# Patient Record
Sex: MALE | Race: WHITE | NOT HISPANIC OR LATINO | Employment: OTHER | URBAN - METROPOLITAN AREA
[De-identification: names, ages, dates, MRNs, and addresses within clinical notes are randomized per-mention and may not be internally consistent; named-entity substitution may affect disease eponyms.]

---

## 2018-07-28 ENCOUNTER — HOSPITAL ENCOUNTER (OUTPATIENT)
Facility: HOSPITAL | Age: 68
Discharge: HOME | End: 2018-07-30
Attending: EMERGENCY MEDICINE | Admitting: HOSPITALIST
Payer: COMMERCIAL

## 2018-07-28 DIAGNOSIS — E11.8 TYPE 2 DIABETES MELLITUS WITH COMPLICATION, WITH LONG-TERM CURRENT USE OF INSULIN: ICD-10-CM

## 2018-07-28 DIAGNOSIS — I25.10 CORONARY ARTERY DISEASE: ICD-10-CM

## 2018-07-28 DIAGNOSIS — R07.9 CHEST PAIN: Primary | ICD-10-CM

## 2018-07-28 DIAGNOSIS — Z79.4 TYPE 2 DIABETES MELLITUS WITH COMPLICATION, WITH LONG-TERM CURRENT USE OF INSULIN: ICD-10-CM

## 2018-07-28 LAB
ALBUMIN SERPL BCP-MCNC: 3.5 G/DL
ALP SERPL-CCNC: 111 U/L
ALT SERPL W/O P-5'-P-CCNC: 7 U/L
ANION GAP SERPL CALC-SCNC: 9 MMOL/L
AST SERPL-CCNC: 15 U/L
BASOPHILS # BLD AUTO: 0.07 K/UL
BASOPHILS NFR BLD: 0.8 %
BILIRUB SERPL-MCNC: 0.7 MG/DL
BNP SERPL-MCNC: 13 PG/ML
BUN SERPL-MCNC: 15 MG/DL
CALCIUM SERPL-MCNC: 8.9 MG/DL
CHLORIDE SERPL-SCNC: 104 MMOL/L
CO2 SERPL-SCNC: 24 MMOL/L
CREAT SERPL-MCNC: 1.1 MG/DL
DIFFERENTIAL METHOD: ABNORMAL
EOSINOPHIL # BLD AUTO: 0.1 K/UL
EOSINOPHIL NFR BLD: 1.4 %
ERYTHROCYTE [DISTWIDTH] IN BLOOD BY AUTOMATED COUNT: 12.9 %
EST. GFR  (AFRICAN AMERICAN): >60 ML/MIN/1.73 M^2
EST. GFR  (NON AFRICAN AMERICAN): >60 ML/MIN/1.73 M^2
GLUCOSE SERPL-MCNC: 350 MG/DL
HCT VFR BLD AUTO: 40.8 %
HGB BLD-MCNC: 13.5 G/DL
IMM GRANULOCYTES # BLD AUTO: 0.05 K/UL
IMM GRANULOCYTES NFR BLD AUTO: 0.6 %
LYMPHOCYTES # BLD AUTO: 2.6 K/UL
LYMPHOCYTES NFR BLD: 29.5 %
MAGNESIUM SERPL-MCNC: 1.6 MG/DL
MCH RBC QN AUTO: 29.3 PG
MCHC RBC AUTO-ENTMCNC: 33.1 G/DL
MCV RBC AUTO: 89 FL
MONOCYTES # BLD AUTO: 0.6 K/UL
MONOCYTES NFR BLD: 6.3 %
NEUTROPHILS # BLD AUTO: 5.4 K/UL
NEUTROPHILS NFR BLD: 61.4 %
NRBC BLD-RTO: 0 /100 WBC
PHOSPHATE SERPL-MCNC: 3.4 MG/DL
PLATELET # BLD AUTO: 203 K/UL
PMV BLD AUTO: 10.9 FL
POTASSIUM SERPL-SCNC: 4.2 MMOL/L
PROT SERPL-MCNC: 6.6 G/DL
RBC # BLD AUTO: 4.6 M/UL
SODIUM SERPL-SCNC: 137 MMOL/L
TROPONIN I SERPL DL<=0.01 NG/ML-MCNC: <0.006 NG/ML
WBC # BLD AUTO: 8.85 K/UL

## 2018-07-28 PROCEDURE — 99285 EMERGENCY DEPT VISIT HI MDM: CPT | Mod: 25

## 2018-07-28 PROCEDURE — 83880 ASSAY OF NATRIURETIC PEPTIDE: CPT

## 2018-07-28 PROCEDURE — 80053 COMPREHEN METABOLIC PANEL: CPT

## 2018-07-28 PROCEDURE — G0378 HOSPITAL OBSERVATION PER HR: HCPCS

## 2018-07-28 PROCEDURE — 83735 ASSAY OF MAGNESIUM: CPT

## 2018-07-28 PROCEDURE — 85025 COMPLETE CBC W/AUTO DIFF WBC: CPT

## 2018-07-28 PROCEDURE — 93005 ELECTROCARDIOGRAM TRACING: CPT

## 2018-07-28 PROCEDURE — 84100 ASSAY OF PHOSPHORUS: CPT

## 2018-07-28 PROCEDURE — 99285 EMERGENCY DEPT VISIT HI MDM: CPT | Mod: ,,, | Performed by: NURSE PRACTITIONER

## 2018-07-28 PROCEDURE — 84484 ASSAY OF TROPONIN QUANT: CPT

## 2018-07-28 PROCEDURE — 25000003 PHARM REV CODE 250: Performed by: NURSE PRACTITIONER

## 2018-07-28 PROCEDURE — 93010 ELECTROCARDIOGRAM REPORT: CPT | Mod: ,,, | Performed by: INTERNAL MEDICINE

## 2018-07-28 PROCEDURE — 99220 PR INITIAL OBSERVATION CARE,LEVL III: CPT | Mod: ,,, | Performed by: NURSE PRACTITIONER

## 2018-07-28 PROCEDURE — 63600175 PHARM REV CODE 636 W HCPCS: Performed by: NURSE PRACTITIONER

## 2018-07-28 RX ORDER — PANTOPRAZOLE SODIUM 40 MG/1
40 TABLET, DELAYED RELEASE ORAL DAILY
Status: DISCONTINUED | OUTPATIENT
Start: 2018-07-29 | End: 2018-07-30 | Stop reason: HOSPADM

## 2018-07-28 RX ORDER — ASPIRIN 325 MG
325 TABLET ORAL
Status: COMPLETED | OUTPATIENT
Start: 2018-07-28 | End: 2018-07-28

## 2018-07-28 RX ORDER — SODIUM CHLORIDE 0.9 % (FLUSH) 0.9 %
5 SYRINGE (ML) INJECTION
Status: DISCONTINUED | OUTPATIENT
Start: 2018-07-29 | End: 2018-07-30 | Stop reason: HOSPADM

## 2018-07-28 RX ORDER — AMOXICILLIN 250 MG
1 CAPSULE ORAL 2 TIMES DAILY PRN
Status: DISCONTINUED | OUTPATIENT
Start: 2018-07-29 | End: 2018-07-30 | Stop reason: HOSPADM

## 2018-07-28 RX ORDER — IPRATROPIUM BROMIDE AND ALBUTEROL SULFATE 2.5; .5 MG/3ML; MG/3ML
3 SOLUTION RESPIRATORY (INHALATION) EVERY 4 HOURS PRN
Status: DISCONTINUED | OUTPATIENT
Start: 2018-07-29 | End: 2018-07-30 | Stop reason: HOSPADM

## 2018-07-28 RX ORDER — NITROGLYCERIN 0.4 MG/1
0.4 TABLET SUBLINGUAL EVERY 5 MIN PRN
Status: DISCONTINUED | OUTPATIENT
Start: 2018-07-28 | End: 2018-07-29

## 2018-07-28 RX ORDER — KETOROLAC TROMETHAMINE 30 MG/ML
15 INJECTION, SOLUTION INTRAMUSCULAR; INTRAVENOUS EVERY 6 HOURS PRN
Status: DISCONTINUED | OUTPATIENT
Start: 2018-07-29 | End: 2018-07-30 | Stop reason: HOSPADM

## 2018-07-28 RX ORDER — ACETAMINOPHEN 325 MG/1
650 TABLET ORAL EVERY 6 HOURS PRN
Status: DISCONTINUED | OUTPATIENT
Start: 2018-07-29 | End: 2018-07-30 | Stop reason: HOSPADM

## 2018-07-28 RX ORDER — RAMELTEON 8 MG/1
8 TABLET ORAL NIGHTLY PRN
Status: DISCONTINUED | OUTPATIENT
Start: 2018-07-29 | End: 2018-07-30 | Stop reason: HOSPADM

## 2018-07-28 RX ORDER — ONDANSETRON 8 MG/1
8 TABLET, ORALLY DISINTEGRATING ORAL EVERY 8 HOURS PRN
Status: DISCONTINUED | OUTPATIENT
Start: 2018-07-29 | End: 2018-07-30 | Stop reason: HOSPADM

## 2018-07-28 RX ORDER — ATORVASTATIN CALCIUM 20 MG/1
80 TABLET, FILM COATED ORAL NIGHTLY
Status: DISCONTINUED | OUTPATIENT
Start: 2018-07-29 | End: 2018-07-30 | Stop reason: HOSPADM

## 2018-07-28 RX ORDER — AMLODIPINE AND BENAZEPRIL HYDROCHLORIDE 5; 20 MG/1; MG/1
1 CAPSULE ORAL DAILY
Status: DISCONTINUED | OUTPATIENT
Start: 2018-07-29 | End: 2018-07-30 | Stop reason: HOSPADM

## 2018-07-28 RX ORDER — ASPIRIN 81 MG/1
81 TABLET ORAL DAILY
Status: DISCONTINUED | OUTPATIENT
Start: 2018-07-29 | End: 2018-07-30 | Stop reason: HOSPADM

## 2018-07-28 RX ADMIN — NITROGLYCERIN 0.4 MG: 0.4 TABLET SUBLINGUAL at 10:07

## 2018-07-28 RX ADMIN — ASPIRIN 325 MG ORAL TABLET 325 MG: 325 PILL ORAL at 09:07

## 2018-07-29 LAB
ALBUMIN SERPL BCP-MCNC: 3.2 G/DL
ALP SERPL-CCNC: 92 U/L
ALT SERPL W/O P-5'-P-CCNC: 8 U/L
ANION GAP SERPL CALC-SCNC: 8 MMOL/L
AST SERPL-CCNC: 11 U/L
BASOPHILS # BLD AUTO: 0.04 K/UL
BASOPHILS NFR BLD: 0.6 %
BILIRUB SERPL-MCNC: 0.5 MG/DL
BUN SERPL-MCNC: 17 MG/DL
CALCIUM SERPL-MCNC: 8.6 MG/DL
CHLORIDE SERPL-SCNC: 105 MMOL/L
CO2 SERPL-SCNC: 23 MMOL/L
CREAT SERPL-MCNC: 0.9 MG/DL
DIFFERENTIAL METHOD: ABNORMAL
EOSINOPHIL # BLD AUTO: 0.1 K/UL
EOSINOPHIL NFR BLD: 2 %
ERYTHROCYTE [DISTWIDTH] IN BLOOD BY AUTOMATED COUNT: 12.9 %
EST. GFR  (AFRICAN AMERICAN): >60 ML/MIN/1.73 M^2
EST. GFR  (NON AFRICAN AMERICAN): >60 ML/MIN/1.73 M^2
ESTIMATED AVG GLUCOSE: 214 MG/DL
GLUCOSE SERPL-MCNC: 223 MG/DL
HBA1C MFR BLD HPLC: 9.1 %
HCT VFR BLD AUTO: 38 %
HGB BLD-MCNC: 12.9 G/DL
IMM GRANULOCYTES # BLD AUTO: 0.05 K/UL
IMM GRANULOCYTES NFR BLD AUTO: 0.7 %
LYMPHOCYTES # BLD AUTO: 2.2 K/UL
LYMPHOCYTES NFR BLD: 30.5 %
MAGNESIUM SERPL-MCNC: 1.9 MG/DL
MCH RBC QN AUTO: 29.8 PG
MCHC RBC AUTO-ENTMCNC: 33.9 G/DL
MCV RBC AUTO: 88 FL
MONOCYTES # BLD AUTO: 0.5 K/UL
MONOCYTES NFR BLD: 7.4 %
NEUTROPHILS # BLD AUTO: 4.2 K/UL
NEUTROPHILS NFR BLD: 58.8 %
NRBC BLD-RTO: 0 /100 WBC
PHOSPHATE SERPL-MCNC: 3.7 MG/DL
PLATELET # BLD AUTO: 180 K/UL
PMV BLD AUTO: 11 FL
POCT GLUCOSE: 212 MG/DL (ref 70–110)
POCT GLUCOSE: 242 MG/DL (ref 70–110)
POCT GLUCOSE: 253 MG/DL (ref 70–110)
POCT GLUCOSE: 258 MG/DL (ref 70–110)
POTASSIUM SERPL-SCNC: 4.1 MMOL/L
PROT SERPL-MCNC: 6 G/DL
RBC # BLD AUTO: 4.33 M/UL
SODIUM SERPL-SCNC: 136 MMOL/L
TROPONIN I SERPL DL<=0.01 NG/ML-MCNC: 0.01 NG/ML
TROPONIN I SERPL DL<=0.01 NG/ML-MCNC: 0.02 NG/ML
TROPONIN I SERPL DL<=0.01 NG/ML-MCNC: <0.006 NG/ML
TROPONIN I SERPL DL<=0.01 NG/ML-MCNC: <0.006 NG/ML
TSH SERPL DL<=0.005 MIU/L-ACNC: 2.4 UIU/ML
WBC # BLD AUTO: 7.05 K/UL

## 2018-07-29 PROCEDURE — 36415 COLL VENOUS BLD VENIPUNCTURE: CPT

## 2018-07-29 PROCEDURE — 80053 COMPREHEN METABOLIC PANEL: CPT

## 2018-07-29 PROCEDURE — 25000003 PHARM REV CODE 250: Performed by: NURSE PRACTITIONER

## 2018-07-29 PROCEDURE — 83036 HEMOGLOBIN GLYCOSYLATED A1C: CPT

## 2018-07-29 PROCEDURE — 63600175 PHARM REV CODE 636 W HCPCS: Performed by: PHYSICIAN ASSISTANT

## 2018-07-29 PROCEDURE — 99226 PR SUBSEQUENT OBSERVATION CARE,LEVEL III: CPT | Mod: ,,, | Performed by: PHYSICIAN ASSISTANT

## 2018-07-29 PROCEDURE — 85025 COMPLETE CBC W/AUTO DIFF WBC: CPT

## 2018-07-29 PROCEDURE — 63600175 PHARM REV CODE 636 W HCPCS: Performed by: NURSE PRACTITIONER

## 2018-07-29 PROCEDURE — 84484 ASSAY OF TROPONIN QUANT: CPT

## 2018-07-29 PROCEDURE — G0378 HOSPITAL OBSERVATION PER HR: HCPCS

## 2018-07-29 PROCEDURE — 84443 ASSAY THYROID STIM HORMONE: CPT

## 2018-07-29 PROCEDURE — 93010 ELECTROCARDIOGRAM REPORT: CPT | Mod: ,,, | Performed by: INTERNAL MEDICINE

## 2018-07-29 PROCEDURE — 84100 ASSAY OF PHOSPHORUS: CPT

## 2018-07-29 PROCEDURE — 93005 ELECTROCARDIOGRAM TRACING: CPT

## 2018-07-29 PROCEDURE — 83735 ASSAY OF MAGNESIUM: CPT

## 2018-07-29 RX ORDER — IBUPROFEN 200 MG
24 TABLET ORAL
Status: DISCONTINUED | OUTPATIENT
Start: 2018-07-29 | End: 2018-07-30 | Stop reason: HOSPADM

## 2018-07-29 RX ORDER — INSULIN ASPART 100 [IU]/ML
1-10 INJECTION, SOLUTION INTRAVENOUS; SUBCUTANEOUS
Status: DISCONTINUED | OUTPATIENT
Start: 2018-07-29 | End: 2018-07-30 | Stop reason: HOSPADM

## 2018-07-29 RX ORDER — IBUPROFEN 200 MG
16 TABLET ORAL
Status: DISCONTINUED | OUTPATIENT
Start: 2018-07-29 | End: 2018-07-30 | Stop reason: HOSPADM

## 2018-07-29 RX ORDER — GLUCAGON 1 MG
1 KIT INJECTION
Status: DISCONTINUED | OUTPATIENT
Start: 2018-07-29 | End: 2018-07-30 | Stop reason: HOSPADM

## 2018-07-29 RX ADMIN — ASPIRIN 81 MG: 81 TABLET, COATED ORAL at 09:07

## 2018-07-29 RX ADMIN — KETOROLAC TROMETHAMINE 15 MG: 30 INJECTION, SOLUTION INTRAMUSCULAR at 12:07

## 2018-07-29 RX ADMIN — ATORVASTATIN CALCIUM 80 MG: 20 TABLET, FILM COATED ORAL at 11:07

## 2018-07-29 RX ADMIN — INSULIN ASPART 6 UNITS: 100 INJECTION, SOLUTION INTRAVENOUS; SUBCUTANEOUS at 01:07

## 2018-07-29 RX ADMIN — AMLODIPINE AND BENAZEPRIL HYDROCHLORIDE 1 CAPSULE: 5; 20 CAPSULE ORAL at 10:07

## 2018-07-29 RX ADMIN — INSULIN DETEMIR 40 UNITS: 100 INJECTION, SOLUTION SUBCUTANEOUS at 11:07

## 2018-07-29 RX ADMIN — INSULIN ASPART 4 UNITS: 100 INJECTION, SOLUTION INTRAVENOUS; SUBCUTANEOUS at 05:07

## 2018-07-29 RX ADMIN — INSULIN ASPART 3 UNITS: 100 INJECTION, SOLUTION INTRAVENOUS; SUBCUTANEOUS at 11:07

## 2018-07-29 RX ADMIN — PANTOPRAZOLE SODIUM 40 MG: 40 TABLET, DELAYED RELEASE ORAL at 09:07

## 2018-07-29 RX ADMIN — KETOROLAC TROMETHAMINE 15 MG: 30 INJECTION, SOLUTION INTRAMUSCULAR at 01:07

## 2018-07-29 NOTE — ASSESSMENT & PLAN NOTE
- hx of CAD s/p stent placement x 2  - initial troponin <0.006 - trend q 6 x 3  - maintain on telemetry   - continue ASA, statin  - trend electrolytes and replete as indicated

## 2018-07-29 NOTE — ED NOTES
Pt says that his heart pain started around 0900 this morning. Pt says that he took a nap to try and help, but it didn't help so he woke up and took two, 325 mg aspirins followed with 2 SL nitro. Pt says that he still doesn't have relief. Pt describes the chest pain as sharp. Pt denies aggravating or alleviating factors to chest pain.

## 2018-07-29 NOTE — H&P
[facility] Hospital Medicine  History & Physical    Patient Name: Paul Deleon  MRN: [id number]  Admission Date: 7/28/2018  Attending Physician: Geovanna Zafar MD   Primary Care Provider: Bree Larose MD    [facility] Medicine Team: [facility] E Avi Espitia NP     Patient information was obtained from patient and ER records.     Subjective:     Principal Problem:Chest pain    Chief Complaint:   Chief Complaint   Patient presents with    Chest Pain     Patient reports left sided chest pain since this AM, states that he took 2 Nitros at home and 2 325mg ASA at home with no relief. Patient reports a mild shortness of breath. Patient reports history of 2 stents.         HPI: 66 y/o gentleman, who presents to the ED with c/o sharp left sided chest pain.  He has a PMH of CAD s/p stent placement, DM, HTN, HLD, and GERD.  He reports that discomfort started around 07:00 am on 07/28/2018 and has been constant since that time.  He endorses brief mild nausea at onset of pain.  He states that discomfort is exacerbated with movement and deep inspiration.  He reports taking 325mg ASA and 2 NTG prior to arrival with no improvement in symptoms.  While in the ED he received additional dosing of NTG.  He reports that discomfort is unchanged since arrival.  Initial workup reveals no leukocytosis, afebrile, CXR with no acute findings, troponin <0.006, EKG without obvious ischemic changes.      Past Medical History:   Diagnosis Date    Coronary artery disease     Diabetes mellitus type II     GERD (gastroesophageal reflux disease)     Heart attack     Hyperlipidemia     Hypertension     MI (mitral incompetence)     MI (myocardial infarction)     Ulcer        Past Surgical History:   Procedure Laterality Date    ANKLE SURGERY      BACK SURGERY      CORONARY ANGIOPLASTY WITH STENT PLACEMENT      ELBOW SURGERY      lft    THROAT SURGERY         Review of patient's allergies indicates:  No Known  "Allergies    No current facility-administered medications on file prior to encounter.      Current Outpatient Prescriptions on File Prior to Encounter   Medication Sig    amlodipine-benazepril (LOTREL) 5-40 mg per capsule Take 1 capsule by mouth once daily.    aspirin (ECOTRIN) 81 MG EC tablet Take 1 tablet (81 mg total) by mouth once daily.    atorvastatin (LIPITOR) 80 MG tablet Take 1 tablet (80 mg total) by mouth every evening.    blood sugar diagnostic (FREESTYLE LITE STRIPS) Strp Inject 1 strip into the skin 3 (three) times daily.    cyclobenzaprine (FLEXERIL) 10 MG tablet     FREESTYLE LANCETS lancets USE THREE TIMES DAILY    hydrocodone-acetaminophen 7.5-300 mg Tab     insulin detemir (LEVEMIR FLEXPEN) 100 unit/mL (3 mL) InPn pen 40 units at bedtime    insulin lispro (HUMALOG KWIKPEN) 100 unit/mL InPn pen Inject 3 Units into the skin 4 (four) times daily before meals and nightly.    insulin needles, disposable, (BD INSULIN PEN NEEDLE UF SHORT) 31 X 5/16 " Ndle Inject 4 application into the skin 4 (four) times daily as needed.    isosorbide mononitrate (IMDUR) 30 MG 24 hr tablet Take 1 tablet (30 mg total) by mouth once daily.    metformin (GLUCOPHAGE) 1000 MG tablet Take 1 tablet (1,000 mg total) by mouth 2 (two) times daily with meals.    nitroGLYCERIN (NITROSTAT) 0.4 MG SL tablet Place 0.4 mg under the tongue every 5 (five) minutes as needed for Chest pain.    pantoprazole (PROTONIX) 40 MG tablet Take 1 tablet (40 mg total) by mouth once daily.     Family History     Problem Relation (Age of Onset)    Heart disease Mother, Father        Social History Main Topics    Smoking status: Never Smoker    Smokeless tobacco: Not on file    Alcohol use No    Drug use: No    Sexual activity: Yes     Partners: Female     Birth control/ protection: Other-see comments     Review of Systems   Constitutional: Negative for chills and fever.   HENT: Negative for congestion and trouble swallowing.    Eyes: " Negative for discharge and visual disturbance.   Respiratory: Negative for cough, chest tightness and shortness of breath.    Cardiovascular: Positive for chest pain. Negative for palpitations and leg swelling.        C/O left sided sharp CP exacerbated by deep inspiration and position changes    Gastrointestinal: Positive for nausea. Negative for abdominal distention, abdominal pain, diarrhea and vomiting.   Genitourinary: Negative for frequency and hematuria.   Musculoskeletal: Negative for back pain and joint swelling.   Skin: Negative for color change and rash.   Neurological: Negative for seizures and syncope.     Objective:     Vital Signs (Most Recent):  Temp: 97.7 °F (36.5 °C) (07/28/18 2053)  Pulse: 72 (07/28/18 2312)  Resp: 17 (07/28/18 2312)  BP: (!) 101/57 (07/28/18 2312)  SpO2: 98 % (07/28/18 2312) Vital Signs (24h Range):  Temp:  [97.7 °F (36.5 °C)] 97.7 °F (36.5 °C)  Pulse:  [71-81] 72  Resp:  [16-21] 17  SpO2:  [96 %-99 %] 98 %  BP: (101-145)/(57-90) 101/57     Weight: 94.3 kg (208 lb)  Body mass index is 33.57 kg/m².    Physical Exam   Constitutional: He is oriented to person, place, and time. He appears well-developed and well-nourished. No distress.   HENT:   Head: Normocephalic and atraumatic.   Eyes: EOM are normal. Pupils are equal, round, and reactive to light.   Neck: Normal range of motion. Neck supple.   Cardiovascular: Normal rate, regular rhythm, normal heart sounds and intact distal pulses.    No murmur heard.  Pulmonary/Chest: Effort normal and breath sounds normal. No respiratory distress. He has no wheezes. He has no rales.   Abdominal: Soft. Bowel sounds are normal. He exhibits no distension. There is no tenderness.   Musculoskeletal: Normal range of motion. He exhibits no edema.   Neurological: He is alert and oriented to person, place, and time.   Skin: Skin is warm and dry. He is not diaphoretic.   Psychiatric: He has a normal mood and affect. His behavior is normal.   Nursing  note and vitals reviewed.        CRANIAL NERVES     CN III, IV, VI   Pupils are equal, round, and reactive to light.  Extraocular motions are normal.        Significant Labs:   CBC:   Recent Labs  Lab 07/28/18 2116   WBC 8.85   HGB 13.5*   HCT 40.8        CMP:   Recent Labs  Lab 07/28/18 2116      K 4.2      CO2 24   *   BUN 15   CREATININE 1.1   CALCIUM 8.9   PROT 6.6   ALBUMIN 3.5   BILITOT 0.7   ALKPHOS 111   AST 15   ALT 7*   ANIONGAP 9   EGFRNONAA >60.0     Cardiac Markers:   Recent Labs  Lab 07/28/18 2116   BNP 13     Troponin:   Recent Labs  Lab 07/28/18 2116   TROPONINI <0.006       Significant Imaging: I have reviewed all pertinent imaging results/findings within the past 24 hours.    Assessment/Plan:     * Chest pain    - presents to ED with c/o sharp L sided chest pain exacerbated by movement and deep inspiration   - EKG without obvious ischemic changes - initial troponin <0.006 - CXR with no acute findings  - cardiology consulted while in ED  - appreciate rec's  - NPO after MN   - trend troponin Q 6 x 3  - hold NTG for now   - continue ASA and statin  - add ketorolac PRN for moderate discomfort ? Musculoskeletal discomfort          Coronary artery disease    - hx of CAD s/p stent placement x 2  - initial troponin <0.006 - trend q 6 x 3  - maintain on telemetry   - continue ASA, statin  - trend electrolytes and replete as indicated         Type 2 diabetes mellitus, with long-term current use of insulin    - glucose 350 on arrival (poorly controlled)  - resume home long acting insulin dosing  - hold metformin for now  - ISS AC/HS  - HgA1c pending         Hypertension associated with diabetes    - continue home antihypertensive regimen         Hyperlipidemia    - resume atorvastatin           VTE Risk Mitigation         Ordered     IP VTE HIGH RISK PATIENT  Once      07/28/18 2350     Place BELTRAN hose  Until discontinued      07/28/18 2350     Place sequential compression device   Until discontinued      07/28/18 4691             Avi Espitia NP  Department of Hospital Medicine   [facility]

## 2018-07-29 NOTE — SUBJECTIVE & OBJECTIVE
"Past Medical History:   Diagnosis Date    Coronary artery disease     Diabetes mellitus type II     GERD (gastroesophageal reflux disease)     Heart attack     Hyperlipidemia     Hypertension     MI (mitral incompetence)     MI (myocardial infarction)     Ulcer        Past Surgical History:   Procedure Laterality Date    ANKLE SURGERY      BACK SURGERY      CORONARY ANGIOPLASTY WITH STENT PLACEMENT      ELBOW SURGERY      lft    THROAT SURGERY         Review of patient's allergies indicates:  No Known Allergies    No current facility-administered medications on file prior to encounter.      Current Outpatient Prescriptions on File Prior to Encounter   Medication Sig    amlodipine-benazepril (LOTREL) 5-40 mg per capsule Take 1 capsule by mouth once daily.    aspirin (ECOTRIN) 81 MG EC tablet Take 1 tablet (81 mg total) by mouth once daily.    atorvastatin (LIPITOR) 80 MG tablet Take 1 tablet (80 mg total) by mouth every evening.    blood sugar diagnostic (FREESTYLE LITE STRIPS) Strp Inject 1 strip into the skin 3 (three) times daily.    cyclobenzaprine (FLEXERIL) 10 MG tablet     FREESTYLE LANCETS lancets USE THREE TIMES DAILY    hydrocodone-acetaminophen 7.5-300 mg Tab     insulin detemir (LEVEMIR FLEXPEN) 100 unit/mL (3 mL) InPn pen 40 units at bedtime    insulin lispro (HUMALOG KWIKPEN) 100 unit/mL InPn pen Inject 3 Units into the skin 4 (four) times daily before meals and nightly.    insulin needles, disposable, (BD INSULIN PEN NEEDLE UF SHORT) 31 X 5/16 " Ndle Inject 4 application into the skin 4 (four) times daily as needed.    isosorbide mononitrate (IMDUR) 30 MG 24 hr tablet Take 1 tablet (30 mg total) by mouth once daily.    metformin (GLUCOPHAGE) 1000 MG tablet Take 1 tablet (1,000 mg total) by mouth 2 (two) times daily with meals.    nitroGLYCERIN (NITROSTAT) 0.4 MG SL tablet Place 0.4 mg under the tongue every 5 (five) minutes as needed for Chest pain.    pantoprazole " (PROTONIX) 40 MG tablet Take 1 tablet (40 mg total) by mouth once daily.     Family History     Problem Relation (Age of Onset)    Heart disease Mother, Father        Social History Main Topics    Smoking status: Never Smoker    Smokeless tobacco: Not on file    Alcohol use No    Drug use: No    Sexual activity: Yes     Partners: Female     Birth control/ protection: Other-see comments     Review of Systems   Constitutional: Negative for chills and fever.   HENT: Negative for congestion and trouble swallowing.    Eyes: Negative for discharge and visual disturbance.   Respiratory: Negative for cough, chest tightness and shortness of breath.    Cardiovascular: Positive for chest pain. Negative for palpitations and leg swelling.        C/O left sided sharp CP exacerbated by deep inspiration and position changes    Gastrointestinal: Positive for nausea. Negative for abdominal distention, abdominal pain, diarrhea and vomiting.   Genitourinary: Negative for frequency and hematuria.   Musculoskeletal: Negative for back pain and joint swelling.   Skin: Negative for color change and rash.   Neurological: Negative for seizures and syncope.     Objective:     Vital Signs (Most Recent):  Temp: 97.7 °F (36.5 °C) (07/28/18 2053)  Pulse: 72 (07/28/18 2312)  Resp: 17 (07/28/18 2312)  BP: (!) 101/57 (07/28/18 2312)  SpO2: 98 % (07/28/18 2312) Vital Signs (24h Range):  Temp:  [97.7 °F (36.5 °C)] 97.7 °F (36.5 °C)  Pulse:  [71-81] 72  Resp:  [16-21] 17  SpO2:  [96 %-99 %] 98 %  BP: (101-145)/(57-90) 101/57     Weight: 94.3 kg (208 lb)  Body mass index is 33.57 kg/m².    Physical Exam   Constitutional: He is oriented to person, place, and time. He appears well-developed and well-nourished. No distress.   HENT:   Head: Normocephalic and atraumatic.   Eyes: EOM are normal. Pupils are equal, round, and reactive to light.   Neck: Normal range of motion. Neck supple.   Cardiovascular: Normal rate, regular rhythm, normal heart sounds  and intact distal pulses.    No murmur heard.  Pulmonary/Chest: Effort normal and breath sounds normal. No respiratory distress. He has no wheezes. He has no rales.   Abdominal: Soft. Bowel sounds are normal. He exhibits no distension. There is no tenderness.   Musculoskeletal: Normal range of motion. He exhibits no edema.   Neurological: He is alert and oriented to person, place, and time.   Skin: Skin is warm and dry. He is not diaphoretic.   Psychiatric: He has a normal mood and affect. His behavior is normal.   Nursing note and vitals reviewed.        CRANIAL NERVES     CN III, IV, VI   Pupils are equal, round, and reactive to light.  Extraocular motions are normal.        Significant Labs:   CBC:   Recent Labs  Lab 07/28/18 2116   WBC 8.85   HGB 13.5*   HCT 40.8        CMP:   Recent Labs  Lab 07/28/18 2116      K 4.2      CO2 24   *   BUN 15   CREATININE 1.1   CALCIUM 8.9   PROT 6.6   ALBUMIN 3.5   BILITOT 0.7   ALKPHOS 111   AST 15   ALT 7*   ANIONGAP 9   EGFRNONAA >60.0     Cardiac Markers:   Recent Labs  Lab 07/28/18 2116   BNP 13     Troponin:   Recent Labs  Lab 07/28/18 2116   TROPONINI <0.006       Significant Imaging: I have reviewed all pertinent imaging results/findings within the past 24 hours.

## 2018-07-29 NOTE — HPI
68 y/o gentleman, who presents to the ED with c/o sharp left sided chest pain.  He has a PMH of CAD s/p stent placement, DM, HTN, HLD, and GERD.  He reports that discomfort started around 07:00 am on 07/28/2018 and has been constant since that time.  He endorses brief mild nausea at onset of pain.  He states that discomfort is exacerbated with movement and deep inspiration.  He reports taking 325mg ASA and 2 NTG prior to arrival with no improvement in symptoms.  While in the ED he received additional dosing of NTG.  He reports that discomfort is unchanged since arrival.  Initial workup reveals no leukocytosis, afebrile, CXR with no acute findings, troponin <0.006, EKG without obvious ischemic changes.

## 2018-07-29 NOTE — ASSESSMENT & PLAN NOTE
- presents to ED with c/o sharp L sided chest pain exacerbated by movement and deep inspiration   - EKG without obvious ischemic changes - initial troponin <0.006 - CXR with no acute findings  - cardiology consulted while in ED  - appreciate rec's  - NPO after MN   - trend troponin Q 6 x 3  - hold NTG for now   - continue ASA and statin  - add ketorolac PRN for moderate discomfort ? Musculoskeletal discomfort

## 2018-07-29 NOTE — ASSESSMENT & PLAN NOTE
63 y/o male with Hx of HTN, HLD, Obesity, CAD (STEMI s/p OM3 stent in 05/13, angiogram in 9/2013 with nonobstructive disease, reported PCI at OSH in 2014-no records), DM2, preserved EF presenting with nonanginal, constant CP for last ~12 hrs. No change with now SL NTG x4. Cardiac enzymes negative, no significant EKG changes; no CXR findings. BP equivalent in both arms and pulses intact. Wells 0, no signs/sx of PE. Does note some worsening with inspiration, worse with leaning forward. MSK most likely, pleurisy or pericarditis also possible. Bedside echo unremarkable. Unlikely CAD by hx, negative enzymes and EKG, though with prior obstructive CAD and RFs, risk intermediate by Heart, BIBIANA.     -Admit to Medicine  -NPO at midnight, would obtain DSE in AM if available on Sun  -Serial Troponin, EKG  -Continue current medications including ASA, statin  -Would not give further NTG

## 2018-07-29 NOTE — ED PROVIDER NOTES
Encounter Date: 7/28/2018       History     Chief Complaint   Patient presents with    Chest Pain     Patient reports left sided chest pain since this AM, states that he took 2 Nitros at home and 2 325mg ASA at home with no relief. Patient reports a mild shortness of breath. Patient reports history of 2 stents.      Patient is a 67-year-old male with medical history of diabetes mellitus, GERD, CAD, hyperlipidemia, hypertension, mi presenting to the ED for left-sided chest pain. Patient describes pain as stabbing and constant.  Patient denies any radiation.  Patient states he took 2 nitro as well as to 325 aspirin at home which did not resolve symptoms. Patient states he had cardiac catheterization x2 5 years ago.  Patient does not follow up with cardiology at this time.  Patient denies any shortness of breath, fever, chills, nausea, vomiting or diarrhea.          Review of patient's allergies indicates:  No Known Allergies  Past Medical History:   Diagnosis Date    Coronary artery disease     Diabetes mellitus type II     GERD (gastroesophageal reflux disease)     Heart attack     Hyperlipidemia     Hypertension     MI (mitral incompetence)     MI (myocardial infarction)     Ulcer      Past Surgical History:   Procedure Laterality Date    ANKLE SURGERY      BACK SURGERY      CORONARY ANGIOPLASTY WITH STENT PLACEMENT      ELBOW SURGERY      lft    THROAT SURGERY       Family History   Problem Relation Age of Onset    Heart disease Mother         cad    Heart disease Father         pacemaker    Melanoma Neg Hx     Psoriasis Neg Hx     Lupus Neg Hx     Eczema Neg Hx     Acne Neg Hx      Social History   Substance Use Topics    Smoking status: Never Smoker    Smokeless tobacco: Not on file    Alcohol use No     Review of Systems   Constitutional: Negative for chills, diaphoresis and fever.   HENT: Negative for sore throat.    Respiratory: Negative for chest tightness, shortness of breath and  wheezing.    Cardiovascular: Positive for chest pain. Negative for palpitations.   Gastrointestinal: Negative for abdominal pain, constipation, diarrhea, nausea and vomiting.   Genitourinary: Negative for dysuria.   Musculoskeletal: Negative for back pain.   Skin: Negative for color change, pallor and rash.   Neurological: Negative for dizziness, syncope, weakness, numbness and headaches.   Hematological: Does not bruise/bleed easily.       Physical Exam     Initial Vitals [07/28/18 2053]   BP Pulse Resp Temp SpO2   135/80 81 18 97.7 °F (36.5 °C) 96 %      MAP       --         Physical Exam    Nursing note and vitals reviewed.  Constitutional: He appears well-developed and well-nourished. He is not diaphoretic. He is cooperative. He does not have a sickly appearance. He does not appear ill. No distress.   HENT:   Head: Normocephalic and atraumatic.   Eyes: Pupils are equal, round, and reactive to light.   Neck: Trachea normal, normal range of motion, full passive range of motion without pain and phonation normal. Neck supple. No JVD present.   Cardiovascular: Normal rate, regular rhythm and normal heart sounds.   Pulses:       Radial pulses are 2+ on the right side, and 2+ on the left side.        Dorsalis pedis pulses are 2+ on the right side, and 2+ on the left side.   Pulmonary/Chest: Effort normal and breath sounds normal. Chest wall is not dull to percussion. He exhibits tenderness ( mild). He exhibits no mass, no bony tenderness, no laceration, no crepitus, no edema, no deformity, no swelling and no retraction.       Abdominal: Soft. Bowel sounds are normal.   Musculoskeletal: Normal range of motion.   Neurological: He is alert and oriented to person, place, and time. He has normal strength. No cranial nerve deficit or sensory deficit. GCS eye subscore is 4. GCS verbal subscore is 5. GCS motor subscore is 6.   Skin: Skin is warm, dry and intact. Capillary refill takes less than 2 seconds. No abrasion, no  "laceration and no rash noted. No cyanosis. Nails show no clubbing.   Psychiatric: He has a normal mood and affect. His speech is normal and behavior is normal. Judgment and thought content normal. Cognition and memory are normal.         ED Course   Procedures  Labs Reviewed   CBC W/ AUTO DIFFERENTIAL - Abnormal; Notable for the following:        Result Value    Hemoglobin 13.5 (*)     Immature Granulocytes 0.6 (*)     Immature Grans (Abs) 0.05 (*)     All other components within normal limits   COMPREHENSIVE METABOLIC PANEL - Abnormal; Notable for the following:     Glucose 350 (*)     ALT 7 (*)     All other components within normal limits   TROPONIN I   B-TYPE NATRIURETIC PEPTIDE   PHOSPHORUS   MAGNESIUM   TROPONIN I          Imaging Results          X-Ray Chest AP Portable (Final result)  Result time 07/28/18 21:25:23    Final result by Jonathan Hinojosa MD (07/28/18 21:25:23)                 Impression:      No acute cardiopulmonary finding.      Electronically signed by: Jonathan Hinojosa MD  Date:    07/28/2018  Time:    21:25             Narrative:    EXAMINATION:  XR CHEST AP PORTABLE    CLINICAL HISTORY:  Provided history is "Chest Pain;  ".    TECHNIQUE:  One view of the chest.    COMPARISON:  08/27/2013.    FINDINGS:  Evaluation is slightly limited by lordotic patient positioning and cardiac wires overlying the chest.  Cardiac silhouette is magnified by technique but not felt to be enlarged.  No focal consolidation. No sizable pleural effusion. No pneumothorax. No detrimental change in lung aeration.                                    Additional MDM:   PERC Rule:   Age is greater than or equal to 50 = 1.0  Heart Rate is greater than or equal to 100 = 0.0  SaO2 on room air < 95% = 0.0  Unilateral leg swelling = 0.0  Hemoptysis = 0.0  Recent surgery or trauma = 0.0  Prior PE or DVT =  0.0  Hormone use = 0.00  PERC Score = 1    APC / Resident Notes:   Emergent evaluation of a 66 yo male patient presenting to " the ER with chief complaint of left sided chest pain that started approximatly 0700 this morning.  Pt states he took 650mg ASA as well as 2 sublingual nitros with no relief.  Patient states pain is stabbing and constant and does not radiate.  On exam patient A&O x3. Abdomen soft and nontender.  Mild chest wall pain to palpation. EKG shows junctional rhythm.  I will get labs, imaging, medicate and reassess.  Differential diagnoses include but are not limited to ischemic chest pain (STEMI, NonSTEMI, or unstable angina), pulmonary embolism, aortic dissection, pericarditis, chest wall pain (rib strain, muscle strain, shingles), pneumonia, esophageal rupture, referred pain from the abdomen (biliary colic, cholecystitis, gastritis, gas pains, pancreatitis), anxiety or other causes of chest pain (GERD, esophageal spasm).  I discussed the care of this patient with my Supervising Physician.      Patient's CBC unremarkable. CMP shows elevated glucose at 3:50 a.m. troponin negative, BNP 13.  Phosphorus within normal limits at 3.4 with a magnesium of 1.6.  Chest x-ray negative.  Will consult Cardiology and reassess.  Chads score 4.  Cardiology recommending observation admission with stress echo in the am.  Pt and family updated on results.  All questions and concerns addressed.                     Clinical Impression:   The encounter diagnosis was Chest pain.      Disposition:   Disposition: Placed in Observation  Condition: Stable                        Mendy Duncan NP  07/28/18 0152

## 2018-07-29 NOTE — HPI
61 y/o male with Hx of HTN, HLD, Obesity, CAD (STEMI s/p OM3 stent in 05/13, angiogram in 9/2013 with nonobstructive disease, reported PCI at OSH in 2014-no records), DM2, preserved EF presenting with chest pain. His pain started while resting at home around 10 AM, no clear precipitant. Left sided, substernal without radiation or associated symptoms, constant since 10 AM. He took 2 SL NTG and 2x 325 ASA without relief at home. He thinks its more different than similar to his prior CP before his 2 PCIs; more severe and sharp, without radiation to his LUE as before. Feels slightly worse with sitting forward, slightly worse with deep breaths. No SOB, diaphoresis, N/V, heartburn/GERD, ISIDORO, PND, orhthopnea.    He was last seen by cardiology in clinic in 2014, doing well at that time, asymptomatic from a cardiovascular perspective. Last echo 2014 with EF 60-65%, no WMA, normal LV and RV function, normal diastology. LHC in 5/2013 with occluded OM3 s/p successful PCI. Repeat in 9/2013 with nonobstructive CAD; noted stenoses 10% pLAD, 40% ramus, 20% OM2, mRCA 10%, dRCA 10%; widely patent stent OM3; otherwise luminal irregularities.               Tn here negative, BNP 23. CXR without acute findings. /90 on presentation. EKG limited by baseline artifact, NSR, no obvious STD or TW changes. Bedside echo with intact EF, no WMA, trace pericardial effusion.    He took 2 further SL NTG here without relief.     Currently on ASA 81, atorvastatin 80, amlodipine-benazepril.

## 2018-07-29 NOTE — PLAN OF CARE
Problem: Patient Care Overview  Goal: Plan of Care Review  Outcome: Ongoing (interventions implemented as appropriate)  Pt will remain free of falls: provide non-skid socks to pt, place call light and personal belongings within reach of pt.  Pt will remain free of pain:  Monitor pt's pain level during rounding.  Will continue to monitor pt's blood sugar levels

## 2018-07-29 NOTE — CONSULTS
[facility] [address]  Cardiology  Consult Note    Patient Name: Paul Deleon  MRN: [id number]  Admission Date: 7/28/2018  Hospital Length of Stay: 0 days  Code Status: Full Code   Attending Provider: Geovanna Zafar MD   Consulting Provider: Antony Galvan MD  Primary Care Physician: Bree Larose MD  Principal Problem:Chest pain    Patient information was obtained from past medical records and ER records.     Inpatient consult to Cardiology  Consult performed by: ANTONY GALVAN  Consult ordered by: JOIE REILLY        Subjective:     Chief Complaint:  Chest pain    HPI:   63 y/o male with Hx of HTN, HLD, Obesity, CAD (STEMI s/p OM3 stent in 05/13, angiogram in 9/2013 with nonobstructive disease, reported PCI at OSH in 2014-no records), DM2, preserved EF presenting with chest pain. His pain started while resting at home around 10 AM, no clear precipitant. Left sided, substernal without radiation or associated symptoms, constant since 10 AM. He took 2 SL NTG and 2x 325 ASA without relief at home. He thinks its more different than similar to his prior CP before his 2 PCIs; more severe and sharp, without radiation to his LUE as before. Feels slightly worse with sitting forward, slightly worse with deep breaths. No SOB, diaphoresis, N/V, heartburn/GERD, ISIDORO, PND, orhthopnea.    He was last seen by cardiology in clinic in 2014, doing well at that time, asymptomatic from a cardiovascular perspective. Last echo 2014 with EF 60-65%, no WMA, normal LV and RV function, normal diastology. LHC in 5/2013 with occluded OM3 s/p successful PCI. Repeat in 9/2013 with nonobstructive CAD; noted stenoses 10% pLAD, 40% ramus, 20% OM2, mRCA 10%, dRCA 10%; widely patent stent OM3; otherwise luminal irregularities.               Tn here negative, BNP 23. CXR without acute findings. /90 on presentation. EKG limited by baseline artifact, NSR, no obvious STD or TW changes. Bedside echo with intact EF, no WMA,  "trace pericardial effusion.    He took 2 further SL NTG here without relief.     Currently on ASA 81, atorvastatin 80, amlodipine-benazepril.     Past Medical History:   Diagnosis Date    Coronary artery disease     Diabetes mellitus type II     GERD (gastroesophageal reflux disease)     Heart attack     Hyperlipidemia     Hypertension     MI (mitral incompetence)     MI (myocardial infarction)     Ulcer        Past Surgical History:   Procedure Laterality Date    ANKLE SURGERY      BACK SURGERY      CORONARY ANGIOPLASTY WITH STENT PLACEMENT      ELBOW SURGERY      lft    THROAT SURGERY         Review of patient's allergies indicates:  No Known Allergies    No current facility-administered medications on file prior to encounter.      Current Outpatient Prescriptions on File Prior to Encounter   Medication Sig    amlodipine-benazepril (LOTREL) 5-40 mg per capsule Take 1 capsule by mouth once daily.    aspirin (ECOTRIN) 81 MG EC tablet Take 1 tablet (81 mg total) by mouth once daily.    atorvastatin (LIPITOR) 80 MG tablet Take 1 tablet (80 mg total) by mouth every evening.    blood sugar diagnostic (FREESTYLE LITE STRIPS) Strp Inject 1 strip into the skin 3 (three) times daily.    cyclobenzaprine (FLEXERIL) 10 MG tablet     FREESTYLE LANCETS lancets USE THREE TIMES DAILY    hydrocodone-acetaminophen 7.5-300 mg Tab     insulin detemir (LEVEMIR FLEXPEN) 100 unit/mL (3 mL) InPn pen 40 units at bedtime    insulin lispro (HUMALOG KWIKPEN) 100 unit/mL InPn pen Inject 3 Units into the skin 4 (four) times daily before meals and nightly.    insulin needles, disposable, (BD INSULIN PEN NEEDLE UF SHORT) 31 X 5/16 " Ndle Inject 4 application into the skin 4 (four) times daily as needed.    isosorbide mononitrate (IMDUR) 30 MG 24 hr tablet Take 1 tablet (30 mg total) by mouth once daily.    metformin (GLUCOPHAGE) 1000 MG tablet Take 1 tablet (1,000 mg total) by mouth 2 (two) times daily with meals.    " nitroGLYCERIN (NITROSTAT) 0.4 MG SL tablet Place 0.4 mg under the tongue every 5 (five) minutes as needed for Chest pain.    pantoprazole (PROTONIX) 40 MG tablet Take 1 tablet (40 mg total) by mouth once daily.     Family History     Problem Relation (Age of Onset)    Heart disease Mother, Father        Social History Main Topics    Smoking status: Never Smoker    Smokeless tobacco: Not on file    Alcohol use No    Drug use: No    Sexual activity: Yes     Partners: Female     Birth control/ protection: Other-see comments     Review of Systems   All other systems reviewed and are negative.    Objective:     Vital Signs (Most Recent):  Temp: 97.7 °F (36.5 °C) (07/28/18 2053)  Pulse: 74 (07/28/18 2214)  Resp: (!) 21 (07/28/18 2214)  BP: 117/66 (07/28/18 2202)  SpO2: 97 % (07/28/18 2214) Vital Signs (24h Range):  Temp:  [97.7 °F (36.5 °C)] 97.7 °F (36.5 °C)  Pulse:  [72-81] 74  Resp:  [16-21] 21  SpO2:  [96 %-99 %] 97 %  BP: (117-145)/(66-90) 117/66     Weight: 94.3 kg (208 lb)  Body mass index is 33.57 kg/m².    SpO2: 97 %  O2 Device (Oxygen Therapy): room air    No intake or output data in the 24 hours ending 07/28/18 2234    Lines/Drains/Airways     Peripheral Intravenous Line                 Peripheral IV - Single Lumen 08/27/13 Right Antecubital 1796 days         Peripheral IV - Single Lumen 07/28/18 2123 Left Antecubital less than 1 day                Physical Exam   Constitutional: He is oriented to person, place, and time. He appears well-nourished.   Obese male, NAD   HENT:   Head: Atraumatic.   Mouth/Throat: No oropharyngeal exudate.   Eyes: EOM are normal. No scleral icterus.   Neck: Neck supple. No JVD present.   Cardiovascular: Normal rate, regular rhythm and normal heart sounds.  Exam reveals no gallop and no friction rub.    No murmur heard.  Pulmonary/Chest: Effort normal and breath sounds normal. No respiratory distress. He has no wheezes. He has no rales. He exhibits no tenderness.   Abdominal:  Soft. Bowel sounds are normal. He exhibits no distension. There is no tenderness.   Musculoskeletal: He exhibits no edema.   Neurological: He is alert and oriented to person, place, and time. No cranial nerve deficit.   Skin: Skin is warm and dry. No erythema.   Psychiatric: He has a normal mood and affect.       Significant Labs:   CMP   Recent Labs  Lab 07/28/18 2116      K 4.2      CO2 24   *   BUN 15   CREATININE 1.1   CALCIUM 8.9   PROT 6.6   ALBUMIN 3.5   BILITOT 0.7   ALKPHOS 111   AST 15   ALT 7*   ANIONGAP 9   ESTGFRAFRICA >60.0   EGFRNONAA >60.0   , CBC   Recent Labs  Lab 07/28/18 2116   WBC 8.85   HGB 13.5*   HCT 40.8      , Lipid Panel No results for input(s): CHOL, HDL, LDLCALC, TRIG, CHOLHDL in the last 48 hours. and Troponin   Recent Labs  Lab 07/28/18 2116   TROPONINI <0.006       Significant Imaging: Echocardiogram:   2D echo with color flow doppler:   Results for orders placed or performed during the hospital encounter of 05/18/13   2D echo with color flow doppler   Result Value Ref Range    EF 65     Diastolic Dysfunction No     and EKG: NSR, normal axis, baseline artifact, no obvious STT abnormalities  Tele: no events, normal sinus rhythm    Assessment and Plan:     * Chest pain    61 y/o male with Hx of HTN, HLD, Obesity, CAD (STEMI s/p OM3 stent in 05/13, angiogram in 9/2013 with nonobstructive disease, reported PCI at OSH in 2014-no records), DM2, preserved EF presenting with nonanginal, constant CP for last ~12 hrs. No change with now SL NTG x4. Cardiac enzymes negative, no significant EKG changes; no CXR findings. BP equivalent in both arms and pulses intact. Wells 0, no signs/sx of PE. Does note some worsening with inspiration, worse with leaning forward. MSK most likely, pleurisy or pericarditis also possible. Bedside echo unremarkable. Unlikely CAD by hx, negative enzymes and EKG, though with prior obstructive CAD and RFs, risk intermediate by Heart, [place].      -Admit to Medicine  -NPO at midnight, would obtain DSE in AM if available on Sun  -Serial Troponin, EKG  -Continue current medications including ASA, statin  -Would not give further NTG          Thank you for your consult. I will sign off. Please contact us if you have any additional questions.    Antony Erazo MD  Cardiology   [facility]

## 2018-07-29 NOTE — ASSESSMENT & PLAN NOTE
- glucose 350 on arrival (poorly controlled)  - resume home long acting insulin dosing  - hold metformin for now  - ISS AC/HS  - HgA1c pending

## 2018-07-29 NOTE — SUBJECTIVE & OBJECTIVE
"Past Medical History:   Diagnosis Date    Coronary artery disease     Diabetes mellitus type II     GERD (gastroesophageal reflux disease)     Heart attack     Hyperlipidemia     Hypertension     MI (mitral incompetence)     MI (myocardial infarction)     Ulcer        Past Surgical History:   Procedure Laterality Date    ANKLE SURGERY      BACK SURGERY      CORONARY ANGIOPLASTY WITH STENT PLACEMENT      ELBOW SURGERY      lft    THROAT SURGERY         Review of patient's allergies indicates:  No Known Allergies    No current facility-administered medications on file prior to encounter.      Current Outpatient Prescriptions on File Prior to Encounter   Medication Sig    amlodipine-benazepril (LOTREL) 5-40 mg per capsule Take 1 capsule by mouth once daily.    aspirin (ECOTRIN) 81 MG EC tablet Take 1 tablet (81 mg total) by mouth once daily.    atorvastatin (LIPITOR) 80 MG tablet Take 1 tablet (80 mg total) by mouth every evening.    blood sugar diagnostic (FREESTYLE LITE STRIPS) Strp Inject 1 strip into the skin 3 (three) times daily.    cyclobenzaprine (FLEXERIL) 10 MG tablet     FREESTYLE LANCETS lancets USE THREE TIMES DAILY    hydrocodone-acetaminophen 7.5-300 mg Tab     insulin detemir (LEVEMIR FLEXPEN) 100 unit/mL (3 mL) InPn pen 40 units at bedtime    insulin lispro (HUMALOG KWIKPEN) 100 unit/mL InPn pen Inject 3 Units into the skin 4 (four) times daily before meals and nightly.    insulin needles, disposable, (BD INSULIN PEN NEEDLE UF SHORT) 31 X 5/16 " Ndle Inject 4 application into the skin 4 (four) times daily as needed.    isosorbide mononitrate (IMDUR) 30 MG 24 hr tablet Take 1 tablet (30 mg total) by mouth once daily.    metformin (GLUCOPHAGE) 1000 MG tablet Take 1 tablet (1,000 mg total) by mouth 2 (two) times daily with meals.    nitroGLYCERIN (NITROSTAT) 0.4 MG SL tablet Place 0.4 mg under the tongue every 5 (five) minutes as needed for Chest pain.    pantoprazole " (PROTONIX) 40 MG tablet Take 1 tablet (40 mg total) by mouth once daily.     Family History     Problem Relation (Age of Onset)    Heart disease Mother, Father        Social History Main Topics    Smoking status: Never Smoker    Smokeless tobacco: Not on file    Alcohol use No    Drug use: No    Sexual activity: Yes     Partners: Female     Birth control/ protection: Other-see comments     Review of Systems   All other systems reviewed and are negative.    Objective:     Vital Signs (Most Recent):  Temp: 97.7 °F (36.5 °C) (07/28/18 2053)  Pulse: 74 (07/28/18 2214)  Resp: (!) 21 (07/28/18 2214)  BP: 117/66 (07/28/18 2202)  SpO2: 97 % (07/28/18 2214) Vital Signs (24h Range):  Temp:  [97.7 °F (36.5 °C)] 97.7 °F (36.5 °C)  Pulse:  [72-81] 74  Resp:  [16-21] 21  SpO2:  [96 %-99 %] 97 %  BP: (117-145)/(66-90) 117/66     Weight: 94.3 kg (208 lb)  Body mass index is 33.57 kg/m².    SpO2: 97 %  O2 Device (Oxygen Therapy): room air    No intake or output data in the 24 hours ending 07/28/18 2234    Lines/Drains/Airways     Peripheral Intravenous Line                 Peripheral IV - Single Lumen 08/27/13 Right Antecubital 1796 days         Peripheral IV - Single Lumen 07/28/18 2123 Left Antecubital less than 1 day                Physical Exam   Constitutional: He is oriented to person, place, and time. He appears well-nourished.   Obese male, NAD   HENT:   Head: Atraumatic.   Mouth/Throat: No oropharyngeal exudate.   Eyes: EOM are normal. No scleral icterus.   Neck: Neck supple. No JVD present.   Cardiovascular: Normal rate, regular rhythm and normal heart sounds.  Exam reveals no gallop and no friction rub.    No murmur heard.  Pulmonary/Chest: Effort normal and breath sounds normal. No respiratory distress. He has no wheezes. He has no rales. He exhibits no tenderness.   Abdominal: Soft. Bowel sounds are normal. He exhibits no distension. There is no tenderness.   Musculoskeletal: He exhibits no edema.   Neurological:  He is alert and oriented to person, place, and time. No cranial nerve deficit.   Skin: Skin is warm and dry. No erythema.   Psychiatric: He has a normal mood and affect.       Significant Labs:   CMP   Recent Labs  Lab 07/28/18 2116      K 4.2      CO2 24   *   BUN 15   CREATININE 1.1   CALCIUM 8.9   PROT 6.6   ALBUMIN 3.5   BILITOT 0.7   ALKPHOS 111   AST 15   ALT 7*   ANIONGAP 9   ESTGFRAFRICA >60.0   EGFRNONAA >60.0   , CBC   Recent Labs  Lab 07/28/18 2116   WBC 8.85   HGB 13.5*   HCT 40.8      , Lipid Panel No results for input(s): CHOL, HDL, LDLCALC, TRIG, CHOLHDL in the last 48 hours. and Troponin   Recent Labs  Lab 07/28/18 2116   TROPONINI <0.006       Significant Imaging: Echocardiogram:   2D echo with color flow doppler:   Results for orders placed or performed during the hospital encounter of 05/18/13   2D echo with color flow doppler   Result Value Ref Range    EF 65     Diastolic Dysfunction No     and EKG: NSR, normal axis, baseline artifact, no obvious STT abnormalities  Tele: no events, normal sinus rhythm

## 2018-07-30 ENCOUNTER — DOCUMENTATION ONLY (OUTPATIENT)
Dept: CARDIOLOGY | Facility: CLINIC | Age: 68
End: 2018-07-30

## 2018-07-30 VITALS
WEIGHT: 214.5 LBS | DIASTOLIC BLOOD PRESSURE: 81 MMHG | RESPIRATION RATE: 18 BRPM | TEMPERATURE: 96 F | SYSTOLIC BLOOD PRESSURE: 131 MMHG | HEIGHT: 66 IN | HEART RATE: 81 BPM | BODY MASS INDEX: 34.47 KG/M2 | OXYGEN SATURATION: 96 %

## 2018-07-30 LAB
ALBUMIN SERPL BCP-MCNC: 3.7 G/DL
ALP SERPL-CCNC: 91 U/L
ALT SERPL W/O P-5'-P-CCNC: 11 U/L
ANION GAP SERPL CALC-SCNC: 9 MMOL/L
AST SERPL-CCNC: 16 U/L
BASOPHILS # BLD AUTO: 0.05 K/UL
BASOPHILS NFR BLD: 0.7 %
BILIRUB SERPL-MCNC: 0.9 MG/DL
BUN SERPL-MCNC: 14 MG/DL
CALCIUM SERPL-MCNC: 9.5 MG/DL
CHLORIDE SERPL-SCNC: 105 MMOL/L
CO2 SERPL-SCNC: 26 MMOL/L
CREAT SERPL-MCNC: 0.8 MG/DL
DIASTOLIC DYSFUNCTION: NO
DIFFERENTIAL METHOD: ABNORMAL
EOSINOPHIL # BLD AUTO: 0.1 K/UL
EOSINOPHIL NFR BLD: 1.7 %
ERYTHROCYTE [DISTWIDTH] IN BLOOD BY AUTOMATED COUNT: 13.1 %
EST. GFR  (AFRICAN AMERICAN): >60 ML/MIN/1.73 M^2
EST. GFR  (NON AFRICAN AMERICAN): >60 ML/MIN/1.73 M^2
GLUCOSE SERPL-MCNC: 182 MG/DL
HCT VFR BLD AUTO: 43.5 %
HGB BLD-MCNC: 14.5 G/DL
IMM GRANULOCYTES # BLD AUTO: 0.05 K/UL
IMM GRANULOCYTES NFR BLD AUTO: 0.7 %
LYMPHOCYTES # BLD AUTO: 2.1 K/UL
LYMPHOCYTES NFR BLD: 28 %
MAGNESIUM SERPL-MCNC: 2.1 MG/DL
MCH RBC QN AUTO: 29.5 PG
MCHC RBC AUTO-ENTMCNC: 33.3 G/DL
MCV RBC AUTO: 88 FL
MONOCYTES # BLD AUTO: 0.5 K/UL
MONOCYTES NFR BLD: 6.7 %
NEUTROPHILS # BLD AUTO: 4.7 K/UL
NEUTROPHILS NFR BLD: 62.2 %
NRBC BLD-RTO: 0 /100 WBC
PHOSPHATE SERPL-MCNC: 3.6 MG/DL
PLATELET # BLD AUTO: 211 K/UL
PMV BLD AUTO: 10.9 FL
POCT GLUCOSE: 134 MG/DL (ref 70–110)
POCT GLUCOSE: 188 MG/DL (ref 70–110)
POTASSIUM SERPL-SCNC: 4.3 MMOL/L
PROT SERPL-MCNC: 6.9 G/DL
RBC # BLD AUTO: 4.92 M/UL
SODIUM SERPL-SCNC: 140 MMOL/L
WBC # BLD AUTO: 7.6 K/UL

## 2018-07-30 PROCEDURE — 93017 CV STRESS TEST TRACING ONLY: CPT

## 2018-07-30 PROCEDURE — 80053 COMPREHEN METABOLIC PANEL: CPT

## 2018-07-30 PROCEDURE — 36415 COLL VENOUS BLD VENIPUNCTURE: CPT

## 2018-07-30 PROCEDURE — 93018 CV STRESS TEST I&R ONLY: CPT | Mod: ,,, | Performed by: INTERNAL MEDICINE

## 2018-07-30 PROCEDURE — G0378 HOSPITAL OBSERVATION PER HR: HCPCS

## 2018-07-30 PROCEDURE — 93016 CV STRESS TEST SUPVJ ONLY: CPT | Mod: ,,, | Performed by: INTERNAL MEDICINE

## 2018-07-30 PROCEDURE — 84100 ASSAY OF PHOSPHORUS: CPT

## 2018-07-30 PROCEDURE — 63600175 PHARM REV CODE 636 W HCPCS: Performed by: PHYSICIAN ASSISTANT

## 2018-07-30 PROCEDURE — 83735 ASSAY OF MAGNESIUM: CPT

## 2018-07-30 PROCEDURE — 85025 COMPLETE CBC W/AUTO DIFF WBC: CPT

## 2018-07-30 PROCEDURE — 25000003 PHARM REV CODE 250: Performed by: NURSE PRACTITIONER

## 2018-07-30 PROCEDURE — 82962 GLUCOSE BLOOD TEST: CPT | Mod: 91

## 2018-07-30 RX ORDER — NITROGLYCERIN 0.4 MG/1
0.4 TABLET SUBLINGUAL EVERY 5 MIN PRN
Status: DISPENSED | OUTPATIENT
Start: 2018-07-30 | End: 2018-07-30

## 2018-07-30 RX ORDER — NITROGLYCERIN 0.4 MG/1
0.4 TABLET SUBLINGUAL EVERY 5 MIN PRN
Status: DISCONTINUED | OUTPATIENT
Start: 2018-07-30 | End: 2018-07-30

## 2018-07-30 RX ADMIN — ASPIRIN 81 MG: 81 TABLET, COATED ORAL at 08:07

## 2018-07-30 RX ADMIN — INSULIN ASPART 2 UNITS: 100 INJECTION, SOLUTION INTRAVENOUS; SUBCUTANEOUS at 08:07

## 2018-07-30 RX ADMIN — NITROGLYCERIN 0.4 MG: 0.4 TABLET SUBLINGUAL at 01:07

## 2018-07-30 RX ADMIN — AMLODIPINE AND BENAZEPRIL HYDROCHLORIDE 1 CAPSULE: 5; 20 CAPSULE ORAL at 08:07

## 2018-07-30 RX ADMIN — PANTOPRAZOLE SODIUM 40 MG: 40 TABLET, DELAYED RELEASE ORAL at 08:07

## 2018-07-30 NOTE — PLAN OF CARE
Problem: Patient Care Overview  Goal: Plan of Care Review  Outcome: Ongoing (interventions implemented as appropriate)  Pt's VSS, NAD noted. Pt is sinus rhythm on cardiac monitor. Pt denies pain at this time. Pt aware of NPO status and impending nuclear medicine test. Bed locked in lowest position, side rails upx2, call bell within reach, nonskid socks on pt. Pt aware may be discharged today after test.

## 2018-07-30 NOTE — PROGRESS NOTES
Pt returned to OBS 9 via stretcher with transport tech. VSS, NAD noted. Pt c/o of left sided nonradiating, nonreproducible chest pain that was a 10/10 during nuclear test and has decreased to a 3/10. PA paged.

## 2018-07-30 NOTE — ASSESSMENT & PLAN NOTE
- hx of CAD s/p stent placement x 2  - troponin negative x 3  - maintain on telemetry   - continue ASA, statin

## 2018-07-30 NOTE — PLAN OF CARE
07/30/18 1633   Final Note   Assessment Type Final Discharge Note     Patient discharged home with no needs on 7/30/18.

## 2018-07-30 NOTE — PROGRESS NOTES
Patient verified by 2 identifiers and allergies reviewed.  22g IV in place to Rt Hand.  Lexiscan testing explained to patient, patient verbalized understanding, consent obtained & testing completed.  Pt C/O 5/10 non-radiating CP, HA & nausea after Regadenoson administration.  CP resolved 7 changed to stomach discomfort during the recovery period.  IV flushed post testing.  Post study discharge instructions reviewed with patient, patient verbalized understanding.  Patient taken to Nuclear Med by BonzerDarg for completion of pictures.

## 2018-07-30 NOTE — PLAN OF CARE
Patient off the unit having a stress test done in nuclear med when CM rounded. Will continue to follow.

## 2018-07-30 NOTE — PROGRESS NOTES
Gibson Coffey PA aware that pt returned to OBS and has 3/10 nonradiating left sided chest pain. PA gave verbal order for one time order of SL .04 nitroglycerin x3 tabs for chest pain as needed.

## 2018-07-30 NOTE — PROGRESS NOTES
[facility] Hospital Medicine  Progress Note    Patient Name: Paul Deleon  MRN: [id number]  Patient Class: OP- Observation   Admission Date: 7/28/2018  Length of Stay: 0 days  Attending Physician: Geovanna Zafar MD  Primary Care Provider: Bree Larose MD    [facility] Medicine Team: [facility] MED E Gibson Coffey PA-C    Subjective:     Principal Problem:Chest pain    HPI:  66 y/o gentleman, who presents to the ED with c/o sharp left sided chest pain.  He has a PMH of CAD s/p stent placement, DM, HTN, HLD, and GERD.  He reports that discomfort started around 07:00 am on 07/28/2018 and has been constant since that time.  He endorses brief mild nausea at onset of pain.  He states that discomfort is exacerbated with movement and deep inspiration.  He reports taking 325mg ASA and 2 NTG prior to arrival with no improvement in symptoms.  While in the ED he received additional dosing of NTG.  He reports that discomfort is unchanged since arrival.  Initial workup reveals no leukocytosis, afebrile, CXR with no acute findings, troponin <0.006, EKG without obvious ischemic changes.      Hospital Course:  Patient admitted to observation for chest pain. NM Spec stress ordered for 7/30.    Interval History: No events overnight. NPO at MN for stress.    Review of Systems   Constitutional: Negative for chills and fever.   HENT: Negative for congestion and trouble swallowing.    Eyes: Negative for discharge and visual disturbance.   Respiratory: Negative for cough, chest tightness and shortness of breath.    Cardiovascular: Positive for chest pain. Negative for palpitations and leg swelling.        C/O left sided sharp CP exacerbated by deep inspiration and position changes    Gastrointestinal: Positive for nausea. Negative for abdominal distention, abdominal pain, diarrhea and vomiting.   Genitourinary: Negative for frequency and hematuria.   Musculoskeletal: Negative for back pain and joint  swelling.   Skin: Negative for color change and rash.   Neurological: Negative for seizures and syncope.     Objective:     Vital Signs (Most Recent):  Temp: 96.3 °F (35.7 °C) (07/29/18 1609)  Pulse: 75 (07/29/18 1900)  Resp: 16 (07/29/18 1609)  BP: 137/78 (07/29/18 1609)  SpO2: (!) 94 % (07/29/18 1609) Vital Signs (24h Range):  Temp:  [96.2 °F (35.7 °C)-97.7 °F (36.5 °C)] 96.3 °F (35.7 °C)  Pulse:  [64-83] 75  Resp:  [14-21] 16  SpO2:  [94 %-99 %] 94 %  BP: (101-145)/(57-90) 137/78     Weight: 97 kg (213 lb 13.5 oz)  Body mass index is 34.52 kg/m².    Intake/Output Summary (Last 24 hours) at 07/29/18 1952  Last data filed at 07/29/18 1500   Gross per 24 hour   Intake              240 ml   Output              450 ml   Net             -210 ml      Physical Exam   Constitutional: He is oriented to person, place, and time. He appears well-developed and well-nourished.   Cardiovascular: Normal rate, regular rhythm, normal heart sounds and intact distal pulses.    No murmur heard.  Pulmonary/Chest: Effort normal and breath sounds normal. He has no wheezes.   Abdominal: Soft. Bowel sounds are normal. There is no tenderness.   Musculoskeletal: Normal range of motion. He exhibits no edema.   Neurological: He is alert and oriented to person, place, and time.   Skin: Skin is warm and dry.   Psychiatric: He has a normal mood and affect. His behavior is normal.   Nursing note and vitals reviewed.      Significant Labs:   BMP:   Recent Labs  Lab 07/29/18  0548   *      K 4.1      CO2 23   BUN 17   CREATININE 0.9   CALCIUM 8.6*   MG 1.9     CBC:   Recent Labs  Lab 07/28/18  2116 07/29/18  0548   WBC 8.85 7.05   HGB 13.5* 12.9*   HCT 40.8 38.0*    180     Troponin:   Recent Labs  Lab 07/29/18  0549 07/29/18  1301 07/29/18  1838   TROPONINI <0.006 0.021 0.006       Significant Imaging: I have reviewed all pertinent imaging results/findings within the past 24 hours.    Assessment/Plan:      * Chest pain     - presents to ED with c/o sharp L sided chest pain exacerbated by movement and deep inspiration   - EKG without obvious ischemic changes - initial troponin <0.006 - CXR with no acute findings  - cardiology consulted while in ED  - NM spec stress ordered, patient cannot tolerate DSE  - NPO after MN   - hold NTG for now   - continue ASA and statin  - add ketorolac PRN for moderate discomfort ? Musculoskeletal discomfort          Type 2 diabetes mellitus, with long-term current use of insulin    - glucose 350 on arrival (poorly controlled)  - resume home long acting insulin dosing  - hold metformin for now  - ISS AC/HS        Coronary artery disease    - hx of CAD s/p stent placement x 2  - troponin negative x 3  - maintain on telemetry   - continue ASA, statin        Hypertension associated with diabetes    - continue home antihypertensive regimen         Hyperlipidemia    - see above          VTE Risk Mitigation         Ordered     IP VTE HIGH RISK PATIENT  Once      07/28/18 2350     Place BELTRAN hose  Until discontinued      07/28/18 2350     Place sequential compression device  Until discontinued      07/28/18 2350              Gibson Coffey PA-C  Department of Hospital Medicine   [facility]

## 2018-07-30 NOTE — ASSESSMENT & PLAN NOTE
- presents to ED with c/o sharp L sided chest pain exacerbated by movement and deep inspiration   - EKG without obvious ischemic changes - initial troponin <0.006 - CXR with no acute findings  - cardiology consulted while in ED  - NM spec stress ordered, patient cannot tolerate DSE  - NPO after MN   - hold NTG for now   - continue ASA and statin  - add ketorolac PRN for moderate discomfort ? Musculoskeletal discomfort

## 2018-07-30 NOTE — PROGRESS NOTES
Gibson Coffey PA aware that pt's BS is 188 and per insulin sliding scale order to give 2U of Novolog. PA stated it was okay to give 2 units of Novolog even though pt is NPO for test.

## 2018-07-30 NOTE — PROGRESS NOTES
Pt discharged and ambulated off unit. VSS, NAD noted. Pt received discharge instructions and verbalized understanding. Pt denies pain at this time. IV removed with catheter intact.

## 2018-07-30 NOTE — ASSESSMENT & PLAN NOTE
- glucose 350 on arrival (poorly controlled)  - resume home long acting insulin dosing  - hold metformin for now  - ISS AC/HS

## 2018-07-30 NOTE — PROGRESS NOTES
Pt's chest pain decreased from 3/10 to a 2/10 after one dose of nitroglycerin. Pt's BP dropped down to 109/78. Gibson DUMONT aware and stated not to give another dose of Nitro. PRN Ketorolac will be requested from pharmacy.

## 2018-07-30 NOTE — SUBJECTIVE & OBJECTIVE
Interval History: No events overnight. NPO at MN for stress.    Review of Systems   Constitutional: Negative for chills and fever.   HENT: Negative for congestion and trouble swallowing.    Eyes: Negative for discharge and visual disturbance.   Respiratory: Negative for cough, chest tightness and shortness of breath.    Cardiovascular: Positive for chest pain. Negative for palpitations and leg swelling.        C/O left sided sharp CP exacerbated by deep inspiration and position changes    Gastrointestinal: Positive for nausea. Negative for abdominal distention, abdominal pain, diarrhea and vomiting.   Genitourinary: Negative for frequency and hematuria.   Musculoskeletal: Negative for back pain and joint swelling.   Skin: Negative for color change and rash.   Neurological: Negative for seizures and syncope.     Objective:     Vital Signs (Most Recent):  Temp: 96.3 °F (35.7 °C) (07/29/18 1609)  Pulse: 75 (07/29/18 1900)  Resp: 16 (07/29/18 1609)  BP: 137/78 (07/29/18 1609)  SpO2: (!) 94 % (07/29/18 1609) Vital Signs (24h Range):  Temp:  [96.2 °F (35.7 °C)-97.7 °F (36.5 °C)] 96.3 °F (35.7 °C)  Pulse:  [64-83] 75  Resp:  [14-21] 16  SpO2:  [94 %-99 %] 94 %  BP: (101-145)/(57-90) 137/78     Weight: 97 kg (213 lb 13.5 oz)  Body mass index is 34.52 kg/m².    Intake/Output Summary (Last 24 hours) at 07/29/18 1952  Last data filed at 07/29/18 1500   Gross per 24 hour   Intake              240 ml   Output              450 ml   Net             -210 ml      Physical Exam   Constitutional: He is oriented to person, place, and time. He appears well-developed and well-nourished.   Cardiovascular: Normal rate, regular rhythm, normal heart sounds and intact distal pulses.    No murmur heard.  Pulmonary/Chest: Effort normal and breath sounds normal. He has no wheezes.   Abdominal: Soft. Bowel sounds are normal. There is no tenderness.   Musculoskeletal: Normal range of motion. He exhibits no edema.   Neurological: He is alert and  oriented to person, place, and time.   Skin: Skin is warm and dry.   Psychiatric: He has a normal mood and affect. His behavior is normal.   Nursing note and vitals reviewed.      Significant Labs:   BMP:   Recent Labs  Lab 07/29/18  0548   *      K 4.1      CO2 23   BUN 17   CREATININE 0.9   CALCIUM 8.6*   MG 1.9     CBC:   Recent Labs  Lab 07/28/18  2116 07/29/18  0548   WBC 8.85 7.05   HGB 13.5* 12.9*   HCT 40.8 38.0*    180     Troponin:   Recent Labs  Lab 07/29/18  0549 07/29/18  1301 07/29/18  1838   TROPONINI <0.006 0.021 0.006       Significant Imaging: I have reviewed all pertinent imaging results/findings within the past 24 hours.

## 2018-07-30 NOTE — PLAN OF CARE
Problem: Patient Care Overview  Goal: Plan of Care Review  Outcome: Ongoing (interventions implemented as appropriate)  Pt with no falls or injuries this shift. BS in 200s. Pt started on SSI. Pt assymptomatic. Pt reports chest pain 2/10.  Cardiac monitoring NSR.

## 2018-07-30 NOTE — PROGRESS NOTES
Pt transferred to cardiac test via stretcher with transport tech. VSS, NAD noted. Pt ambulated to stretcher independently with even gait.

## 2018-07-30 NOTE — HOSPITAL COURSE
Patient admitted to observation for chest pain. NM Spec stress completed and negative for ischemia. Troponins negative. Patient stable for discharge with Cardiology follow up.

## 2018-08-01 NOTE — ASSESSMENT & PLAN NOTE
- presents to ED with c/o sharp L sided chest pain exacerbated by movement and deep inspiration   - EKG without ischemic changes  - NM spec stress ordered, patient cannot tolerate DSE  - negative for ischemia  - continue ASA and statin  - add ketorolac PRN for moderate discomfort ? Musculoskeletal discomfort    - amb ref to Cardiology

## 2018-08-01 NOTE — DISCHARGE SUMMARY
[facility] Hospital Medicine  Discharge Summary      Patient Name: Paul Deleon  MRN: [id number]  Admission Date: 7/28/2018  Hospital Length of Stay: 0 days  Discharge Date and Time: 7/30/2018  2:46 PM  Attending Physician: No att. providers found   Discharging Provider: Gibson Coffey PA-C  Primary Care Provider: Bree Larose MD  [facility] Medicine Team: [facility]OS[facility] MED E Gibson Coffey PA-C    HPI:   66 y/o gentleman, who presents to the ED with c/o sharp left sided chest pain.  He has a PMH of CAD s/p stent placement, DM, HTN, HLD, and GERD.  He reports that discomfort started around 07:00 am on 07/28/2018 and has been constant since that time.  He endorses brief mild nausea at onset of pain.  He states that discomfort is exacerbated with movement and deep inspiration.  He reports taking 325mg ASA and 2 NTG prior to arrival with no improvement in symptoms.  While in the ED he received additional dosing of NTG.  He reports that discomfort is unchanged since arrival.  Initial workup reveals no leukocytosis, afebrile, CXR with no acute findings, troponin <0.006, EKG without obvious ischemic changes.      * No surgery found *      Hospital Course:   Patient admitted to observation for chest pain. NM Spec stress completed and negative for ischemia. Troponins negative. Patient stable for discharge with Cardiology follow up.     Consults:   Consults         Status Ordering Provider     Inpatient consult to Cardiology  Once     Provider:  (Not yet assigned)    Completed NAHOMI GALO          * Chest pain    - presents to ED with c/o sharp L sided chest pain exacerbated by movement and deep inspiration   - EKG without ischemic changes  - NM spec stress ordered, patient cannot tolerate DSE  - negative for ischemia  - continue ASA and statin  - add ketorolac PRN for moderate discomfort ? Musculoskeletal discomfort    - amb ref to Cardiology        Type 2 diabetes mellitus, with  long-term current use of insulin    - glucose 350 on arrival (poorly controlled)  - resume home long acting insulin dosing  - hold metformin for now  - ISS AC/HS        Coronary artery disease    - hx of CAD s/p stent placement x 2  - troponin negative x 3  - maintain on telemetry   - continue ASA, statin        Hypertension associated with diabetes    - continue home antihypertensive regimen         Hyperlipidemia    - see above          Final Active Diagnoses:    Diagnosis Date Noted POA    PRINCIPAL PROBLEM:  Chest pain [R07.9] 07/28/2018 Yes    Type 2 diabetes mellitus, with long-term current use of insulin [E11.9, Z79.4] 10/17/2013 Not Applicable    Coronary artery disease [I25.10] 09/24/2013 Yes    Hypertension associated with diabetes [E11.59, I10] 05/07/2013 Yes    Hyperlipidemia [E78.5]  Yes      Problems Resolved During this Admission:    Diagnosis Date Noted Date Resolved POA       Discharged Condition: good    Disposition: Home or Self Care    Follow Up:  Follow-up Information     Antony Erazo MD On 8/10/2018.    Specialty:  Cardiovascular Disease  Why:  at 10:00 AM  Contact information:  [address] [facility] [address]  [phone]             Bree Larose MD On 8/16/2018.    Specialty:  Internal Medicine  Why:  at 10:00 AM  Contact information:  [address]  [place] [address]  [phone]                 Patient Instructions:     Ambulatory referral to Cardiology   Referral Priority: Routine Referral Type: Consultation   Referral Reason: Specialty Services Required    Requested Specialty: Cardiology    Number of Visits Requested: 1      Diet diabetic     Diet Cardiac     Notify your health care provider if you experience any of the following:  persistent nausea and vomiting or diarrhea     Notify your health care provider if you experience any of the following:  severe uncontrolled pain     Notify your health care provider if you experience any of the following:   difficulty breathing or increased cough     Notify your health care provider if you experience any of the following:  persistent dizziness, light-headedness, or visual disturbances     Notify your health care provider if you experience any of the following:  increased confusion or weakness     Activity as tolerated         Significant Diagnostic Studies:     Pending Diagnostic Studies:     None         Medications:  Reconciled Home Medications:      Medication List      CONTINUE taking these medications    amlodipine-benazepril 5-40 mg per capsule  Commonly known as:  LOTREL  Take 1 capsule by mouth once daily.     aspirin 81 MG EC tablet  Commonly known as:  ECOTRIN  Take 1 tablet (81 mg total) by mouth once daily.     atorvastatin 80 MG tablet  Commonly known as:  LIPITOR  Take 1 tablet (80 mg total) by mouth every evening.     blood sugar diagnostic Strp  Commonly known as:  FREESTYLE LITE STRIPS  Inject 1 strip into the skin 3 (three) times daily.     cyclobenzaprine 10 MG tablet  Commonly known as:  FLEXERIL     FREESTYLE LANCETS 28 gauge Misc  Generic drug:  lancets  USE THREE TIMES DAILY     HYDROcodone-acetaminophen 7.5-300 mg Tab     insulin detemir U-100 100 unit/mL (3 mL) Inpn pen  Commonly known as:  LEVEMIR FLEXPEN  40 units at bedtime     insulin lispro 100 unit/mL Inpn pen  Commonly known as:  HUMALOG KWIKPEN  Inject 3 Units into the skin 4 (four) times daily before meals and nightly.     isosorbide mononitrate 30 MG 24 hr tablet  Commonly known as:  IMDUR  Take 1 tablet (30 mg total) by mouth once daily.     metFORMIN 1000 MG tablet  Commonly known as:  GLUCOPHAGE  Take 1 tablet (1,000 mg total) by mouth 2 (two) times daily with meals.     nitroGLYCERIN 0.4 MG SL tablet  Commonly known as:  NITROSTAT  Place 0.4 mg under the tongue every 5 (five) minutes as needed for Chest pain.     pantoprazole 40 MG tablet  Commonly known as:  PROTONIX  Take 1 tablet (40 mg total) by mouth once daily.     pen  "needle, diabetic 31 gauge x 5/16" Ndle  Commonly known as:  BD ULTRA-FINE SHORT PEN NEEDLE  Inject 4 application into the skin 4 (four) times daily as needed.            Indwelling Lines/Drains at time of discharge:   Lines/Drains/Airways          No matching active lines, drains, or airways          Time spent on the discharge of patient: 36 minutes  Patient was seen and examined on the date of discharge and determined to be suitable for discharge.         Gibson Coffey PA-C  Department of Hospital Medicine  [facility]  "

## 2018-08-07 ENCOUNTER — TELEPHONE (OUTPATIENT)
Dept: CARDIOLOGY | Facility: CLINIC | Age: 68
End: 2018-08-07

## 2018-08-07 DIAGNOSIS — R00.2 PALPITATION: Primary | ICD-10-CM

## 2018-08-10 ENCOUNTER — OFFICE VISIT (OUTPATIENT)
Dept: CARDIOLOGY | Facility: CLINIC | Age: 68
End: 2018-08-10
Payer: COMMERCIAL

## 2018-08-10 ENCOUNTER — HOSPITAL ENCOUNTER (OUTPATIENT)
Dept: CARDIOLOGY | Facility: CLINIC | Age: 68
Discharge: HOME | End: 2018-08-10
Payer: COMMERCIAL

## 2018-08-10 VITALS
DIASTOLIC BLOOD PRESSURE: 71 MMHG | HEIGHT: 66 IN | SYSTOLIC BLOOD PRESSURE: 113 MMHG | WEIGHT: 207.25 LBS | HEART RATE: 75 BPM | BODY MASS INDEX: 33.31 KG/M2

## 2018-08-10 DIAGNOSIS — I15.2 HYPERTENSION ASSOCIATED WITH DIABETES: ICD-10-CM

## 2018-08-10 DIAGNOSIS — E78.5 HYPERLIPIDEMIA, UNSPECIFIED HYPERLIPIDEMIA TYPE: ICD-10-CM

## 2018-08-10 DIAGNOSIS — R00.2 PALPITATION: ICD-10-CM

## 2018-08-10 DIAGNOSIS — I25.10 CORONARY ARTERY DISEASE INVOLVING NATIVE HEART, ANGINA PRESENCE UNSPECIFIED, UNSPECIFIED VESSEL OR LESION TYPE: ICD-10-CM

## 2018-08-10 DIAGNOSIS — R07.9 CHEST PAIN, UNSPECIFIED TYPE: Primary | ICD-10-CM

## 2018-08-10 DIAGNOSIS — E11.59 HYPERTENSION ASSOCIATED WITH DIABETES: ICD-10-CM

## 2018-08-10 PROCEDURE — 99999 PR PBB SHADOW E&M-EST. PATIENT-LVL IV: CPT | Mod: PBBFAC,,, | Performed by: STUDENT IN AN ORGANIZED HEALTH CARE EDUCATION/TRAINING PROGRAM

## 2018-08-10 PROCEDURE — 99203 OFFICE O/P NEW LOW 30 MIN: CPT | Mod: S$GLB,,, | Performed by: STUDENT IN AN ORGANIZED HEALTH CARE EDUCATION/TRAINING PROGRAM

## 2018-08-10 PROCEDURE — 3078F DIAST BP <80 MM HG: CPT | Mod: CPTII,S$GLB,, | Performed by: STUDENT IN AN ORGANIZED HEALTH CARE EDUCATION/TRAINING PROGRAM

## 2018-08-10 PROCEDURE — 3074F SYST BP LT 130 MM HG: CPT | Mod: CPTII,S$GLB,, | Performed by: STUDENT IN AN ORGANIZED HEALTH CARE EDUCATION/TRAINING PROGRAM

## 2018-08-10 PROCEDURE — 93000 ELECTROCARDIOGRAM COMPLETE: CPT | Mod: S$GLB,,, | Performed by: INTERNAL MEDICINE

## 2018-08-10 PROCEDURE — 3046F HEMOGLOBIN A1C LEVEL >9.0%: CPT | Mod: CPTII,S$GLB,, | Performed by: STUDENT IN AN ORGANIZED HEALTH CARE EDUCATION/TRAINING PROGRAM

## 2018-08-10 RX ORDER — INSULIN DEGLUDEC 200 U/ML
68 INJECTION, SOLUTION SUBCUTANEOUS NIGHTLY
COMMUNITY
Start: 2018-07-24 | End: 2019-11-13 | Stop reason: SDUPTHER

## 2018-08-10 NOTE — PROGRESS NOTES
Cardiology Clinic Note  Reason for Visit: chest pain    HPI:   66 y/o male with hx of HTN, HLD, Obesity, CAD (STEMI s/p OM3 stent in 05/13, angiogram in 9/2013 with nonobstructive disease, reported PCI at OSH in 2014-no records), DM2, preserved EF presenting for follow up of chest pain after recent brief hospitalization. He initially presented to the ED with 10 hrs constant, left sided substernal chest pain described as throbbing. Initially stated similar to his prior MI but now feels it is different. Prior pain more severe, radiating down left arm. He took multiple SL NTG without relief at home and in ED. No positional component, pleuritic component. Not associated with SOB/VAZ, diaphoresis, N/V. Serial enzymes negative, EKG with NSR, nonspecific STT changes not dynamic. He underwent a lexiscan SPECT which was negative for ischemia or prior injury. BP was well controlled, equal bilaterally; no significant findings on CXR.    He follows up today and continues to have the same chest discomfort. Again states its different than prior to his MI. Nonexertional, constant, feels like a sharp throbbing. Nonpositional, nonpleuritic, not reproducible with movements. Did get some relief inpatient with toradol.     Denies palpitations, SOB/VAZ, PND, orthopnea, ISIDORO, presyncope/syncope, LH, dizziness, diaphoresis, N/V.     He was last seen by cardiology in clinic in 2014, doing well at that time, asymptomatic from a cardiovascular perspective. Last echo 2014 with EF 60-65%, no WMA, normal LV and RV function, normal diastology. LHC in 5/2013 with occluded OM3 s/p successful PCI. Repeat in 9/2013 with nonobstructive CAD; noted stenoses 10% pLAD, 40% ramus, 20% OM2, mRCA 10%, dRCA 10%; widely patent stent OM3; otherwise luminal irregularities.    ROS:    Constitution: Negative for fever or chills. Negative for weight loss or gain.   HENT: Negative for sore throat or headaches. Negative for rhinorrhea.  Eyes: Negative for blurred or  "double vision.   Cardiovascular: See above  Pulmonary: Negative for SOB. Negative for cough.   Gastrointestinal: Negative for abdominal pain. Negative for nausea/ vomiting. Negative for diarrhea.   : Negative for dysuria.   Neurological: Negative for focal weakness or sensory changes.  PMH:     Past Medical History:   Diagnosis Date    Coronary artery disease     Diabetes mellitus type II     GERD (gastroesophageal reflux disease)     Heart attack     Hyperlipidemia     Hypertension     MI (mitral incompetence)     MI (myocardial infarction)     Ulcer      Past Surgical History:   Procedure Laterality Date    ANKLE SURGERY      BACK SURGERY      CORONARY ANGIOPLASTY WITH STENT PLACEMENT      ELBOW SURGERY      lft    THROAT SURGERY       Allergies:   Review of patient's allergies indicates:  No Known Allergies  Medications:     Current Outpatient Prescriptions on File Prior to Visit   Medication Sig Dispense Refill    amlodipine-benazepril (LOTREL) 5-40 mg per capsule Take 1 capsule by mouth once daily. 90 capsule 0    aspirin (ECOTRIN) 81 MG EC tablet Take 1 tablet (81 mg total) by mouth once daily. 30 tablet 3    atorvastatin (LIPITOR) 80 MG tablet Take 1 tablet (80 mg total) by mouth every evening. 90 tablet 3    blood sugar diagnostic (FREESTYLE LITE STRIPS) Strp Inject 1 strip into the skin 3 (three) times daily. 100 each 11    FREESTYLE LANCETS lancets USE THREE TIMES DAILY 100 each 11    insulin needles, disposable, (BD INSULIN PEN NEEDLE UF SHORT) 31 X 5/16 " Ndle Inject 4 application into the skin 4 (four) times daily as needed. 150 each 11    metformin (GLUCOPHAGE) 1000 MG tablet Take 1 tablet (1,000 mg total) by mouth 2 (two) times daily with meals. 60 tablet 11    nitroGLYCERIN (NITROSTAT) 0.4 MG SL tablet Place 0.4 mg under the tongue every 5 (five) minutes as needed for Chest pain.      pantoprazole (PROTONIX) 40 MG tablet Take 1 tablet (40 mg total) by mouth once daily. 30 " "tablet 11    [DISCONTINUED] cyclobenzaprine (FLEXERIL) 10 MG tablet       [DISCONTINUED] hydrocodone-acetaminophen 7.5-300 mg Tab       [DISCONTINUED] insulin detemir (LEVEMIR FLEXPEN) 100 unit/mL (3 mL) InPn pen 40 units at bedtime 15 mL 11    [DISCONTINUED] insulin lispro (HUMALOG KWIKPEN) 100 unit/mL InPn pen Inject 3 Units into the skin 4 (four) times daily before meals and nightly.      [DISCONTINUED] isosorbide mononitrate (IMDUR) 30 MG 24 hr tablet Take 1 tablet (30 mg total) by mouth once daily. 90 tablet 3     No current facility-administered medications on file prior to visit.      Social History:     Social History   Substance Use Topics    Smoking status: Never Smoker    Smokeless tobacco: Not on file    Alcohol use No     Family History:     Family History   Problem Relation Age of Onset    Heart disease Mother         cad    Heart disease Father         pacemaker    Melanoma Neg Hx     Psoriasis Neg Hx     Lupus Neg Hx     Eczema Neg Hx     Acne Neg Hx      Physical Exam:   /71 (BP Location: Left arm, Patient Position: Sitting, BP Method: Large (Automatic))   Pulse 75   Ht 5' 6" (1.676 m)   Wt 94 kg (207 lb 3.7 oz)   BMI 33.45 kg/m²      Constitutional: No acute distress, conversant  HEENT: Sclera anicteric, Pupils equal, round and reactive to light, extraocular motions intact, Oropharynx clear  Neck: No JVD, no carotid bruits  Cardiovascular: regular rate and rhythm, no murmur, rubs or gallops, normal S1/S2, chest wall nontender  Pulmonary: Clear to auscultation bilaterally  Abdominal: Abdomen soft, nontender, nondistended, positive bowel sounds  Extremities: No lower extremity edema,   Pulses: 2+ BL Radial, 2+ BL carotid, 2+ BL DP, 2+ BL PT, normal Allens Test BL  Skin: No ecchymosis, erythema, or ulcers  Psych: Alert and oriented x 3, appropriate affect  Neuro: CNII-XII intact, no focal deficits    Labs:     Lab Results   Component Value Date     07/30/2018    K 4.3 " 07/30/2018     07/30/2018    CO2 26 07/30/2018    BUN 14 07/30/2018    CREATININE 0.8 07/30/2018    ANIONGAP 9 07/30/2018     Lab Results   Component Value Date    AST 16 07/30/2018    ALT 11 07/30/2018    ALKPHOS 91 07/30/2018    BILITOT 0.9 07/30/2018    ALBUMIN 3.7 07/30/2018     Lab Results   Component Value Date    CALCIUM 9.5 07/30/2018    MG 2.1 07/30/2018    PHOS 3.6 07/30/2018     Lab Results   Component Value Date    BNP 13 07/28/2018    BNP <10.0 08/27/2013    BNP <10.0 06/22/2012    Lab Results   Component Value Date    WBC 7.60 07/30/2018    HGB 14.5 07/30/2018    HCT 43.5 07/30/2018     07/30/2018    GRAN 4.7 07/30/2018    GRAN 62.2 07/30/2018     Lab Results   Component Value Date    INR 1.0 08/27/2013     Lab Results   Component Value Date    CHOL 185 12/31/2013    HDL 26 (L) 12/31/2013    LDLCALC 126.4 12/31/2013    TRIG 163 (H) 12/31/2013     Lab Results   Component Value Date    HGBA1C 9.1 (H) 07/29/2018     Lab Results   Component Value Date    TSH 2.400 07/29/2018          Imaging:     EF   Date Value Ref Range Status   05/19/2013 65       Assessment:   68 y/o male with hx of HTN, HLD, Obesity, CAD (STEMI s/p OM3 stent in 05/13, angiogram in 9/2013 with nonobstructive disease, reported PCI at OSH in 2014-no records), DM2, preserved EF presenting for follow up of chest pain after recent brief hospitalization. Noncardiac chest pain. No evidence of ACS or angina on previous testing including recent SPECT. No CHF symptoms, no e/o pericarditis on EKG or by hx. Suspect musculoskeletal, josie given relief with toradol. Will advise trial of NSAIDs. BP well controlled, on appropriate meds, enrolled in ADAPTABLE study.    1. Chest pain, unspecified type     2. Coronary artery disease involving native heart, angina presence unspecified, unspecified vessel or lesion type     3. Hyperlipidemia, unspecified hyperlipidemia type     4. Hypertension associated with diabetes       Plan:   -Trial  naproxen for 7-10 days, do not take within 6 hrs of ASA  -Continue ASA (enrolled in ADAPTABLE today)  -Continue statin  -Continue amlodipine-benazepril  -Follow up in one year or sooner as needed    Signed:    Antony Erazo MD  Cardiology Fellow PGY4  Beeper:  [phone]  8/10/2018 11:59 AM    Follow-up:   Future Appointments  Date Time Provider Department Center   8/16/2018 1:00 PM Bree Larose MD [facility] [place]

## 2018-09-28 DIAGNOSIS — E78.5 HYPERLIPIDEMIA, UNSPECIFIED HYPERLIPIDEMIA TYPE: Primary | ICD-10-CM

## 2018-10-01 ENCOUNTER — OFFICE VISIT (OUTPATIENT)
Dept: INTERNAL MEDICINE | Facility: CLINIC | Age: 68
End: 2018-10-01
Payer: COMMERCIAL

## 2018-10-01 ENCOUNTER — IMMUNIZATION (OUTPATIENT)
Dept: INTERNAL MEDICINE | Facility: CLINIC | Age: 68
End: 2018-10-01
Payer: COMMERCIAL

## 2018-10-01 VITALS
HEIGHT: 66 IN | BODY MASS INDEX: 32.35 KG/M2 | OXYGEN SATURATION: 93 % | WEIGHT: 201.31 LBS | HEART RATE: 94 BPM | SYSTOLIC BLOOD PRESSURE: 104 MMHG | DIASTOLIC BLOOD PRESSURE: 68 MMHG

## 2018-10-01 DIAGNOSIS — Z11.59 NEED FOR HEPATITIS C SCREENING TEST: ICD-10-CM

## 2018-10-01 DIAGNOSIS — E11.8 TYPE 2 DIABETES MELLITUS WITH COMPLICATION, WITH LONG-TERM CURRENT USE OF INSULIN: ICD-10-CM

## 2018-10-01 DIAGNOSIS — R07.89 CHEST PAIN, NON-CARDIAC: ICD-10-CM

## 2018-10-01 DIAGNOSIS — I15.2 HYPERTENSION ASSOCIATED WITH DIABETES: ICD-10-CM

## 2018-10-01 DIAGNOSIS — E11.59 HYPERTENSION ASSOCIATED WITH DIABETES: ICD-10-CM

## 2018-10-01 DIAGNOSIS — R07.9 CHEST PAIN SYNDROME: ICD-10-CM

## 2018-10-01 DIAGNOSIS — E78.5 HYPERLIPIDEMIA, UNSPECIFIED HYPERLIPIDEMIA TYPE: ICD-10-CM

## 2018-10-01 DIAGNOSIS — Z79.4 TYPE 2 DIABETES MELLITUS WITH COMPLICATION, WITH LONG-TERM CURRENT USE OF INSULIN: ICD-10-CM

## 2018-10-01 PROCEDURE — 90662 IIV NO PRSV INCREASED AG IM: CPT | Mod: PBBFAC

## 2018-10-01 PROCEDURE — 99215 OFFICE O/P EST HI 40 MIN: CPT | Mod: PBBFAC,25 | Performed by: INTERNAL MEDICINE

## 2018-10-01 PROCEDURE — 99203 OFFICE O/P NEW LOW 30 MIN: CPT | Mod: S$GLB,,, | Performed by: INTERNAL MEDICINE

## 2018-10-01 PROCEDURE — 99999 PR PBB SHADOW E&M-EST. PATIENT-LVL V: CPT | Mod: PBBFAC,,, | Performed by: INTERNAL MEDICINE

## 2018-10-01 NOTE — PROGRESS NOTES
Subjective:       Patient ID: Paul Deleon is a 67 y.o. male.    Chief Complaint: Follow-up (hosp. f/u)    HPI    Last seen 4 years ago.     Retired and lives in [place], [place].    Hospitalized in July with CP Known CAD and stents placed.  Saw cardiology in August, with similar CP, thought MS.  He was given naproxen and pain has resolved.     he would like to transfer health care back to me.   He complains that Tresiba.  Continues to take metformin.  Sugars running 130-140 fasting, as low as 110.  Last a1c 9.1.      Htn.  BP low today.  No hypotensive symptoms.    Active riding mower.  Chores around the house.    protonix for gerd.    Review of Systems   Constitutional: Negative for activity change and unexpected weight change.   Respiratory: Negative for chest tightness and shortness of breath.    Cardiovascular: Negative for chest pain and leg swelling.   Gastrointestinal: Negative for abdominal pain.   Neurological: Negative for headaches.   Psychiatric/Behavioral: Negative for dysphoric mood.       Objective:      Physical Exam   Constitutional: He is oriented to person, place, and time. He appears well-developed and well-nourished.   Eyes: No scleral icterus.   Neck: No JVD present. No thyromegaly present.   Cardiovascular: Normal rate, regular rhythm and normal heart sounds.   Pulmonary/Chest: Effort normal and breath sounds normal. No respiratory distress. He has no wheezes. He has no rales.   Abdominal: Soft. He exhibits no mass. There is no tenderness.   Musculoskeletal: He exhibits no edema.   Neurological: He is alert and oriented to person, place, and time.   Psychiatric: He has a normal mood and affect. His behavior is normal.       Assessment:       1. Uncontrolled diabetes mellitus with peripheral artery disease    2. Chest pain syndrome    3. Hyperlipidemia, unspecified hyperlipidemia type    4. Hypertension associated with diabetes    5. Type 2 diabetes mellitus with complication, with  long-term current use of insulin    6. Need for hepatitis C screening test    7. BMI 32.0-32.9,adult    8. Chest pain, non-cardiac        Plan:       Paul was seen today for follow-up.    Diagnoses and all orders for this visit:    Uncontrolled diabetes mellitus with peripheral artery disease  -     Ambulatory consult to Endocrinology  -     Hemoglobin A1c; Future    Chest pain syndrome    Hyperlipidemia, unspecified hyperlipidemia type  -     Lipid panel; Future    Hypertension associated with diabetes    Type 2 diabetes mellitus with complication, with long-term current use of insulin  -     Ambulatory consult to Optometry    Need for hepatitis C screening test  -     Hepatitis C antibody; Future    BMI 32.0-32.9,adult    Chest pain, non-cardiac       Will endocrine advice.  DM is uncontrolled and he would like cheaper alternative to Tresiba.

## 2018-10-12 ENCOUNTER — TELEPHONE (OUTPATIENT)
Dept: CARDIOLOGY | Facility: CLINIC | Age: 68
End: 2018-10-12

## 2018-10-12 ENCOUNTER — TELEPHONE (OUTPATIENT)
Dept: RESEARCH | Facility: HOSPITAL | Age: 68
End: 2018-10-12

## 2018-10-12 RX ORDER — ASPIRIN 325 MG
325 TABLET ORAL DAILY
COMMUNITY
End: 2023-01-10

## 2018-11-27 ENCOUNTER — OFFICE VISIT (OUTPATIENT)
Dept: OPTOMETRY | Facility: CLINIC | Age: 68
End: 2018-11-27
Payer: MEDICARE

## 2018-11-27 ENCOUNTER — OFFICE VISIT (OUTPATIENT)
Dept: ENDOCRINOLOGY | Facility: CLINIC | Age: 68
End: 2018-11-27
Payer: MEDICARE

## 2018-11-27 ENCOUNTER — CLINICAL SUPPORT (OUTPATIENT)
Dept: OPHTHALMOLOGY | Facility: CLINIC | Age: 68
End: 2018-11-27
Payer: MEDICARE

## 2018-11-27 ENCOUNTER — TELEPHONE (OUTPATIENT)
Dept: OPTOMETRY | Facility: CLINIC | Age: 68
End: 2018-11-27

## 2018-11-27 VITALS
DIASTOLIC BLOOD PRESSURE: 78 MMHG | HEIGHT: 66 IN | BODY MASS INDEX: 32.78 KG/M2 | WEIGHT: 203.94 LBS | HEART RATE: 86 BPM | SYSTOLIC BLOOD PRESSURE: 106 MMHG

## 2018-11-27 DIAGNOSIS — H25.13 NUCLEAR SCLEROTIC CATARACT OF BOTH EYES: ICD-10-CM

## 2018-11-27 DIAGNOSIS — E11.8 TYPE 2 DIABETES MELLITUS WITH COMPLICATION, WITH LONG-TERM CURRENT USE OF INSULIN: Primary | ICD-10-CM

## 2018-11-27 DIAGNOSIS — H52.203 MYOPIA WITH ASTIGMATISM AND PRESBYOPIA, BILATERAL: ICD-10-CM

## 2018-11-27 DIAGNOSIS — H52.13 MYOPIA WITH ASTIGMATISM AND PRESBYOPIA, BILATERAL: ICD-10-CM

## 2018-11-27 DIAGNOSIS — I15.2 HYPERTENSION ASSOCIATED WITH DIABETES: ICD-10-CM

## 2018-11-27 DIAGNOSIS — H52.4 MYOPIA WITH ASTIGMATISM AND PRESBYOPIA, BILATERAL: ICD-10-CM

## 2018-11-27 DIAGNOSIS — Z79.84 LONG TERM CURRENT USE OF ORAL HYPOGLYCEMIC DRUG: ICD-10-CM

## 2018-11-27 DIAGNOSIS — E11.9 TYPE 2 DIABETES MELLITUS WITHOUT RETINOPATHY: Primary | ICD-10-CM

## 2018-11-27 DIAGNOSIS — E78.5 HYPERLIPIDEMIA, UNSPECIFIED HYPERLIPIDEMIA TYPE: ICD-10-CM

## 2018-11-27 DIAGNOSIS — Z79.4 TYPE 2 DIABETES MELLITUS WITH COMPLICATION, WITH LONG-TERM CURRENT USE OF INSULIN: Primary | ICD-10-CM

## 2018-11-27 DIAGNOSIS — H40.023 OAG (OPEN ANGLE GLAUCOMA) SUSPECT, HIGH RISK, BILATERAL: ICD-10-CM

## 2018-11-27 DIAGNOSIS — E11.59 HYPERTENSION ASSOCIATED WITH DIABETES: ICD-10-CM

## 2018-11-27 LAB — GLUCOSE SERPL-MCNC: 141 MG/DL (ref 70–110)

## 2018-11-27 PROCEDURE — 1101F PT FALLS ASSESS-DOCD LE1/YR: CPT | Mod: CPTII,S$GLB,, | Performed by: INTERNAL MEDICINE

## 2018-11-27 PROCEDURE — 92083 EXTENDED VISUAL FIELD XM: CPT | Mod: S$GLB,,, | Performed by: OPTOMETRIST

## 2018-11-27 PROCEDURE — 99999 PR PBB SHADOW E&M-EST. PATIENT-LVL III: CPT | Mod: PBBFAC,,, | Performed by: OPTOMETRIST

## 2018-11-27 PROCEDURE — 99203 OFFICE O/P NEW LOW 30 MIN: CPT | Mod: S$GLB,,, | Performed by: INTERNAL MEDICINE

## 2018-11-27 PROCEDURE — 3046F HEMOGLOBIN A1C LEVEL >9.0%: CPT | Mod: CPTII,S$GLB,, | Performed by: INTERNAL MEDICINE

## 2018-11-27 PROCEDURE — 3074F SYST BP LT 130 MM HG: CPT | Mod: CPTII,S$GLB,, | Performed by: INTERNAL MEDICINE

## 2018-11-27 PROCEDURE — 92004 COMPRE OPH EXAM NEW PT 1/>: CPT | Mod: S$GLB,,, | Performed by: OPTOMETRIST

## 2018-11-27 PROCEDURE — 92015 DETERMINE REFRACTIVE STATE: CPT | Mod: S$GLB,,, | Performed by: OPTOMETRIST

## 2018-11-27 PROCEDURE — 3078F DIAST BP <80 MM HG: CPT | Mod: CPTII,S$GLB,, | Performed by: INTERNAL MEDICINE

## 2018-11-27 PROCEDURE — 82962 GLUCOSE BLOOD TEST: CPT | Mod: S$GLB,,, | Performed by: INTERNAL MEDICINE

## 2018-11-27 PROCEDURE — 92133 CPTRZD OPH DX IMG PST SGM ON: CPT | Mod: S$GLB,,, | Performed by: OPTOMETRIST

## 2018-11-27 PROCEDURE — 99999 PR PBB SHADOW E&M-EST. PATIENT-LVL III: CPT | Mod: PBBFAC,,, | Performed by: INTERNAL MEDICINE

## 2018-11-27 RX ORDER — VITAMIN E 268 MG
CAPSULE ORAL
COMMUNITY
Start: 2018-08-24

## 2018-11-27 NOTE — TELEPHONE ENCOUNTER
Spoke with pt directly regarding results of HVF/OCT performed earlier today. No glaucoma OU. Monitor in 1 year.    Emily Ivory, OD   11/27/2018

## 2018-11-27 NOTE — LETTER
November 27, 2018      Bree Larose MD  [address]  [place] [address]           [facility] - Endocrinology  [address]  [facility] [address]  Phone: [phone]          Patient: Paul Deleon   MR Number: [id number]   YOB: 1950   Date of Visit: 11/27/2018       Dear Dr. Bree Larose:    Thank you for referring Paul Deleon to me for evaluation. Attached you will find relevant portions of my assessment and plan of care.    If you have questions, please do not hesitate to call me. I look forward to following Paul Deleon along with you.    Sincerely,    Rocio Gillespie MD    Enclosure  CC:  No Recipients    If you would like to receive this communication electronically, please contact [email or web address] or [phone] to request more information on [employer] access.    For providers and/or their staff who would like to refer a patient to [facility], please contact us through our one-stop-shop provider referral line, [facility], at [phone].    If you feel you have received this communication in error or would no longer like to receive these types of communications, please e-mail [email or web address]

## 2018-11-27 NOTE — LETTER
November 27, 2018      Bree Larose MD  [address]  [facility] [address]           [facility]  [address]  [facility] [address]  Phone: [phone]          Patient: Paul Deleon   MR Number: [id number]   YOB: 1950   Date of Visit: 11/27/2018       Dear Dr. Bree Larose:    Thank you for referring Paul Deleon to me for evaluation. Attached you will find relevant portions of my assessment and plan of care.    If you have questions, please do not hesitate to call me. I look forward to following Paul Deloen along with you.    Sincerely,    Emily Ivory, OD    Enclosure  CC:  No Recipients    If you would like to receive this communication electronically, please contact [email or web address] or [phone] to request more information on [facility] [employer] access.    For providers and/or their staff who would like to refer a patient to [facility], please contact us through our one-stop-shop provider referral line, [facility], at [phone].    If you feel you have received this communication in error or would no longer like to receive these types of communications, please e-mail [email or web address]

## 2018-11-27 NOTE — PATIENT INSTRUCTIONS
Look into cost of trulicity as an add on to the insulin (this is not generic)     If your insulin is too expensive ... [facility] has a generic 70/30   Dose would be 40 units before breakfast and before dinner

## 2018-11-27 NOTE — PATIENT INSTRUCTIONS
CATARACT    Symptoms and Signs:  A cataract starts out small, and at first has little effect on your vision. You may notice that your vision is blurred a little, like looking through a cloudy piece of glass or viewing an impressionist painting. A cataract may make light from the sun or a lamp seem too bright or glaring. Or you may notice when you drive at night that the oncoming headlights cause more glare than before. Colors may not appear as bright as they once did.  The type of cataract you have will affect exactly which symptoms you experience and how soon they will occur. When a nuclear cataract first develops it can bring about a temporary improvement in your near vision, called second sight. Unfortunately, the improved vision is short-lived and will disappear as the cataract worsens. Meanwhile, a sub-capsular cataract may not produce any symptoms until it's well-developed.    Causes:  No one knows for sure why the eye's lens changes as we age, forming cataracts. Researchers are gradually identifying factors that may cause cataracts - and information that may help to prevent them.  Many studies suggest that exposure to ultraviolet light is associated with cataracts, so eye care practitioners recommend wearing sunglasses and a wide-brimmed hat to lessen your exposure.  Other studies suggest people with diabetes are at risk for developing a cataract.   Some eye care practitioners believe that a diet high in antioxidants, such as beta-carotene (vitamin A), selenium and vitamins C and E, may forestall cataracts.  The most important of these is probably vitamin C; it might be helpful to supplement the diet with an extra Vitamin C tablet.  Meanwhile, eating a lot of salt may increase your risk.  Other risk factors include cigarette smoke, air pollution and heavy alcohol consumption.  We simply recommend that you be careful to use sunglasses and to take Vitamin C.    Treatment:  When symptoms begin to appear, we can  improve your vision for a while using new glasses, strong bifocals, magnification, appropriate lighting or other visual aids.  This is true in your case; your cataract does not impact your vision very much at this time. If you experience any of the symptoms we described you can return at any time. Otherwise it is fine to see you in 1 year.    ==============================================    GLAUCOMA    Glaucoma is a disease caused by increased intraocular pressure (IOP) resulting either from a malformation or malfunction of the eyes drainage structures.  Left untreated, an elevated IOP causes irreversible damage the optic nerve and retinal fibers resulting in a progressive, permanent loss of vision.  However, early detection and treatment can slow, or even halt the progression of the disease.     Causes of glaucoma:    The eye constantly produces aqueous, the clear fluid that fills the anterior chamber (the space between the cornea and iris).  The aqueous filters out of the anterior chamber through a complex drainage system.  The delicate balance between the production and drainage of aqueous determines the eyes intraocular pressure (IOP). Most peoples IOPs fall between 8 and 21.  However, some eyes can tolerate higher pressures than others. Thats why it may be normal for one person to have a higher pressure than another.     Common types of glaucoma    Open Angle    Open angle (also called chronic open angle or primary open angle) is the most common type of glaucoma.  With this type, even though the anterior structures of the eye appear normal, aqueous fluid builds within the anterior chamber, causing the IOP to become elevated.  Left untreated, this may result in permanent damage of the optic nerve and retina.  Eye drops are generally prescribed to lower the eye pressure.  In some cases, surgery is performed if the IOP cannot be adequately controlled with medical therapy.      Acute Angle Closure    Only  about 10% of the population with glaucoma has this type.  Acute angle closure occurs because of an abnormality of the structures in the front of the eye.  In most of these cases, the space between the iris and cornea is more narrow than normal, leaving a smaller channel for the aqueous to pass through.  If the flow of aqueous becomes completely blocked, the IOP rises sharply, causing a sudden angle closure attack.    While patients with open angle glaucoma don't typically have symptoms, those with angle closure glaucoma may experience severe eye pain accompanied by nausea, blurred vision, rainbows around lights, and a red eye. This problem is an emergency and should be treated immediately. If left untreated, severe and permanent loss of vision will occur in a matter of days.    Secondary Glaucoma    This type occurs as a result of another disease or problem within the eye such as: inflammation, trauma, previous surgery, diabetes, tumor, and certain medications.  For this type, both the glaucoma and the underlying problem must be treated.    Congenital    This is a rare type of glaucoma that is generally seen in infants. In most cases, surgery is required.    Signs and Symptoms    Glaucoma is an insidious disease because it rarely causes symptoms.        Detection and Diagnosis    Because glaucoma does not cause symptoms in most cases, those who are 40 or older should have an annual examination including a measurement of the intraocular pressure.  Those who are glaucoma suspects may need additional testing.      The glaucoma evaluation has several components. In addition to measuring the intraocular pressure, the doctor will also evaluate the health of the optic nerve (ophthalmoscopy), test the peripheral vision (visual field test), and examine the structures in the front of the eye with a special lens (gonioscopy) before making a diagnosis.       The doctor evaluates the optic nerve and grades its health by noting  the cup to disc ratio.  This is simply a comparison of the cup (the depressed area in the center of the nerve) to the entire diameter of the optic nerve.  As glaucoma progresses, the area of cupping, or depression, increases.  Therefore, a patient with a higher ratio has more damage.     The progression of glaucoma is monitored with a visual field test.  This test maps the peripheral vision, allowing the doctor to determine the extent of vision loss from glaucoma and a measure of the effectiveness of the treatment.  The visual field test is periodically repeated to verify that the intraocular pressure is being adequately controlled.    The structures in the front of the eye are normally difficult to see without the help of a special gonioscopy lens.  This special mirrored contact lens allows the doctor to examine the anterior chamber and the eyes drainage system.        Treatment     Most patients with glaucoma require only medication to control the eye pressure.  Sometimes, several medications that complement each other are necessary to reduce the pressure adequately.      Surgery is indicated when medical treatment fails to lower the pressure satisfactorily.  There are several types of procedures, some involve laser and can be done in the office, others must be performed in the operating room.  The objective of any glaucoma operation is to allow fluid to drain from the eye more efficiently.

## 2018-11-27 NOTE — Clinical Note
Dear Dr. Larose,Thank you for referring Mr. Deleon for a diabetic eye examination; there is no retinopathy and I will continue to monitor yearly. Please let me know if you have questions.Sincerely,Emily Ivory OD

## 2018-11-27 NOTE — Clinical Note
He wasn't interested in making changesAlso he wanted me to let you know he needs a refill on his PPI

## 2018-11-27 NOTE — PROGRESS NOTES
Subjective:      Patient ID: Paul Deleon is a 68 y.o. male.    Chief Complaint:  Diabetes      History of Present Illness  With regards to the diabetes:   Known complications:  Cad     Current regimen:  Metformin 1 gm bid  Basal insulin : tresiba 80      Missed doses?  none         # times a day testing 1-2   Log reviewed:   none     Pre breakfast 100      Hypoglycemic event? None      Diabetes Management Status    Statin: taking  ACE/ARB: taking    Screening or Prevention Patient's value Goal Complete/Controlled?   HgA1C Testing and Control   Lab Results   Component Value Date    HGBA1C 9.1 (H) 07/29/2018      Annually/Less than 8% No   Lipid profile Most Recent Lipid Panel Health Maintenance Topic Completion: Not Found Annually Yes   LDL control Lab Results   Component Value Date    LDLCALC 96.6 11/27/2018    LDLCALC 96.6 11/27/2018    Annually/Less than 100 mg/dl  Yes   Nephropathy screening Lab Results   Component Value Date    LABMICR 42.0 09/08/2014     Lab Results   Component Value Date    PROTEINUA Trace (A) 09/08/2014    Annually No   Blood pressure BP Readings from Last 1 Encounters:   11/27/18 106/78    Less than 140/90 Yes   Dilated retinal exam : 09/24/2014 Annually No   Foot exam   Most Recent Foot Exam Date: Not Found Annually No        Denies numbness or tingling of feet    Typical meals: trying to keep to a healthy diet    Education - last visit: w/i year per him   Exercise - tries to stay active     No Polyuria polydipsia nocturia or vision changes          Review of Systems   Constitutional: Negative for unexpected weight change.   Eyes: Negative for visual disturbance.   Respiratory: Negative for shortness of breath.    Cardiovascular: Negative for chest pain.   Gastrointestinal: Negative for abdominal pain.   Musculoskeletal: Negative for myalgias.   Skin: Negative for wound.   Neurological: Negative for headaches.   Hematological: Does not bruise/bleed easily.   Psychiatric/Behavioral:  Negative for sleep disturbance.       Objective:   Physical Exam   Constitutional: He appears well-developed.   HENT:   Right Ear: External ear normal.   Left Ear: External ear normal.   Nose: Nose normal.   Hearing normal    Neck: No tracheal deviation present. No thyromegaly present.   Cardiovascular: Normal rate.   No murmur heard.  Pulmonary/Chest: Effort normal and breath sounds normal.   Abdominal: Soft. He exhibits no mass.   No hernia noted   Musculoskeletal: He exhibits no edema.   Neurological: He is alert. No cranial nerve deficit or sensory deficit. Coordination and gait normal.        Skin: No rash noted.   No nodules   Psychiatric: He has a normal mood and affect. Judgment normal.   Vitals reviewed.  injection sites are ok. No lipo hypertropthy or atrophy      Body mass index is 32.91 kg/m².    Lab Review:   Lab Results   Component Value Date    HGBA1C 9.1 (H) 07/29/2018     Lab Results   Component Value Date    CHOL 153 11/27/2018    CHOL 153 11/27/2018    HDL 29 (L) 11/27/2018    HDL 29 (L) 11/27/2018    LDLCALC 96.6 11/27/2018    LDLCALC 96.6 11/27/2018    TRIG 137 11/27/2018    TRIG 137 11/27/2018    CHOLHDL 19.0 (L) 11/27/2018    CHOLHDL 19.0 (L) 11/27/2018     Lab Results   Component Value Date     07/30/2018    K 4.3 07/30/2018     07/30/2018    CO2 26 07/30/2018     (H) 07/30/2018    BUN 14 07/30/2018    CREATININE 0.8 07/30/2018    CALCIUM 9.5 07/30/2018    PROT 6.9 07/30/2018    ALBUMIN 3.7 07/30/2018    BILITOT 0.9 07/30/2018    ALKPHOS 91 07/30/2018    AST 16 07/30/2018    ALT 11 07/30/2018    ANIONGAP 9 07/30/2018    ESTGFRAFRICA >60.0 07/30/2018    EGFRNONAA >60.0 07/30/2018    TSH 2.400 07/29/2018       Assessment and Plan     Type 2 diabetes mellitus, with long-term current use of insulin  He declined changing to generic insulin   He declined adding incretin      rtc prn         Hypertension associated with diabetes  Controlled  Was not able to give urine for MAC      Hyperlipidemia  On a statin

## 2018-11-27 NOTE — PROGRESS NOTES
HPI     Mr. Paul Deleon was referred by Bree Larose MD for Diabetic   Eye Exam.    Blurred vision all ranges with and without correction. He requests   refraction today.    (-)drops  (+)flashes saw a flash of light once.  (-)floaters  (-)diplopia    Diabetic: Yes  Pt says blood glucose has been unstable.   Hemoglobin A1C       Date                     Value               Ref Range             Status           07/29/2018               9.1 (H)             4.0 - 5.6 %         Final  12/31/2013               8.6 (H)             4.5 - 6.2 %         Final  10/14/2013               8.4 (H)             4.5 - 6.2 %         Final    OCULAR HISTORY  Last Eye Exam: 2016 outside [facility] (Dr. Bass)  Last eye examination at [facility]: 09/24/14 with Dr. Enamorado  (-)eye surgery  Pt reports possible corneal abrasion/erosion OS a few years ago  Cataracts OU  Glaucoma suspect OU   * HVF in 2014 showed early superior arcuate OD and temple defect OS   * Pt has never had OCT    FAMILY HISTORY  (-)Glaucoma         Last edited by Emily Ivory, OD on 11/27/2018  9:25 AM. (History)            Assessment /Plan     For exam results, see Encounter Report.    Type 2 diabetes mellitus without retinopathy  Long term current use of oral hypoglycemic drug   No retinopathy noted OU. Monitor with yearly DFE.     Nuclear sclerotic cataract of both eyes   Cause of mildly reduced best-corrected visual acuity (20/30 OD and OS), but asymptomatic. Surgery not yet indicated. Monitor.    OAG (open angle glaucoma) suspect, high risk, bilateral   As previously noted based on cupping OU and HVF defects OU in 2014. No family history and normal IOP (Tmax OD 14mmHg, OS 15mmHg).    Explained nature of glaucoma and potential to progress to permanent loss of peripheral vision if untreated. Recommended HVF 24-2ss, RNFL OCT, and Posterior Pole OCT; pt agrees, scheduled for later today. Will call pt with results -- HVF stable/improved OD and normal OS; OCT  borderline OD and normal OS. Drops not needed at this time. Monitor in 1 year with DFE and RNFL OCT to monitor for progression.    -     Zeng Visual Field - OU - Extended - Both Eyes  -     Posterior Segment OCT Optic Nerve- Both eyes    Interpretation of RNFL OCT (11/27/2018):   OD: Borderline globally.   OS: WNL.  Interpretation of Posterior Pole OCT (11/27/2018):   OD: Inferior hemisphere thinner than superior. Normal foveal contour and macular morphology.   OS: Inferior hemisphere slightly thinner than superior. Normal foveal contour and macular morphology.  Interpretation of HVF 24-2ss (11/27/2018):   OD: Good reliability (fixation losses 2/13, false positives 4%, false negatives 4%). Small shallow defect at superior edge of field (possible early superior arcuate, repeatable but improved). GHT WNL.   OS: Fair reliability (fixation losses 6/13, false positives 2%, false negatives 1%). Clear field. GHT WNL.      Myopia with astigmatism and presbyopia, bilateral   Pt understands that refractive error changes with blood glucose fluctuations and opts to proceed with refraction today.    New glasses prescription released, adaptation expected.    Eyeglass Final Rx     Eyeglass Final Rx       Sphere Cylinder Axis Add    Right -2.25 +1.25 175 +2.50    Left -1.75 +0.50 060 +2.50    Expiration Date:  11/28/2019                   RTC 1 year

## 2019-05-19 ENCOUNTER — PATIENT MESSAGE (OUTPATIENT)
Dept: INTERNAL MEDICINE | Facility: CLINIC | Age: 69
End: 2019-05-19

## 2019-06-07 DIAGNOSIS — E11.9 TYPE 2 DIABETES MELLITUS WITHOUT COMPLICATION: ICD-10-CM

## 2019-09-03 ENCOUNTER — TELEPHONE (OUTPATIENT)
Dept: CARDIOLOGY | Facility: CLINIC | Age: 69
End: 2019-09-03

## 2019-09-03 NOTE — TELEPHONE ENCOUNTER
----- Message from Dorina Luna sent at 9/3/2019  9:56 AM CDT -----  Contact: Pt  Pt is requesting a callback from office at [phone] need to r/s his visit he has scheduled on Wednesday to a Monday or Tuesday after the 10th

## 2019-09-10 ENCOUNTER — OFFICE VISIT (OUTPATIENT)
Dept: INTERNAL MEDICINE | Facility: CLINIC | Age: 69
End: 2019-09-10
Payer: MEDICARE

## 2019-09-10 ENCOUNTER — LAB VISIT (OUTPATIENT)
Dept: LAB | Facility: HOSPITAL | Age: 69
End: 2019-09-10
Attending: INTERNAL MEDICINE
Payer: MEDICARE

## 2019-09-10 ENCOUNTER — IMMUNIZATION (OUTPATIENT)
Dept: INTERNAL MEDICINE | Facility: CLINIC | Age: 69
End: 2019-09-10
Payer: MEDICARE

## 2019-09-10 VITALS
OXYGEN SATURATION: 99 % | SYSTOLIC BLOOD PRESSURE: 104 MMHG | WEIGHT: 200.63 LBS | DIASTOLIC BLOOD PRESSURE: 64 MMHG | HEIGHT: 66 IN | HEART RATE: 83 BPM | BODY MASS INDEX: 32.24 KG/M2

## 2019-09-10 DIAGNOSIS — Z12.5 PROSTATE CANCER SCREENING: ICD-10-CM

## 2019-09-10 DIAGNOSIS — Z12.11 COLON CANCER SCREENING: ICD-10-CM

## 2019-09-10 DIAGNOSIS — I15.2 HYPERTENSION ASSOCIATED WITH DIABETES: ICD-10-CM

## 2019-09-10 DIAGNOSIS — E78.5 HYPERLIPIDEMIA, UNSPECIFIED HYPERLIPIDEMIA TYPE: ICD-10-CM

## 2019-09-10 DIAGNOSIS — E11.59 HYPERTENSION ASSOCIATED WITH DIABETES: ICD-10-CM

## 2019-09-10 DIAGNOSIS — I25.10 CORONARY ARTERY DISEASE INVOLVING NATIVE HEART, ANGINA PRESENCE UNSPECIFIED, UNSPECIFIED VESSEL OR LESION TYPE: ICD-10-CM

## 2019-09-10 DIAGNOSIS — H40.009 GLAUCOMA SUSPECT, UNSPECIFIED LATERALITY: ICD-10-CM

## 2019-09-10 LAB
ALBUMIN SERPL BCP-MCNC: 4.2 G/DL (ref 3.5–5.2)
ALP SERPL-CCNC: 84 U/L (ref 55–135)
ALT SERPL W/O P-5'-P-CCNC: 10 U/L (ref 10–44)
ANION GAP SERPL CALC-SCNC: 9 MMOL/L (ref 8–16)
AST SERPL-CCNC: 20 U/L (ref 10–40)
BASOPHILS # BLD AUTO: 0.05 K/UL (ref 0–0.2)
BASOPHILS NFR BLD: 0.5 % (ref 0–1.9)
BILIRUB SERPL-MCNC: 0.9 MG/DL (ref 0.1–1)
BUN SERPL-MCNC: 13 MG/DL (ref 8–23)
CALCIUM SERPL-MCNC: 9.6 MG/DL (ref 8.7–10.5)
CHLORIDE SERPL-SCNC: 101 MMOL/L (ref 95–110)
CO2 SERPL-SCNC: 28 MMOL/L (ref 23–29)
COMPLEXED PSA SERPL-MCNC: 1.7 NG/ML (ref 0–4)
CREAT SERPL-MCNC: 0.9 MG/DL (ref 0.5–1.4)
DIFFERENTIAL METHOD: NORMAL
EOSINOPHIL # BLD AUTO: 0.1 K/UL (ref 0–0.5)
EOSINOPHIL NFR BLD: 1.2 % (ref 0–8)
ERYTHROCYTE [DISTWIDTH] IN BLOOD BY AUTOMATED COUNT: 13.3 % (ref 11.5–14.5)
EST. GFR  (AFRICAN AMERICAN): >60 ML/MIN/1.73 M^2
EST. GFR  (NON AFRICAN AMERICAN): >60 ML/MIN/1.73 M^2
ESTIMATED AVG GLUCOSE: 186 MG/DL (ref 68–131)
GLUCOSE SERPL-MCNC: 167 MG/DL (ref 70–110)
HBA1C MFR BLD HPLC: 8.1 % (ref 4–5.6)
HCT VFR BLD AUTO: 44.1 % (ref 40–54)
HGB BLD-MCNC: 14.8 G/DL (ref 14–18)
LYMPHOCYTES # BLD AUTO: 2.1 K/UL (ref 1–4.8)
LYMPHOCYTES NFR BLD: 23.2 % (ref 18–48)
MCH RBC QN AUTO: 28.8 PG (ref 27–31)
MCHC RBC AUTO-ENTMCNC: 33.6 G/DL (ref 32–36)
MCV RBC AUTO: 86 FL (ref 82–98)
MONOCYTES # BLD AUTO: 0.7 K/UL (ref 0.3–1)
MONOCYTES NFR BLD: 7.9 % (ref 4–15)
NEUTROPHILS # BLD AUTO: 6.2 K/UL (ref 1.8–7.7)
NEUTROPHILS NFR BLD: 67.2 % (ref 38–73)
PLATELET # BLD AUTO: 242 K/UL (ref 150–350)
PMV BLD AUTO: 10.7 FL (ref 9.2–12.9)
POTASSIUM SERPL-SCNC: 4.5 MMOL/L (ref 3.5–5.1)
PROT SERPL-MCNC: 7.6 G/DL (ref 6–8.4)
RBC # BLD AUTO: 5.14 M/UL (ref 4.6–6.2)
SODIUM SERPL-SCNC: 138 MMOL/L (ref 136–145)
WBC # BLD AUTO: 9.23 K/UL (ref 3.9–12.7)

## 2019-09-10 PROCEDURE — 90662 FLU VACCINE - HIGH DOSE (65+) PRESERVATIVE FREE IM: ICD-10-PCS | Mod: S$GLB,,, | Performed by: INTERNAL MEDICINE

## 2019-09-10 PROCEDURE — 99999 PR PBB SHADOW E&M-EST. PATIENT-LVL III: ICD-10-PCS | Mod: PBBFAC,,, | Performed by: INTERNAL MEDICINE

## 2019-09-10 PROCEDURE — 1101F PT FALLS ASSESS-DOCD LE1/YR: CPT | Mod: CPTII,S$GLB,, | Performed by: INTERNAL MEDICINE

## 2019-09-10 PROCEDURE — 85025 COMPLETE CBC W/AUTO DIFF WBC: CPT

## 2019-09-10 PROCEDURE — 90662 IIV NO PRSV INCREASED AG IM: CPT | Mod: S$GLB,,, | Performed by: INTERNAL MEDICINE

## 2019-09-10 PROCEDURE — 3078F PR MOST RECENT DIASTOLIC BLOOD PRESSURE < 80 MM HG: ICD-10-PCS | Mod: CPTII,S$GLB,, | Performed by: INTERNAL MEDICINE

## 2019-09-10 PROCEDURE — 1101F PR PT FALLS ASSESS DOC 0-1 FALLS W/OUT INJ PAST YR: ICD-10-PCS | Mod: CPTII,S$GLB,, | Performed by: INTERNAL MEDICINE

## 2019-09-10 PROCEDURE — 99215 PR OFFICE/OUTPT VISIT, EST, LEVL V, 40-54 MIN: ICD-10-PCS | Mod: 25,S$GLB,, | Performed by: INTERNAL MEDICINE

## 2019-09-10 PROCEDURE — G0008 ADMIN INFLUENZA VIRUS VAC: HCPCS | Mod: S$GLB,,, | Performed by: INTERNAL MEDICINE

## 2019-09-10 PROCEDURE — 3078F DIAST BP <80 MM HG: CPT | Mod: CPTII,S$GLB,, | Performed by: INTERNAL MEDICINE

## 2019-09-10 PROCEDURE — 83036 HEMOGLOBIN GLYCOSYLATED A1C: CPT

## 2019-09-10 PROCEDURE — 99215 OFFICE O/P EST HI 40 MIN: CPT | Mod: 25,S$GLB,, | Performed by: INTERNAL MEDICINE

## 2019-09-10 PROCEDURE — 3045F PR MOST RECENT HEMOGLOBIN A1C LEVEL 7.0-9.0%: ICD-10-PCS | Mod: CPTII,S$GLB,, | Performed by: INTERNAL MEDICINE

## 2019-09-10 PROCEDURE — 80053 COMPREHEN METABOLIC PANEL: CPT

## 2019-09-10 PROCEDURE — G0008 FLU VACCINE - HIGH DOSE (65+) PRESERVATIVE FREE IM: ICD-10-PCS | Mod: S$GLB,,, | Performed by: INTERNAL MEDICINE

## 2019-09-10 PROCEDURE — 84153 ASSAY OF PSA TOTAL: CPT

## 2019-09-10 PROCEDURE — 3074F SYST BP LT 130 MM HG: CPT | Mod: CPTII,S$GLB,, | Performed by: INTERNAL MEDICINE

## 2019-09-10 PROCEDURE — 36415 COLL VENOUS BLD VENIPUNCTURE: CPT

## 2019-09-10 PROCEDURE — 3045F PR MOST RECENT HEMOGLOBIN A1C LEVEL 7.0-9.0%: CPT | Mod: CPTII,S$GLB,, | Performed by: INTERNAL MEDICINE

## 2019-09-10 PROCEDURE — 3074F PR MOST RECENT SYSTOLIC BLOOD PRESSURE < 130 MM HG: ICD-10-PCS | Mod: CPTII,S$GLB,, | Performed by: INTERNAL MEDICINE

## 2019-09-10 PROCEDURE — 99999 PR PBB SHADOW E&M-EST. PATIENT-LVL III: CPT | Mod: PBBFAC,,, | Performed by: INTERNAL MEDICINE

## 2019-09-10 RX ORDER — ATORVASTATIN CALCIUM 80 MG/1
80 TABLET, FILM COATED ORAL NIGHTLY
Qty: 90 TABLET | Refills: 3 | Status: SHIPPED | OUTPATIENT
Start: 2019-09-10 | End: 2020-10-08

## 2019-09-10 RX ORDER — NITROGLYCERIN 0.4 MG/1
0.4 TABLET SUBLINGUAL EVERY 5 MIN PRN
Qty: 30 TABLET | Refills: 11 | Status: SHIPPED | OUTPATIENT
Start: 2019-09-10 | End: 2021-07-13

## 2019-09-10 RX ORDER — METFORMIN HYDROCHLORIDE 1000 MG/1
1000 TABLET ORAL 2 TIMES DAILY WITH MEALS
Qty: 180 TABLET | Refills: 3 | Status: SHIPPED | OUTPATIENT
Start: 2019-09-10 | End: 2021-06-24

## 2019-09-10 RX ORDER — AMLODIPINE AND BENAZEPRIL HYDROCHLORIDE 5; 40 MG/1; MG/1
1 CAPSULE ORAL DAILY
Qty: 90 CAPSULE | Refills: 3 | Status: SHIPPED | OUTPATIENT
Start: 2019-09-10 | End: 2020-10-08

## 2019-09-10 NOTE — PROGRESS NOTES
Subjective:       Patient ID: Paul Deleon is a 68 y.o. male.    Chief Complaint: Diabetes          And other concerns  HPI   Last visit was in October.  Lives in [place], [place].    Last  Medical visit was many m onths ago.     Saw NP for uti most recently.  No dysuria currently.     He has poorly controlled DM complicated by htn, pvd.     He is not monitoring sugars.  He c/o expense of Tresiba.  We discussed changing to 70/30, as Dr Gillespie suggested last November.     He missed last cardiology appt as he had to attend a .   He is out of lipitor.  He c/o fatigue with exertion, but no chest pain.  He doesn't believe he has had angina.    He had [facility] Screening.  All ok except hs CRP of 6.7.  Normal < 5.1.  carotids fine.  Cr 0.8.        Review of Systems   Constitutional: Negative for activity change and unexpected weight change.   Respiratory: Negative for chest tightness and shortness of breath.    Cardiovascular: Negative for chest pain and leg swelling.   Gastrointestinal: Negative for abdominal pain.   Neurological: Negative for headaches.   Psychiatric/Behavioral: Negative for dysphoric mood.       Objective:      Physical Exam   Constitutional: He appears well-developed and well-nourished. No distress.        Neck: Neck supple. No JVD present.   Cardiovascular: Regular rhythm.   Pulmonary/Chest: Effort normal. No respiratory distress.   Abdominal: He exhibits no mass. There is no tenderness.   Musculoskeletal: He exhibits no edema.   Psychiatric: He has a normal mood and affect.     Protective Sensation (w/ 10 gram monofilament):  Right: Intact  Left: Intact    Visual Inspection:  Normal -  Bilateral    Pedal Pulses:   Right: Diminshed  Left: Diminshed    Posterior tibialis:   Right:Diminshed  Left: Diminshed    Assessment:       1. Uncontrolled diabetes mellitus with peripheral artery disease    2. Hyperlipidemia, unspecified hyperlipidemia type    3. Hypertension associated with diabetes     4. Prostate cancer screening    5. Glaucoma suspect, unspecified laterality    6. Coronary artery disease involving native heart, angina presence unspecified, unspecified vessel or lesion type    7. Colon cancer screening        Plan:       Paul was seen today for diabetes.    Diagnoses and all orders for this visit:    Uncontrolled diabetes mellitus with peripheral artery disease  -     Hemoglobin A1c; Future  -     Comprehensive metabolic panel; Future  -     CBC auto differential; Future  -     Ambulatory consult to Optometry    Hyperlipidemia, unspecified hyperlipidemia type    Hypertension associated with diabetes    Prostate cancer screening  -     PSA, Screening; Future    Glaucoma suspect, unspecified laterality  -     Ambulatory consult to Optometry    Coronary artery disease involving native heart, angina presence unspecified, unspecified vessel or lesion type  -     Ambulatory consult to Cardiology    Colon cancer screening  -     Fecal Immunochemical Test (iFOBT); Future    Other orders  -     atorvastatin (LIPITOR) 80 MG tablet; Take 1 tablet (80 mg total) by mouth every evening.  -     amlodipine-benazepril (LOTREL) 5-40 mg per capsule; Take 1 capsule by mouth once daily.  -     metFORMIN (GLUCOPHAGE) 1000 MG tablet; Take 1 tablet (1,000 mg total) by mouth 2 (two) times daily with meals.  -     nitroGLYCERIN (NITROSTAT) 0.4 MG SL tablet; Place 1 tablet (0.4 mg total) under the tongue every 5 (five) minutes as needed for Chest pain.       He declines colonoscopy     Will decide f/u after above.     Flu vax

## 2019-09-11 ENCOUNTER — TELEPHONE (OUTPATIENT)
Dept: INTERNAL MEDICINE | Facility: CLINIC | Age: 69
End: 2019-09-11

## 2019-09-11 NOTE — TELEPHONE ENCOUNTER
Left voice message for patient to schedule appointment from referral to Cardiology.  Manuel BIRCH  [phone]

## 2019-09-15 RX ORDER — LANCETS 28 GAUGE
EACH MISCELLANEOUS
Qty: 200 EACH | Refills: 11 | Status: SHIPPED | OUTPATIENT
Start: 2019-09-15

## 2019-09-16 ENCOUNTER — TELEPHONE (OUTPATIENT)
Dept: INTERNAL MEDICINE | Facility: CLINIC | Age: 69
End: 2019-09-16

## 2019-09-16 NOTE — TELEPHONE ENCOUNTER
----- Message from Bree Larose MD sent at 9/15/2019  9:18 PM CDT -----  pls call - labs are fine except for uncontrolled DM    Pls make appt with Namita Mederos - asap    Monitor sugars - I reordered strips and lancets.

## 2019-09-16 NOTE — TELEPHONE ENCOUNTER
Tried calling pt, home num,seamus is not in use, mobile number rang and then gave busy signal. Will try again

## 2019-10-15 ENCOUNTER — OFFICE VISIT (OUTPATIENT)
Dept: INTERNAL MEDICINE | Facility: CLINIC | Age: 69
End: 2019-10-15
Payer: MEDICARE

## 2019-10-15 VITALS
BODY MASS INDEX: 32.14 KG/M2 | DIASTOLIC BLOOD PRESSURE: 72 MMHG | SYSTOLIC BLOOD PRESSURE: 108 MMHG | WEIGHT: 200 LBS | HEIGHT: 66 IN

## 2019-10-15 DIAGNOSIS — E11.65 TYPE 2 DIABETES MELLITUS WITH HYPERGLYCEMIA, WITH LONG-TERM CURRENT USE OF INSULIN: Primary | ICD-10-CM

## 2019-10-15 DIAGNOSIS — I25.10 CORONARY ARTERY DISEASE INVOLVING NATIVE HEART, ANGINA PRESENCE UNSPECIFIED, UNSPECIFIED VESSEL OR LESION TYPE: ICD-10-CM

## 2019-10-15 DIAGNOSIS — Z79.4 TYPE 2 DIABETES MELLITUS WITH HYPERGLYCEMIA, WITH LONG-TERM CURRENT USE OF INSULIN: Primary | ICD-10-CM

## 2019-10-15 DIAGNOSIS — I15.2 HYPERTENSION ASSOCIATED WITH DIABETES: ICD-10-CM

## 2019-10-15 DIAGNOSIS — E78.5 HYPERLIPIDEMIA, UNSPECIFIED HYPERLIPIDEMIA TYPE: ICD-10-CM

## 2019-10-15 DIAGNOSIS — E66.9 OBESITY, UNSPECIFIED CLASSIFICATION, UNSPECIFIED OBESITY TYPE, UNSPECIFIED WHETHER SERIOUS COMORBIDITY PRESENT: ICD-10-CM

## 2019-10-15 DIAGNOSIS — E11.59 HYPERTENSION ASSOCIATED WITH DIABETES: ICD-10-CM

## 2019-10-15 PROCEDURE — 99214 OFFICE O/P EST MOD 30 MIN: CPT | Mod: S$GLB,,, | Performed by: NURSE PRACTITIONER

## 2019-10-15 PROCEDURE — 99214 PR OFFICE/OUTPT VISIT, EST, LEVL IV, 30-39 MIN: ICD-10-PCS | Mod: S$GLB,,, | Performed by: NURSE PRACTITIONER

## 2019-10-15 PROCEDURE — 99999 PR PBB SHADOW E&M-EST. PATIENT-LVL III: ICD-10-PCS | Mod: PBBFAC,,, | Performed by: NURSE PRACTITIONER

## 2019-10-15 PROCEDURE — 1101F PT FALLS ASSESS-DOCD LE1/YR: CPT | Mod: CPTII,S$GLB,, | Performed by: NURSE PRACTITIONER

## 2019-10-15 PROCEDURE — 3078F PR MOST RECENT DIASTOLIC BLOOD PRESSURE < 80 MM HG: ICD-10-PCS | Mod: CPTII,S$GLB,, | Performed by: NURSE PRACTITIONER

## 2019-10-15 PROCEDURE — 99999 PR PBB SHADOW E&M-EST. PATIENT-LVL III: CPT | Mod: PBBFAC,,, | Performed by: NURSE PRACTITIONER

## 2019-10-15 PROCEDURE — 3074F SYST BP LT 130 MM HG: CPT | Mod: CPTII,S$GLB,, | Performed by: NURSE PRACTITIONER

## 2019-10-15 PROCEDURE — 3074F PR MOST RECENT SYSTOLIC BLOOD PRESSURE < 130 MM HG: ICD-10-PCS | Mod: CPTII,S$GLB,, | Performed by: NURSE PRACTITIONER

## 2019-10-15 PROCEDURE — 1101F PR PT FALLS ASSESS DOC 0-1 FALLS W/OUT INJ PAST YR: ICD-10-PCS | Mod: CPTII,S$GLB,, | Performed by: NURSE PRACTITIONER

## 2019-10-15 PROCEDURE — 3078F DIAST BP <80 MM HG: CPT | Mod: CPTII,S$GLB,, | Performed by: NURSE PRACTITIONER

## 2019-10-15 RX ORDER — PANTOPRAZOLE SODIUM 40 MG/1
TABLET, DELAYED RELEASE ORAL
COMMUNITY
Start: 2019-10-03 | End: 2020-06-18 | Stop reason: SDUPTHER

## 2019-10-15 NOTE — PROGRESS NOTES
CHIEF COMPLAINT: Type  2 Diabetes     HPI: Mr. Paul Deleon is a 69 y.o. male who was diagnosed with T2DM in 2006.  Pt has had issues with financial burden of insulin-tresiba -$150 per month.  Pt is being seen by me for the first time.  Lab Results   Component Value Date    HGBA1C 8.1 (H) 09/10/2019     a1c has been elevated over the past year.     PREVIOUS DIABETES MEDICATIONS TRIED  tresiba   Metformin     CURRENT DIABETIC MEDS: metformin 1000 mg bid, tresiba 80 units at night     Pt is monitoring blood glucose readings 3 times a day.  Needs ~100 strips per month related to fluctuations with blood glucose reading, a1c trends, and activity level.  FBG 98, 120s-140s  Pre lunch  Mid 100s   Evening varies  Lowest: 98  Highest: 300      Diabetes Management Status    Statin: Taking  ACE/ARB: Taking    Screening or Prevention Patient's value Goal Complete/Controlled?   HgA1C Testing and Control   Lab Results   Component Value Date    HGBA1C 8.1 (H) 09/10/2019      Annually/Less than 8% No   Lipid profile : 11/27/2018 Annually Yes   LDL control Lab Results   Component Value Date    LDLCALC 96.6 11/27/2018    LDLCALC 96.6 11/27/2018    Annually/Less than 100 mg/dl  Yes   Nephropathy screening Lab Results   Component Value Date    LABMICR 42.0 09/08/2014     Lab Results   Component Value Date    PROTEINUA Trace (A) 09/08/2014    Annually No   Blood pressure BP Readings from Last 1 Encounters:   10/15/19 108/72    Less than 140/90 Yes   Dilated retinal exam : 11/27/2018 Annually Yes   Foot exam   : 09/10/2019 Annually Yes     REVIEW OF SYSTEMS  General: no weakness, fatigue, + weight changes 3# loss since 10/2018.   Eyes: no double or blurred vision, eye pain, or redness  Cardiovascular: no chest pain, palpitations, edema, or murmurs.   Respiratory: no cough or dyspnea.   GI: no heartburn, nausea, or changes in bowel patterns; good appetite.   Skin: no rashes, dryness, itching, or reactions at insulin injection  "sites.  Neuro: no numbness, tingling, tremors, or vertigo. +dizziness at night (7p-8p-tresiba admin)  Endocrine: no polyuria, polydipsia, polyphagia, heat or cold intolerance.     Vital Signs  /72   Ht 5' 6" (1.676 m)   Wt 90.7 kg (200 lb)   BMI 32.28 kg/m²     Hemoglobin A1C   Date Value Ref Range Status   09/10/2019 8.1 (H) 4.0 - 5.6 % Final     Comment:     ADA Screening Guidelines:  5.7-6.4%  Consistent with prediabetes  >or=6.5%  Consistent with diabetes  High levels of fetal hemoglobin interfere with the HbA1C  assay. Heterozygous hemoglobin variants (HbS, HgC, etc)do  not significantly interfere with this assay.   However, presence of multiple variants may affect accuracy.     11/27/2018 7.8 (H) 4.0 - 5.6 % Final     Comment:     ADA Screening Guidelines:  5.7-6.4%  Consistent with prediabetes  >or=6.5%  Consistent with diabetes  High levels of fetal hemoglobin interfere with the HbA1C  assay. Heterozygous hemoglobin variants (HbS, HgC, etc)do  not significantly interfere with this assay.   However, presence of multiple variants may affect accuracy.     07/29/2018 9.1 (H) 4.0 - 5.6 % Final     Comment:     ADA Screening Guidelines:  5.7-6.4%  Consistent with prediabetes  >or=6.5%  Consistent with diabetes  High levels of fetal hemoglobin interfere with the HbA1C  assay. Heterozygous hemoglobin variants (HbS, HgC, etc)do  not significantly interfere with this assay.   However, presence of multiple variants may affect accuracy.          Chemistry        Component Value Date/Time     09/10/2019 0856    K 4.5 09/10/2019 0856     09/10/2019 0856    CO2 28 09/10/2019 0856    BUN 13 09/10/2019 0856    CREATININE 0.9 09/10/2019 0856     (H) 09/10/2019 0856        Component Value Date/Time    CALCIUM 9.6 09/10/2019 0856    ALKPHOS 84 09/10/2019 0856    AST 20 09/10/2019 0856    ALT 10 09/10/2019 0856    BILITOT 0.9 09/10/2019 0856    ESTGFRAFRICA >60 09/10/2019 0856    EGFRNONAA >60 " 09/10/2019 0856           Lab Results   Component Value Date    TSH 2.400 07/29/2018      Lab Results   Component Value Date    CHOL 153 11/27/2018    CHOL 153 11/27/2018    CHOL 185 12/31/2013     Lab Results   Component Value Date    HDL 29 (L) 11/27/2018    HDL 29 (L) 11/27/2018    HDL 26 (L) 12/31/2013     Lab Results   Component Value Date    LDLCALC 96.6 11/27/2018    LDLCALC 96.6 11/27/2018    LDLCALC 126.4 12/31/2013     Lab Results   Component Value Date    TRIG 137 11/27/2018    TRIG 137 11/27/2018    TRIG 163 (H) 12/31/2013     Lab Results   Component Value Date    CHOLHDL 19.0 (L) 11/27/2018    CHOLHDL 19.0 (L) 11/27/2018    CHOLHDL 14.1 (L) 12/31/2013         PHYSICAL EXAMINATION  Constitutional: Appears well, no distress  Neck: Supple, trachea midline.   Respiratory: No wheezes, even and unlabored.  Cardiovascular: RRR; no edema.   Lymph: deferred  Skin: warm and dry; no injection site reactions, no acanthosis nigracans observed.  Neuro:patient alert and cooperative, normal affect; steady gait.    Diabetes Foot Exam:   Deferred     Assessment/Plan    1. Type 2 diabetes mellitus with hyperglycemia, with long-term current use of insulin     2. Hypertension associated with diabetes     3. Hyperlipidemia, unspecified hyperlipidemia type     4. Obesity, unspecified classification, unspecified obesity type, unspecified whether serious comorbidity present       1. F/u in 3 mos  DE in 4-6 weeks-here  Pt assistance messaged  Needs assistance for tresiba  Discussed vgo 30 and 3 clicks with meals  1 click for snack  1 click extra for sugars >180  Discussed trulicity 0.75 mg weekly-pt is worried about price burdens  No h/o pancreatitis or medullary thyroid ca    DE will be helpful to make decision on best options  vgo $75-80 per month, eliminates pen needles, total medical for freestyle orion -option discussed as well  Brochures given  Regular insulin (relion)-[facility] 3 vials ($75, or $25 per week)    As of  now,  a1c goal less than 7.5%  Cut back tresiba by 15% to 68 units at night  Continue metformin 1000 mg bid  Add jardiance 10 mg daily -[facility] -pt assistance if too expensive  Await pt assistance  Will like to add glp1a trulicity via ino -will hold off    A1c, tsh, lipid, urine mac   [facility] -insulin     2. Controlled, continue med(s)  3.   Lab Results   Component Value Date    LDLCALC 96.6 11/27/2018    LDLCALC 96.6 11/27/2018     At goal  Lipid next time   4. Body mass index is 32.28 kg/m². may increase insulin resistance  Walks 20 mins per morning  Does yard work   Part of silver sneakers     FOLLOW UP  Follow up in about 3 months (around 1/15/2020).

## 2019-10-15 NOTE — PATIENT INSTRUCTIONS
Snacks can be an important part of a balanced, healthy meal plan. They allow you to eat more frequently, feeling full and satisfied throughout the day. Also, they allow you to spread carbohydrates evenly, which may stabilize blood sugars.  Plus, snacks are enjoyable!     The amount of carbohydrate needed at snacks varies. Generally, about 15-30 grams of carbohydrate per snack is recommended.  Below you will find some tasty treats.       0-5 gm carb   Crystal Light   Vitamin Water Zero   Herbal tea, unsweetened   2 tsp peanut butter on celery   1./2 cup sugar-free jell-o   1 sugar-free popsicle   ¼ cup blueberries   8oz Blue Annika unsweetened almond milk   5 baby carrots & celery sticks, cucumbers, bell peppers dipped in ¼ cup salsa, 2Tbsp light ranch dressing or 2Tbsp plain Greek yogurt   10 Goldfish crackers   ½ oz low-fat cheese or string cheese   1 closed handful of nuts, unsalted   1 Tbsp of sunflower seeds, unsalted   1 cup Smart Pop popcorn   1 whole grain brown rice cake        15 gm carb   1 small piece of fruit or ½ banana or 1/2 cup lite canned fruit   3 aishwarya cracker squares   3 cups Smart Pop popcorn, top spray butter, Lu lite salt or cinnamon and Truvia   5 Vanilla Wafers   ½ cup low fat, no added sugar ice cream or frozen yogurt (Blue bell, Blue Bunny, Weight Watchers, Skinny Cow)   ½ turkey, ham, or chicken sandwich   ½ c fruit with ½ c Cottage cheese   4-6 unsalted wheat crackers with 1 oz low fat cheese or 1 tbsp peanut butter    30-45 goldfish crackers (depending on flavor)    7-8 Lutheran mini brown rice cakes (caramel, apple cinnamon, chocolate)    12 Lutheran mini brown rice cakes (cheddar, bbq, ranch)    1/3 cup hummus dip with raw veg   1/2 whole wheat fly, 1Tbsp hummus   Mini Pizza (1/2 whole wheat English muffin, low-fat  cheese, tomato sauce)   100 calorie snack pack (Oreo, Chips Ahoy, Ritz Mix, Baked Cheetos)   4-6 oz. light or Greek Style yogurt  (Chocarmela, Yoplaiesther, [place], [facility])   ½ cup sugar-free pudding     6 in. wheat tortilla or fly oven toasted chips (topped with spray butter flavoring, cinnamon, Truvia OR spray butter, garlic powder, chili powder)    18 BBQ Popchips (available at [employer], [employer] Foods, Fresh Market)                   Diabetes Support Group Meetings         Date: Topic:   February 14 Eat Fit MADDIE/Health Promotion   March 14 Taking Care of Your Smile   April 11 Spring into Healthy Eating/Cooking Demo   May 9 Ease Your Mind with Diabetes   Herlinda 13 Summer Treats/Cooking Demo   July 11 Eat Fit MADDIE/Super Market Sweep   August 8 Taking Care of Your Eyes and Feet   Sept 12 Technology/ADA updates   October 10 Recipes & Treats/Cooking Demo   November 14 Heart Health/Pump it up!   December 12 Year-End Close Out        Meetings are held in the [facility] (A) of the [facility] located at [address] [place] [address]. Please call [phone] for additional information.    Free service, offered every 2nd Thursday of every month! Family members and/or friends are welcome as well!  Support group is for patients with type 1 or type 2 diabetes.    From 3:30p to 4:30p        Empagliflozin oral tablets  What is this medicine?  EMPAGLIGLOZIN (EM pa gli FLOE zin) helps to treat type 2 diabetes. It helps to control blood sugar. Treatment is combined with diet and exercise.  How should I use this medicine?  Take this medicine by mouth with a glass of water. Follow the directions on the prescription label. Take it in the morning, with or without food. Take your dose at the same time each day. Do not take more often than directed. Do not stop taking except on your doctor's advice.  Talk to your pediatrician regarding the use of this medicine in children. Special care may be needed.  What side effects may I notice from receiving this medicine?  Side effects that you should report to your doctor or  health care professional as soon as possible:  · allergic reactions like skin rash, itching or hives, swelling of the face, lips, or tongue  · breathing problems  · dizziness  · fast or irregular heartbeat  · feeling faint or lightheaded, falls  · muscle weakness  · nausea, vomiting, unusual stomach upset or pain  · signs and symptoms of low blood sugar such as feeling anxious, confusion, dizziness, increased hunger, unusually weak or tired, sweating, shakiness, cold, irritable, headache, blurred vision, fast heartbeat, loss of consciousness  · signs and symptoms of a urinary tract infection, such as fever, chills, a burning feeling when urinating, blood in the urine, back pain  · trouble passing urine or change in the amount of urine, including an urgent need to urinate more often, in larger amounts, or at night  · penile discharge, itching, or pain in men  · unusual tiredness  · vaginal discharge, itching, or odor in women  Side effects that usually do not require medical attention (Report these to your doctor or health care professional if they continue or are bothersome.):  · joint pain  · mild increase in urination  · thirsty  What may interact with this medicine?  Do not take this medicine with any of the following medications:  · gatifloxacin  This medicine may also interact with the following medications:  · alcohol  · certain medicines for blood pressure, heart disease  · diuretics  What if I miss a dose?  If you miss a dose, take it as soon as you can. If it is almost time for your next dose, take only that dose. Do not take double or extra doses.  Where should I keep my medicine?  Keep out of the reach of children.  Store at room temperature between 20 and 25 degrees C (68 and 77 degrees F). Throw away any unused medicine after the expiration date.  What should I tell my health care provider before I take this medicine?  They need to know if you have any of these conditions:  · dehydration  · diabetic  ketoacidosis  · diet low in salt  · eating less due to illness, surgery, dieting, or any other reason  · having surgery  · high cholesterol  · high levels of potassium in the blood  · history of pancreatitis or pancreas problems  · history of yeast infection of the penis or vagina  · if you often drink alcohol  · infections in the bladder, kidneys, or urinary tract  · kidney disease  · liver disease  · low blood pressure  · on hemodialysis  · problems urinating  · type 1 diabetes  · uncircumcised male  · an unusual or allergic reaction to empagliflozin, other medicines, foods, dyes, or preservatives  · pregnant or trying to get pregnant  · breast-feeding  What should I watch for while using this medicine?  Visit your doctor or health care professional for regular checks on your progress.  This medicine can cause a serious condition in which there is too much acid in the blood. If you develop nausea, vomiting, stomach pain, unusual tiredness, or breathing problems, stop taking this medicine and call your doctor right away. If possible, use a ketone dipstick to check for ketones in your urine.  A test called the HbA1C (A1C) will be monitored. This is a simple blood test. It measures your blood sugar control over the last 2 to 3 months. You will receive this test every 3 to 6 months.  Learn how to check your blood sugar. Learn the symptoms of low and high blood sugar and how to manage them.  Always carry a quick-source of sugar with you in case you have symptoms of low blood sugar. Examples include hard sugar candy or glucose tablets. Make sure others know that you can choke if you eat or drink when you develop serious symptoms of low blood sugar, such as seizures or unconsciousness. They must get medical help at once.  Tell your doctor or health care professional if you have high blood sugar. You might need to change the dose of your medicine. If you are sick or exercising more than usual, you might need to change the  dose of your medicine.  Do not skip meals. Ask your doctor or health care professional if you should avoid alcohol. Many nonprescription cough and cold products contain sugar or alcohol. These can affect blood sugar.  Wear a medical ID bracelet or chain, and carry a card that describes your disease and details of your medicine and dosage times.  NOTE:This sheet is a summary. It may not cover all possible information. If you have questions about this medicine, talk to your doctor, pharmacist, or health care provider. Copyright© 2017 [employer]        Hypoglycemia (Low Blood Sugar)     Fast-acting sugar includes a cup of nonfat milk.     Too little sugar (glucose) in your blood is called hypoglycemia or low blood sugar. Low blood sugar usually means anything lower than 70 mg/dL. Talk with your healthcare provider about your target range and what level is too low for you. Diabetes itself doesnt cause low blood sugar. But some of the treatments for diabetes, such as pills or insulin, may raise your risk for it. Low blood sugar may cause you to pass out or have a seizure. So always treat low blood sugar right away, but don't overeat.  Special note: Always carry a source of fast-acting sugar and a snack in case of hypoglycemia.   What you may notice  If you have low blood sugar, you may have one or more of these symptoms:  · Shakiness or dizziness  · Cold, clammy skin or sweating  · Feelings of hunger  · Headache  · Nervousness  · A hard, fast heartbeat  · Weakness  · Confusion or irritability  · Blurred vision  · Having nightmares or waking up confused or sweating  · Numbness or tingling in the lips or tongue  What you should do  Here are tips to follow if you have hypoglycemia:   · First check your blood sugar. If it is too low (out of your target range), eat or drink 15 to 20 grams of fast-acting sugar. This may be 3 to 4 glucose tablets, 4 ounces (half a cup) of fruit juice or regular (nondiet) soda, 8 ounces (1  cup) of fat-free milk, or 1 tablespoon of honey. Dont take more than this, or your blood sugar may go too high.  · Wait 15 minutes. Then recheck your blood sugar if you can.  · If your blood sugar is still too low, repeat the steps above and check your blood sugar again. If your blood sugar still has not returned to your target range, contact your healthcare provider or seek emergency care.  · Once your blood sugar returns to target range, eat a snack or meal.  Preventing low blood sugar  Things you can do include the following:   · If your condition needs a strict treatment plan, eat your meals and snacks at the same times each day. Dont skip meals!  · If your treatment plan lets you change when you eat and what you eat, learn how to change the time and dose of your rapid-acting insulin to match this.   · Ask your healthcare provider if it is safe for you to drink alcohol. Never drink on an empty stomach.  · Take your medicine at the prescribed times.  · Always carry a source of fast-acting sugar and a snack when youre away from home.  Other things to do  Additional tips include the following:  · Carry a medical ID card, a compact USB drive, or wear a medical alert bracelet or necklace. It should say that you have diabetes. It should also say what to do if you pass out or have a seizure.  · Make sure your family, friends, and coworkers know the signs of low blood sugar. Tell them what to do if your blood sugar falls very low and you cant treat yourself.  · Keep a glucagon emergency kit handy. Be sure your family, friends, and coworkers know how and when to use it. Check it regularly and replace the glucagon before it expires.  · Talk with your health care team about other things you can do to prevent low blood sugar.     If you have unexplained hypoglycemia or hypoglycemia several times, call your healthcare provider.   Date Last Reviewed: 5/1/2016  © 6087-3261 [employer]. [address] [facility],  [place] [address]. All rights reserved. This information is not intended as a substitute for professional medical care. Always follow your healthcare professional's instructions.

## 2019-10-17 ENCOUNTER — TELEPHONE (OUTPATIENT)
Dept: PHARMACY | Facility: AMBULARY SURGERY CENTER | Age: 69
End: 2019-10-17

## 2019-10-30 ENCOUNTER — PATIENT OUTREACH (OUTPATIENT)
Dept: ADMINISTRATIVE | Facility: OTHER | Age: 69
End: 2019-10-30

## 2019-11-01 ENCOUNTER — OFFICE VISIT (OUTPATIENT)
Dept: OPTOMETRY | Facility: CLINIC | Age: 69
End: 2019-11-01
Payer: COMMERCIAL

## 2019-11-01 DIAGNOSIS — H26.9 CORTICAL CATARACT OF BOTH EYES: ICD-10-CM

## 2019-11-01 DIAGNOSIS — E11.9 DIABETIC EYE EXAM: Primary | ICD-10-CM

## 2019-11-01 DIAGNOSIS — Z01.00 DIABETIC EYE EXAM: Primary | ICD-10-CM

## 2019-11-01 DIAGNOSIS — H25.13 NUCLEAR SCLEROTIC CATARACT OF BOTH EYES: ICD-10-CM

## 2019-11-01 DIAGNOSIS — H52.4 PRESBYOPIA OF BOTH EYES: ICD-10-CM

## 2019-11-01 DIAGNOSIS — H52.11 MYOPIA OF RIGHT EYE WITH ASTIGMATISM: ICD-10-CM

## 2019-11-01 DIAGNOSIS — H52.12 MYOPIA OF LEFT EYE: ICD-10-CM

## 2019-11-01 DIAGNOSIS — E11.9 TYPE 2 DIABETES MELLITUS WITHOUT RETINOPATHY: ICD-10-CM

## 2019-11-01 DIAGNOSIS — H52.201 MYOPIA OF RIGHT EYE WITH ASTIGMATISM: ICD-10-CM

## 2019-11-01 DIAGNOSIS — H40.013 OPEN ANGLE WITH BORDERLINE FINDINGS, LOW RISK, BILATERAL: ICD-10-CM

## 2019-11-01 PROCEDURE — 99999 PR PBB SHADOW E&M-EST. PATIENT-LVL III: ICD-10-PCS | Mod: PBBFAC,,, | Performed by: OPTOMETRIST

## 2019-11-01 PROCEDURE — 92015 DETERMINE REFRACTIVE STATE: CPT | Mod: S$GLB,,, | Performed by: OPTOMETRIST

## 2019-11-01 PROCEDURE — 92014 COMPRE OPH EXAM EST PT 1/>: CPT | Mod: S$GLB,,, | Performed by: OPTOMETRIST

## 2019-11-01 PROCEDURE — 92014 PR EYE EXAM, EST PATIENT,COMPREHESV: ICD-10-PCS | Mod: S$GLB,,, | Performed by: OPTOMETRIST

## 2019-11-01 PROCEDURE — 92015 PR REFRACTION: ICD-10-PCS | Mod: S$GLB,,, | Performed by: OPTOMETRIST

## 2019-11-01 PROCEDURE — 92250 COLOR FUNDUS PHOTOGRAPHY - OU - BOTH EYES: ICD-10-PCS | Mod: S$GLB,,, | Performed by: OPTOMETRIST

## 2019-11-01 PROCEDURE — 92250 FUNDUS PHOTOGRAPHY W/I&R: CPT | Mod: S$GLB,,, | Performed by: OPTOMETRIST

## 2019-11-01 PROCEDURE — 99999 PR PBB SHADOW E&M-EST. PATIENT-LVL III: CPT | Mod: PBBFAC,,, | Performed by: OPTOMETRIST

## 2019-11-01 NOTE — PATIENT INSTRUCTIONS
Bilateral nuclear sclerosis of lens, and bilateral cortical cataract.  No need for cataract surgery in either eye.  Monitor.    Glaucoma suspect based on the finding of larger than average optic nerve head cupping in each eye, with normal intraocular pressure in each eye.  Previous HVF test and OCT RNFL thickness analysis done (per Dr. Ivory).  Results not accessible today in OIS, but likely normal, as no treatment for IOP control/reduction ever initiated.  Order stereo disc photos to be taken today.  Otherwise, continue to monitor.    Myopia with astigmatism in the right eye.  Myopia in the left eye.  Presbyopia consistent with age.   New spectacle Rx issued for use as desired.     Recheck in one year.

## 2019-11-01 NOTE — LETTER
November 3, 2019      Bree Larose MD  [address]  [facility] [address]           [facility]  [address]  [facility] [address]  Phone: [phone]  Fax: [phone]          Patient: Paul Deleon   MR Number: [id number]   YOB: 1950   Date of Visit: 11/1/2019       Dear Dr. Bree Larose:    Thank you for referring Paul Deleon to me for evaluation. Attached you will find relevant portions of my assessment and plan of care.    If you have questions, please do not hesitate to call me. I look forward to following Paul Deleon along with you.    Sincerely,    Perez Allan, OD    Enclosure  CC:  No Recipients    If you would like to receive this communication electronically, please contact [email or web address] or [phone] to request more information on [employer] Link access.    For providers and/or their staff who would like to refer a patient to [facility], please contact us through our one-stop-shop provider referral line, [facility], at [phone].    If you feel you have received this communication in error or would no longer like to receive these types of communications, please e-mail [email or web address]

## 2019-11-01 NOTE — PROGRESS NOTES
HPI     Diabetic Eye Exam      Additional comments: Diabetic eye examination.  IDDM - and takes Metformin.  General eye examination and refraction.  Pt states overall changes with glasses.               Comments     Patient's age: 69 y.o. WM   Occupation: Retired   Approximate date of last eye examination:  11/27/2018  Name of last eye doctor seen: Dr. Ivory  City/State: [place]  Wears glasses? Yes      If yes, wears  Full-time or part-time?  Full time   Present glasses are: Bifocal, SV Distance, SV Reading?  Progressive lens  Approximate age of present glasses:  1 yr    Got new glasses following last exam, or subsequently?:  Yes    Any problem with VA with glasses?  Pt co overall changes with glasses    Wears CLs?:  No  Headaches?  Yes, occasional   Eye pain/discomfort?  Possible Dryness                                                                                      Flashes?  No   Floaters?  No   Diplopia/Double vision? No  Patient's Ocular History:         Any eye surgeries? No          Any eye injury?  No          Any treatment for eye disease?  Scratched cornea OS  Family history of eye disease?  No   Significant patient medical history:         1. Diabetes?  Yes, Pt did not check BS this morning.        If yes, IDDM or NIDDM?  IDDM   2. HBP?  Yes, controlled with meds               3. Other (describe):  No    ! OTC eyedrops currently using:  No    ! Prescription eye meds currently using:  No    ! Any history of allergy/adverse reaction to any eye meds used   previously?  No     ! Any history of allergy/adverse reaction to eyedrops used during prior   eye exam(s)? No     ! Any history of allergy/adverse reaction to Novacaine or similar meds?   No    ! Any history of allergy/adverse reaction to Epinephrine or similar meds?   No     ! Patient okay with use of anesthetic eyedrops to check eye pressure?    Yes         ! Patient okay with use of eyedrops to dilate pupils today?  Yes    !   "Allergies/Medications/Medical History/Family History reviewed today?    Yes       PD =   62/58  Desired reading distance =  15.5 "                                                                     Last edited by Perez Allan, OD on 11/1/2019  8:44 AM. (History)            Assessment /Plan     For exam results, see Encounter Report.    1. Diabetic eye exam     2. Type 2 diabetes mellitus without retinopathy     3. Nuclear sclerotic cataract of both eyes     4. Cortical cataract of both eyes     5. Open angle with borderline findings, low risk, bilateral  Color Fundus Photography - [facility] - Both Eyes   6. Myopia of right eye with astigmatism     7. Myopia of left eye     8. Presbyopia of both eyes                    Bilateral nuclear sclerosis of lens, and bilateral cortical cataract.  No need for cataract surgery in either eye.  Monitor.    Glaucoma suspect based on the finding of larger than average optic nerve head cupping in each eye, with normal intraocular pressure in each eye.  Previous HVF test and OCT RNFL thickness analysis done (per Dr. Ivory).  Results not accessible today in OIS, but likely normal, as no treatment for IOP control/reduction ever initiated.  Order stereo disc photos to be taken today.  Otherwise, continue to monitor.    Myopia with astigmatism in the right eye.  Myopia in the left eye.  Presbyopia consistent with age.   New spectacle Rx issued for use as desired.     Recheck in one year.           "

## 2019-11-11 ENCOUNTER — PATIENT OUTREACH (OUTPATIENT)
Dept: DIABETES | Facility: CLINIC | Age: 69
End: 2019-11-11

## 2019-11-11 DIAGNOSIS — Z79.4 TYPE 2 DIABETES MELLITUS WITH HYPEROSMOLAR COMA, WITH LONG-TERM CURRENT USE OF INSULIN: Primary | ICD-10-CM

## 2019-11-11 DIAGNOSIS — E11.01 TYPE 2 DIABETES MELLITUS WITH HYPEROSMOLAR COMA, WITH LONG-TERM CURRENT USE OF INSULIN: Primary | ICD-10-CM

## 2019-11-11 DIAGNOSIS — E08.8 DIABETES MELLITUS DUE TO UNDERLYING CONDITION WITH UNSPECIFIED COMPLICATIONS: ICD-10-CM

## 2019-11-13 ENCOUNTER — TELEPHONE (OUTPATIENT)
Dept: INTERNAL MEDICINE | Facility: CLINIC | Age: 69
End: 2019-11-13

## 2019-11-13 ENCOUNTER — PATIENT MESSAGE (OUTPATIENT)
Dept: INTERNAL MEDICINE | Facility: CLINIC | Age: 69
End: 2019-11-13

## 2019-11-13 RX ORDER — INSULIN DEGLUDEC 200 U/ML
68 INJECTION, SOLUTION SUBCUTANEOUS NIGHTLY
Qty: 6 ML | Refills: 11 | Status: SHIPPED | OUTPATIENT
Start: 2019-11-13 | End: 2019-11-13 | Stop reason: SDUPTHER

## 2019-11-13 RX ORDER — INSULIN DEGLUDEC 200 U/ML
68 INJECTION, SOLUTION SUBCUTANEOUS NIGHTLY
Qty: 6 ML | Refills: 11 | Status: SHIPPED | OUTPATIENT
Start: 2019-11-13 | End: 2019-11-18 | Stop reason: SDUPTHER

## 2019-11-13 NOTE — TELEPHONE ENCOUNTER
----- Message from Catalina Gómez sent at 11/13/2019  3:29 PM CST -----  Contact: Destiny ( pt wife) [phone]  Patient is calling for an RX refill or new RX.  Is this a refill or new RX:  refill  RX name and strength: TRESIBA FLEXTOUCH U-200 200 unit/mL (3 mL) InPn  Directions (copy/paste from chart):  68 Units every evening   Is this a 30 day or 90 day RX:    Local pharmacy or mail order pharmacy:  local  Pharmacy name and phone # (copy/paste from chart):   [facility]   [address] [place] [address] [phone]   Comments:

## 2019-11-13 NOTE — TELEPHONE ENCOUNTER
----- Message from Kelli Lee sent at 11/13/2019 12:16 PM CST -----  Contact: [phone]  Type: Rx    Name of medication(s): TRESIBA FLEXTOUCH U-200 200 unit/mL (3 mL) InPn    Is this a refill? New rx? refill    Who prescribed medication? Lachelle    Pharmacy Name, Phone, & Location:  [facility]. -    Comments:  Please advise, thank you

## 2019-11-14 ENCOUNTER — PATIENT OUTREACH (OUTPATIENT)
Dept: ADMINISTRATIVE | Facility: OTHER | Age: 69
End: 2019-11-14

## 2019-11-18 ENCOUNTER — CLINICAL SUPPORT (OUTPATIENT)
Dept: DIABETES | Facility: CLINIC | Age: 69
End: 2019-11-18
Payer: MEDICARE

## 2019-11-18 ENCOUNTER — PATIENT MESSAGE (OUTPATIENT)
Dept: INTERNAL MEDICINE | Facility: CLINIC | Age: 69
End: 2019-11-18

## 2019-11-18 ENCOUNTER — TELEPHONE (OUTPATIENT)
Dept: INTERNAL MEDICINE | Facility: CLINIC | Age: 69
End: 2019-11-18

## 2019-11-18 DIAGNOSIS — Z79.4 TYPE 2 DIABETES MELLITUS WITH HYPEROSMOLAR COMA, WITH LONG-TERM CURRENT USE OF INSULIN: ICD-10-CM

## 2019-11-18 DIAGNOSIS — E11.01 TYPE 2 DIABETES MELLITUS WITH HYPEROSMOLAR COMA, WITH LONG-TERM CURRENT USE OF INSULIN: ICD-10-CM

## 2019-11-18 DIAGNOSIS — E08.8 DIABETES MELLITUS DUE TO UNDERLYING CONDITION WITH UNSPECIFIED COMPLICATIONS: ICD-10-CM

## 2019-11-18 PROCEDURE — 99999 PR PBB SHADOW E&M-EST. PATIENT-LVL II: ICD-10-PCS | Mod: PBBFAC,,,

## 2019-11-18 PROCEDURE — G0108 DIAB MANAGE TRN  PER INDIV: HCPCS | Mod: S$GLB,,, | Performed by: INTERNAL MEDICINE

## 2019-11-18 PROCEDURE — 99999 PR PBB SHADOW E&M-EST. PATIENT-LVL II: CPT | Mod: PBBFAC,,,

## 2019-11-18 PROCEDURE — G0108 PR DIAB MANAGE TRN  PER INDIV: ICD-10-PCS | Mod: S$GLB,,, | Performed by: INTERNAL MEDICINE

## 2019-11-18 RX ORDER — PEN NEEDLE, DIABETIC 30 GX3/16"
NEEDLE, DISPOSABLE MISCELLANEOUS
Qty: 100 EACH | Refills: 3 | Status: SHIPPED | OUTPATIENT
Start: 2019-11-18 | End: 2022-09-27 | Stop reason: SDUPTHER

## 2019-11-18 RX ORDER — GLIPIZIDE 10 MG/1
10 TABLET, FILM COATED, EXTENDED RELEASE ORAL
Qty: 30 TABLET | Refills: 6 | Status: SHIPPED | OUTPATIENT
Start: 2019-11-18 | End: 2020-06-18

## 2019-11-18 RX ORDER — NAPROXEN SODIUM 220 MG
TABLET ORAL
Qty: 200 EACH | Refills: 3
Start: 2019-11-18 | End: 2020-06-18

## 2019-11-18 RX ORDER — INSULIN DEGLUDEC 200 U/ML
68 INJECTION, SOLUTION SUBCUTANEOUS NIGHTLY
Qty: 4 SYRINGE | Refills: 6 | Status: SHIPPED | OUTPATIENT
Start: 2019-11-18 | End: 2020-02-20 | Stop reason: SDUPTHER

## 2019-11-18 NOTE — TELEPHONE ENCOUNTER
----- Message from DENG Mishra, THONG sent at 11/18/2019  9:30 AM CST -----  Ok please advise pt to consider vial/syringe   nph 42 units in am, 26 units at night-relion brand at [facility]  Vials for tresiba 68 units at night      As of now, without jardiance approval  Sent rx to [facility]--glipizide 10 mg XR w/ breakfast  And tresiba flexpen with pen needles    Let me know if he will like to switch to other options    But I definitely will recommend glipizide    As far as insulin: nph (relion) is $25  Per vial    Thanks  Namita    All are on med list  Please resend rx if he will like other options discussed above  ----- Message -----  From: Nathaly Abdullahi MA  Sent: 11/18/2019   9:07 AM CST  To: DENG Mishra, THONG        ----- Message -----  From: Meera Arroyo RN  Sent: 11/18/2019   9:01 AM CST  To: [facility] Staff    Namita,    I saw the patient today for DM education. Patient is not taking jardiance or tresiba due to cost. I referred to pharmacy assistance program and also gave a discount card to try with tresiba. Patient is asking if you can send RX's to the [facility] in [place]..    Please call the patient and advise of your recommendations.     Thank you,    Meera Arroyo RN, [facility]  Diabetic Educator  [facility]

## 2019-11-18 NOTE — PROGRESS NOTES
Diabetes Education  Author: Meera Arroyo RN  Date: 2019    Diabetes Care Management Summary  Diabetes Education Record Assessment/Progress: Initial  Current Diabetes Risk Level: Moderate     Last A1c:   Lab Results   Component Value Date    HGBA1C 8.1 (H) 09/10/2019     Last visit with Diabetes Educator: : 2019      Diabetes Type  Diabetes Type : Type II    Diabetes History  Diabetes Diagnosis: >10 years  Current Treatment: Oral Medication, Insulin  Reviewed Problem List with Patient: Yes    Health Maintenance was reviewed today with patient. Discussed with patient importance of routine eye exams, foot exams/foot care, blood work (i.e.: A1c, microalbumin, and lipid), dental visits, yearly flu vaccine, and pneumonia vaccine as indicated by PCP. Patient verbalized understanding.     Health Maintenance Topics with due status: Not Due       Topic Last Completion Date    Lipid Panel 2018    Hemoglobin A1c 09/10/2019    Foot Exam 10/15/2019    Eye Exam 2019    High Dose Statin 2019    Aspirin/Antiplatelet Therapy 2019     Health Maintenance Due   Topic Date Due    TETANUS VACCINE  10/08/1968    Colonoscopy  10/20/2018       Nutrition  Meal Planning: water, snacks between meal, 3 meals per day, eats out seldom, drinks regular soda, artificial sweeteners  What type of sweetener do you use?: Splenda  What type of beverages do you drink?: water, milk, regular soda/tea(coffee with 2 splenda and cream)  Meal Plan 24 Hour Recall - Breakfast: toast, coffee with 2 splenda and cream, milk  Meal Plan 24 Hour Recall - Lunch: baked chicken and green beans, water  Meal Plan 24 Hour Recall - Dinner: Leftover--baked chicken and green beans, water  Meal Plan 24 Hour Recall - Snack: Popcorn    Monitoring   Monitoring: Other  Self Monitoring : SMB-3 times per day. AM-90s, lunch-190 dineer-200-and higher  Blood Glucose Logs: No  Do you use a personal continuous glucose monitor?: No  In the last  month, how often have you had a low blood sugar reaction?: never  Can you tell when your blood sugar is too high?: no    Exercise   Exercise Type: (Cut grass and rake leaves)  Frequency: (twice a week)  Duration: (5 hours- has 10 acres)    Current Diabetes Treatment   Current Treatment: Oral Medication, Insulin    Social History  Preferred Learning Method: Face to Face, Demonstration, Reading Materials  Primary Support: Friends, Family  Smoking Status: Never a Smoker      Barriers to Change  Barriers to Change: None  Learning Challenges : Hearing([place])  Hearing - further explanation: hearing impaired    Readiness to Learn   Readiness to Learn : Eager    Cultural Influences  Cultural Influences: None    Diabetes Education Assessment/Progress      Diabetes Disease Process (diabetes disease process and treatment options): Discussion, Instructed, Individual Session, Written Materials Provided, Comprehends Key Points  Discussed qualifying parameters of diabetes dx. Reviewed/Instructed on disease process and pt's most likely causes of recently 8.1 A1c. Discussed current treatment options, especially dietary and lifestyle changes as well as medication additions and/or changes. Patient verbalizes understanding of received information/education.       Nutrition (Incorporating nutritional management into one's lifestyle): Discussion, Instructed, Individual Session, Written Materials Provided, Comprehends Key Points  Emphasized importance of eliminating all SSB. Discussed SF drink options. Discussed carb vs non-carb foods and reviewed appropriate amounts of carbs to have at meals vs snacks. Recommended 45-60 gm carb at meals and 15 gm carb at snacks. Instructed on appropriate label reading and serving sizes of specific carb containing foods. Reviewed need to limit total/saturated fats. Discussed meal plans and snack ideas amenable to pt. Reviewed the plate method. Patient verbalizes understanding of received  information/education.       Physical Activity (incorporating physical activity into one's lifestyle): Discussion, Instructed, Individual Session, Written Materials Provided, Comprehends Key Points  Discussed goals and benefits of regular Physical Activity. Reviewed difference between active lifestyle and structured physical activity. Encouraged Physical Activity with increased heart rate for sustained duration as tolerated. Reviewed Physical Activity goals of >150 minutes weekly. Patient verbalizes understanding of received information/education.       Medications (states correct name, dose, onset, peak, duration, side effects & timing of meds): Discussion, Instructed, Individual Session, Written Materials Provided, Comprehends Key Points(Referral to PAP for assistance with Tresiba- pt in [place])  Discussed mechanism of action of oral diabetic medication metformin and jardiance. Patient currently not taking Jardiance- as he is in the donut hole and cannot afford. Will notify Ann NP Reviewed signs and symptoms of hypoglycemia and treatment with Rule of 15. Discussed general vs severe hyperglycemia and risk of DKA. Patient verbalizes understanding of received information/education.   Advised patient that to decrease chances of developing lactic acidosis, if undergoing a procedure that uses a contrast medium patient should discuss the need to temporarily stop taking metformin with physician.    Discussed timing and mechanism of action of Tresiba insulin.  Patient currently not taking as RX is over $200, pt is in [facility]- will notify Ann NP. Referral to PAP placed.    Reviewed need for rotation of injection sites, appropriate insulin storage, timing of insulin, safe disposal of used sharps and insulin pen preparation and use, including performing a prime shot of 2 units prior to injection and keeping pen in skin for a count of 10 before removing. Discussed possible side effects and how to avoid these.  Reviewed hypoglycemia and treatment with Rule of 15. Discussed general vs severe hyperglycemia and risk of DKA. Emphasized need to take Tresiba at same time daily when he does start taking medication.Patient verbalizes understanding of received information/education.     Monitoring (monitoring blood glucose/other parameters & using results): Discussion, Instructed, Individual Session, Written Materials Provided, Comprehends Key Points  Discussed goal BGs for different times of day and in relation to meals. Instructed pt to test BG 3 times per day: fasting and 2-hours after any meal. Reviewed need for updated BG logs for all endo, PCP, and education appts. Provided blood sugar logs and advised to return to MD for review.  Patient verbalizes understanding of received information/education.       Acute Complications (preventing, detecting, and treating acute complications): Discussion, Instructed, Individual Session, Written Materials Provided, Comprehends Key Points  Discussed hypoglycemia vs hyperglycemia symptoms and discussed appropriate treatments for each. Reviewed that pt is at low risk of hypo with current medication regimen. Once pt starts tresiba he will be at higher risk for hypoglycemia. Discussed general vs severe hyperglycemia and risk of DKA.    Chronic Complications (preventing, detecting, and treating chronic complications): Discussion, Instructed, Individual Session, Written Materials Provided, Comprehends Key Points(Referral to Podiatry)  Reviewed annual diabetes care schedule and patient priorities. Patient verbalizes understanding of received information/education.       Clinical (diabetes, other pertinent medical history, and relevant comorbidities reviewed during visit): Discussion, Instructed, Individual Session  Reviewed current medical history including co-morbidities.    Cognitive (knowledge of self-management skills, functional health literacy): Discussion, Instructed, Individual  Session  Arrives with adequate health management knowledge - poor specific knowledge of diabetes management. Leaves with increased knowledge base - will benefit from f/u in  3 months.    Psychosocial (emotional response to diabetes): Not Covered/Deferred(Unable to complete due to Time Constraints)      Diabetes Distress and Support Systems: Not Covered/Deferred(Unable to complete due to Time Constraints)      Behavioral (readiness for change, lifestyle practices, self-care behaviors): Discussion, Instructed, Individual Session  Appears  motivated to make recommended changes.    Goals  Patient has selected/evaluated goals during today's session: Yes, evaluated  Healthy Eating: Set(Will measure food and read food labels. Will count carbohydrates to equal 45-60 gm per meal. Will abstain from drinking Sugar Beverages and utilize Diet and/or artificial sweetener.)  Start Date: 11/18/19  Target Date: 02/18/20    Diabetes Care Plan/Intervention  Education Plan/Intervention: Individual Follow-Up DSMT    Diabetes Meal Plan  Restrictions: Restricted Carbohydrate  Carbohydrate Per Snack : 7-15g    Today's Self-Management Care Plan was developed with the patient's input and is based on barriers identified during today's assessment.    The long and short-term goals in the care plan were written with the patient/caregiver's input. The patient has agreed to work toward these goals to improve his overall diabetes control.      The patient received a copy of today's self-management plan and verbalized understanding of the care plan, goals, and all of today's instructions.      The patient was encouraged to communicate with his physician and care team regarding his condition(s) and treatment.  I provided the patient with my contact information today and encouraged him to contact me via phone or patient portal as needed.     Education Units of Time   Time Spent: 60 min

## 2019-11-19 ENCOUNTER — TELEPHONE (OUTPATIENT)
Dept: PHARMACY | Facility: AMBULARY SURGERY CENTER | Age: 69
End: 2019-11-19

## 2020-02-20 NOTE — TELEPHONE ENCOUNTER
----- Message from Maine Ponce sent at 2/20/2020  4:49 PM CST -----  Contact: [facility] [phone] (Phone)  Requesting an RX refill or new RX.  Is this a refill or new RX:  refill  RX name and strength: TRESIBA FLEXTOUCH U-200 200 unit/mL (3 mL) InPnDirections (copy/paste from chart):    Is this a 30 day or 90 day RX:  90 day   Local pharmacy or mail order pharmacy:    Pharmacy name and phone # (copy/paste from chart):   [facility] [id number]  [place], [place] - [address] [facility] [phone] (Phone)  [phone] (Fax)  Comments:  Option 2 then 0, please advise

## 2020-02-27 RX ORDER — INSULIN DEGLUDEC 200 U/ML
68 INJECTION, SOLUTION SUBCUTANEOUS NIGHTLY
Qty: 10 SYRINGE | Refills: 0 | Status: SHIPPED | OUTPATIENT
Start: 2020-02-27 | End: 2020-06-11 | Stop reason: SDUPTHER

## 2020-02-27 NOTE — PROGRESS NOTES
Refill Authorization Note     is requesting a refill authorization.    Brief assessment and rationale for refill: APPROVE: prr          Medication Therapy Plan: A1C>8                              Comments:   Requested Prescriptions   Pending Prescriptions Disp Refills    TRESIBA FLEXTOUCH U-200 200 unit/mL (3 mL) InPn 10 Syringe 0     Sig: Inject 68 Units into the skin every evening.       Endocrinology:  Diabetes - Insulins Failed - 2/20/2020  4:54 PM        Failed - HBA1C is 7.9 or below and within 180 days     Hemoglobin A1C   Date Value Ref Range Status   09/10/2019 8.1 (H) 4.0 - 5.6 % Final     Comment:     ADA Screening Guidelines:  5.7-6.4%  Consistent with prediabetes  >or=6.5%  Consistent with diabetes  High levels of fetal hemoglobin interfere with the HbA1C  assay. Heterozygous hemoglobin variants (HbS, HgC, etc)do  not significantly interfere with this assay.   However, presence of multiple variants may affect accuracy.     11/27/2018 7.8 (H) 4.0 - 5.6 % Final     Comment:     ADA Screening Guidelines:  5.7-6.4%  Consistent with prediabetes  >or=6.5%  Consistent with diabetes  High levels of fetal hemoglobin interfere with the HbA1C  assay. Heterozygous hemoglobin variants (HbS, HgC, etc)do  not significantly interfere with this assay.   However, presence of multiple variants may affect accuracy.     07/29/2018 9.1 (H) 4.0 - 5.6 % Final     Comment:     ADA Screening Guidelines:  5.7-6.4%  Consistent with prediabetes  >or=6.5%  Consistent with diabetes  High levels of fetal hemoglobin interfere with the HbA1C  assay. Heterozygous hemoglobin variants (HbS, HgC, etc)do  not significantly interfere with this assay.   However, presence of multiple variants may affect accuracy.                Passed - Patient is at least 18 years old        Passed - Office visit in past 12 months or future 90 days.     Recent Outpatient Visits            3 months ago Diabetic eye exam    [facility]  Perez Allan, OD    4 months ago Type 2 diabetes mellitus with hyperglycemia, with long-term current use of insulin    [facility] - Internal Medicine Namita Mederos, APRN, FNP    5 months ago Uncontrolled diabetes mellitus with peripheral artery disease    [facility] - Internal Medicine Bree Larose MD    1 year ago Type 2 diabetes mellitus without retinopathy    [facility]-Optometry Wellness Emily Ivory, OD    1 year ago Type 2 diabetes mellitus with complication, with long-term current use of insulin    [facility] - Endocrinology [facility] Rocio Gillespie MD                    Passed - eGFR is 30 or above and within 360 days     eGFR if non    Date Value Ref Range Status   09/10/2019 >60 >60 mL/min/1.73 m^2 Final     Comment:     Calculation used to obtain the estimated glomerular filtration  rate (eGFR) is the CKD-EPI equation.      07/30/2018 >60.0 >60 mL/min/1.73 m^2 Final     Comment:     Calculation used to obtain the estimated glomerular filtration  rate (eGFR) is the CKD-EPI equation.      07/29/2018 >60.0 >60 mL/min/1.73 m^2 Final     Comment:     Calculation used to obtain the estimated glomerular filtration  rate (eGFR) is the CKD-EPI equation.        eGFR if    Date Value Ref Range Status   09/10/2019 >60 >60 mL/min/1.73 m^2 Final   07/30/2018 >60.0 >60 mL/min/1.73 m^2 Final   07/29/2018 >60.0 >60 mL/min/1.73 m^2 Final              Passed - Cr is 1.3 or below and within 360 days     Creatinine   Date Value Ref Range Status   09/10/2019 0.9 0.5 - 1.4 mg/dL Final   07/30/2018 0.8 0.5 - 1.4 mg/dL Final   07/29/2018 0.9 0.5 - 1.4 mg/dL Final               Appointments  past 12m or future 3m with PCP    Date Provider   Last Visit   9/10/2019 Bree Larose MD   Next Visit   Visit date not found Bree Larose MD   .  ED visits in past 90 days: 0       Note composed:8:41 AM 02/27/2020

## 2020-03-12 ENCOUNTER — PATIENT OUTREACH (OUTPATIENT)
Dept: ADMINISTRATIVE | Facility: HOSPITAL | Age: 70
End: 2020-03-12

## 2020-03-31 RX ORDER — PANTOPRAZOLE SODIUM 40 MG/1
40 TABLET, DELAYED RELEASE ORAL DAILY
Qty: 90 TABLET | Refills: 0 | Status: CANCELLED | OUTPATIENT
Start: 2020-03-31

## 2020-03-31 NOTE — PROGRESS NOTES
Refill Authorization Note     is requesting a refill authorization.    Brief assessment and rationale for refill: DEFER: not previously prescribed by you          Medication Therapy Plan: LOV(9/19); Last commented 10/1/18 that pt taking Protonix for GERD; Not previously prescribed by you; Pended 90 day supply; Defer to you                              Comments:     Refill Center Care Gap Closure protocols temporarily suspended.   Requested Prescriptions   Pending Prescriptions Disp Refills    pantoprazole (PROTONIX) 40 MG tablet         Gastroenterology: Proton Pump Inhibitors 2 Failed - 3/31/2020  8:59 AM        Failed - An appropriate indication is on the problem list        Passed - Patient is at least 18 years old        Passed - Osteoporosis is not on problem list        Passed - Office visit in past 6 months or future 90 days.     Recent Outpatient Visits            5 months ago Diabetic eye exam    [facility] - Optometry Perez Allan, OD    5 months ago Type 2 diabetes mellitus with hyperglycemia, with long-term current use of insulin    [facility] - Internal Medicine Namita Mederos, APRN, FNP    6 months ago Uncontrolled diabetes mellitus with peripheral artery disease    [facility] - Internal Medicine Bree Larose MD    1 year ago Type 2 diabetes mellitus without retinopathy    [facility]-Optometry Wellness Emily SDarlene Ivory, OD    1 year ago Type 2 diabetes mellitus with complication, with long-term current use of insulin    [facility] - Endocrinology [facility] Rocio Gillespie MD                    Powered by [employer] - 3/31/2020  8:59 AM        Unable to evaluate active med list or whether request is a duplicate.         Appointments  past 12m or future 3m with PCP    Date Provider   Last Visit   9/10/2019 Bree Larose MD   Next Visit   Visit date not found Bree Larose MD   .  ED visits in past 90 days: 0       Note composed:9:39 AM 03/31/2020

## 2020-03-31 NOTE — TELEPHONE ENCOUNTER
Please see the following assessment. This refill request still requires some action on your part. Pantoprazole has not been previously prescribed by you. Pended 90 day supply. Defer to you. Thank you.

## 2020-03-31 NOTE — TELEPHONE ENCOUNTER
----- Message from Azul Mendieta sent at 3/31/2020  8:51 AM CDT -----  Contact: [facility]/ [phone]  Requesting an RX refill or new RX.  Is this a refill or new RX: Refill  1  RX name and strength: pantoprazole (PROTONIX) 40 MG tablet  Directions (copy/paste from chart):    Is this a 30 day or 90 day RX:  90 days  Local pharmacy or mail order pharmacy: mail order   Pharmacy name and phone # (copy/paste from chart): [facility] - [facility] [address] [facility] [phone] (Phone)  [phone] (Fax)   Comments:

## 2020-05-06 ENCOUNTER — TELEPHONE (OUTPATIENT)
Dept: INTERNAL MEDICINE | Facility: CLINIC | Age: 70
End: 2020-05-06

## 2020-05-06 NOTE — TELEPHONE ENCOUNTER
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

## 2020-06-11 ENCOUNTER — TELEPHONE (OUTPATIENT)
Dept: INTERNAL MEDICINE | Facility: CLINIC | Age: 70
End: 2020-06-11

## 2020-06-11 DIAGNOSIS — Z79.4 TYPE 2 DIABETES MELLITUS WITH HYPERGLYCEMIA, WITH LONG-TERM CURRENT USE OF INSULIN: Primary | ICD-10-CM

## 2020-06-11 DIAGNOSIS — E11.65 TYPE 2 DIABETES MELLITUS WITH HYPERGLYCEMIA, WITH LONG-TERM CURRENT USE OF INSULIN: Primary | ICD-10-CM

## 2020-06-11 RX ORDER — INSULIN DEGLUDEC 200 U/ML
68 INJECTION, SOLUTION SUBCUTANEOUS NIGHTLY
Qty: 10 SYRINGE | Refills: 0 | Status: SHIPPED | OUTPATIENT
Start: 2020-06-11 | End: 2020-06-18 | Stop reason: SDUPTHER

## 2020-06-11 RX ORDER — INSULIN DEGLUDEC 200 U/ML
68 INJECTION, SOLUTION SUBCUTANEOUS NIGHTLY
Qty: 10 SYRINGE | Refills: 0 | Status: CANCELLED | OUTPATIENT
Start: 2020-06-11

## 2020-06-11 NOTE — TELEPHONE ENCOUNTER
----- Message from Catalina Gómez sent at 6/11/2020  8:38 AM CDT -----  Contact: Destiny(wife)[phone]  Requesting an RX refill or new RX.  Is this a refill or new RX:  new  RX name and strength: TRESIBA FLEXTOUCH U-200 200 unit/mL (3 mL) InPn  Directions (copy/paste from chart):  Inject 68 Units into the skin every evening. - Subcutaneous  Is this a 30 day or 90 day RX:    Local pharmacy or mail order pharmacy:  local  Pharmacy name and phone # (copy/paste from chart): [facility] [address] [place] [address], [place] - [address] [facility] [phone] (Phone)  [phone] (Fax)    Comments:

## 2020-06-11 NOTE — TELEPHONE ENCOUNTER
He is way overdue for appt with Namita eMderos- pls schedule with labs she ordered - tsh, lipids, a1c. Also add cmp.

## 2020-06-11 NOTE — TELEPHONE ENCOUNTER
----- Message from Brittani Haywood sent at 6/11/2020 10:03 AM CDT -----  Contact: patient on cell only [phone]  Requesting an RX refill or new RX.  Is this a refill or new RX:                        RX name and strength: TRESIBA FLEXTOUCH U-200 200 unit/mL (3 mL) InPn  Directions (copy/paste from chart):    Is this a 30 day or 90 day RX:  30  Local pharmacy or mail order pharmacy:  local  Pharmacy name and phone  [id number] - [place], [place] - [address] AT [facility] [phone] (Phone)     Comments:  Pt is having trouble with [facility] and is changing pharmacies. Pt is totally out of Tresiba and needs this called in today please.PLease call patient when this has been ordered so pt can pick it up.

## 2020-06-12 ENCOUNTER — LAB VISIT (OUTPATIENT)
Dept: LAB | Facility: HOSPITAL | Age: 70
End: 2020-06-12
Attending: NURSE PRACTITIONER
Payer: MEDICARE

## 2020-06-12 DIAGNOSIS — E78.5 HYPERLIPIDEMIA, UNSPECIFIED HYPERLIPIDEMIA TYPE: ICD-10-CM

## 2020-06-12 DIAGNOSIS — Z79.4 TYPE 2 DIABETES MELLITUS WITH HYPERGLYCEMIA, WITH LONG-TERM CURRENT USE OF INSULIN: ICD-10-CM

## 2020-06-12 DIAGNOSIS — E11.65 TYPE 2 DIABETES MELLITUS WITH HYPERGLYCEMIA, WITH LONG-TERM CURRENT USE OF INSULIN: ICD-10-CM

## 2020-06-12 LAB
CHOLEST SERPL-MCNC: 106 MG/DL (ref 120–199)
CHOLEST/HDLC SERPL: 3.7 {RATIO} (ref 2–5)
HDLC SERPL-MCNC: 29 MG/DL (ref 40–75)
HDLC SERPL: 27.4 % (ref 20–50)
LDLC SERPL CALC-MCNC: 50.4 MG/DL (ref 63–159)
NONHDLC SERPL-MCNC: 77 MG/DL
TRIGL SERPL-MCNC: 133 MG/DL (ref 30–150)
TSH SERPL DL<=0.005 MIU/L-ACNC: 3.25 UIU/ML (ref 0.4–4)

## 2020-06-12 PROCEDURE — 84443 ASSAY THYROID STIM HORMONE: CPT

## 2020-06-12 PROCEDURE — 36415 COLL VENOUS BLD VENIPUNCTURE: CPT | Mod: PO

## 2020-06-12 PROCEDURE — 80061 LIPID PANEL: CPT

## 2020-06-12 PROCEDURE — 83036 HEMOGLOBIN GLYCOSYLATED A1C: CPT

## 2020-06-12 RX ORDER — INSULIN DEGLUDEC 200 U/ML
68 INJECTION, SOLUTION SUBCUTANEOUS NIGHTLY
Qty: 10 SYRINGE | Refills: 0 | Status: SHIPPED | OUTPATIENT
Start: 2020-06-12 | End: 2020-06-12 | Stop reason: SDUPTHER

## 2020-06-12 RX ORDER — INSULIN DEGLUDEC 200 U/ML
68 INJECTION, SOLUTION SUBCUTANEOUS NIGHTLY
Qty: 10 SYRINGE | Refills: 11 | Status: SHIPPED | OUTPATIENT
Start: 2020-06-12 | End: 2020-10-05 | Stop reason: SDUPTHER

## 2020-06-12 NOTE — PROGRESS NOTES
Refill Routing Note    Medication(s) are not appropriate for processing by [facility]:       Drug-Drug Interaction (NPH, TRESIBA FLEXTOUCH U-200;)     Medication-related problems identified: Drug-drug interaction  Medication Therapy Plan: Duplicate Therapy: insulin NPH, TRESIBA FLEXTOUCH U-200; Intermediate-, Long-acting, and Combination Insulins; Defer to you  Medication reconciliation completed: No      Automatic Epic Protocol Generated Data:    Requested Prescriptions   Pending Prescriptions Disp Refills    TRESIBA FLEXTOUCH U-200 200 unit/mL (3 mL) InPn 10 Syringe 0     Sig: Inject 68 Units into the skin every evening.       Endocrinology:  Diabetes - Insulins Failed - 6/11/2020 12:55 PM        Failed - HBA1C is 7.9 or below and within 180 days     Hemoglobin A1C   Date Value Ref Range Status   09/10/2019 8.1 (H) 4.0 - 5.6 % Final     Comment:     ADA Screening Guidelines:  5.7-6.4%  Consistent with prediabetes  >or=6.5%  Consistent with diabetes  High levels of fetal hemoglobin interfere with the HbA1C  assay. Heterozygous hemoglobin variants (HbS, HgC, etc)do  not significantly interfere with this assay.   However, presence of multiple variants may affect accuracy.     11/27/2018 7.8 (H) 4.0 - 5.6 % Final     Comment:     ADA Screening Guidelines:  5.7-6.4%  Consistent with prediabetes  >or=6.5%  Consistent with diabetes  High levels of fetal hemoglobin interfere with the HbA1C  assay. Heterozygous hemoglobin variants (HbS, HgC, etc)do  not significantly interfere with this assay.   However, presence of multiple variants may affect accuracy.     07/29/2018 9.1 (H) 4.0 - 5.6 % Final     Comment:     ADA Screening Guidelines:  5.7-6.4%  Consistent with prediabetes  >or=6.5%  Consistent with diabetes  High levels of fetal hemoglobin interfere with the HbA1C  assay. Heterozygous hemoglobin variants (HbS, HgC, etc)do  not significantly interfere with this assay.   However, presence of multiple variants  may affect accuracy.                Passed - Patient is at least 18 years old        Passed - Office visit in past 12 months or future 90 days.     Recent Outpatient Visits            7 months ago Diabetic eye exam    [facility] Optometry Perez Allan, OD    8 months ago Type 2 diabetes mellitus with hyperglycemia, with long-term current use of insulin    [facility] Internal Medicine Namita Mederos, APRN, FNP    9 months ago Uncontrolled diabetes mellitus with peripheral artery disease    [facility] Bree Larose MD    1 year ago Type 2 diabetes mellitus without retinopathy    [facility] Emily TUCKERDarlene Ivory, OD    1 year ago Type 2 diabetes mellitus with complication, with long-term current use of insulin    [facility] End[facility]in[facility] Rocio Gillespie MD          Future Appointments              Today LABORATORY, [place] [facility]    In 6 days Namita Mederos, APRN, FNP [facility], [facility]                Passed - eGFR is 30 or above and within 360 days     eGFR if non    Date Value Ref Range Status   09/10/2019 >60 >60 mL/min/1.73 m^2 Final     Comment:     Calculation used to obtain the estimated glomerular filtration  rate (eGFR) is the CKD-EPI equation.      07/30/2018 >60.0 >60 mL/min/1.73 m^2 Final     Comment:     Calculation used to obtain the estimated glomerular filtration  rate (eGFR) is the CKD-EPI equation.      07/29/2018 >60.0 >60 mL/min/1.73 m^2 Final     Comment:     Calculation used to obtain the estimated glomerular filtration  rate (eGFR) is the CKD-EPI equation.        eGFR if    Date Value Ref Range Status   09/10/2019 >60 >60 mL/min/1.73 m^2 Final   07/30/2018 >60.0 >60 mL/min/1.73 m^2 Final   07/29/2018 >60.0 >60 mL/min/1.73 m^2 Final              Passed - Cr is 1.3 or below and within 360 days     Creatinine   Date Value Ref Range Status   09/10/2019 0.9 0.5  - 1.4 mg/dL Final   07/30/2018 0.8 0.5 - 1.4 mg/dL Final   07/29/2018 0.9 0.5 - 1.4 mg/dL Final                 Appointments  past 12m or future 3m with PCP    Date Provider   Last Visit   9/10/2019 Bree Larose MD   Next Visit   6/11/2020 Bree Larose MD   ED visits in past 90 days: 0     Note composed:10:04 AM 06/12/2020

## 2020-06-13 LAB
ESTIMATED AVG GLUCOSE: 206 MG/DL (ref 68–131)
HBA1C MFR BLD HPLC: 8.8 % (ref 4–5.6)

## 2020-06-14 ENCOUNTER — TELEPHONE (OUTPATIENT)
Dept: INTERNAL MEDICINE | Facility: CLINIC | Age: 70
End: 2020-06-14

## 2020-06-17 NOTE — PROGRESS NOTES
CHIEF COMPLAINT: Type  2 Diabetes     HPI: Mr. Paul Deleon is a 69 y.o. male who was diagnosed with T2DM in 2006.  Pt has had issues with financial burden of insulin-tresiba -$150 per month.  Pt is being seen by me for the second time.  a1c went up from 8.1% to 8.8%.  Price issues with medications- did not start jardiance from last visit   Off nph, syringes.     Past Medical History:   Diagnosis Date    Cataract     Coronary artery disease     Diabetes mellitus type II     GERD (gastroesophageal reflux disease)     Heart attack     Hyperlipidemia     Hypertension     MI (mitral incompetence)     MI (myocardial infarction)     Ulcer        Lab Results   Component Value Date    HGBA1C 8.8 (H) 06/12/2020     PREVIOUS DIABETES MEDICATIONS TRIED  tresiba   Metformin   humalog  jardiance - cost issue, never started   nph     CURRENT DIABETIC MEDS: metformin 1000 mg bid, tresiba 68 units at night     Pt is monitoring blood glucose readings 3 times a day.  Needs ~100 strips per month related to fluctuations with blood glucose reading, a1c trends, and activity level.  FBG   Pre lunch   Evening   Lowest:   Highest:       Diabetes Management Status    Statin: Taking  ACE/ARB: Taking    Screening or Prevention Patient's value Goal Complete/Controlled?   HgA1C Testing and Control   Lab Results   Component Value Date    HGBA1C 8.8 (H) 06/12/2020      Annually/Less than 8% No   Lipid profile : 06/12/2020 Annually Yes   LDL control Lab Results   Component Value Date    LDLCALC 50.4 (L) 06/12/2020    Annually/Less than 100 mg/dl  Yes   Nephropathy screening Lab Results   Component Value Date    LABMICR 42.0 09/08/2014     Lab Results   Component Value Date    PROTEINUA Trace (A) 09/08/2014    Annually No   Blood pressure BP Readings from Last 1 Encounters:   10/15/19 108/72    Less than 140/90 Yes   Dilated retinal exam : 11/01/2019 Annually Yes   Foot exam   : 10/15/2019 Annually Yes     REVIEW OF  SYSTEMS  General: no weakness, fatigue, + weight changes 3# loss since 10/2018.   Eyes: no double or blurred vision, eye pain, or redness  Cardiovascular: no chest pain, palpitations, edema, or murmurs.   Respiratory: no cough or dyspnea.   GI: no heartburn, nausea, or changes in bowel patterns; good appetite.   Skin: no rashes, dryness, itching, or reactions at insulin injection sites.  Neuro: no numbness, tingling, tremors, or vertigo. +dizziness at night (7p-8p-tresiba admin)  Endocrine: no polyuria, polydipsia, polyphagia, heat or cold intolerance.     Vital Signs  There were no vitals taken for this visit.    Hemoglobin A1C   Date Value Ref Range Status   06/12/2020 8.8 (H) 4.0 - 5.6 % Final     Comment:     ADA Screening Guidelines:  5.7-6.4%  Consistent with prediabetes  >or=6.5%  Consistent with diabetes  High levels of fetal hemoglobin interfere with the HbA1C  assay. Heterozygous hemoglobin variants (HbS, HgC, etc)do  not significantly interfere with this assay.   However, presence of multiple variants may affect accuracy.     09/10/2019 8.1 (H) 4.0 - 5.6 % Final     Comment:     ADA Screening Guidelines:  5.7-6.4%  Consistent with prediabetes  >or=6.5%  Consistent with diabetes  High levels of fetal hemoglobin interfere with the HbA1C  assay. Heterozygous hemoglobin variants (HbS, HgC, etc)do  not significantly interfere with this assay.   However, presence of multiple variants may affect accuracy.     11/27/2018 7.8 (H) 4.0 - 5.6 % Final     Comment:     ADA Screening Guidelines:  5.7-6.4%  Consistent with prediabetes  >or=6.5%  Consistent with diabetes  High levels of fetal hemoglobin interfere with the HbA1C  assay. Heterozygous hemoglobin variants (HbS, HgC, etc)do  not significantly interfere with this assay.   However, presence of multiple variants may affect accuracy.          Chemistry        Component Value Date/Time     09/10/2019 0856    K 4.5 09/10/2019 0856     09/10/2019 0856     CO2 28 09/10/2019 0856    BUN 13 09/10/2019 0856    CREATININE 0.9 09/10/2019 0856     (H) 09/10/2019 0856        Component Value Date/Time    CALCIUM 9.6 09/10/2019 0856    ALKPHOS 84 09/10/2019 0856    AST 20 09/10/2019 0856    ALT 10 09/10/2019 0856    BILITOT 0.9 09/10/2019 0856    ESTGFRAFRICA >60 09/10/2019 0856    EGFRNONAA >60 09/10/2019 0856           Lab Results   Component Value Date    TSH 3.255 06/12/2020      Lab Results   Component Value Date    CHOL 106 (L) 06/12/2020    CHOL 153 11/27/2018    CHOL 153 11/27/2018     Lab Results   Component Value Date    HDL 29 (L) 06/12/2020    HDL 29 (L) 11/27/2018    HDL 29 (L) 11/27/2018     Lab Results   Component Value Date    LDLCALC 50.4 (L) 06/12/2020    LDLCALC 96.6 11/27/2018    LDLCALC 96.6 11/27/2018     Lab Results   Component Value Date    TRIG 133 06/12/2020    TRIG 137 11/27/2018    TRIG 137 11/27/2018     Lab Results   Component Value Date    CHOLHDL 27.4 06/12/2020    CHOLHDL 19.0 (L) 11/27/2018    CHOLHDL 19.0 (L) 11/27/2018         PHYSICAL EXAMINATION  Constitutional: Appears well, no distress  Neck: Supple, trachea midline.   Respiratory: No wheezes, even and unlabored.  Cardiovascular: trace BLE edema.   Lymph: deferred  Skin: warm and dry; no injection site reactions, no acanthosis nigracans observed.  Neuro:patient alert and cooperative, normal affect; steady gait.    Diabetes Foot Exam:       Visual Inspection:  Normal -  Bilateral, Erythema -  Bilateral and Dry Skin -  Bilateral    Pedal Pulses:   Right: Present  Left: Present    Posterior tibialis:   Right:Present  Left: Present      Assessment/Plan    1. Type 2 diabetes mellitus with hyperosmolar coma, with long-term current use of insulin  Hemoglobin A1C    Microalbumin/creatinine urine ratio   2. Obesity, unspecified classification, unspecified obesity type, unspecified whether serious comorbidity present     3. Hypertension associated with diabetes     4. Hyperlipidemia,  unspecified hyperlipidemia type     5. Coronary artery disease involving native heart, angina presence unspecified, unspecified vessel or lesion type     6. Ingrown toenail  Ambulatory referral/consult to Podiatry       1. F/u in 3 mos   Cost issue with medications  Start novolog correction scale 150-200+2, etc per novocare   Free box for tresiba  Continue   Continue metformin max  a1c goal less than 8%  Discussed dietary habits  2. Body mass index is 32.6 kg/m². may increase insulin resistance.  3. Continue med(s)  Controlled  4.   Lab Results   Component Value Date    LDLCALC 50.4 (L) 06/12/2020     At goal  5. Avoid hypoglycemia  6. Podiatry prn   FOLLOW UP  3 mos

## 2020-06-18 ENCOUNTER — OFFICE VISIT (OUTPATIENT)
Dept: INTERNAL MEDICINE | Facility: CLINIC | Age: 70
End: 2020-06-18
Payer: MEDICARE

## 2020-06-18 VITALS
SYSTOLIC BLOOD PRESSURE: 110 MMHG | WEIGHT: 202 LBS | BODY MASS INDEX: 32.47 KG/M2 | OXYGEN SATURATION: 96 % | DIASTOLIC BLOOD PRESSURE: 68 MMHG | HEART RATE: 86 BPM | HEIGHT: 66 IN

## 2020-06-18 DIAGNOSIS — E66.9 OBESITY, UNSPECIFIED CLASSIFICATION, UNSPECIFIED OBESITY TYPE, UNSPECIFIED WHETHER SERIOUS COMORBIDITY PRESENT: ICD-10-CM

## 2020-06-18 DIAGNOSIS — I15.2 HYPERTENSION ASSOCIATED WITH DIABETES: ICD-10-CM

## 2020-06-18 DIAGNOSIS — E11.59 HYPERTENSION ASSOCIATED WITH DIABETES: ICD-10-CM

## 2020-06-18 DIAGNOSIS — E11.01 TYPE 2 DIABETES MELLITUS WITH HYPEROSMOLAR COMA, WITH LONG-TERM CURRENT USE OF INSULIN: Primary | ICD-10-CM

## 2020-06-18 DIAGNOSIS — I25.10 CORONARY ARTERY DISEASE INVOLVING NATIVE HEART, ANGINA PRESENCE UNSPECIFIED, UNSPECIFIED VESSEL OR LESION TYPE: ICD-10-CM

## 2020-06-18 DIAGNOSIS — Z79.4 TYPE 2 DIABETES MELLITUS WITH HYPEROSMOLAR COMA, WITH LONG-TERM CURRENT USE OF INSULIN: Primary | ICD-10-CM

## 2020-06-18 DIAGNOSIS — L60.0 INGROWN TOENAIL: ICD-10-CM

## 2020-06-18 DIAGNOSIS — E78.5 HYPERLIPIDEMIA, UNSPECIFIED HYPERLIPIDEMIA TYPE: ICD-10-CM

## 2020-06-18 PROCEDURE — 3052F PR MOST RECENT HEMOGLOBIN A1C LEVEL 8.0 - < 9.0%: ICD-10-PCS | Mod: CPTII,S$GLB,, | Performed by: NURSE PRACTITIONER

## 2020-06-18 PROCEDURE — 3078F PR MOST RECENT DIASTOLIC BLOOD PRESSURE < 80 MM HG: ICD-10-PCS | Mod: CPTII,S$GLB,, | Performed by: NURSE PRACTITIONER

## 2020-06-18 PROCEDURE — 1159F MED LIST DOCD IN RCRD: CPT | Mod: S$GLB,,, | Performed by: NURSE PRACTITIONER

## 2020-06-18 PROCEDURE — 99999 PR PBB SHADOW E&M-EST. PATIENT-LVL IV: ICD-10-PCS | Mod: PBBFAC,,, | Performed by: NURSE PRACTITIONER

## 2020-06-18 PROCEDURE — 3074F SYST BP LT 130 MM HG: CPT | Mod: CPTII,S$GLB,, | Performed by: NURSE PRACTITIONER

## 2020-06-18 PROCEDURE — 1101F PR PT FALLS ASSESS DOC 0-1 FALLS W/OUT INJ PAST YR: ICD-10-PCS | Mod: CPTII,S$GLB,, | Performed by: NURSE PRACTITIONER

## 2020-06-18 PROCEDURE — 3078F DIAST BP <80 MM HG: CPT | Mod: CPTII,S$GLB,, | Performed by: NURSE PRACTITIONER

## 2020-06-18 PROCEDURE — 3052F HG A1C>EQUAL 8.0%<EQUAL 9.0%: CPT | Mod: CPTII,S$GLB,, | Performed by: NURSE PRACTITIONER

## 2020-06-18 PROCEDURE — 99214 PR OFFICE/OUTPT VISIT, EST, LEVL IV, 30-39 MIN: ICD-10-PCS | Mod: S$GLB,,, | Performed by: NURSE PRACTITIONER

## 2020-06-18 PROCEDURE — 3074F PR MOST RECENT SYSTOLIC BLOOD PRESSURE < 130 MM HG: ICD-10-PCS | Mod: CPTII,S$GLB,, | Performed by: NURSE PRACTITIONER

## 2020-06-18 PROCEDURE — 1159F PR MEDICATION LIST DOCUMENTED IN MEDICAL RECORD: ICD-10-PCS | Mod: S$GLB,,, | Performed by: NURSE PRACTITIONER

## 2020-06-18 PROCEDURE — 99214 OFFICE O/P EST MOD 30 MIN: CPT | Mod: S$GLB,,, | Performed by: NURSE PRACTITIONER

## 2020-06-18 PROCEDURE — 99999 PR PBB SHADOW E&M-EST. PATIENT-LVL IV: CPT | Mod: PBBFAC,,, | Performed by: NURSE PRACTITIONER

## 2020-06-18 PROCEDURE — 1101F PT FALLS ASSESS-DOCD LE1/YR: CPT | Mod: CPTII,S$GLB,, | Performed by: NURSE PRACTITIONER

## 2020-06-18 RX ORDER — INSULIN ASPART 100 [IU]/ML
INJECTION, SOLUTION INTRAVENOUS; SUBCUTANEOUS
Qty: 15 ML | Refills: 1 | Status: SHIPPED | OUTPATIENT
Start: 2020-06-18 | End: 2022-02-02 | Stop reason: SDUPTHER

## 2020-06-18 RX ORDER — INSULIN DEGLUDEC 200 U/ML
INJECTION, SOLUTION SUBCUTANEOUS
Qty: 3 SYRINGE | Refills: 1 | OUTPATIENT
Start: 2020-06-18 | End: 2020-06-18 | Stop reason: SDUPTHER

## 2020-06-18 RX ORDER — INSULIN DEGLUDEC 200 U/ML
INJECTION, SOLUTION SUBCUTANEOUS
Qty: 9 ML | Refills: 1 | Status: SHIPPED | OUTPATIENT
Start: 2020-06-18 | End: 2020-10-05

## 2020-06-18 RX ORDER — PANTOPRAZOLE SODIUM 40 MG/1
40 TABLET, DELAYED RELEASE ORAL DAILY
Qty: 90 TABLET | Refills: 3 | Status: SHIPPED | OUTPATIENT
Start: 2020-06-18 | End: 2021-05-19

## 2020-06-18 RX ORDER — INSULIN ASPART 100 [IU]/ML
INJECTION, SOLUTION INTRAVENOUS; SUBCUTANEOUS
Qty: 1 BOX | Refills: 1 | OUTPATIENT
Start: 2020-06-18 | End: 2020-06-18 | Stop reason: SDUPTHER

## 2020-06-18 NOTE — PATIENT INSTRUCTIONS
Snacks can be an important part of a balanced, healthy meal plan. They allow you to eat more frequently, feeling full and satisfied throughout the day. Also, they allow you to spread carbohydrates evenly, which may stabilize blood sugars.  Plus, snacks are enjoyable!     The amount of carbohydrate needed at snacks varies. Generally, about 15-30 grams of carbohydrate per snack is recommended.  Below you will find some tasty treats.       0-5 gm carb   Crystal Light   Vitamin Water Zero   Herbal tea, unsweetened   2 tsp peanut butter on celery   1./2 cup sugar-free jell-o   1 sugar-free popsicle   ¼ cup blueberries   8oz Blue Annika unsweetened almond milk   5 baby carrots & celery sticks, cucumbers, bell peppers dipped in ¼ cup salsa, 2Tbsp light ranch dressing or 2Tbsp plain Greek yogurt   10 Goldfish crackers   ½ oz low-fat cheese or string cheese   1 closed handful of nuts, unsalted   1 Tbsp of sunflower seeds, unsalted   1 cup Smart Pop popcorn   1 whole grain brown rice cake        15 gm carb   1 small piece of fruit or ½ banana or 1/2 cup lite canned fruit   3 aishwarya cracker squares   3 cups Smart Pop popcorn, top spray butter, Lu lite salt or cinnamon and Truvia   5 Vanilla Wafers   ½ cup low fat, no added sugar ice cream or frozen yogurt (Blue bell, Blue Bunny, Weight Watchers, Skinny Cow)   ½ turkey, ham, or chicken sandwich   ½ c fruit with ½ c Cottage cheese   4-6 unsalted wheat crackers with 1 oz low fat cheese or 1 tbsp peanut butter    30-45 goldfish crackers (depending on flavor)    7-8 Moravian mini brown rice cakes (caramel, apple cinnamon, chocolate)    12 Moravian mini brown rice cakes (cheddar, bbq, ranch)    1/3 cup hummus dip with raw veg   1/2 whole wheat fly, 1Tbsp hummus   Mini Pizza (1/2 whole wheat English muffin, low-fat  cheese, tomato sauce)   100 calorie snack pack (Oreo, Chips Ahoy, Ritz Mix, Baked Cheetos)   4-6 oz. light or Greek Style yogurt  (Jenny, Rosalba, [facility], [facility])   ½ cup sugar-free pudding     6 in. wheat tortilla or fly oven toasted chips (topped with spray butter flavoring, cinnamon, Truvia OR spray butter, garlic powder, chili powder)    18 BBQ Popchips (available at [employer], [employer], Fresh Market)

## 2020-07-08 ENCOUNTER — OFFICE VISIT (OUTPATIENT)
Dept: PODIATRY | Facility: CLINIC | Age: 70
End: 2020-07-08
Payer: MEDICARE

## 2020-07-08 ENCOUNTER — PATIENT OUTREACH (OUTPATIENT)
Dept: ADMINISTRATIVE | Facility: OTHER | Age: 70
End: 2020-07-08

## 2020-07-08 VITALS
BODY MASS INDEX: 31.71 KG/M2 | DIASTOLIC BLOOD PRESSURE: 82 MMHG | WEIGHT: 202 LBS | SYSTOLIC BLOOD PRESSURE: 123 MMHG | HEART RATE: 73 BPM | HEIGHT: 67 IN

## 2020-07-08 DIAGNOSIS — L60.0 INGROWN TOENAIL: ICD-10-CM

## 2020-07-08 LAB — HBA1C MFR BLD: 8.1 %

## 2020-07-08 PROCEDURE — 1125F PR PAIN SEVERITY QUANTIFIED, PAIN PRESENT: ICD-10-PCS | Mod: S$GLB,,, | Performed by: PODIATRIST

## 2020-07-08 PROCEDURE — 99203 PR OFFICE/OUTPT VISIT, NEW, LEVL III, 30-44 MIN: ICD-10-PCS | Mod: S$GLB,,, | Performed by: PODIATRIST

## 2020-07-08 PROCEDURE — 1101F PR PT FALLS ASSESS DOC 0-1 FALLS W/OUT INJ PAST YR: ICD-10-PCS | Mod: CPTII,S$GLB,, | Performed by: PODIATRIST

## 2020-07-08 PROCEDURE — 3079F PR MOST RECENT DIASTOLIC BLOOD PRESSURE 80-89 MM HG: ICD-10-PCS | Mod: CPTII,S$GLB,, | Performed by: PODIATRIST

## 2020-07-08 PROCEDURE — 1159F PR MEDICATION LIST DOCUMENTED IN MEDICAL RECORD: ICD-10-PCS | Mod: S$GLB,,, | Performed by: PODIATRIST

## 2020-07-08 PROCEDURE — 99203 OFFICE O/P NEW LOW 30 MIN: CPT | Mod: S$GLB,,, | Performed by: PODIATRIST

## 2020-07-08 PROCEDURE — 3008F BODY MASS INDEX DOCD: CPT | Mod: CPTII,S$GLB,, | Performed by: PODIATRIST

## 2020-07-08 PROCEDURE — 99999 PR PBB SHADOW E&M-EST. PATIENT-LVL IV: CPT | Mod: PBBFAC,,, | Performed by: PODIATRIST

## 2020-07-08 PROCEDURE — 1101F PT FALLS ASSESS-DOCD LE1/YR: CPT | Mod: CPTII,S$GLB,, | Performed by: PODIATRIST

## 2020-07-08 PROCEDURE — 3079F DIAST BP 80-89 MM HG: CPT | Mod: CPTII,S$GLB,, | Performed by: PODIATRIST

## 2020-07-08 PROCEDURE — 1125F AMNT PAIN NOTED PAIN PRSNT: CPT | Mod: S$GLB,,, | Performed by: PODIATRIST

## 2020-07-08 PROCEDURE — 1159F MED LIST DOCD IN RCRD: CPT | Mod: S$GLB,,, | Performed by: PODIATRIST

## 2020-07-08 PROCEDURE — 3074F SYST BP LT 130 MM HG: CPT | Mod: CPTII,S$GLB,, | Performed by: PODIATRIST

## 2020-07-08 PROCEDURE — 3008F PR BODY MASS INDEX (BMI) DOCUMENTED: ICD-10-PCS | Mod: CPTII,S$GLB,, | Performed by: PODIATRIST

## 2020-07-08 PROCEDURE — 3074F PR MOST RECENT SYSTOLIC BLOOD PRESSURE < 130 MM HG: ICD-10-PCS | Mod: CPTII,S$GLB,, | Performed by: PODIATRIST

## 2020-07-08 PROCEDURE — 99999 PR PBB SHADOW E&M-EST. PATIENT-LVL IV: ICD-10-PCS | Mod: PBBFAC,,, | Performed by: PODIATRIST

## 2020-07-08 NOTE — PROGRESS NOTES
Subjective:      Patient ID: Paul Deleon is a 69 y.o. male.    Chief Complaint: Diabetes Mellitus (06/18/2020 dr kamila moeller), Diabetic Foot Exam, and Ingrown Toenail    Paul is a 69 y.o. male who presents to the clinic upon referral from Dr. Moeller  for evaluation and treatment of diabetic feet. Paul has a past medical history of Cataract, Coronary artery disease, Diabetes mellitus type II, GERD (gastroesophageal reflux disease), Heart attack, Hyperlipidemia, Hypertension, MI (mitral incompetence), MI (myocardial infarction), and Ulcer. Patient relates no major problem with feet. Only complaints today consist of diabetic foot exam.    PCP: Bree Larose MD    Date Last Seen by PCP:  06/18/2020 dr kamila moeller    Current shoe gear: Tennis shoes    Hemoglobin A1C   Date Value Ref Range Status   06/12/2020 8.8 (H) 4.0 - 5.6 % Final     Comment:     ADA Screening Guidelines:  5.7-6.4%  Consistent with prediabetes  >or=6.5%  Consistent with diabetes  High levels of fetal hemoglobin interfere with the HbA1C  assay. Heterozygous hemoglobin variants (HbS, HgC, etc)do  not significantly interfere with this assay.   However, presence of multiple variants may affect accuracy.     09/10/2019 8.1 (H) 4.0 - 5.6 % Final     Comment:     ADA Screening Guidelines:  5.7-6.4%  Consistent with prediabetes  >or=6.5%  Consistent with diabetes  High levels of fetal hemoglobin interfere with the HbA1C  assay. Heterozygous hemoglobin variants (HbS, HgC, etc)do  not significantly interfere with this assay.   However, presence of multiple variants may affect accuracy.     11/27/2018 7.8 (H) 4.0 - 5.6 % Final     Comment:     ADA Screening Guidelines:  5.7-6.4%  Consistent with prediabetes  >or=6.5%  Consistent with diabetes  High levels of fetal hemoglobin interfere with the HbA1C  assay. Heterozygous hemoglobin variants (HbS, HgC, etc)do  not significantly interfere with this assay.   However, presence of multiple variants  may affect accuracy.             Review of Systems   Constitution: Negative for chills, fever and malaise/fatigue.   HENT: Negative for hearing loss.    Cardiovascular: Negative for claudication.   Respiratory: Negative for shortness of breath.    Skin: Positive for dry skin. Negative for flushing and rash.   Musculoskeletal: Negative for joint pain and myalgias.   Neurological: Negative for loss of balance, numbness, paresthesias and sensory change.   Psychiatric/Behavioral: Negative for altered mental status.           Objective:      Physical Exam  Cardiovascular:      Pulses:           Dorsalis pedis pulses are 2+ on the right side and 2+ on the left side.        Posterior tibial pulses are 2+ on the right side and 2+ on the left side.      Comments: No edema noted to b/L LEs  Musculoskeletal:      Comments: Adequate joint ROM noted to all lower extremity muscle groups with no pain or crepitation noted. Muscle strength is 5/5 in all groups bilaterally.     Feet:      Right foot:      Protective Sensation: 5 sites tested. 5 sites sensed.      Left foot:      Protective Sensation: 5 sites tested. 5 sites sensed.   Skin:     Comments: Normal skin tugor noted.   No open lesion noted b/L  Skin temp is warm to warm from proximal to distal b/L.  B/L hallux nails thickened, discolored, and incurvated. No paronychia or SOI noted, POP. All other nails elongated and non-dystrophic.  Webspaces clean, dry, and intact   Neurological:      Comments: Intact gross sensation noted to b/L LEs               Assessment:       Encounter Diagnosis   Name Primary?    Ingrown toenail          Plan:       Paul was seen today for diabetes mellitus, diabetic foot exam and ingrown toenail.    Diagnoses and all orders for this visit:    Ingrown toenail  -     Ambulatory referral/consult to Podiatry      I counseled the patient on his conditions, their implications and medical management.      Nails debrided as a courtesy  - Shoe  inspection. Diabetic Foot Education. Patient reminded of the importance of good nutrition and blood sugar control to help prevent podiatric complications of diabetes. Patient instructed on proper foot hygeine. We discussed wearing proper shoe gear, daily foot inspections, never walking without protective shoe gear  Utilizing sterile toenail clippers I aggressively trimmed  the offending medial and lateral  nail borders approximately 3 mm from its edge and carried the nail plate incision down at an angle in order to wedge out the offending cryptotic portion of the nail plate, b/L hallux. The remaining nail was grinded down with an electric  down to nail bed.  No blood was drawn. Patient tolerated the procedure well and related significant relief.  RTC in 1 year or sooner if any new pedal problems should arise.

## 2020-07-08 NOTE — LETTER
July 8, 2020      Namita Mederos, APRN, FNP  [address]  [facility] [address]           [facility]  [address]  [facility] [address]  Phone: [phone]          Patient: Paul Deleon   MR Number: [id number]   YOB: 1950   Date of Visit: 7/8/2020       Dear Namita Mederos:    Thank you for referring Paul Deleon to me for evaluation. Attached you will find relevant portions of my assessment and plan of care.    If you have questions, please do not hesitate to call me. I look forward to following Paul Deleon along with you.    Sincerely,    Radha Chou, REHANA    Enclosure  CC:  No Recipients    If you would like to receive this communication electronically, please contact [email or web address] or [phone] to request more information on [employer] access.    For providers and/or their staff who would like to refer a patient to [facility], please contact us through our one-stop-shop provider referral line, [facility], at [phone].    If you feel you have received this communication in error or would no longer like to receive these types of communications, please e-mail [email or web address]

## 2020-08-25 ENCOUNTER — PATIENT OUTREACH (OUTPATIENT)
Dept: ADMINISTRATIVE | Facility: HOSPITAL | Age: 70
End: 2020-08-25

## 2020-09-01 ENCOUNTER — LAB VISIT (OUTPATIENT)
Dept: LAB | Facility: HOSPITAL | Age: 70
End: 2020-09-01
Attending: INTERNAL MEDICINE
Payer: MEDICARE

## 2020-09-01 ENCOUNTER — OFFICE VISIT (OUTPATIENT)
Dept: INTERNAL MEDICINE | Facility: CLINIC | Age: 70
End: 2020-09-01
Payer: MEDICARE

## 2020-09-01 VITALS
BODY MASS INDEX: 32.45 KG/M2 | HEART RATE: 79 BPM | DIASTOLIC BLOOD PRESSURE: 78 MMHG | HEIGHT: 66 IN | OXYGEN SATURATION: 98 % | WEIGHT: 201.94 LBS | SYSTOLIC BLOOD PRESSURE: 110 MMHG

## 2020-09-01 DIAGNOSIS — E11.01 TYPE 2 DIABETES MELLITUS WITH HYPEROSMOLAR COMA, WITH LONG-TERM CURRENT USE OF INSULIN: ICD-10-CM

## 2020-09-01 DIAGNOSIS — Z79.4 TYPE 2 DIABETES MELLITUS WITH HYPEROSMOLAR COMA, WITH LONG-TERM CURRENT USE OF INSULIN: ICD-10-CM

## 2020-09-01 DIAGNOSIS — I15.2 HYPERTENSION ASSOCIATED WITH DIABETES: ICD-10-CM

## 2020-09-01 DIAGNOSIS — R10.9 FLANK PAIN: Primary | ICD-10-CM

## 2020-09-01 DIAGNOSIS — E78.5 HYPERLIPIDEMIA, UNSPECIFIED HYPERLIPIDEMIA TYPE: ICD-10-CM

## 2020-09-01 DIAGNOSIS — E11.59 HYPERTENSION ASSOCIATED WITH DIABETES: ICD-10-CM

## 2020-09-01 LAB
BACTERIA #/AREA URNS AUTO: NORMAL /HPF
BASOPHILS # BLD AUTO: 0.06 K/UL (ref 0–0.2)
BASOPHILS NFR BLD: 0.6 % (ref 0–1.9)
BILIRUB UR QL STRIP: NEGATIVE
CLARITY UR REFRACT.AUTO: ABNORMAL
COLOR UR AUTO: YELLOW
DIFFERENTIAL METHOD: ABNORMAL
EOSINOPHIL # BLD AUTO: 0.1 K/UL (ref 0–0.5)
EOSINOPHIL NFR BLD: 1.1 % (ref 0–8)
ERYTHROCYTE [DISTWIDTH] IN BLOOD BY AUTOMATED COUNT: 13.7 % (ref 11.5–14.5)
GLUCOSE UR QL STRIP: NEGATIVE
HCT VFR BLD AUTO: 46 % (ref 40–54)
HGB BLD-MCNC: 14.3 G/DL (ref 14–18)
HGB UR QL STRIP: NEGATIVE
IMM GRANULOCYTES # BLD AUTO: 0.04 K/UL (ref 0–0.04)
IMM GRANULOCYTES NFR BLD AUTO: 0.4 % (ref 0–0.5)
KETONES UR QL STRIP: NEGATIVE
LEUKOCYTE ESTERASE UR QL STRIP: NEGATIVE
LYMPHOCYTES # BLD AUTO: 2.6 K/UL (ref 1–4.8)
LYMPHOCYTES NFR BLD: 26.4 % (ref 18–48)
MCH RBC QN AUTO: 29.1 PG (ref 27–31)
MCHC RBC AUTO-ENTMCNC: 31.1 G/DL (ref 32–36)
MCV RBC AUTO: 94 FL (ref 82–98)
MICROSCOPIC COMMENT: NORMAL
MONOCYTES # BLD AUTO: 0.6 K/UL (ref 0.3–1)
MONOCYTES NFR BLD: 6.6 % (ref 4–15)
NEUTROPHILS # BLD AUTO: 6.3 K/UL (ref 1.8–7.7)
NEUTROPHILS NFR BLD: 64.9 % (ref 38–73)
NITRITE UR QL STRIP: NEGATIVE
NRBC BLD-RTO: 0 /100 WBC
PH UR STRIP: 5 [PH] (ref 5–8)
PLATELET # BLD AUTO: 232 K/UL (ref 150–350)
PMV BLD AUTO: 11.3 FL (ref 9.2–12.9)
PROT UR QL STRIP: NEGATIVE
RBC # BLD AUTO: 4.92 M/UL (ref 4.6–6.2)
RBC #/AREA URNS AUTO: 1 /HPF (ref 0–4)
SP GR UR STRIP: 1.02 (ref 1–1.03)
SQUAMOUS #/AREA URNS AUTO: 6 /HPF
URN SPEC COLLECT METH UR: ABNORMAL
WBC # BLD AUTO: 9.7 K/UL (ref 3.9–12.7)
WBC #/AREA URNS AUTO: 2 /HPF (ref 0–5)

## 2020-09-01 PROCEDURE — 3078F DIAST BP <80 MM HG: CPT | Mod: CPTII,S$GLB,, | Performed by: INTERNAL MEDICINE

## 2020-09-01 PROCEDURE — 3008F BODY MASS INDEX DOCD: CPT | Mod: CPTII,S$GLB,, | Performed by: INTERNAL MEDICINE

## 2020-09-01 PROCEDURE — 3078F PR MOST RECENT DIASTOLIC BLOOD PRESSURE < 80 MM HG: ICD-10-PCS | Mod: CPTII,S$GLB,, | Performed by: INTERNAL MEDICINE

## 2020-09-01 PROCEDURE — 80053 COMPREHEN METABOLIC PANEL: CPT

## 2020-09-01 PROCEDURE — 99999 PR PBB SHADOW E&M-EST. PATIENT-LVL V: CPT | Mod: PBBFAC,,, | Performed by: INTERNAL MEDICINE

## 2020-09-01 PROCEDURE — 85025 COMPLETE CBC W/AUTO DIFF WBC: CPT

## 2020-09-01 PROCEDURE — 3052F HG A1C>EQUAL 8.0%<EQUAL 9.0%: CPT | Mod: CPTII,S$GLB,, | Performed by: INTERNAL MEDICINE

## 2020-09-01 PROCEDURE — 36415 COLL VENOUS BLD VENIPUNCTURE: CPT

## 2020-09-01 PROCEDURE — 1125F AMNT PAIN NOTED PAIN PRSNT: CPT | Mod: S$GLB,,, | Performed by: INTERNAL MEDICINE

## 2020-09-01 PROCEDURE — 1101F PR PT FALLS ASSESS DOC 0-1 FALLS W/OUT INJ PAST YR: ICD-10-PCS | Mod: CPTII,S$GLB,, | Performed by: INTERNAL MEDICINE

## 2020-09-01 PROCEDURE — 99214 OFFICE O/P EST MOD 30 MIN: CPT | Mod: S$GLB,,, | Performed by: INTERNAL MEDICINE

## 2020-09-01 PROCEDURE — 3074F PR MOST RECENT SYSTOLIC BLOOD PRESSURE < 130 MM HG: ICD-10-PCS | Mod: CPTII,S$GLB,, | Performed by: INTERNAL MEDICINE

## 2020-09-01 PROCEDURE — 1159F PR MEDICATION LIST DOCUMENTED IN MEDICAL RECORD: ICD-10-PCS | Mod: S$GLB,,, | Performed by: INTERNAL MEDICINE

## 2020-09-01 PROCEDURE — 99999 PR PBB SHADOW E&M-EST. PATIENT-LVL V: ICD-10-PCS | Mod: PBBFAC,,, | Performed by: INTERNAL MEDICINE

## 2020-09-01 PROCEDURE — 1125F PR PAIN SEVERITY QUANTIFIED, PAIN PRESENT: ICD-10-PCS | Mod: S$GLB,,, | Performed by: INTERNAL MEDICINE

## 2020-09-01 PROCEDURE — 3074F SYST BP LT 130 MM HG: CPT | Mod: CPTII,S$GLB,, | Performed by: INTERNAL MEDICINE

## 2020-09-01 PROCEDURE — 1159F MED LIST DOCD IN RCRD: CPT | Mod: S$GLB,,, | Performed by: INTERNAL MEDICINE

## 2020-09-01 PROCEDURE — 3052F PR MOST RECENT HEMOGLOBIN A1C LEVEL 8.0 - < 9.0%: ICD-10-PCS | Mod: CPTII,S$GLB,, | Performed by: INTERNAL MEDICINE

## 2020-09-01 PROCEDURE — 3008F PR BODY MASS INDEX (BMI) DOCUMENTED: ICD-10-PCS | Mod: CPTII,S$GLB,, | Performed by: INTERNAL MEDICINE

## 2020-09-01 PROCEDURE — 99214 PR OFFICE/OUTPT VISIT, EST, LEVL IV, 30-39 MIN: ICD-10-PCS | Mod: S$GLB,,, | Performed by: INTERNAL MEDICINE

## 2020-09-01 PROCEDURE — 1101F PT FALLS ASSESS-DOCD LE1/YR: CPT | Mod: CPTII,S$GLB,, | Performed by: INTERNAL MEDICINE

## 2020-09-01 PROCEDURE — 81001 URINALYSIS AUTO W/SCOPE: CPT

## 2020-09-01 RX ORDER — TIZANIDINE 4 MG/1
TABLET ORAL
Qty: 40 TABLET | Refills: 1 | Status: SHIPPED | OUTPATIENT
Start: 2020-09-01 | End: 2021-07-13

## 2020-09-01 RX ORDER — MELOXICAM 15 MG/1
15 TABLET ORAL DAILY
Qty: 30 TABLET | Refills: 0 | Status: SHIPPED | OUTPATIENT
Start: 2020-09-01 | End: 2020-09-01

## 2020-09-01 RX ORDER — MELOXICAM 15 MG/1
TABLET ORAL
Qty: 90 TABLET | Refills: 0 | Status: SHIPPED | OUTPATIENT
Start: 2020-09-01 | End: 2021-07-13

## 2020-09-01 NOTE — PROGRESS NOTES
Subjective:       Patient ID: Paul Deleon is a 69 y.o. male.    Chief Complaint: Back Pain (right side pain )    HPI   C/o 2 weeks h/o sharp r flank pain.  No trauma.  Hurts all the time.  Worse with movement when in bed.  Some pain with walking.  Retired.  Cuts grass, does yard work.    No leg pain.  No dysuria, hematuria, fever.  Tried asa only - not helpful.     Namita Mederos is managing DM and a1c has fallen to 8.1    No cp. Never followed up with cardiology.    BMI 32.    He has h/o PUD and is taking pantoprazole.      Past Medical History:   Diagnosis Date    Cataract     Coronary artery disease     Diabetes mellitus type II     GERD (gastroesophageal reflux disease)     Heart attack     Hyperlipidemia     Hypertension     MI (mitral incompetence)     MI (myocardial infarction)     Ulcer      Review of Systems   Constitutional: Negative for activity change and unexpected weight change.   Respiratory: Negative for chest tightness and shortness of breath.    Cardiovascular: Negative for chest pain and leg swelling.   Gastrointestinal: Negative for abdominal pain.   Neurological: Negative for headaches.   Psychiatric/Behavioral: Negative for dysphoric mood.         Objective:      Physical Exam  Constitutional:       General: He is in acute distress (in pain when changing position).      Appearance: Normal appearance. He is obese. He is not ill-appearing or diaphoretic.   Cardiovascular:      Rate and Rhythm: Normal rate and regular rhythm.   Pulmonary:      Effort: Pulmonary effort is normal.      Breath sounds: Normal breath sounds.   Abdominal:      General: Abdomen is flat. Bowel sounds are normal. There is no distension.      Palpations: Abdomen is soft. There is no mass.      Tenderness: There is no abdominal tenderness.   Musculoskeletal:         General: No swelling or tenderness (of flank.).   Skin:     Findings: No rash.   Neurological:      General: No focal deficit present.       Mental Status: He is alert and oriented to person, place, and time.   Psychiatric:         Mood and Affect: Mood normal.         Behavior: Behavior normal.         Assessment:       1. Flank pain    2. Hyperlipidemia, unspecified hyperlipidemia type    3. Hypertension associated with diabetes    4. Type 2 diabetes mellitus with hyperosmolar coma, with long-term current use of insulin        Plan:       Paul was seen today for back pain.    Diagnoses and all orders for this visit:    Flank pain  -     Urinalysis    Hyperlipidemia, unspecified hyperlipidemia type    Hypertension associated with diabetes  -     CBC auto differential; Future  -     Comprehensive metabolic panel; Future    Type 2 diabetes mellitus with hyperosmolar coma, with long-term current use of insulin  -     CBC auto differential; Future  -     Comprehensive metabolic panel; Future    Other orders  -     tiZANidine (ZANAFLEX) 4 MG tablet; 1 tab po q 12 h prn pain  -     meloxicam (MOBIC) 15 MG tablet; Take 1 tablet (15 mg total) by mouth once daily.

## 2020-09-01 NOTE — PATIENT INSTRUCTIONS
Meloxicam 15 mg - 1 a day for back pain. (anti-inflammatory pain reliever)     Continue to take Pantoprazole daily to prevent ulcer disease.    Tizanidine for back spasm.  Take 1 twice a day.  May cause fatigue.    Apply ice to back.    Wt loss.

## 2020-09-01 NOTE — PROGRESS NOTES
Refill Routing Note   Medication(s) are not appropriate for processing by [facility]:       - Outside of protocol           Medication reconciliation completed: No      Automatic Epic Generated Protocol Data:        Requested Prescriptions   Pending Prescriptions Disp Refills    meloxicam (MOBIC) 15 MG tablet [Pharmacy Med Name: MELOXICAM 15MG TABLETS] 90 tablet      Sig: TAKE 1 TABLET(15 MG) BY MOUTH EVERY DAY       NSAIDs Protocol Passed - 9/1/2020 11:53 AM        Passed - Serum Creatinine less than 1.4 on file in the past 12 months     Lab Results   Component Value Date    CREATININE 0.9 09/10/2019    CREATININE 0.8 07/30/2018    CREATININE 0.9 07/29/2018     Lab Results   Component Value Date    EGFRNONAA >60 09/10/2019    EGFRNONAA >60.0 07/30/2018    EGFRNONAA >60.0 07/29/2018               Passed - Visit with authorizing provider in past 12 months or upcoming 90 days        Passed - HGB greater than 10 or HCT greater than 30 in past 12 months        Passed - AST in past 12 months      Lab Results   Component Value Date    AST 20 09/10/2019    AST 16 07/30/2018    AST 11 07/29/2018              Passed - Serum Potassium less than 5.2 on file in the past 12 months     Lab Results   Component Value Date    K 4.5 09/10/2019    K 4.3 07/30/2018    K 4.1 07/29/2018                  Passed - Blood Pressure below 139/89 on file in past 12 months      BP Readings from Last 3 Encounters:   09/01/20 110/78   07/08/20 123/82   06/18/20 110/68             Passed - ALT less than 95 in past 12 months     Lab Results   Component Value Date    ALT 10 09/10/2019    ALT 11 07/30/2018    ALT 8 (L) 07/29/2018                    Appointments  past 12m or future 3m with PCP    Date Provider   Last Visit   9/1/2020 Bree Larose MD   Next Visit   Visit date not found Bree Larose MD   ED visits in past 90 days: 0     Note composed:4:22 PM 09/01/2020

## 2020-09-02 LAB
ALBUMIN SERPL BCP-MCNC: 3.8 G/DL (ref 3.5–5.2)
ALP SERPL-CCNC: 85 U/L (ref 55–135)
ALT SERPL W/O P-5'-P-CCNC: 9 U/L (ref 10–44)
ANION GAP SERPL CALC-SCNC: 12 MMOL/L (ref 8–16)
AST SERPL-CCNC: 20 U/L (ref 10–40)
BILIRUB SERPL-MCNC: 0.8 MG/DL (ref 0.1–1)
BUN SERPL-MCNC: 14 MG/DL (ref 8–23)
CALCIUM SERPL-MCNC: 9.2 MG/DL (ref 8.7–10.5)
CHLORIDE SERPL-SCNC: 104 MMOL/L (ref 95–110)
CO2 SERPL-SCNC: 21 MMOL/L (ref 23–29)
CREAT SERPL-MCNC: 0.8 MG/DL (ref 0.5–1.4)
EST. GFR  (AFRICAN AMERICAN): >60 ML/MIN/1.73 M^2
EST. GFR  (NON AFRICAN AMERICAN): >60 ML/MIN/1.73 M^2
GLUCOSE SERPL-MCNC: 139 MG/DL (ref 70–110)
POTASSIUM SERPL-SCNC: 4.8 MMOL/L (ref 3.5–5.1)
PROT SERPL-MCNC: 6.9 G/DL (ref 6–8.4)
SODIUM SERPL-SCNC: 137 MMOL/L (ref 136–145)

## 2020-10-02 ENCOUNTER — PATIENT OUTREACH (OUTPATIENT)
Dept: ADMINISTRATIVE | Facility: HOSPITAL | Age: 70
End: 2020-10-02

## 2020-10-04 NOTE — TELEPHONE ENCOUNTER
No new care gaps identified.  Powered by [employer]. Reference number: [id number]. 10/04/2020 3:44:33 AM   NEHAT

## 2020-10-05 ENCOUNTER — PATIENT MESSAGE (OUTPATIENT)
Dept: ADMINISTRATIVE | Facility: HOSPITAL | Age: 70
End: 2020-10-05

## 2020-10-05 RX ORDER — INSULIN DEGLUDEC 200 U/ML
INJECTION, SOLUTION SUBCUTANEOUS
Qty: 36 ML | Refills: 0 | Status: SHIPPED | OUTPATIENT
Start: 2020-10-05 | End: 2020-10-14 | Stop reason: SDUPTHER

## 2020-10-05 NOTE — PROGRESS NOTES
Refill Authorization Note   Paul Deleon is requesting a refill authorization.     Brief assessment and rationale for refill: APPROVE     Medication-related problems identified: Therapeutic duplication    Medication Therapy Plan: CDMR.     Medication reconciliation completed: No   Comments:      Orders Placed This Encounter    TRESIBA FLEXTOUCH U-200 200 unit/mL (3 mL) InPn      Requested Prescriptions   Signed Prescriptions Disp Refills    TRESIBA FLEXTOUCH U-200 200 unit/mL (3 mL) InPn 36 mL 0     Sig: ADMINISTER 68 UNITS UNDER THE SKIN EVERY EVENING       Endocrinology:  Diabetes - Insulins Failed - 10/4/2020  3:44 AM        Failed - HBA1C is 7.9 or below and within 180 days     Hemoglobin A1C   Date Value Ref Range Status   07/08/2020 8.1 % Final   06/12/2020 8.8 (H) 4.0 - 5.6 % Final     Comment:     ADA Screening Guidelines:  5.7-6.4%  Consistent with prediabetes  >or=6.5%  Consistent with diabetes  High levels of fetal hemoglobin interfere with the HbA1C  assay. Heterozygous hemoglobin variants (HbS, HgC, etc)do  not significantly interfere with this assay.   However, presence of multiple variants may affect accuracy.     09/10/2019 8.1 (H) 4.0 - 5.6 % Final     Comment:     ADA Screening Guidelines:  5.7-6.4%  Consistent with prediabetes  >or=6.5%  Consistent with diabetes  High levels of fetal hemoglobin interfere with the HbA1C  assay. Heterozygous hemoglobin variants (HbS, HgC, etc)do  not significantly interfere with this assay.   However, presence of multiple variants may affect accuracy.     11/27/2018 7.8 (H) 4.0 - 5.6 % Final     Comment:     ADA Screening Guidelines:  5.7-6.4%  Consistent with prediabetes  >or=6.5%  Consistent with diabetes  High levels of fetal hemoglobin interfere with the HbA1C  assay. Heterozygous hemoglobin variants (HbS, HgC, etc)do  not significantly interfere with this assay.   However, presence of multiple variants may affect accuracy.                Passed - Patient  is at least 18 years old        Passed - Office Visit within last 12 months or future 90 days.     Recent Outpatient Visits            1 month ago Flank pain    [facility] Janel Larose MD    2 months ago Ingrown toenail    [id number] Radha Chou, DPM    3 months ago Type 2 diabetes mellitus with hyperosmolar coma, with long-term current use of insulin    [facility] DENG Galindo, THONG    11 months ago [facility] Perez E Jerrell, OD    11 months ago Type 2 diabetes mellitus with hyperglycemia, with long-term current use of insulin    [facility] DENG Galindo, THONG                    Passed - eGFR is 30 or above and within 360 days     eGFR if non    Date Value Ref Range Status   09/01/2020 >60.0 >60 mL/min/1.73 m^2 Final     Comment:     Calculation used to obtain the estimated glomerular filtration  rate (eGFR) is the CKD-EPI equation.      09/10/2019 >60 >60 mL/min/1.73 m^2 Final     Comment:     Calculation used to obtain the estimated glomerular filtration  rate (eGFR) is the CKD-EPI equation.      07/30/2018 >60.0 >60 mL/min/1.73 m^2 Final     Comment:     Calculation used to obtain the estimated glomerular filtration  rate (eGFR) is the CKD-EPI equation.        eGFR if    Date Value Ref Range Status   09/01/2020 >60.0 >60 mL/min/1.73 m^2 Final   09/10/2019 >60 >60 mL/min/1.73 m^2 Final   07/30/2018 >60.0 >60 mL/min/1.73 m^2 Final              Passed - Cr is 1.3 or below and within 360 days     Creatinine   Date Value Ref Range Status   09/01/2020 0.8 0.5 - 1.4 mg/dL Final   09/10/2019 0.9 0.5 - 1.4 mg/dL Final   07/30/2018 0.8 0.5 - 1.4 mg/dL Final                  Appointments  past 12m or future 3m with PCP    Date Provider   Last Visit   9/1/2020 Bree Larose MD   Next Visit   Visit date not found Bree Larose MD    ED visits in past 90 days: 0     Note composed:3:34 PM 10/05/2020

## 2020-10-14 RX ORDER — INSULIN DEGLUDEC 200 U/ML
INJECTION, SOLUTION SUBCUTANEOUS
Qty: 36 ML | Refills: 11 | Status: SHIPPED | OUTPATIENT
Start: 2020-10-14 | End: 2020-10-20 | Stop reason: SDUPTHER

## 2020-10-14 NOTE — TELEPHONE ENCOUNTER
No new care gaps identified.  Powered by [employer]. Reference number: [id number]. 10/14/2020 12:51:29 PM   CDT

## 2020-10-14 NOTE — TELEPHONE ENCOUNTER
----- Message from Rose Perez sent at 10/14/2020 12:39 PM CDT -----  Contact: James/Destiny [phone]  Prescription Alternative Needed:     The pharmacy needs alternative on the following RX:    TRESIBA FLEXTOUCH U-200 200 unit/mL (3 mL) InPn    Reason: Drug too expensive.    Pharmacy: [id number] [place] - [address] AT [facility] [address] [facility]    Please advise.    Thank You

## 2020-10-14 NOTE — TELEPHONE ENCOUNTER
----- Message from Lesia Torres sent at 10/14/2020  4:27 PM CDT -----  Contact: Destiny @ [phone]  Good Evening,  Patient would like a replacement for the Rx: TRESIBA FLEXTOUCH U-200 200 unit/mL (3 mL) InPn  That would be cheaper. Rx: TRESIBA FLEXTOUCH U-200 200 unit/mL (3 mL) InPn  Is 500.00 now . Please call and advise

## 2020-10-15 NOTE — TELEPHONE ENCOUNTER
----- Message from Catalina Gómez sent at 10/15/2020  2:55 PM CDT -----  Contact: Destiny(wife)[phone]  Pt wife is requesting a Rx for a different medication in place of the TRESIBA FLEXTOUCH U-200 200 unit/mL (3 mL) InPn. Pt wife states the Rx is $553 and they are not able to afford that for one month's medication. Please call and advise.

## 2020-10-20 ENCOUNTER — LAB VISIT (OUTPATIENT)
Dept: LAB | Facility: HOSPITAL | Age: 70
End: 2020-10-20
Attending: INTERNAL MEDICINE
Payer: MEDICARE

## 2020-10-20 ENCOUNTER — OFFICE VISIT (OUTPATIENT)
Dept: INTERNAL MEDICINE | Facility: CLINIC | Age: 70
End: 2020-10-20
Payer: MEDICARE

## 2020-10-20 VITALS
HEART RATE: 89 BPM | OXYGEN SATURATION: 99 % | DIASTOLIC BLOOD PRESSURE: 72 MMHG | SYSTOLIC BLOOD PRESSURE: 109 MMHG | WEIGHT: 200.19 LBS | BODY MASS INDEX: 32.17 KG/M2 | HEIGHT: 66 IN

## 2020-10-20 DIAGNOSIS — Z79.4 TYPE 2 DIABETES MELLITUS WITH HYPEROSMOLAR COMA, WITH LONG-TERM CURRENT USE OF INSULIN: ICD-10-CM

## 2020-10-20 DIAGNOSIS — E11.01 TYPE 2 DIABETES MELLITUS WITH HYPEROSMOLAR COMA, WITH LONG-TERM CURRENT USE OF INSULIN: ICD-10-CM

## 2020-10-20 DIAGNOSIS — Z12.5 PROSTATE CANCER SCREENING: ICD-10-CM

## 2020-10-20 DIAGNOSIS — Z01.00 ROUTINE EYE EXAM: ICD-10-CM

## 2020-10-20 DIAGNOSIS — Z12.5 PROSTATE CANCER SCREENING: Primary | ICD-10-CM

## 2020-10-20 DIAGNOSIS — Z12.11 COLON CANCER SCREENING: ICD-10-CM

## 2020-10-20 LAB
COMPLEXED PSA SERPL-MCNC: 2 NG/ML (ref 0–4)
ESTIMATED AVG GLUCOSE: 214 MG/DL (ref 68–131)
HBA1C MFR BLD HPLC: 9.1 % (ref 4–5.6)

## 2020-10-20 PROCEDURE — 3046F HEMOGLOBIN A1C LEVEL >9.0%: CPT | Mod: CPTII,S$GLB,, | Performed by: INTERNAL MEDICINE

## 2020-10-20 PROCEDURE — 99999 PR PBB SHADOW E&M-EST. PATIENT-LVL V: ICD-10-PCS | Mod: PBBFAC,,, | Performed by: INTERNAL MEDICINE

## 2020-10-20 PROCEDURE — 36415 COLL VENOUS BLD VENIPUNCTURE: CPT

## 2020-10-20 PROCEDURE — 99397 PER PM REEVAL EST PAT 65+ YR: CPT | Mod: S$GLB,,, | Performed by: INTERNAL MEDICINE

## 2020-10-20 PROCEDURE — 84153 ASSAY OF PSA TOTAL: CPT

## 2020-10-20 PROCEDURE — 3078F PR MOST RECENT DIASTOLIC BLOOD PRESSURE < 80 MM HG: ICD-10-PCS | Mod: CPTII,S$GLB,, | Performed by: INTERNAL MEDICINE

## 2020-10-20 PROCEDURE — 3078F DIAST BP <80 MM HG: CPT | Mod: CPTII,S$GLB,, | Performed by: INTERNAL MEDICINE

## 2020-10-20 PROCEDURE — 3046F PR MOST RECENT HEMOGLOBIN A1C LEVEL > 9.0%: ICD-10-PCS | Mod: CPTII,S$GLB,, | Performed by: INTERNAL MEDICINE

## 2020-10-20 PROCEDURE — 99397 PR PREVENTIVE VISIT,EST,65 & OVER: ICD-10-PCS | Mod: S$GLB,,, | Performed by: INTERNAL MEDICINE

## 2020-10-20 PROCEDURE — 3074F PR MOST RECENT SYSTOLIC BLOOD PRESSURE < 130 MM HG: ICD-10-PCS | Mod: CPTII,S$GLB,, | Performed by: INTERNAL MEDICINE

## 2020-10-20 PROCEDURE — 83036 HEMOGLOBIN GLYCOSYLATED A1C: CPT

## 2020-10-20 PROCEDURE — 3074F SYST BP LT 130 MM HG: CPT | Mod: CPTII,S$GLB,, | Performed by: INTERNAL MEDICINE

## 2020-10-20 PROCEDURE — 99999 PR PBB SHADOW E&M-EST. PATIENT-LVL V: CPT | Mod: PBBFAC,,, | Performed by: INTERNAL MEDICINE

## 2020-10-20 RX ORDER — INSULIN DEGLUDEC 200 U/ML
INJECTION, SOLUTION SUBCUTANEOUS
Qty: 36 ML | Refills: 11 | Status: SHIPPED | OUTPATIENT
Start: 2020-10-20 | End: 2021-02-10 | Stop reason: SDUPTHER

## 2020-10-20 NOTE — PROGRESS NOTES
Subjective:       Patient ID: Paul Deleon is a 70 y.o. male.    Chief Complaint: PE  HPI   Finally got MO fixed.  Back pain has resolved.  Ua, cmp cbc all acceptable.    He has Dm.  Overdue for f/u .  Can't afford tresiba.  He missed appt in late sept with Irielle, as was visiting his dying brother.    H/o CAd. No cp, sob.  Obese, but active around home.  Last cardiology note reviewed 8/18.  No acute cardiac symptoms.  .    Review of Systems   Constitutional: Negative for activity change and unexpected weight change.   HENT: Negative for postnasal drip, rhinorrhea and sinus pressure/congestion.    Respiratory: Negative for chest tightness and shortness of breath.    Cardiovascular: Negative for chest pain and leg swelling.   Gastrointestinal: Negative for abdominal pain, nausea and vomiting.   Genitourinary: Negative for difficulty urinating, dysuria, hematuria and urgency.   Integumentary:  Negative for rash.   Neurological: Negative for headaches.   Psychiatric/Behavioral: Negative for dysphoric mood and sleep disturbance. The patient is not nervous/anxious.          Objective:      Physical Exam  Constitutional:       Appearance: He is well-developed.   Eyes:      General: No scleral icterus.  Neck:      Thyroid: No thyromegaly.      Vascular: No JVD.   Cardiovascular:      Rate and Rhythm: Normal rate and regular rhythm.      Heart sounds: Normal heart sounds.   Pulmonary:      Effort: Pulmonary effort is normal. No respiratory distress.      Breath sounds: Normal breath sounds. No wheezing or rales.   Abdominal:      Palpations: Abdomen is soft. There is no mass.      Tenderness: There is no abdominal tenderness.   Neurological:      Mental Status: He is alert and oriented to person, place, and time.   Psychiatric:         Behavior: Behavior normal.         Assessment:       1. Prostate cancer screening    2. Type 2 diabetes mellitus with hyperosmolar coma, with long-term current use of insulin    3.  Routine eye exam    4. Colon cancer screening        Plan:       Paul was seen today for follow-up and diarrhea.    Diagnoses and all orders for this visit:    Prostate cancer screening  -     PSA, Screening; Future    Type 2 diabetes mellitus with hyperosmolar coma, with long-term current use of insulin  -     Hemoglobin A1C; Future    Routine eye exam  -     Ambulatory referral/consult to Optometry; Future    Colon cancer screening  -     Fecal Immunochemical Test (iFOBT); Future    Other orders  -     TRESIBA FLEXTOUCH U-200 200 unit/mL (3 mL) InPn; ADMINISTER 68 UNITS UNDER THE SKIN EVERY EVENING       tdap now     shingrix rec'd.    Will ask pharmacy if they can help with cost of Tresiba.  If not, will ask Irielle for assistance.      F/u Namita Mederos.    Addendum:    Lab Results   Component Value Date    HGBA1C 9.1 (H) 10/20/2020

## 2020-10-26 ENCOUNTER — TELEPHONE (OUTPATIENT)
Dept: INTERNAL MEDICINE | Facility: CLINIC | Age: 70
End: 2020-10-26

## 2020-10-26 NOTE — TELEPHONE ENCOUNTER
----- Message from Catalina Gómez sent at 10/26/2020 12:35 PM CDT -----  Contact: self [phone]  Patient is returning a phone call.  Who left a message for the patient: Delmy Lugo LPN  Does patient know what this is regarding:  A1c and appt with Namita Mederos  Comments:

## 2020-10-26 NOTE — TELEPHONE ENCOUNTER
----- Message from Bree Larose MD sent at 10/25/2020  6:55 PM CDT -----  pls let him know - a1c 9.1, so be sure and keep endocrine appt with Namita.  He missed last appt - Josette was to r/s, but not yet done  Pls make sure it hasn't fallen through the cracks  Thanks

## 2020-11-06 ENCOUNTER — OFFICE VISIT (OUTPATIENT)
Dept: INTERNAL MEDICINE | Facility: CLINIC | Age: 70
End: 2020-11-06
Payer: MEDICARE

## 2020-11-06 VITALS
HEART RATE: 67 BPM | BODY MASS INDEX: 31.82 KG/M2 | DIASTOLIC BLOOD PRESSURE: 76 MMHG | HEIGHT: 66 IN | SYSTOLIC BLOOD PRESSURE: 108 MMHG | OXYGEN SATURATION: 96 % | WEIGHT: 198 LBS

## 2020-11-06 DIAGNOSIS — E11.59 HYPERTENSION ASSOCIATED WITH DIABETES: ICD-10-CM

## 2020-11-06 DIAGNOSIS — Z79.4 TYPE 2 DIABETES MELLITUS WITH HYPEROSMOLAR COMA, WITH LONG-TERM CURRENT USE OF INSULIN: Primary | ICD-10-CM

## 2020-11-06 DIAGNOSIS — I15.2 HYPERTENSION ASSOCIATED WITH DIABETES: ICD-10-CM

## 2020-11-06 DIAGNOSIS — E11.01 TYPE 2 DIABETES MELLITUS WITH HYPEROSMOLAR COMA, WITH LONG-TERM CURRENT USE OF INSULIN: Primary | ICD-10-CM

## 2020-11-06 DIAGNOSIS — I25.10 CORONARY ARTERY DISEASE INVOLVING NATIVE HEART, ANGINA PRESENCE UNSPECIFIED, UNSPECIFIED VESSEL OR LESION TYPE: ICD-10-CM

## 2020-11-06 DIAGNOSIS — E78.5 HYPERLIPIDEMIA, UNSPECIFIED HYPERLIPIDEMIA TYPE: ICD-10-CM

## 2020-11-06 PROCEDURE — 3074F PR MOST RECENT SYSTOLIC BLOOD PRESSURE < 130 MM HG: ICD-10-PCS | Mod: CPTII,S$GLB,, | Performed by: NURSE PRACTITIONER

## 2020-11-06 PROCEDURE — 3008F BODY MASS INDEX DOCD: CPT | Mod: CPTII,S$GLB,, | Performed by: NURSE PRACTITIONER

## 2020-11-06 PROCEDURE — 3046F HEMOGLOBIN A1C LEVEL >9.0%: CPT | Mod: CPTII,S$GLB,, | Performed by: NURSE PRACTITIONER

## 2020-11-06 PROCEDURE — 99999 PR PBB SHADOW E&M-EST. PATIENT-LVL IV: ICD-10-PCS | Mod: PBBFAC,,, | Performed by: NURSE PRACTITIONER

## 2020-11-06 PROCEDURE — 3078F PR MOST RECENT DIASTOLIC BLOOD PRESSURE < 80 MM HG: ICD-10-PCS | Mod: CPTII,S$GLB,, | Performed by: NURSE PRACTITIONER

## 2020-11-06 PROCEDURE — 3078F DIAST BP <80 MM HG: CPT | Mod: CPTII,S$GLB,, | Performed by: NURSE PRACTITIONER

## 2020-11-06 PROCEDURE — 3046F PR MOST RECENT HEMOGLOBIN A1C LEVEL > 9.0%: ICD-10-PCS | Mod: CPTII,S$GLB,, | Performed by: NURSE PRACTITIONER

## 2020-11-06 PROCEDURE — 3074F SYST BP LT 130 MM HG: CPT | Mod: CPTII,S$GLB,, | Performed by: NURSE PRACTITIONER

## 2020-11-06 PROCEDURE — 1159F PR MEDICATION LIST DOCUMENTED IN MEDICAL RECORD: ICD-10-PCS | Mod: S$GLB,,, | Performed by: NURSE PRACTITIONER

## 2020-11-06 PROCEDURE — 1101F PT FALLS ASSESS-DOCD LE1/YR: CPT | Mod: CPTII,S$GLB,, | Performed by: NURSE PRACTITIONER

## 2020-11-06 PROCEDURE — 3008F PR BODY MASS INDEX (BMI) DOCUMENTED: ICD-10-PCS | Mod: CPTII,S$GLB,, | Performed by: NURSE PRACTITIONER

## 2020-11-06 PROCEDURE — 99214 OFFICE O/P EST MOD 30 MIN: CPT | Mod: S$GLB,,, | Performed by: NURSE PRACTITIONER

## 2020-11-06 PROCEDURE — 99999 PR PBB SHADOW E&M-EST. PATIENT-LVL IV: CPT | Mod: PBBFAC,,, | Performed by: NURSE PRACTITIONER

## 2020-11-06 PROCEDURE — 99214 PR OFFICE/OUTPT VISIT, EST, LEVL IV, 30-39 MIN: ICD-10-PCS | Mod: S$GLB,,, | Performed by: NURSE PRACTITIONER

## 2020-11-06 PROCEDURE — 1101F PR PT FALLS ASSESS DOC 0-1 FALLS W/OUT INJ PAST YR: ICD-10-PCS | Mod: CPTII,S$GLB,, | Performed by: NURSE PRACTITIONER

## 2020-11-06 PROCEDURE — 1159F MED LIST DOCD IN RCRD: CPT | Mod: S$GLB,,, | Performed by: NURSE PRACTITIONER

## 2020-11-06 NOTE — PROGRESS NOTES
CHIEF COMPLAINT: Type  2 Diabetes     HPI: Mr. Paul Deleon is a 70 y.o. male who was diagnosed with T2DM in 2006.  Pt has had issues with financial burden of insulin-tresiba -$150 per month  Last seen in 6/2020.  a1c went up from last visit.   a1c went up from 8.1% to 8.8% to 9.1%    Past Medical History:   Diagnosis Date    Cataract     Coronary artery disease     Diabetes mellitus type II     GERD (gastroesophageal reflux disease)     Heart attack     Hyperlipidemia     Hypertension     MI (mitral incompetence)     MI (myocardial infarction)     Ulcer        Lab Results   Component Value Date    HGBA1C 9.1 (H) 10/20/2020     PREVIOUS DIABETES MEDICATIONS TRIED  tresiba   Metformin   humalog  jardiance - cost issue, never started   nph     CURRENT DIABETIC MEDS: metformin 1000 mg bid, tresiba 68 units at night -out of insulin x 2-3 weeks-cost issues >$500, [facility] ~$200   novolog correction scale 150-200+2, etc     Pt is monitoring blood glucose readings 4 times a day.  Needs ~100 strips per month related to fluctuations with blood glucose reading, a1c trends, and activity level.  On MDI injections 3 x a day  Self adjusting per scale - insulin   Testing 4 x a day  Patient is willing and able to use the device  Demonstrated an understanding of the technology and is motivated to use CGM  Patient expected to adhere to a comprehensive diabetes treatment plan and patient has adequate medical supervision  Patient experiences multiple impaired awareness of hypoglycemia (hypoglycemia unawareness)      - 207 mg/dl    Highest 350 mg/dl     Diabetes Management Status    Statin: Taking  ACE/ARB: Taking    Screening or Prevention Patient's value Goal Complete/Controlled?   HgA1C Testing and Control   Lab Results   Component Value Date    HGBA1C 9.1 (H) 10/20/2020      Annually/Less than 8% No   Lipid profile : 06/12/2020 Annually Yes   LDL control Lab Results   Component Value Date    LDLCALC  "50.4 (L) 06/12/2020    Annually/Less than 100 mg/dl  Yes   Nephropathy screening Lab Results   Component Value Date    LABMICR 42.0 09/08/2014     Lab Results   Component Value Date    PROTEINUA Negative 09/01/2020    Annually No   Blood pressure BP Readings from Last 1 Encounters:   10/20/20 109/72    Less than 140/90 Yes   Dilated retinal exam : 11/01/2019 Annually Yes   Foot exam   : 06/18/2020 Annually Yes     REVIEW OF SYSTEMS  General: no weakness, fatigue, + weight changes  Eyes: no double or blurred vision, eye pain, or redness  Cardiovascular: no chest pain, palpitations, edema, or murmurs.   Respiratory: no cough or dyspnea.   GI: no heartburn, nausea, or changes in bowel patterns; good appetite.   Skin: no rashes, dryness, itching, or reactions at insulin injection sites.  Neuro: no numbness, tingling, tremors, or vertigo. +dizziness at night (7p-8p-tresiba admin)  Endocrine: no polyuria, polydipsia, polyphagia, heat or cold intolerance.     Vital Signs  BP 98/62 (BP Location: Left arm, Patient Position: Sitting, BP Method: Large (Manual))   Pulse 67   Ht 5' 6" (1.676 m)   Wt 89.8 kg (198 lb)   SpO2 96%   BMI 31.96 kg/m²     Hemoglobin A1C   Date Value Ref Range Status   10/20/2020 9.1 (H) 4.0 - 5.6 % Final     Comment:     ADA Screening Guidelines:  5.7-6.4%  Consistent with prediabetes  >or=6.5%  Consistent with diabetes  High levels of fetal hemoglobin interfere with the HbA1C  assay. Heterozygous hemoglobin variants (HbS, HgC, etc)do  not significantly interfere with this assay.   However, presence of multiple variants may affect accuracy.     07/08/2020 8.1 % Final   06/12/2020 8.8 (H) 4.0 - 5.6 % Final     Comment:     ADA Screening Guidelines:  5.7-6.4%  Consistent with prediabetes  >or=6.5%  Consistent with diabetes  High levels of fetal hemoglobin interfere with the HbA1C  assay. Heterozygous hemoglobin variants (HbS, HgC, etc)do  not significantly interfere with this assay.   However, " presence of multiple variants may affect accuracy.     09/10/2019 8.1 (H) 4.0 - 5.6 % Final     Comment:     ADA Screening Guidelines:  5.7-6.4%  Consistent with prediabetes  >or=6.5%  Consistent with diabetes  High levels of fetal hemoglobin interfere with the HbA1C  assay. Heterozygous hemoglobin variants (HbS, HgC, etc)do  not significantly interfere with this assay.   However, presence of multiple variants may affect accuracy.          Chemistry        Component Value Date/Time     09/01/2020 1229    K 4.8 09/01/2020 1229     09/01/2020 1229    CO2 21 (L) 09/01/2020 1229    BUN 14 09/01/2020 1229    CREATININE 0.8 09/01/2020 1229     (H) 09/01/2020 1229        Component Value Date/Time    CALCIUM 9.2 09/01/2020 1229    ALKPHOS 85 09/01/2020 1229    AST 20 09/01/2020 1229    ALT 9 (L) 09/01/2020 1229    BILITOT 0.8 09/01/2020 1229    ESTGFRAFRICA >60.0 09/01/2020 1229    EGFRNONAA >60.0 09/01/2020 1229           Lab Results   Component Value Date    TSH 3.255 06/12/2020      Lab Results   Component Value Date    CHOL 106 (L) 06/12/2020    CHOL 153 11/27/2018    CHOL 153 11/27/2018     Lab Results   Component Value Date    HDL 29 (L) 06/12/2020    HDL 29 (L) 11/27/2018    HDL 29 (L) 11/27/2018     Lab Results   Component Value Date    LDLCALC 50.4 (L) 06/12/2020    LDLCALC 96.6 11/27/2018    LDLCALC 96.6 11/27/2018     Lab Results   Component Value Date    TRIG 133 06/12/2020    TRIG 137 11/27/2018    TRIG 137 11/27/2018     Lab Results   Component Value Date    CHOLHDL 27.4 06/12/2020    CHOLHDL 19.0 (L) 11/27/2018    CHOLHDL 19.0 (L) 11/27/2018         PHYSICAL EXAMINATION  Constitutional: Appears well, no distress  Neck: Supple, trachea midline.   Respiratory: No wheezes, even and unlabored.  Cardiovascular: trace BLE edema.   Lymph: deferred  Skin: warm and dry; no injection site reactions, no acanthosis nigracans observed.  Neuro:patient alert and cooperative, normal affect; steady  gait.    Diabetes Foot Exam:   Deferred       Assessment/Plan    1. Type 2 diabetes mellitus with hyperosmolar coma, with long-term current use of insulin  Hemoglobin A1C    Diabetic Eye Screening Photo    Microalbumin/Creatinine Ratio, Urine   2. Hypertension associated with diabetes     3. Hyperlipidemia, unspecified hyperlipidemia type     4. Coronary artery disease involving native heart, angina presence unspecified, unspecified vessel or lesion type       1. F/u in 3 mos w/ me   a1c urine mac prior   Diabetic eye photo -this year  a1c goal less than 7%  vgo video shown during visit  Cost effective 2021-demo being sent to rep [facility]  Info on orion given- send info to DMS-pend for 2021   humalog novolog or regular insulin can be use via vgo 20  3 clicks with meals   Continue tresiba at this time with metformin   Will wait til top of the year 2021   a1c goal less than 7.5%  2. Continue med(s)  Controlled  Repeat 108/76   3.   Lab Results   Component Value Date    LDLCALC 50.4 (L) 06/12/2020     At goal  4. Avoid hypoglycemia   FOLLOW UP  3 mos

## 2020-11-06 NOTE — PATIENT INSTRUCTIONS
Snacks can be an important part of a balanced, healthy meal plan. They allow you to eat more frequently, feeling full and satisfied throughout the day. Also, they allow you to spread carbohydrates evenly, which may stabilize blood sugars.  Plus, snacks are enjoyable!     The amount of carbohydrate needed at snacks varies. Generally, about 15-30 grams of carbohydrate per snack is recommended.  Below you will find some tasty treats.       0-5 gm carb   Crystal Light   Vitamin Water Zero   Herbal tea, unsweetened   2 tsp peanut butter on celery   1./2 cup sugar-free jell-o   1 sugar-free popsicle   ¼ cup blueberries   8oz Blue Annika unsweetened almond milk   5 baby carrots & celery sticks, cucumbers, bell peppers dipped in ¼ cup salsa, 2Tbsp light ranch dressing or 2Tbsp plain Greek yogurt   10 Goldfish crackers   ½ oz low-fat cheese or string cheese   1 closed handful of nuts, unsalted   1 Tbsp of sunflower seeds, unsalted   1 cup Smart Pop popcorn   1 whole grain brown rice cake        15 gm carb   1 small piece of fruit or ½ banana or 1/2 cup lite canned fruit   3 aishwarya cracker squares   3 cups Smart Pop popcorn, top spray butter, Lu lite salt or cinnamon and Truvia   5 Vanilla Wafers   ½ cup low fat, no added sugar ice cream or frozen yogurt (Blue bell, Blue Bunny, Weight Watchers, Skinny Cow)   ½ turkey, ham, or chicken sandwich   ½ c fruit with ½ c Cottage cheese   4-6 unsalted wheat crackers with 1 oz low fat cheese or 1 tbsp peanut butter    30-45 goldfish crackers (depending on flavor)    7-8 Baptism mini brown rice cakes (caramel, apple cinnamon, chocolate)    12 Baptism mini brown rice cakes (cheddar, bbq, ranch)    1/3 cup hummus dip with raw veg   1/2 whole wheat fly, 1Tbsp hummus   Mini Pizza (1/2 whole wheat English muffin, low-fat  cheese, tomato sauce)   100 calorie snack pack (Oreo, Chips Ahoy, Ritz Mix, Baked Cheetos)   4-6 oz. light or Greek Style yogurt  (Jenny, Rosalba, Zonia, [facility])   ½ cup sugar-free pudding     6 in. wheat tortilla or fly oven toasted chips (topped with spray butter flavoring, cinnamon, Truvia OR spray butter, garlic powder, chili powder)    18 BBQ Popchips (available at [employer], [employer], [employer])                   Managing Diabetes: The A1C Test       Healthy red blood cells have some glucose stuck to them. A high A1C means that unhealthy amounts of glucose are stuck to the cells.   What is the A1C test?  Using your meter helps you track your blood sugar every day. But your glucose meter tells you the value at the time of testing only. You also need to know if your treatment plan is keeping you healthy over time. The hemoglobin A1C (or glycated hemoglobin) test can help. This test measures your average blood sugar level over a few months. A higher A1C result means that you have a higher risk of developing complications.  The A1C test  The A1C is a blood test done by your healthcare provider. You will likely have an A1C test every 3 to 6 months.  Your blood glucose goal  A1C has been shown as a percentage. But it can also be shown as a number representing the estimated Average Glucose (eAG). Unlike the A1C percentage, eAG is a number similar to the numbers listed on your daily glucose monitor. Both A1C and eAG measure the amount of glucose stuck to a protein called hemoglobin in red blood cells. Your healthcare provider will help you figure out what your ideal A1C or eAG should be. Your target number will depend on your age, general health, and other factors. If your current number is too high, your treatment plan may need changes, such as different medicines.  Sample results  Most people aim for an A1c lower than 7%. Thats an eAG less than 154 mg/dL. Or, your healthcare provider may want you to aim for an A1C of 6%. Thats an eAG of 126 mg/dL.     Glucose  calculator  Visit http://[email or web address].[email or web address]/glucose_calc for a chart that helps convert your A1C percentages into eAG numbers.   Date Last Reviewed: 6/1/2016 © 2000-2017 [employer]. [address] [place] [address], [place] [address]. All rights reserved. This information is not intended as a substitute for professional medical care. Always follow your healthcare professional's instructions.        Hypoglycemia (Low Blood Sugar)     Fast-acting sugar includes a cup of nonfat milk.     Too little sugar (glucose) in your blood is called hypoglycemia or low blood sugar. Low blood sugar usually means anything lower than 70 mg/dL. Talk with your healthcare provider about your target range and what level is too low for you. Diabetes itself doesnt cause low blood sugar. But some of the treatments for diabetes, such as pills or insulin, may raise your risk for it. Low blood sugar may cause you to pass out or have a seizure. So always treat low blood sugar right away, but don't overeat.  Special note: Always carry a source of fast-acting sugar and a snack in case of hypoglycemia.   What you may notice  If you have low blood sugar, you may have one or more of these symptoms:  · Shakiness or dizziness  · Cold, clammy skin or sweating  · Feelings of hunger  · Headache  · Nervousness  · A hard, fast heartbeat  · Weakness  · Confusion or irritability  · Blurred vision  · Having nightmares or waking up confused or sweating  · Numbness or tingling in the lips or tongue  What you should do  Here are tips to follow if you have hypoglycemia:   · First check your blood sugar. If it is too low (out of your target range), eat or drink 15 to 20 grams of fast-acting sugar. This may be 3 to 4 glucose tablets, 4 ounces (half a cup) of fruit juice or regular (nondiet) soda, 8 ounces (1 cup) of fat-free milk, or 1 tablespoon of honey. Dont take more than this, or your blood sugar may go too high.  · Wait 15 minutes. Then  recheck your blood sugar if you can.  · If your blood sugar is still too low, repeat the steps above and check your blood sugar again. If your blood sugar still has not returned to your target range, contact your healthcare provider or seek emergency care.  · Once your blood sugar returns to target range, eat a snack or meal.  Preventing low blood sugar  Things you can do include the following:   · If your condition needs a strict treatment plan, eat your meals and snacks at the same times each day. Dont skip meals!  · If your treatment plan lets you change when you eat and what you eat, learn how to change the time and dose of your rapid-acting insulin to match this.   · Ask your healthcare provider if it is safe for you to drink alcohol. Never drink on an empty stomach.  · Take your medicine at the prescribed times.  · Always carry a source of fast-acting sugar and a snack when youre away from home.  Other things to do  Additional tips include the following:  · Carry a medical ID card, a compact USB drive, or wear a medical alert bracelet or necklace. It should say that you have diabetes. It should also say what to do if you pass out or have a seizure.  · Make sure your family, friends, and coworkers know the signs of low blood sugar. Tell them what to do if your blood sugar falls very low and you cant treat yourself.  · Keep a glucagon emergency kit handy. Be sure your family, friends, and coworkers know how and when to use it. Check it regularly and replace the glucagon before it expires.  · Talk with your health care team about other things you can do to prevent low blood sugar.     If you have unexplained hypoglycemia or hypoglycemia several times, call your healthcare provider.   Date Last Reviewed: 5/1/2016  © 9252-4918 [employer]. [address] [place] [address], [place] [address]. All rights reserved. This information is not intended as a substitute for professional medical care. Always follow  your healthcare professional's instructions.        Exercise: Adding Intensity  You have been exercising for 30 minutes most days of the week. Now you can move on to the next stage: increasing the intensity. This means doing your activity in one or more of these ways:  · Longer. Exercise for 30 minutes or more without a break.  · Faster. Hike, run, or skate fast enough to raise your heart rate moderately--as if you had walked fast to catch a bus.  · More often. Do your activity 4 to 6 times a week instead of 1 to 3 times.    Not just gym class  Be creative. You can reach your health and fitness goals in many ways. Try some of these activities:  · Team sports, like basketball or soccer  · Social or recreational activities, like hiking or dancing  · Individual exercise, like cycling, swimming, or skating  · Group fitness classes, like aerobic classes or weight training  Safety first  Whatever activity you choose, think about safety:  · Wear the right safety gear and shoes for your activity.  · Drink plenty of water during and after workouts.  · Wear light-colored clothing if youre out when its dark.  · Make time to warm up before you exercise and cool down after.  · Carry ID (identification) with you if youre out alone. And be sure someone knows where youre going.  · If youre on foot, travel against traffic (except on blind corners). If youre on a bike, go with traffic. Obey the rules of the road.   Tips for sticking with it  · Find a workout partner or sports club. If you know someone is expecting you, youll be less likely to skip your workout.  · Pack a workout bag with everything you need. Then its ready when you are.  · Choose a few different activities so youll stay interested. Make it fun!  What will help you to stick with it?  1.   2.   3.   Date Last Reviewed: 8/13/2015  © 4636-1016 [employer], [employer]. [address] [place] [address], [place] [address]. All rights reserved. This information is not  intended as a substitute for professional medical care. Always follow your healthcare professional's instructions.        Managing Diabetes: The A1C Test       Healthy red blood cells have some glucose stuck to them. A high A1C means that unhealthy amounts of glucose are stuck to the cells.   What is the A1C test?  Using your meter helps you track your blood sugar every day. But your glucose meter tells you the value at the time of testing only. You also need to know if your treatment plan is keeping you healthy over time. The hemoglobin A1C (or glycated hemoglobin) test can help. This test measures your average blood sugar level over a few months. A higher A1C result means that you have a higher risk of developing complications.  The A1C test  The A1C is a blood test done by your healthcare provider. You will likely have an A1C test every 3 to 6 months.  Your blood glucose goal  A1C has been shown as a percentage. But it can also be shown as a number representing the estimated Average Glucose (eAG). Unlike the A1C percentage, eAG is a number similar to the numbers listed on your daily glucose monitor. Both A1C and eAG measure the amount of glucose stuck to a protein called hemoglobin in red blood cells. Your healthcare provider will help you figure out what your ideal A1C or eAG should be. Your target number will depend on your age, general health, and other factors. If your current number is too high, your treatment plan may need changes, such as different medicines.  Sample results  Most people aim for an A1c lower than 7%. Thats an eAG less than 154 mg/dL. Or, your healthcare provider may want you to aim for an A1C of 6%. Thats an eAG of 126 mg/dL.     Glucose calculator  Visit http://[email or web address]/glucose_calc for a chart that helps convert your A1C percentages into eAG numbers.   Date Last Reviewed: 6/1/2016  © 4507-0614 [employer]. [address] [place] [address], [place] [address].  All rights reserved. This information is not intended as a substitute for professional medical care. Always follow your healthcare professional's instructions.        Managing Diabetes: The A1C Test       Healthy red blood cells have some glucose stuck to them. A high A1C means that unhealthy amounts of glucose are stuck to the cells.   What is the A1C test?  Using your meter helps you track your blood sugar every day. But your glucose meter tells you the value at the time of testing only. You also need to know if your treatment plan is keeping you healthy over time. The hemoglobin A1C (or glycated hemoglobin) test can help. This test measures your average blood sugar level over a few months. A higher A1C result means that you have a higher risk of developing complications.  The A1C test  The A1C is a blood test done by your healthcare provider. You will likely have an A1C test every 3 to 6 months.  Your blood glucose goal  A1C has been shown as a percentage. But it can also be shown as a number representing the estimated Average Glucose (eAG). Unlike the A1C percentage, eAG is a number similar to the numbers listed on your daily glucose monitor. Both A1C and eAG measure the amount of glucose stuck to a protein called hemoglobin in red blood cells. Your healthcare provider will help you figure out what your ideal A1C or eAG should be. Your target number will depend on your age, general health, and other factors. If your current number is too high, your treatment plan may need changes, such as different medicines.  Sample results  Most people aim for an A1c lower than 7%. Thats an eAG less than 154 mg/dL. Or, your healthcare provider may want you to aim for an A1C of 6%. Thats an eAG of 126 mg/dL.     Glucose calculator  Visit http://[email or web address]/glucose_calc for a chart that helps convert your A1C percentages into eAG numbers.   Date Last Reviewed: 6/1/2016  © 3741-9624 [employer],  [employer]. [address] [place] [address]. All rights reserved. This information is not intended as a substitute for professional medical care. Always follow your healthcare professional's instructions.        Diabetes: Sick-Day Plan  Infections, the flu, and even a cold, can cause your blood sugar to rise. And, eating less, nausea, and vomiting may cause your blood glucose to fall (hypoglycemia). Ask your healthcare provider to help you develop a sick-day plan. The following information can help.    Donts  Don'ts include the following:  · Diabetes medicines. Dont stop taking your diabetes medicine.  · Other medicines. Dont take other medicines, such as those for colds or the flu, without checking with your healthcare provider.  Dos  Do's include the following:  · Eating. Stick to your meal plan. If you cant eat, try fruit juice, regular gelatin, or frozen juice bars as directed by your healthcare provider.  · Drinking. Drink at least 1 glass of liquid every hour. If youre eating, these liquids should be sugar-free.  · Blood glucose. Check your blood sugar as often as directed by your healthcare provider. You may need to check it more often than usual.  · Ketones. Check your blood or urine for ketones. Ketones are the waste from burning fat instead of glucose for energy. Ketones are a warning sign of ketoacidosis. Ketoacidosis is a medical emergency. Ketoacidosis can happen to anyone with diabetes, but it's very rare in type 2 diabetes. It is usually only an issue if you have type 1 diabetes.  · Diabetes medicines.  ¨ Adjust your insulin according to your sick-day plan. Don't skip insulin. You need insulin even if you can't eat your normal meals.  ¨ If you take pills for diabetes (oral medicines), take your normal dose unless your healthcare provider tells you something different.  · Sugar-free medicines. Look for sugar-free cough drops and other medicines. Ask your healthcare provider if its OK for you  to take these.  · Getting help. If you're alone, ask someone to check on you several times a day.  Try to get all these supplies together before you need them.  Call your healthcare provider  Call your healthcare provider if you have any of the following:  · You vomit or have diarrhea for more than 6 hours.  · Your blood glucose level is higher than usual or more than 250 mg/dL after you have taken extra insulin (if recommended in your sick-day plan).  · You take oral medicine for diabetes, and your blood sugar is higher than usual or over 250 mg/dL, before a meal and stays that high for more than 24 hours.  · Your blood glucose is lower than usual or less than 70 mg/dL  · You have moderate to large amounts of ketones in your blood or urine.  · You arent better after 2 days.  Date Last Reviewed: 6/1/2016  © 6053-2922 [employer], [employer]. [address] [place] [address], [place] [address]. All rights reserved. This information is not intended as a substitute for professional medical care. Always follow your healthcare professional's instructions.

## 2021-01-04 ENCOUNTER — PATIENT MESSAGE (OUTPATIENT)
Dept: ADMINISTRATIVE | Facility: HOSPITAL | Age: 71
End: 2021-01-04

## 2021-01-05 ENCOUNTER — PATIENT OUTREACH (OUTPATIENT)
Dept: ADMINISTRATIVE | Facility: OTHER | Age: 71
End: 2021-01-05

## 2021-01-06 ENCOUNTER — OFFICE VISIT (OUTPATIENT)
Dept: OPTOMETRY | Facility: CLINIC | Age: 71
End: 2021-01-06
Payer: COMMERCIAL

## 2021-01-06 DIAGNOSIS — E11.9 DIABETIC EYE EXAM: Primary | ICD-10-CM

## 2021-01-06 DIAGNOSIS — H40.013 OPEN ANGLE WITH BORDERLINE FINDINGS, LOW RISK, BILATERAL: ICD-10-CM

## 2021-01-06 DIAGNOSIS — H52.13 MYOPIA OF BOTH EYES WITH ASTIGMATISM: ICD-10-CM

## 2021-01-06 DIAGNOSIS — H25.13 NUCLEAR SCLEROTIC CATARACT OF BOTH EYES: ICD-10-CM

## 2021-01-06 DIAGNOSIS — H52.203 MYOPIA OF BOTH EYES WITH ASTIGMATISM: ICD-10-CM

## 2021-01-06 DIAGNOSIS — H52.4 PRESBYOPIA OF BOTH EYES: ICD-10-CM

## 2021-01-06 DIAGNOSIS — E11.9 TYPE 2 DIABETES MELLITUS WITHOUT RETINOPATHY: ICD-10-CM

## 2021-01-06 DIAGNOSIS — Z01.00 DIABETIC EYE EXAM: Primary | ICD-10-CM

## 2021-01-06 DIAGNOSIS — Z01.00 ROUTINE EYE EXAM: ICD-10-CM

## 2021-01-06 DIAGNOSIS — H26.9 CORTICAL CATARACT OF BOTH EYES: ICD-10-CM

## 2021-01-06 PROCEDURE — 92014 PR EYE EXAM, EST PATIENT,COMPREHESV: ICD-10-PCS | Mod: S$GLB,,, | Performed by: OPTOMETRIST

## 2021-01-06 PROCEDURE — 99999 PR PBB SHADOW E&M-EST. PATIENT-LVL III: ICD-10-PCS | Mod: PBBFAC,,, | Performed by: OPTOMETRIST

## 2021-01-06 PROCEDURE — 92015 DETERMINE REFRACTIVE STATE: CPT | Mod: S$GLB,,, | Performed by: OPTOMETRIST

## 2021-01-06 PROCEDURE — 92014 COMPRE OPH EXAM EST PT 1/>: CPT | Mod: S$GLB,,, | Performed by: OPTOMETRIST

## 2021-01-06 PROCEDURE — 99999 PR PBB SHADOW E&M-EST. PATIENT-LVL III: CPT | Mod: PBBFAC,,, | Performed by: OPTOMETRIST

## 2021-01-06 PROCEDURE — 92015 PR REFRACTION: ICD-10-PCS | Mod: S$GLB,,, | Performed by: OPTOMETRIST

## 2021-02-10 RX ORDER — INSULIN DEGLUDEC 200 U/ML
INJECTION, SOLUTION SUBCUTANEOUS
Qty: 36 ML | Refills: 11 | Status: SHIPPED | OUTPATIENT
Start: 2021-02-10 | End: 2021-07-13

## 2021-04-05 ENCOUNTER — PATIENT MESSAGE (OUTPATIENT)
Dept: ADMINISTRATIVE | Facility: HOSPITAL | Age: 71
End: 2021-04-05

## 2021-05-19 RX ORDER — PANTOPRAZOLE SODIUM 40 MG/1
TABLET, DELAYED RELEASE ORAL
Qty: 90 TABLET | Refills: 3 | Status: SHIPPED | OUTPATIENT
Start: 2021-05-19 | End: 2022-05-09

## 2021-06-23 DIAGNOSIS — I15.2 HYPERTENSION ASSOCIATED WITH DIABETES: ICD-10-CM

## 2021-06-23 DIAGNOSIS — Z79.4 TYPE 2 DIABETES MELLITUS WITH HYPEROSMOLAR COMA, WITH LONG-TERM CURRENT USE OF INSULIN: ICD-10-CM

## 2021-06-23 DIAGNOSIS — E78.5 HYPERLIPIDEMIA, UNSPECIFIED HYPERLIPIDEMIA TYPE: Primary | ICD-10-CM

## 2021-06-23 DIAGNOSIS — E11.59 HYPERTENSION ASSOCIATED WITH DIABETES: ICD-10-CM

## 2021-06-23 DIAGNOSIS — E11.01 TYPE 2 DIABETES MELLITUS WITH HYPEROSMOLAR COMA, WITH LONG-TERM CURRENT USE OF INSULIN: ICD-10-CM

## 2021-06-23 DIAGNOSIS — E11.9 TYPE 2 DIABETES MELLITUS WITHOUT COMPLICATION: ICD-10-CM

## 2021-06-23 RX ORDER — METFORMIN HYDROCHLORIDE 1000 MG/1
TABLET ORAL
Refills: 0 | Status: CANCELLED | OUTPATIENT
Start: 2021-06-23

## 2021-06-24 ENCOUNTER — TELEPHONE (OUTPATIENT)
Dept: INTERNAL MEDICINE | Facility: CLINIC | Age: 71
End: 2021-06-24

## 2021-06-24 RX ORDER — METFORMIN HYDROCHLORIDE 1000 MG/1
1000 TABLET ORAL 2 TIMES DAILY WITH MEALS
Qty: 180 TABLET | Refills: 0 | Status: SHIPPED | OUTPATIENT
Start: 2021-06-24 | End: 2021-07-13 | Stop reason: SDUPTHER

## 2021-07-02 ENCOUNTER — LAB VISIT (OUTPATIENT)
Dept: LAB | Facility: HOSPITAL | Age: 71
End: 2021-07-02
Attending: INTERNAL MEDICINE
Payer: MEDICARE

## 2021-07-02 DIAGNOSIS — E78.5 HYPERLIPIDEMIA, UNSPECIFIED HYPERLIPIDEMIA TYPE: ICD-10-CM

## 2021-07-02 DIAGNOSIS — I15.2 HYPERTENSION ASSOCIATED WITH DIABETES: ICD-10-CM

## 2021-07-02 DIAGNOSIS — E11.59 HYPERTENSION ASSOCIATED WITH DIABETES: ICD-10-CM

## 2021-07-02 DIAGNOSIS — Z79.4 TYPE 2 DIABETES MELLITUS WITH HYPEROSMOLAR COMA, WITH LONG-TERM CURRENT USE OF INSULIN: ICD-10-CM

## 2021-07-02 DIAGNOSIS — E11.01 TYPE 2 DIABETES MELLITUS WITH HYPEROSMOLAR COMA, WITH LONG-TERM CURRENT USE OF INSULIN: ICD-10-CM

## 2021-07-02 DIAGNOSIS — E11.9 TYPE 2 DIABETES MELLITUS WITHOUT COMPLICATION: ICD-10-CM

## 2021-07-02 LAB
ALBUMIN SERPL BCP-MCNC: 3.2 G/DL (ref 3.5–5.2)
ALP SERPL-CCNC: 104 U/L (ref 55–135)
ALT SERPL W/O P-5'-P-CCNC: 5 U/L (ref 10–44)
ANION GAP SERPL CALC-SCNC: 11 MMOL/L (ref 8–16)
AST SERPL-CCNC: 16 U/L (ref 10–40)
BASOPHILS # BLD AUTO: 0.07 K/UL (ref 0–0.2)
BASOPHILS NFR BLD: 0.7 % (ref 0–1.9)
BILIRUB SERPL-MCNC: 1 MG/DL (ref 0.1–1)
BUN SERPL-MCNC: 14 MG/DL (ref 8–23)
CALCIUM SERPL-MCNC: 9.1 MG/DL (ref 8.7–10.5)
CHLORIDE SERPL-SCNC: 101 MMOL/L (ref 95–110)
CHOLEST SERPL-MCNC: 80 MG/DL (ref 120–199)
CHOLEST SERPL-MCNC: 80 MG/DL (ref 120–199)
CHOLEST/HDLC SERPL: 3.2 {RATIO} (ref 2–5)
CHOLEST/HDLC SERPL: 3.2 {RATIO} (ref 2–5)
CO2 SERPL-SCNC: 25 MMOL/L (ref 23–29)
CREAT SERPL-MCNC: 0.8 MG/DL (ref 0.5–1.4)
DIFFERENTIAL METHOD: ABNORMAL
EOSINOPHIL # BLD AUTO: 0.1 K/UL (ref 0–0.5)
EOSINOPHIL NFR BLD: 1.3 % (ref 0–8)
ERYTHROCYTE [DISTWIDTH] IN BLOOD BY AUTOMATED COUNT: 13.3 % (ref 11.5–14.5)
EST. GFR  (AFRICAN AMERICAN): >60 ML/MIN/1.73 M^2
EST. GFR  (NON AFRICAN AMERICAN): >60 ML/MIN/1.73 M^2
ESTIMATED AVG GLUCOSE: 240 MG/DL (ref 68–131)
GLUCOSE SERPL-MCNC: 241 MG/DL (ref 70–110)
HBA1C MFR BLD: 10 % (ref 4–5.6)
HCT VFR BLD AUTO: 41 % (ref 40–54)
HDLC SERPL-MCNC: 25 MG/DL (ref 40–75)
HDLC SERPL-MCNC: 25 MG/DL (ref 40–75)
HDLC SERPL: 31.3 % (ref 20–50)
HDLC SERPL: 31.3 % (ref 20–50)
HGB BLD-MCNC: 13.3 G/DL (ref 14–18)
IMM GRANULOCYTES # BLD AUTO: 0.03 K/UL (ref 0–0.04)
IMM GRANULOCYTES NFR BLD AUTO: 0.3 % (ref 0–0.5)
LDLC SERPL CALC-MCNC: 37.8 MG/DL (ref 63–159)
LDLC SERPL CALC-MCNC: 37.8 MG/DL (ref 63–159)
LYMPHOCYTES # BLD AUTO: 2.2 K/UL (ref 1–4.8)
LYMPHOCYTES NFR BLD: 21.8 % (ref 18–48)
MCH RBC QN AUTO: 28.6 PG (ref 27–31)
MCHC RBC AUTO-ENTMCNC: 32.4 G/DL (ref 32–36)
MCV RBC AUTO: 88 FL (ref 82–98)
MONOCYTES # BLD AUTO: 0.7 K/UL (ref 0.3–1)
MONOCYTES NFR BLD: 7.1 % (ref 4–15)
NEUTROPHILS # BLD AUTO: 7.1 K/UL (ref 1.8–7.7)
NEUTROPHILS NFR BLD: 68.8 % (ref 38–73)
NONHDLC SERPL-MCNC: 55 MG/DL
NONHDLC SERPL-MCNC: 55 MG/DL
NRBC BLD-RTO: 0 /100 WBC
PLATELET # BLD AUTO: 224 K/UL (ref 150–450)
PMV BLD AUTO: 11.7 FL (ref 9.2–12.9)
POTASSIUM SERPL-SCNC: 3.9 MMOL/L (ref 3.5–5.1)
PROT SERPL-MCNC: 6.8 G/DL (ref 6–8.4)
RBC # BLD AUTO: 4.65 M/UL (ref 4.6–6.2)
SODIUM SERPL-SCNC: 137 MMOL/L (ref 136–145)
TRIGL SERPL-MCNC: 86 MG/DL (ref 30–150)
TRIGL SERPL-MCNC: 86 MG/DL (ref 30–150)
WBC # BLD AUTO: 10.24 K/UL (ref 3.9–12.7)

## 2021-07-02 PROCEDURE — 83036 HEMOGLOBIN GLYCOSYLATED A1C: CPT | Performed by: INTERNAL MEDICINE

## 2021-07-02 PROCEDURE — 80053 COMPREHEN METABOLIC PANEL: CPT | Performed by: INTERNAL MEDICINE

## 2021-07-02 PROCEDURE — 80061 LIPID PANEL: CPT | Performed by: INTERNAL MEDICINE

## 2021-07-02 PROCEDURE — 85025 COMPLETE CBC W/AUTO DIFF WBC: CPT | Performed by: INTERNAL MEDICINE

## 2021-07-02 PROCEDURE — 36415 COLL VENOUS BLD VENIPUNCTURE: CPT | Performed by: INTERNAL MEDICINE

## 2021-07-06 ENCOUNTER — PATIENT MESSAGE (OUTPATIENT)
Dept: ADMINISTRATIVE | Facility: HOSPITAL | Age: 71
End: 2021-07-06

## 2021-07-07 ENCOUNTER — PATIENT MESSAGE (OUTPATIENT)
Dept: ADMINISTRATIVE | Facility: HOSPITAL | Age: 71
End: 2021-07-07

## 2021-07-11 PROBLEM — R07.9 CHEST PAIN: Status: RESOLVED | Noted: 2018-07-28 | Resolved: 2021-07-11

## 2021-07-13 ENCOUNTER — OFFICE VISIT (OUTPATIENT)
Dept: INTERNAL MEDICINE | Facility: CLINIC | Age: 71
End: 2021-07-13
Payer: MEDICARE

## 2021-07-13 VITALS
OXYGEN SATURATION: 95 % | HEIGHT: 66 IN | DIASTOLIC BLOOD PRESSURE: 82 MMHG | SYSTOLIC BLOOD PRESSURE: 112 MMHG | WEIGHT: 199.5 LBS | BODY MASS INDEX: 32.06 KG/M2 | HEART RATE: 85 BPM

## 2021-07-13 DIAGNOSIS — E11.59 HYPERTENSION ASSOCIATED WITH DIABETES: ICD-10-CM

## 2021-07-13 DIAGNOSIS — I25.10 CORONARY ARTERY DISEASE INVOLVING NATIVE HEART, ANGINA PRESENCE UNSPECIFIED, UNSPECIFIED VESSEL OR LESION TYPE: ICD-10-CM

## 2021-07-13 DIAGNOSIS — E11.01 TYPE 2 DIABETES MELLITUS WITH HYPEROSMOLAR COMA, WITH LONG-TERM CURRENT USE OF INSULIN: Primary | ICD-10-CM

## 2021-07-13 DIAGNOSIS — Z79.4 TYPE 2 DIABETES MELLITUS WITH HYPEROSMOLAR COMA, WITH LONG-TERM CURRENT USE OF INSULIN: Primary | ICD-10-CM

## 2021-07-13 DIAGNOSIS — I15.2 HYPERTENSION ASSOCIATED WITH DIABETES: ICD-10-CM

## 2021-07-13 DIAGNOSIS — E78.5 HYPERLIPIDEMIA, UNSPECIFIED HYPERLIPIDEMIA TYPE: ICD-10-CM

## 2021-07-13 PROCEDURE — 1159F PR MEDICATION LIST DOCUMENTED IN MEDICAL RECORD: ICD-10-PCS | Mod: S$GLB,,, | Performed by: NURSE PRACTITIONER

## 2021-07-13 PROCEDURE — 99999 PR PBB SHADOW E&M-EST. PATIENT-LVL IV: CPT | Mod: PBBFAC,,, | Performed by: NURSE PRACTITIONER

## 2021-07-13 PROCEDURE — 99214 OFFICE O/P EST MOD 30 MIN: CPT | Mod: S$GLB,,, | Performed by: NURSE PRACTITIONER

## 2021-07-13 PROCEDURE — 1101F PR PT FALLS ASSESS DOC 0-1 FALLS W/OUT INJ PAST YR: ICD-10-PCS | Mod: CPTII,S$GLB,, | Performed by: NURSE PRACTITIONER

## 2021-07-13 PROCEDURE — 3079F PR MOST RECENT DIASTOLIC BLOOD PRESSURE 80-89 MM HG: ICD-10-PCS | Mod: CPTII,S$GLB,, | Performed by: NURSE PRACTITIONER

## 2021-07-13 PROCEDURE — 3288F PR FALLS RISK ASSESSMENT DOCUMENTED: ICD-10-PCS | Mod: CPTII,S$GLB,, | Performed by: NURSE PRACTITIONER

## 2021-07-13 PROCEDURE — 1159F MED LIST DOCD IN RCRD: CPT | Mod: S$GLB,,, | Performed by: NURSE PRACTITIONER

## 2021-07-13 PROCEDURE — 3008F BODY MASS INDEX DOCD: CPT | Mod: CPTII,S$GLB,, | Performed by: NURSE PRACTITIONER

## 2021-07-13 PROCEDURE — 1126F AMNT PAIN NOTED NONE PRSNT: CPT | Mod: S$GLB,,, | Performed by: NURSE PRACTITIONER

## 2021-07-13 PROCEDURE — 3074F SYST BP LT 130 MM HG: CPT | Mod: CPTII,S$GLB,, | Performed by: NURSE PRACTITIONER

## 2021-07-13 PROCEDURE — 99214 PR OFFICE/OUTPT VISIT, EST, LEVL IV, 30-39 MIN: ICD-10-PCS | Mod: S$GLB,,, | Performed by: NURSE PRACTITIONER

## 2021-07-13 PROCEDURE — 99999 PR PBB SHADOW E&M-EST. PATIENT-LVL IV: ICD-10-PCS | Mod: PBBFAC,,, | Performed by: NURSE PRACTITIONER

## 2021-07-13 PROCEDURE — 3288F FALL RISK ASSESSMENT DOCD: CPT | Mod: CPTII,S$GLB,, | Performed by: NURSE PRACTITIONER

## 2021-07-13 PROCEDURE — 3046F PR MOST RECENT HEMOGLOBIN A1C LEVEL > 9.0%: ICD-10-PCS | Mod: CPTII,S$GLB,, | Performed by: NURSE PRACTITIONER

## 2021-07-13 PROCEDURE — 1126F PR PAIN SEVERITY QUANTIFIED, NO PAIN PRESENT: ICD-10-PCS | Mod: S$GLB,,, | Performed by: NURSE PRACTITIONER

## 2021-07-13 PROCEDURE — 1101F PT FALLS ASSESS-DOCD LE1/YR: CPT | Mod: CPTII,S$GLB,, | Performed by: NURSE PRACTITIONER

## 2021-07-13 PROCEDURE — 3046F HEMOGLOBIN A1C LEVEL >9.0%: CPT | Mod: CPTII,S$GLB,, | Performed by: NURSE PRACTITIONER

## 2021-07-13 PROCEDURE — 3008F PR BODY MASS INDEX (BMI) DOCUMENTED: ICD-10-PCS | Mod: CPTII,S$GLB,, | Performed by: NURSE PRACTITIONER

## 2021-07-13 PROCEDURE — 3074F PR MOST RECENT SYSTOLIC BLOOD PRESSURE < 130 MM HG: ICD-10-PCS | Mod: CPTII,S$GLB,, | Performed by: NURSE PRACTITIONER

## 2021-07-13 PROCEDURE — 3079F DIAST BP 80-89 MM HG: CPT | Mod: CPTII,S$GLB,, | Performed by: NURSE PRACTITIONER

## 2021-07-13 RX ORDER — GLIPIZIDE 10 MG/1
TABLET, FILM COATED, EXTENDED RELEASE ORAL
Qty: 90 TABLET | Refills: 3 | Status: SHIPPED | OUTPATIENT
Start: 2021-07-13 | End: 2022-02-02 | Stop reason: SDUPTHER

## 2021-07-13 RX ORDER — INSULIN DEGLUDEC 200 U/ML
INJECTION, SOLUTION SUBCUTANEOUS
Qty: 3 PEN | Refills: 6 | Status: SHIPPED | OUTPATIENT
Start: 2021-07-13 | End: 2022-02-25

## 2021-07-13 RX ORDER — METFORMIN HYDROCHLORIDE 1000 MG/1
1000 TABLET ORAL 2 TIMES DAILY WITH MEALS
Qty: 180 TABLET | Refills: 3 | Status: SHIPPED | OUTPATIENT
Start: 2021-07-13 | End: 2021-08-31 | Stop reason: SDUPTHER

## 2021-08-01 ENCOUNTER — TELEPHONE (OUTPATIENT)
Dept: INTERNAL MEDICINE | Facility: CLINIC | Age: 71
End: 2021-08-01

## 2021-08-01 DIAGNOSIS — D64.9 MILD CHRONIC ANEMIA: Primary | ICD-10-CM

## 2021-10-04 ENCOUNTER — PATIENT MESSAGE (OUTPATIENT)
Dept: ADMINISTRATIVE | Facility: HOSPITAL | Age: 71
End: 2021-10-04

## 2021-10-11 ENCOUNTER — PATIENT OUTREACH (OUTPATIENT)
Dept: ADMINISTRATIVE | Facility: HOSPITAL | Age: 71
End: 2021-10-11

## 2021-11-03 ENCOUNTER — TELEPHONE (OUTPATIENT)
Dept: INTERNAL MEDICINE | Facility: CLINIC | Age: 71
End: 2021-11-03
Payer: MEDICARE

## 2021-11-03 ENCOUNTER — PATIENT MESSAGE (OUTPATIENT)
Dept: INTERNAL MEDICINE | Facility: CLINIC | Age: 71
End: 2021-11-03
Payer: MEDICARE

## 2021-11-03 DIAGNOSIS — R43.0 ANOSMIA: Primary | ICD-10-CM

## 2021-11-03 DIAGNOSIS — R43.2 LOSS OF TASTE: ICD-10-CM

## 2021-11-04 ENCOUNTER — PATIENT MESSAGE (OUTPATIENT)
Dept: INTERNAL MEDICINE | Facility: CLINIC | Age: 71
End: 2021-11-04
Payer: MEDICARE

## 2021-11-04 ENCOUNTER — LAB VISIT (OUTPATIENT)
Dept: LAB | Facility: OTHER | Age: 71
End: 2021-11-04
Attending: NURSE PRACTITIONER
Payer: MEDICARE

## 2021-11-04 DIAGNOSIS — D64.9 MILD CHRONIC ANEMIA: ICD-10-CM

## 2021-11-04 DIAGNOSIS — Z79.4 TYPE 2 DIABETES MELLITUS WITH HYPEROSMOLAR COMA, WITH LONG-TERM CURRENT USE OF INSULIN: ICD-10-CM

## 2021-11-04 DIAGNOSIS — E11.01 TYPE 2 DIABETES MELLITUS WITH HYPEROSMOLAR COMA, WITH LONG-TERM CURRENT USE OF INSULIN: ICD-10-CM

## 2021-11-04 LAB
BASOPHILS # BLD AUTO: 0.06 K/UL (ref 0–0.2)
BASOPHILS NFR BLD: 0.7 % (ref 0–1.9)
DIFFERENTIAL METHOD: ABNORMAL
EOSINOPHIL # BLD AUTO: 0.2 K/UL (ref 0–0.5)
EOSINOPHIL NFR BLD: 1.7 % (ref 0–8)
ERYTHROCYTE [DISTWIDTH] IN BLOOD BY AUTOMATED COUNT: 13.3 % (ref 11.5–14.5)
ESTIMATED AVG GLUCOSE: 246 MG/DL (ref 68–131)
FERRITIN SERPL-MCNC: 74 NG/ML (ref 20–300)
HBA1C MFR BLD: 10.2 % (ref 4–5.6)
HCT VFR BLD AUTO: 43.8 % (ref 40–54)
HGB BLD-MCNC: 13.8 G/DL (ref 14–18)
IMM GRANULOCYTES # BLD AUTO: 0.05 K/UL (ref 0–0.04)
IMM GRANULOCYTES NFR BLD AUTO: 0.6 % (ref 0–0.5)
IRON SERPL-MCNC: 54 UG/DL (ref 45–160)
LYMPHOCYTES # BLD AUTO: 2.3 K/UL (ref 1–4.8)
LYMPHOCYTES NFR BLD: 26.4 % (ref 18–48)
MCH RBC QN AUTO: 28.2 PG (ref 27–31)
MCHC RBC AUTO-ENTMCNC: 31.5 G/DL (ref 32–36)
MCV RBC AUTO: 90 FL (ref 82–98)
MONOCYTES # BLD AUTO: 0.6 K/UL (ref 0.3–1)
MONOCYTES NFR BLD: 7.2 % (ref 4–15)
NEUTROPHILS # BLD AUTO: 5.5 K/UL (ref 1.8–7.7)
NEUTROPHILS NFR BLD: 63.4 % (ref 38–73)
NRBC BLD-RTO: 0 /100 WBC
PLATELET # BLD AUTO: 227 K/UL (ref 150–450)
PMV BLD AUTO: 10.8 FL (ref 9.2–12.9)
RBC # BLD AUTO: 4.89 M/UL (ref 4.6–6.2)
SATURATED IRON: 16 % (ref 20–50)
TOTAL IRON BINDING CAPACITY: 332 UG/DL (ref 250–450)
TRANSFERRIN SERPL-MCNC: 224 MG/DL (ref 200–375)
WBC # BLD AUTO: 8.63 K/UL (ref 3.9–12.7)

## 2021-11-04 PROCEDURE — 83036 HEMOGLOBIN GLYCOSYLATED A1C: CPT | Performed by: NURSE PRACTITIONER

## 2021-11-04 PROCEDURE — 82728 ASSAY OF FERRITIN: CPT | Performed by: INTERNAL MEDICINE

## 2021-11-04 PROCEDURE — 85025 COMPLETE CBC W/AUTO DIFF WBC: CPT | Performed by: INTERNAL MEDICINE

## 2021-11-04 PROCEDURE — 36415 COLL VENOUS BLD VENIPUNCTURE: CPT | Performed by: INTERNAL MEDICINE

## 2021-11-04 PROCEDURE — 84466 ASSAY OF TRANSFERRIN: CPT | Performed by: INTERNAL MEDICINE

## 2021-11-09 ENCOUNTER — OFFICE VISIT (OUTPATIENT)
Dept: OTOLARYNGOLOGY | Facility: CLINIC | Age: 71
End: 2021-11-09
Payer: MEDICARE

## 2021-11-09 ENCOUNTER — HOSPITAL ENCOUNTER (OUTPATIENT)
Dept: RADIOLOGY | Facility: HOSPITAL | Age: 71
Discharge: HOME | End: 2021-11-09
Attending: STUDENT IN AN ORGANIZED HEALTH CARE EDUCATION/TRAINING PROGRAM
Payer: MEDICARE

## 2021-11-09 VITALS
HEART RATE: 76 BPM | DIASTOLIC BLOOD PRESSURE: 77 MMHG | WEIGHT: 200 LBS | BODY MASS INDEX: 32.28 KG/M2 | SYSTOLIC BLOOD PRESSURE: 135 MMHG

## 2021-11-09 DIAGNOSIS — R43.0 ANOSMIA: ICD-10-CM

## 2021-11-09 DIAGNOSIS — J32.9 CHRONIC SINUSITIS, UNSPECIFIED LOCATION: Primary | ICD-10-CM

## 2021-11-09 DIAGNOSIS — J34.89 OTHER SPECIFIED DISORDERS OF NOSE AND NASAL SINUSES: ICD-10-CM

## 2021-11-09 DIAGNOSIS — R43.2 LOSS OF TASTE: ICD-10-CM

## 2021-11-09 DIAGNOSIS — J32.9 CHRONIC SINUSITIS, UNSPECIFIED LOCATION: ICD-10-CM

## 2021-11-09 PROCEDURE — 99203 PR OFFICE/OUTPT VISIT, NEW, LEVL III, 30-44 MIN: ICD-10-PCS | Mod: 25,S$GLB,, | Performed by: STUDENT IN AN ORGANIZED HEALTH CARE EDUCATION/TRAINING PROGRAM

## 2021-11-09 PROCEDURE — 3066F NEPHROPATHY DOC TX: CPT | Mod: CPTII,S$GLB,, | Performed by: STUDENT IN AN ORGANIZED HEALTH CARE EDUCATION/TRAINING PROGRAM

## 2021-11-09 PROCEDURE — 3078F DIAST BP <80 MM HG: CPT | Mod: CPTII,S$GLB,, | Performed by: STUDENT IN AN ORGANIZED HEALTH CARE EDUCATION/TRAINING PROGRAM

## 2021-11-09 PROCEDURE — 99999 PR PBB SHADOW E&M-EST. PATIENT-LVL III: ICD-10-PCS | Mod: PBBFAC,,, | Performed by: STUDENT IN AN ORGANIZED HEALTH CARE EDUCATION/TRAINING PROGRAM

## 2021-11-09 PROCEDURE — 3066F PR DOCUMENTATION OF TREATMENT FOR NEPHROPATHY: ICD-10-PCS | Mod: CPTII,S$GLB,, | Performed by: STUDENT IN AN ORGANIZED HEALTH CARE EDUCATION/TRAINING PROGRAM

## 2021-11-09 PROCEDURE — 3046F HEMOGLOBIN A1C LEVEL >9.0%: CPT | Mod: CPTII,S$GLB,, | Performed by: STUDENT IN AN ORGANIZED HEALTH CARE EDUCATION/TRAINING PROGRAM

## 2021-11-09 PROCEDURE — 3288F FALL RISK ASSESSMENT DOCD: CPT | Mod: CPTII,S$GLB,, | Performed by: STUDENT IN AN ORGANIZED HEALTH CARE EDUCATION/TRAINING PROGRAM

## 2021-11-09 PROCEDURE — 3075F SYST BP GE 130 - 139MM HG: CPT | Mod: CPTII,S$GLB,, | Performed by: STUDENT IN AN ORGANIZED HEALTH CARE EDUCATION/TRAINING PROGRAM

## 2021-11-09 PROCEDURE — 3288F PR FALLS RISK ASSESSMENT DOCUMENTED: ICD-10-PCS | Mod: CPTII,S$GLB,, | Performed by: STUDENT IN AN ORGANIZED HEALTH CARE EDUCATION/TRAINING PROGRAM

## 2021-11-09 PROCEDURE — 1126F PR PAIN SEVERITY QUANTIFIED, NO PAIN PRESENT: ICD-10-PCS | Mod: CPTII,S$GLB,, | Performed by: STUDENT IN AN ORGANIZED HEALTH CARE EDUCATION/TRAINING PROGRAM

## 2021-11-09 PROCEDURE — 70486 CT MAXILLOFACIAL W/O DYE: CPT | Mod: 26,,, | Performed by: RADIOLOGY

## 2021-11-09 PROCEDURE — 87076 CULTURE ANAEROBE IDENT EACH: CPT | Performed by: STUDENT IN AN ORGANIZED HEALTH CARE EDUCATION/TRAINING PROGRAM

## 2021-11-09 PROCEDURE — 70486 CT MAXILLOFACIAL W/O DYE: CPT | Mod: TC

## 2021-11-09 PROCEDURE — 31231 NASAL ENDOSCOPY DX: CPT | Mod: S$GLB,,, | Performed by: STUDENT IN AN ORGANIZED HEALTH CARE EDUCATION/TRAINING PROGRAM

## 2021-11-09 PROCEDURE — 99203 OFFICE O/P NEW LOW 30 MIN: CPT | Mod: 25,S$GLB,, | Performed by: STUDENT IN AN ORGANIZED HEALTH CARE EDUCATION/TRAINING PROGRAM

## 2021-11-09 PROCEDURE — 1100F PTFALLS ASSESS-DOCD GE2>/YR: CPT | Mod: CPTII,S$GLB,, | Performed by: STUDENT IN AN ORGANIZED HEALTH CARE EDUCATION/TRAINING PROGRAM

## 2021-11-09 PROCEDURE — 3046F PR MOST RECENT HEMOGLOBIN A1C LEVEL > 9.0%: ICD-10-PCS | Mod: CPTII,S$GLB,, | Performed by: STUDENT IN AN ORGANIZED HEALTH CARE EDUCATION/TRAINING PROGRAM

## 2021-11-09 PROCEDURE — 3008F BODY MASS INDEX DOCD: CPT | Mod: CPTII,S$GLB,, | Performed by: STUDENT IN AN ORGANIZED HEALTH CARE EDUCATION/TRAINING PROGRAM

## 2021-11-09 PROCEDURE — 87070 CULTURE OTHR SPECIMN AEROBIC: CPT | Performed by: STUDENT IN AN ORGANIZED HEALTH CARE EDUCATION/TRAINING PROGRAM

## 2021-11-09 PROCEDURE — 3061F PR NEG MICROALBUMINURIA RESULT DOCUMENTED/REVIEW: ICD-10-PCS | Mod: CPTII,S$GLB,, | Performed by: STUDENT IN AN ORGANIZED HEALTH CARE EDUCATION/TRAINING PROGRAM

## 2021-11-09 PROCEDURE — 3008F PR BODY MASS INDEX (BMI) DOCUMENTED: ICD-10-PCS | Mod: CPTII,S$GLB,, | Performed by: STUDENT IN AN ORGANIZED HEALTH CARE EDUCATION/TRAINING PROGRAM

## 2021-11-09 PROCEDURE — 87075 CULTR BACTERIA EXCEPT BLOOD: CPT | Performed by: STUDENT IN AN ORGANIZED HEALTH CARE EDUCATION/TRAINING PROGRAM

## 2021-11-09 PROCEDURE — 3075F PR MOST RECENT SYSTOLIC BLOOD PRESS GE 130-139MM HG: ICD-10-PCS | Mod: CPTII,S$GLB,, | Performed by: STUDENT IN AN ORGANIZED HEALTH CARE EDUCATION/TRAINING PROGRAM

## 2021-11-09 PROCEDURE — 4010F PR ACE/ARB THEARPY RXD/TAKEN: ICD-10-PCS | Mod: CPTII,S$GLB,, | Performed by: STUDENT IN AN ORGANIZED HEALTH CARE EDUCATION/TRAINING PROGRAM

## 2021-11-09 PROCEDURE — 4010F ACE/ARB THERAPY RXD/TAKEN: CPT | Mod: CPTII,S$GLB,, | Performed by: STUDENT IN AN ORGANIZED HEALTH CARE EDUCATION/TRAINING PROGRAM

## 2021-11-09 PROCEDURE — 70486 CT MEDTRONIC SINUSES WITHOUT: ICD-10-PCS | Mod: 26,,, | Performed by: RADIOLOGY

## 2021-11-09 PROCEDURE — 1159F PR MEDICATION LIST DOCUMENTED IN MEDICAL RECORD: ICD-10-PCS | Mod: CPTII,S$GLB,, | Performed by: STUDENT IN AN ORGANIZED HEALTH CARE EDUCATION/TRAINING PROGRAM

## 2021-11-09 PROCEDURE — 3061F NEG MICROALBUMINURIA REV: CPT | Mod: CPTII,S$GLB,, | Performed by: STUDENT IN AN ORGANIZED HEALTH CARE EDUCATION/TRAINING PROGRAM

## 2021-11-09 PROCEDURE — 1159F MED LIST DOCD IN RCRD: CPT | Mod: CPTII,S$GLB,, | Performed by: STUDENT IN AN ORGANIZED HEALTH CARE EDUCATION/TRAINING PROGRAM

## 2021-11-09 PROCEDURE — 3078F PR MOST RECENT DIASTOLIC BLOOD PRESSURE < 80 MM HG: ICD-10-PCS | Mod: CPTII,S$GLB,, | Performed by: STUDENT IN AN ORGANIZED HEALTH CARE EDUCATION/TRAINING PROGRAM

## 2021-11-09 PROCEDURE — 31231 PR NASAL ENDOSCOPY, DX: ICD-10-PCS | Mod: S$GLB,,, | Performed by: STUDENT IN AN ORGANIZED HEALTH CARE EDUCATION/TRAINING PROGRAM

## 2021-11-09 PROCEDURE — 1100F PR PT FALLS ASSESS DOC 2+ FALLS/FALL W/INJURY/YR: ICD-10-PCS | Mod: CPTII,S$GLB,, | Performed by: STUDENT IN AN ORGANIZED HEALTH CARE EDUCATION/TRAINING PROGRAM

## 2021-11-09 PROCEDURE — 1126F AMNT PAIN NOTED NONE PRSNT: CPT | Mod: CPTII,S$GLB,, | Performed by: STUDENT IN AN ORGANIZED HEALTH CARE EDUCATION/TRAINING PROGRAM

## 2021-11-09 PROCEDURE — 99999 PR PBB SHADOW E&M-EST. PATIENT-LVL III: CPT | Mod: PBBFAC,,, | Performed by: STUDENT IN AN ORGANIZED HEALTH CARE EDUCATION/TRAINING PROGRAM

## 2021-11-09 RX ORDER — DOXYCYCLINE HYCLATE 100 MG
100 TABLET ORAL 2 TIMES DAILY
Qty: 20 TABLET | Refills: 0 | Status: SHIPPED | OUTPATIENT
Start: 2021-11-09 | End: 2021-11-19

## 2021-11-09 RX ORDER — FLUTICASONE PROPIONATE 50 MCG
1 SPRAY, SUSPENSION (ML) NASAL DAILY
Qty: 15.8 ML | Refills: 6 | Status: SHIPPED | OUTPATIENT
Start: 2021-11-09

## 2021-11-13 LAB — BACTERIA SPEC AEROBE CULT: NORMAL

## 2021-11-15 ENCOUNTER — LAB VISIT (OUTPATIENT)
Dept: LAB | Facility: HOSPITAL | Age: 71
End: 2021-11-15
Attending: INTERNAL MEDICINE
Payer: MEDICARE

## 2021-11-15 DIAGNOSIS — Z12.11 COLON CANCER SCREENING: ICD-10-CM

## 2021-11-15 PROCEDURE — 82274 ASSAY TEST FOR BLOOD FECAL: CPT | Performed by: INTERNAL MEDICINE

## 2021-11-16 LAB — BACTERIA SPEC ANAEROBE CULT: NORMAL

## 2021-11-18 LAB — HEMOCCULT STL QL IA: NEGATIVE

## 2021-12-16 RX ORDER — ATORVASTATIN CALCIUM 80 MG/1
TABLET, FILM COATED ORAL
Qty: 90 TABLET | Refills: 3 | Status: SHIPPED | OUTPATIENT
Start: 2021-12-16 | End: 2023-04-18

## 2021-12-21 ENCOUNTER — TELEPHONE (OUTPATIENT)
Dept: INTERNAL MEDICINE | Facility: CLINIC | Age: 71
End: 2021-12-21
Payer: MEDICARE

## 2021-12-21 NOTE — TELEPHONE ENCOUNTER
----- Message from Sudha Betancourt sent at 12/21/2021 12:26 PM CST -----  Regarding: returned call  Contact: patient [phone]  Patient is returning a phone call.  Who left a message for the patient: Delmy CERON  Does patient know what this is regarding:    Would you like a call back, or a response through your [facility] portal?:  please call  Comments:

## 2021-12-22 ENCOUNTER — TELEPHONE (OUTPATIENT)
Dept: INTERNAL MEDICINE | Facility: CLINIC | Age: 71
End: 2021-12-22
Payer: MEDICARE

## 2021-12-22 NOTE — TELEPHONE ENCOUNTER
----- Message from Sudha Betancourt sent at 12/22/2021  8:41 AM CST -----  Regarding: returned call  Contact: patient [phone]  Patient is returning a phone call.  Who left a message for the patient: Delmy CERON  Does patient know what this is regarding:    Would you like a call back, or a response through your [facility] portal?: please call  Comments:

## 2022-02-01 NOTE — PROGRESS NOTES
CHIEF COMPLAINT: Type  2 Diabetes     HPI: Mr. Paul Deleon is a 71 y.o. male who was diagnosed with T2DM in 2006.  Pt has had issues with financial burden of insulins.  Last seen in 7/2021 and is now being seen by me again today.     a1c trends 8.1% to 8.8% to 9.1% to 10% to 10.2%  Out of cerna for awhile, out of novolog r/t formulary change   Overdue for labs.    Past Medical History:   Diagnosis Date    Cataract     Coronary artery disease     Diabetes mellitus type II     GERD (gastroesophageal reflux disease)     Heart attack     Hyperlipidemia     Hypertension     MI (mitral incompetence)     MI (myocardial infarction)     Ulcer        Lab Results   Component Value Date    HGBA1C 10.2 (H) 11/04/2021     Highest 220, 241 mg/dl  Lowest 100 mg/dl    Admits to cheating- seafood (fried)  A lot of chicken- boiled    PREVIOUS DIABETES MEDICATIONS TRIED  tresiba   Metformin   humalog  jardiance - cost issue, never started   nph   Glipizide xr  novolog    CURRENT DIABETIC MEDS: metformin 1000 mg bid, tresiba 60 units at night, novolog correction scale 150-200+2, etc.-needs to change to humalog, Glipizide xr 10 mg daily w /breakfast- out for a few weeks    Pt is monitoring blood glucose readings 4 times a day.  Needs ~100 strips per month related to fluctuations with blood glucose reading, a1c trends, and activity level.  On MDI injections 3 x a day max  Self adjusting per scale - insulin   Testing 4 x a day  Patient is willing and able to use the device  Demonstrated an understanding of the technology and is motivated to use CGM  Patient expected to adhere to a comprehensive diabetes treatment plan and patient has adequate medical supervision  Patient experiences multiple impaired awareness of hypoglycemia (hypoglycemia unawareness)    See above    Diabetes Management Status    Statin: Taking  ACE/ARB: Taking    Screening or Prevention Patient's value Goal Complete/Controlled?   HgA1C Testing and  "Control   Lab Results   Component Value Date    HGBA1C 10.2 (H) 11/04/2021      Annually/Less than 8% No   Lipid profile : 07/02/2021 Annually Yes   LDL control Lab Results   Component Value Date    LDLCALC 37.8 (L) 07/02/2021    LDLCALC 37.8 (L) 07/02/2021    Annually/Less than 100 mg/dl  Yes   Nephropathy screening Lab Results   Component Value Date    LABMICR 15.0 11/04/2021     Lab Results   Component Value Date    PROTEINUA Negative 09/01/2020    Annually No   Blood pressure BP Readings from Last 1 Encounters:   11/09/21 135/77    Less than 140/90 Yes   Dilated retinal exam : 01/06/2021 Annually Yes   Foot exam   : 07/13/2021 Annually Yes   Dr. Pettit- 6/2022 scheduled    REVIEW OF SYSTEMS  General: no weakness, fatigue, + weight stable 1-3#  Eyes: no double or blurred vision, eye pain, or redness  Cardiovascular: no chest pain, palpitations, edema, or murmurs.   Respiratory: no cough or dyspnea.   GI: no heartburn, nausea, or changes in bowel patterns; good appetite.   Skin: no rashes, dryness, itching, or reactions at insulin injection sites.  Neuro: + numbness, tingling, tremors, or vertigo. +dizziness intermittent  Endocrine: no polyuria, polydipsia, polyphagia, heat or cold intolerance.     Vital Signs  /64   Ht 5' 6" (1.676 m)   Wt 91.6 kg (201 lb 15.1 oz)   BMI 32.59 kg/m²     Hemoglobin A1C   Date Value Ref Range Status   11/04/2021 10.2 (H) 4.0 - 5.6 % Final     Comment:     ADA Screening Guidelines:  5.7-6.4%  Consistent with prediabetes  >or=6.5%  Consistent with diabetes    High levels of fetal hemoglobin interfere with the HbA1C  assay. Heterozygous hemoglobin variants (HbS, HgC, etc)do  not significantly interfere with this assay.   However, presence of multiple variants may affect accuracy.     07/02/2021 10.0 (H) 4.0 - 5.6 % Final     Comment:     ADA Screening Guidelines:  5.7-6.4%  Consistent with prediabetes  >or=6.5%  Consistent with diabetes    High levels of fetal hemoglobin " interfere with the HbA1C  assay. Heterozygous hemoglobin variants (HbS, HgC, etc)do  not significantly interfere with this assay.   However, presence of multiple variants may affect accuracy.     10/20/2020 9.1 (H) 4.0 - 5.6 % Final     Comment:     ADA Screening Guidelines:  5.7-6.4%  Consistent with prediabetes  >or=6.5%  Consistent with diabetes  High levels of fetal hemoglobin interfere with the HbA1C  assay. Heterozygous hemoglobin variants (HbS, HgC, etc)do  not significantly interfere with this assay.   However, presence of multiple variants may affect accuracy.     07/08/2020 8.1 % Final        Chemistry        Component Value Date/Time     07/02/2021 0712    K 3.9 07/02/2021 0712     07/02/2021 0712    CO2 25 07/02/2021 0712    BUN 14 07/02/2021 0712    CREATININE 0.8 07/02/2021 0712     (H) 07/02/2021 0712        Component Value Date/Time    CALCIUM 9.1 07/02/2021 0712    ALKPHOS 104 07/02/2021 0712    AST 16 07/02/2021 0712    ALT 5 (L) 07/02/2021 0712    BILITOT 1.0 07/02/2021 0712    ESTGFRAFRICA >60.0 07/02/2021 0712    EGFRNONAA >60.0 07/02/2021 0712           Lab Results   Component Value Date    TSH 3.255 06/12/2020      Lab Results   Component Value Date    CHOL 80 (L) 07/02/2021    CHOL 80 (L) 07/02/2021    CHOL 106 (L) 06/12/2020     Lab Results   Component Value Date    HDL 25 (L) 07/02/2021    HDL 25 (L) 07/02/2021    HDL 29 (L) 06/12/2020     Lab Results   Component Value Date    LDLCALC 37.8 (L) 07/02/2021    LDLCALC 37.8 (L) 07/02/2021    LDLCALC 50.4 (L) 06/12/2020     Lab Results   Component Value Date    TRIG 86 07/02/2021    TRIG 86 07/02/2021    TRIG 133 06/12/2020     Lab Results   Component Value Date    CHOLHDL 31.3 07/02/2021    CHOLHDL 31.3 07/02/2021    CHOLHDL 27.4 06/12/2020         PHYSICAL EXAMINATION  Constitutional: Appears well, no distress  Neck: Supple, trachea midline.   Respiratory: No wheezes, even and unlabored.  Cardiovascular: trace BLE edema.    Lymph: deferred  Skin: warm and dry; no injection site reactions, no acanthosis nigracans observed.  Neuro:patient alert and cooperative, normal affect; steady gait.    Diabetes Foot Exam:   Deferred       Assessment/Plan    1. Type 2 diabetes mellitus with hyperosmolar coma, with long-term current use of insulin  Hemoglobin A1C    Hemoglobin A1C    insulin lispro (HUMALOG KWIKPEN INSULIN) 100 unit/mL pen   2. Obesity, unspecified classification, unspecified obesity type, unspecified whether serious comorbidity present     3. Hypertension associated with diabetes     4. Hyperlipidemia, unspecified hyperlipidemia type  metFORMIN (GLUCOPHAGE) 1000 MG tablet       1. F/u in 4 mos w/ me  Send info for cgm- [facility] -see below  Eye appt scheduled  a1c goal less than 8%  Brochure given orion 2   DE not covered with insurance   Send rx for glipizide 10 mg xr -30 mins before breakfast  Metformin 1000 mg in am   tresiba 60 units at night  humalog correction scale-pa may be needed   2. Body mass index is 32.59 kg/m².  May increase insulin resistance  3. Continue med(s)  Controlled  4.   Lab Results   Component Value Date    LDLCALC 37.8 (L) 07/02/2021    LDLCALC 37.8 (L) 07/02/2021     At goal        F/u in 4 mos

## 2022-02-02 ENCOUNTER — OFFICE VISIT (OUTPATIENT)
Dept: INTERNAL MEDICINE | Facility: CLINIC | Age: 72
End: 2022-02-02
Payer: MEDICARE

## 2022-02-02 VITALS
WEIGHT: 200 LBS | HEART RATE: 69 BPM | HEIGHT: 66 IN | SYSTOLIC BLOOD PRESSURE: 114 MMHG | BODY MASS INDEX: 32.14 KG/M2 | DIASTOLIC BLOOD PRESSURE: 70 MMHG | OXYGEN SATURATION: 99 %

## 2022-02-02 VITALS
WEIGHT: 201.94 LBS | HEIGHT: 66 IN | BODY MASS INDEX: 32.45 KG/M2 | SYSTOLIC BLOOD PRESSURE: 106 MMHG | DIASTOLIC BLOOD PRESSURE: 64 MMHG

## 2022-02-02 DIAGNOSIS — E11.01 TYPE 2 DIABETES MELLITUS WITH HYPEROSMOLAR COMA, WITH LONG-TERM CURRENT USE OF INSULIN: Primary | ICD-10-CM

## 2022-02-02 DIAGNOSIS — I25.10 CORONARY ARTERY DISEASE INVOLVING NATIVE HEART, UNSPECIFIED VESSEL OR LESION TYPE, UNSPECIFIED WHETHER ANGINA PRESENT: ICD-10-CM

## 2022-02-02 DIAGNOSIS — E78.5 HYPERLIPIDEMIA, UNSPECIFIED HYPERLIPIDEMIA TYPE: ICD-10-CM

## 2022-02-02 DIAGNOSIS — E66.9 OBESITY, UNSPECIFIED CLASSIFICATION, UNSPECIFIED OBESITY TYPE, UNSPECIFIED WHETHER SERIOUS COMORBIDITY PRESENT: ICD-10-CM

## 2022-02-02 DIAGNOSIS — R43.0 ANOSMIA: Primary | ICD-10-CM

## 2022-02-02 DIAGNOSIS — I15.2 HYPERTENSION ASSOCIATED WITH DIABETES: ICD-10-CM

## 2022-02-02 DIAGNOSIS — Z79.4 TYPE 2 DIABETES MELLITUS WITH HYPEROSMOLAR COMA, WITH LONG-TERM CURRENT USE OF INSULIN: ICD-10-CM

## 2022-02-02 DIAGNOSIS — E11.59 HYPERTENSION ASSOCIATED WITH DIABETES: ICD-10-CM

## 2022-02-02 DIAGNOSIS — Z79.4 TYPE 2 DIABETES MELLITUS WITH HYPEROSMOLAR COMA, WITH LONG-TERM CURRENT USE OF INSULIN: Primary | ICD-10-CM

## 2022-02-02 DIAGNOSIS — E11.01 TYPE 2 DIABETES MELLITUS WITH HYPEROSMOLAR COMA, WITH LONG-TERM CURRENT USE OF INSULIN: ICD-10-CM

## 2022-02-02 DIAGNOSIS — D64.9 MILD CHRONIC ANEMIA: ICD-10-CM

## 2022-02-02 PROCEDURE — 3074F PR MOST RECENT SYSTOLIC BLOOD PRESSURE < 130 MM HG: ICD-10-PCS | Mod: CPTII,S$GLB,, | Performed by: INTERNAL MEDICINE

## 2022-02-02 PROCEDURE — 1126F AMNT PAIN NOTED NONE PRSNT: CPT | Mod: CPTII,S$GLB,, | Performed by: NURSE PRACTITIONER

## 2022-02-02 PROCEDURE — 3288F FALL RISK ASSESSMENT DOCD: CPT | Mod: CPTII,S$GLB,, | Performed by: INTERNAL MEDICINE

## 2022-02-02 PROCEDURE — 3072F PR LOW RISK FOR RETINOPATHY: ICD-10-PCS | Mod: CPTII,S$GLB,, | Performed by: NURSE PRACTITIONER

## 2022-02-02 PROCEDURE — 1126F PR PAIN SEVERITY QUANTIFIED, NO PAIN PRESENT: ICD-10-PCS | Mod: CPTII,S$GLB,, | Performed by: INTERNAL MEDICINE

## 2022-02-02 PROCEDURE — 3072F LOW RISK FOR RETINOPATHY: CPT | Mod: CPTII,S$GLB,, | Performed by: INTERNAL MEDICINE

## 2022-02-02 PROCEDURE — 3008F BODY MASS INDEX DOCD: CPT | Mod: CPTII,S$GLB,, | Performed by: INTERNAL MEDICINE

## 2022-02-02 PROCEDURE — 3078F DIAST BP <80 MM HG: CPT | Mod: CPTII,S$GLB,, | Performed by: INTERNAL MEDICINE

## 2022-02-02 PROCEDURE — 99999 PR PBB SHADOW E&M-EST. PATIENT-LVL V: ICD-10-PCS | Mod: PBBFAC,,, | Performed by: INTERNAL MEDICINE

## 2022-02-02 PROCEDURE — 3074F SYST BP LT 130 MM HG: CPT | Mod: CPTII,S$GLB,, | Performed by: INTERNAL MEDICINE

## 2022-02-02 PROCEDURE — 99397 PR PREVENTIVE VISIT,EST,65 & OVER: ICD-10-PCS | Mod: S$GLB,,, | Performed by: INTERNAL MEDICINE

## 2022-02-02 PROCEDURE — 1101F PT FALLS ASSESS-DOCD LE1/YR: CPT | Mod: CPTII,S$GLB,, | Performed by: INTERNAL MEDICINE

## 2022-02-02 PROCEDURE — 99999 PR PBB SHADOW E&M-EST. PATIENT-LVL III: ICD-10-PCS | Mod: PBBFAC,,, | Performed by: NURSE PRACTITIONER

## 2022-02-02 PROCEDURE — 1126F AMNT PAIN NOTED NONE PRSNT: CPT | Mod: CPTII,S$GLB,, | Performed by: INTERNAL MEDICINE

## 2022-02-02 PROCEDURE — 99214 PR OFFICE/OUTPT VISIT, EST, LEVL IV, 30-39 MIN: ICD-10-PCS | Mod: S$GLB,,, | Performed by: NURSE PRACTITIONER

## 2022-02-02 PROCEDURE — 1160F PR REVIEW ALL MEDS BY PRESCRIBER/CLIN PHARMACIST DOCUMENTED: ICD-10-PCS | Mod: CPTII,S$GLB,, | Performed by: NURSE PRACTITIONER

## 2022-02-02 PROCEDURE — 1126F PR PAIN SEVERITY QUANTIFIED, NO PAIN PRESENT: ICD-10-PCS | Mod: CPTII,S$GLB,, | Performed by: NURSE PRACTITIONER

## 2022-02-02 PROCEDURE — 3074F SYST BP LT 130 MM HG: CPT | Mod: CPTII,S$GLB,, | Performed by: NURSE PRACTITIONER

## 2022-02-02 PROCEDURE — 1159F PR MEDICATION LIST DOCUMENTED IN MEDICAL RECORD: ICD-10-PCS | Mod: CPTII,S$GLB,, | Performed by: NURSE PRACTITIONER

## 2022-02-02 PROCEDURE — 3078F DIAST BP <80 MM HG: CPT | Mod: CPTII,S$GLB,, | Performed by: NURSE PRACTITIONER

## 2022-02-02 PROCEDURE — 3072F PR LOW RISK FOR RETINOPATHY: ICD-10-PCS | Mod: CPTII,S$GLB,, | Performed by: INTERNAL MEDICINE

## 2022-02-02 PROCEDURE — 3072F LOW RISK FOR RETINOPATHY: CPT | Mod: CPTII,S$GLB,, | Performed by: NURSE PRACTITIONER

## 2022-02-02 PROCEDURE — 1101F PR PT FALLS ASSESS DOC 0-1 FALLS W/OUT INJ PAST YR: ICD-10-PCS | Mod: CPTII,S$GLB,, | Performed by: INTERNAL MEDICINE

## 2022-02-02 PROCEDURE — 3008F PR BODY MASS INDEX (BMI) DOCUMENTED: ICD-10-PCS | Mod: CPTII,S$GLB,, | Performed by: NURSE PRACTITIONER

## 2022-02-02 PROCEDURE — 3008F BODY MASS INDEX DOCD: CPT | Mod: CPTII,S$GLB,, | Performed by: NURSE PRACTITIONER

## 2022-02-02 PROCEDURE — 1159F MED LIST DOCD IN RCRD: CPT | Mod: CPTII,S$GLB,, | Performed by: NURSE PRACTITIONER

## 2022-02-02 PROCEDURE — 1160F RVW MEDS BY RX/DR IN RCRD: CPT | Mod: CPTII,S$GLB,, | Performed by: NURSE PRACTITIONER

## 2022-02-02 PROCEDURE — 3078F PR MOST RECENT DIASTOLIC BLOOD PRESSURE < 80 MM HG: ICD-10-PCS | Mod: CPTII,S$GLB,, | Performed by: NURSE PRACTITIONER

## 2022-02-02 PROCEDURE — 99397 PER PM REEVAL EST PAT 65+ YR: CPT | Mod: S$GLB,,, | Performed by: INTERNAL MEDICINE

## 2022-02-02 PROCEDURE — 3288F PR FALLS RISK ASSESSMENT DOCUMENTED: ICD-10-PCS | Mod: CPTII,S$GLB,, | Performed by: INTERNAL MEDICINE

## 2022-02-02 PROCEDURE — 3008F PR BODY MASS INDEX (BMI) DOCUMENTED: ICD-10-PCS | Mod: CPTII,S$GLB,, | Performed by: INTERNAL MEDICINE

## 2022-02-02 PROCEDURE — 99999 PR PBB SHADOW E&M-EST. PATIENT-LVL III: CPT | Mod: PBBFAC,,, | Performed by: NURSE PRACTITIONER

## 2022-02-02 PROCEDURE — 99999 PR PBB SHADOW E&M-EST. PATIENT-LVL V: CPT | Mod: PBBFAC,,, | Performed by: INTERNAL MEDICINE

## 2022-02-02 PROCEDURE — 3078F PR MOST RECENT DIASTOLIC BLOOD PRESSURE < 80 MM HG: ICD-10-PCS | Mod: CPTII,S$GLB,, | Performed by: INTERNAL MEDICINE

## 2022-02-02 PROCEDURE — 99214 OFFICE O/P EST MOD 30 MIN: CPT | Mod: S$GLB,,, | Performed by: NURSE PRACTITIONER

## 2022-02-02 PROCEDURE — 3074F PR MOST RECENT SYSTOLIC BLOOD PRESSURE < 130 MM HG: ICD-10-PCS | Mod: CPTII,S$GLB,, | Performed by: NURSE PRACTITIONER

## 2022-02-02 RX ORDER — GLIPIZIDE 10 MG/1
TABLET, FILM COATED, EXTENDED RELEASE ORAL
Qty: 90 TABLET | Refills: 3 | Status: SHIPPED | OUTPATIENT
Start: 2022-02-02 | End: 2022-09-27 | Stop reason: SDUPTHER

## 2022-02-02 RX ORDER — METFORMIN HYDROCHLORIDE 1000 MG/1
1000 TABLET ORAL
Qty: 90 TABLET | Refills: 3 | Status: SHIPPED | OUTPATIENT
Start: 2022-02-02 | End: 2022-06-08 | Stop reason: SDUPTHER

## 2022-02-02 RX ORDER — INSULIN ASPART 100 [IU]/ML
INJECTION, SOLUTION INTRAVENOUS; SUBCUTANEOUS
Qty: 15 ML | Refills: 11 | OUTPATIENT
Start: 2022-02-02 | End: 2022-02-02

## 2022-02-02 RX ORDER — INSULIN LISPRO 100 [IU]/ML
INJECTION, SOLUTION INTRAVENOUS; SUBCUTANEOUS
Qty: 15 ML | Refills: 6 | Status: SHIPPED | OUTPATIENT
Start: 2022-02-02 | End: 2022-02-03

## 2022-02-02 NOTE — PATIENT INSTRUCTIONS
a1c prior (1-7 days before appt)  a1c tomorrow 2/3/22   Follow up in 4 months w/Namita     [facility]- orion 2 - paperwork will be sent to Shaniqua AGUIRRE  (rep)     tresiba long acting insulin 60 units at night  Glipizide 10 mg xr (30 mins before breakfast)  humalog correction scale if sugar is >180   Metformin 1000 mg in am     [email or web address]   Eat fit wei   [email or web address]   myfitnesspal wei     Goal  (sugar)

## 2022-02-02 NOTE — PROGRESS NOTES
Subjective:       Patient ID: Paul Deleon is a 71 y.o. male.    Chief Complaint: Annual Exam    HPI   Poorly controlled DM.  Last a1c 10.2.    Namita Mederos is managing.    No retinopathy.  Worsening foot numbness.    Has seen podiatry 2020.    Ongoing dental work.  Recent extraction and bruising of chin.    Long standing htn and CAD.     Activity limited to yard work, walking.  Low impact gym work twice a week (Silver Sneakers).    Anosmia continues.      GERD controlled with protonix.           Review of Systems   Constitutional: Negative for activity change and unexpected weight change.   HENT: Negative for postnasal drip, rhinorrhea and sinus pressure/congestion.    Respiratory: Negative for chest tightness and shortness of breath.    Cardiovascular: Negative for chest pain and leg swelling.   Gastrointestinal: Negative for abdominal pain, nausea and vomiting.   Genitourinary: Negative for difficulty urinating, dysuria, hematuria and urgency.   Integumentary:  Negative for rash.   Neurological: Negative for headaches.   Psychiatric/Behavioral: Negative for dysphoric mood and sleep disturbance. The patient is not nervous/anxious.          Objective:      Physical Exam  Constitutional:       General: He is not in acute distress.     Appearance: He is well-developed and well-nourished.   HENT:      Head: Normocephalic and atraumatic.   Eyes:      General: No scleral icterus.        Left eye: No discharge.      Conjunctiva/sclera: Conjunctivae normal.   Neck:      Thyroid: No thyromegaly.      Vascular: No JVD.   Cardiovascular:      Rate and Rhythm: Normal rate and regular rhythm.      Heart sounds: Normal heart sounds. No murmur heard.      Pulmonary:      Effort: Pulmonary effort is normal. No respiratory distress.      Breath sounds: Normal breath sounds. No wheezing or rales.   Abdominal:      General: There is no distension.      Palpations: Abdomen is soft. There is no mass.      Tenderness: There is  no abdominal tenderness.   Musculoskeletal:         General: No edema.      Cervical back: Normal range of motion.      Right lower leg: No edema.      Left lower leg: No edema.   Lymphadenopathy:      Cervical: No cervical adenopathy.   Skin:     General: Skin is dry.      Findings: No rash.   Neurological:      Mental Status: He is alert and oriented to person, place, and time.   Psychiatric:         Mood and Affect: Mood and affect normal.         Behavior: Behavior normal.         Thought Content: Thought content normal.         Judgment: Judgment normal.         Lab Results   Component Value Date    WBC 8.63 11/04/2021    HGB 13.8 (L) 11/04/2021    HCT 43.8 11/04/2021     11/04/2021    CHOL 80 (L) 07/02/2021    CHOL 80 (L) 07/02/2021    TRIG 86 07/02/2021    TRIG 86 07/02/2021    HDL 25 (L) 07/02/2021    HDL 25 (L) 07/02/2021    ALT 5 (L) 07/02/2021    AST 16 07/02/2021     07/02/2021    K 3.9 07/02/2021     07/02/2021    CREATININE 0.8 07/02/2021    BUN 14 07/02/2021    CO2 25 07/02/2021    TSH 3.255 06/12/2020    PSA 2.0 10/20/2020    INR 1.0 08/27/2013    GLUF 159 (H) 12/31/2013    HGBA1C 10.2 (H) 11/04/2021     Assessment:       Problem List Items Addressed This Visit     Type 2 diabetes mellitus, with long-term current use of insulin    Relevant Orders    Ambulatory referral/consult to Podiatry    Ambulatory referral/consult to Optometry    Hyperlipidemia    Coronary artery disease      Other Visit Diagnoses     Anosmia    -  Primary    Relevant Orders    Ambulatory referral/consult to ENT    Mild chronic anemia        BMI 32.0-32.9,adult              Plan:       Paul was seen today for annual exam.    Diagnoses and all orders for this visit:    Anosmia  -     Ambulatory referral/consult to ENT; Future    Mild chronic anemia    Hyperlipidemia, unspecified hyperlipidemia type    Coronary artery disease involving native heart, unspecified vessel or lesion type, unspecified whether angina  present    Type 2 diabetes mellitus with hyperosmolar coma, with long-term current use of insulin  -     Ambulatory referral/consult to Podiatry; Future  -     Discontinue: insulin aspart U-100 (NOVOLOG FLEXPEN U-100 INSULIN) 100 unit/mL (3 mL) InPn pen; Inject 150--200+2, 201-250+4, 251-300+6, 301-350+8, >350+10. Max daily 30 units novocare voucher  -     Ambulatory referral/consult to Optometry; Future    BMI 32.0-32.9,adult           F/u ENT.    Out of novolog for months!.  For some reason pharmacy would not fill.  Will investigate if PA required.    tdap    Exercise regularly.  Weight loss diet reviewed.

## 2022-02-03 ENCOUNTER — TELEPHONE (OUTPATIENT)
Dept: INTERNAL MEDICINE | Facility: CLINIC | Age: 72
End: 2022-02-03
Payer: MEDICARE

## 2022-02-03 DIAGNOSIS — Z79.4 TYPE 2 DIABETES MELLITUS WITH HYPEROSMOLAR COMA, WITH LONG-TERM CURRENT USE OF INSULIN: ICD-10-CM

## 2022-02-03 DIAGNOSIS — E11.01 TYPE 2 DIABETES MELLITUS WITH HYPEROSMOLAR COMA, WITH LONG-TERM CURRENT USE OF INSULIN: ICD-10-CM

## 2022-02-03 RX ORDER — INSULIN ASPART 100 [IU]/ML
INJECTION, SOLUTION INTRAVENOUS; SUBCUTANEOUS
Qty: 15 ML | Refills: 6 | Status: SHIPPED | OUTPATIENT
Start: 2022-02-03 | End: 2022-09-27 | Stop reason: SDUPTHER

## 2022-02-03 NOTE — TELEPHONE ENCOUNTER
Pt's plan will cover Novolog instead of Humalog.  Spoke to pt and he has used Novolog in the past.

## 2022-02-22 ENCOUNTER — PATIENT OUTREACH (OUTPATIENT)
Dept: ADMINISTRATIVE | Facility: OTHER | Age: 72
End: 2022-02-22
Payer: MEDICARE

## 2022-02-22 NOTE — PROGRESS NOTES
LINKS immunization registry updated  Care Everywhere updated  Health Maintenance updated  Chart reviewed for overdue [facility] (CHANTE) health maintenance testing (CRS, Breast Ca, Diabetic Eye Exam)   Orders entered:N/A

## 2022-03-04 ENCOUNTER — OFFICE VISIT (OUTPATIENT)
Dept: OTOLARYNGOLOGY | Facility: CLINIC | Age: 72
End: 2022-03-04
Payer: MEDICARE

## 2022-03-04 VITALS
BODY MASS INDEX: 33.06 KG/M2 | WEIGHT: 204.81 LBS | TEMPERATURE: 98 F | HEART RATE: 79 BPM | SYSTOLIC BLOOD PRESSURE: 126 MMHG | DIASTOLIC BLOOD PRESSURE: 78 MMHG

## 2022-03-04 DIAGNOSIS — R43.0 ANOSMIA: ICD-10-CM

## 2022-03-04 DIAGNOSIS — J34.2 NASAL SEPTAL DEVIATION: Primary | ICD-10-CM

## 2022-03-04 DIAGNOSIS — J34.3 HYPERTROPHY OF BOTH INFERIOR NASAL TURBINATES: ICD-10-CM

## 2022-03-04 PROCEDURE — 3008F BODY MASS INDEX DOCD: CPT | Mod: CPTII,S$GLB,, | Performed by: STUDENT IN AN ORGANIZED HEALTH CARE EDUCATION/TRAINING PROGRAM

## 2022-03-04 PROCEDURE — 3072F PR LOW RISK FOR RETINOPATHY: ICD-10-PCS | Mod: CPTII,S$GLB,, | Performed by: STUDENT IN AN ORGANIZED HEALTH CARE EDUCATION/TRAINING PROGRAM

## 2022-03-04 PROCEDURE — 3072F LOW RISK FOR RETINOPATHY: CPT | Mod: CPTII,S$GLB,, | Performed by: STUDENT IN AN ORGANIZED HEALTH CARE EDUCATION/TRAINING PROGRAM

## 2022-03-04 PROCEDURE — 1159F MED LIST DOCD IN RCRD: CPT | Mod: CPTII,S$GLB,, | Performed by: STUDENT IN AN ORGANIZED HEALTH CARE EDUCATION/TRAINING PROGRAM

## 2022-03-04 PROCEDURE — 99213 OFFICE O/P EST LOW 20 MIN: CPT | Mod: 25,S$GLB,, | Performed by: STUDENT IN AN ORGANIZED HEALTH CARE EDUCATION/TRAINING PROGRAM

## 2022-03-04 PROCEDURE — 1101F PR PT FALLS ASSESS DOC 0-1 FALLS W/OUT INJ PAST YR: ICD-10-PCS | Mod: CPTII,S$GLB,, | Performed by: STUDENT IN AN ORGANIZED HEALTH CARE EDUCATION/TRAINING PROGRAM

## 2022-03-04 PROCEDURE — 3078F PR MOST RECENT DIASTOLIC BLOOD PRESSURE < 80 MM HG: ICD-10-PCS | Mod: CPTII,S$GLB,, | Performed by: STUDENT IN AN ORGANIZED HEALTH CARE EDUCATION/TRAINING PROGRAM

## 2022-03-04 PROCEDURE — 3074F PR MOST RECENT SYSTOLIC BLOOD PRESSURE < 130 MM HG: ICD-10-PCS | Mod: CPTII,S$GLB,, | Performed by: STUDENT IN AN ORGANIZED HEALTH CARE EDUCATION/TRAINING PROGRAM

## 2022-03-04 PROCEDURE — 1126F AMNT PAIN NOTED NONE PRSNT: CPT | Mod: CPTII,S$GLB,, | Performed by: STUDENT IN AN ORGANIZED HEALTH CARE EDUCATION/TRAINING PROGRAM

## 2022-03-04 PROCEDURE — 3008F PR BODY MASS INDEX (BMI) DOCUMENTED: ICD-10-PCS | Mod: CPTII,S$GLB,, | Performed by: STUDENT IN AN ORGANIZED HEALTH CARE EDUCATION/TRAINING PROGRAM

## 2022-03-04 PROCEDURE — 1101F PT FALLS ASSESS-DOCD LE1/YR: CPT | Mod: CPTII,S$GLB,, | Performed by: STUDENT IN AN ORGANIZED HEALTH CARE EDUCATION/TRAINING PROGRAM

## 2022-03-04 PROCEDURE — 3288F PR FALLS RISK ASSESSMENT DOCUMENTED: ICD-10-PCS | Mod: CPTII,S$GLB,, | Performed by: STUDENT IN AN ORGANIZED HEALTH CARE EDUCATION/TRAINING PROGRAM

## 2022-03-04 PROCEDURE — 3078F DIAST BP <80 MM HG: CPT | Mod: CPTII,S$GLB,, | Performed by: STUDENT IN AN ORGANIZED HEALTH CARE EDUCATION/TRAINING PROGRAM

## 2022-03-04 PROCEDURE — 3288F FALL RISK ASSESSMENT DOCD: CPT | Mod: CPTII,S$GLB,, | Performed by: STUDENT IN AN ORGANIZED HEALTH CARE EDUCATION/TRAINING PROGRAM

## 2022-03-04 PROCEDURE — 99213 PR OFFICE/OUTPT VISIT, EST, LEVL III, 20-29 MIN: ICD-10-PCS | Mod: 25,S$GLB,, | Performed by: STUDENT IN AN ORGANIZED HEALTH CARE EDUCATION/TRAINING PROGRAM

## 2022-03-04 PROCEDURE — 99999 PR PBB SHADOW E&M-EST. PATIENT-LVL IV: CPT | Mod: PBBFAC,,, | Performed by: STUDENT IN AN ORGANIZED HEALTH CARE EDUCATION/TRAINING PROGRAM

## 2022-03-04 PROCEDURE — 99999 PR PBB SHADOW E&M-EST. PATIENT-LVL IV: ICD-10-PCS | Mod: PBBFAC,,, | Performed by: STUDENT IN AN ORGANIZED HEALTH CARE EDUCATION/TRAINING PROGRAM

## 2022-03-04 PROCEDURE — 3074F SYST BP LT 130 MM HG: CPT | Mod: CPTII,S$GLB,, | Performed by: STUDENT IN AN ORGANIZED HEALTH CARE EDUCATION/TRAINING PROGRAM

## 2022-03-04 PROCEDURE — 1159F PR MEDICATION LIST DOCUMENTED IN MEDICAL RECORD: ICD-10-PCS | Mod: CPTII,S$GLB,, | Performed by: STUDENT IN AN ORGANIZED HEALTH CARE EDUCATION/TRAINING PROGRAM

## 2022-03-04 PROCEDURE — 1126F PR PAIN SEVERITY QUANTIFIED, NO PAIN PRESENT: ICD-10-PCS | Mod: CPTII,S$GLB,, | Performed by: STUDENT IN AN ORGANIZED HEALTH CARE EDUCATION/TRAINING PROGRAM

## 2022-03-04 NOTE — PATIENT INSTRUCTIONS
Smell Training    Smell training involves actively sniffing a few different fragrances every day for about 20 seconds each while concentrating on the scent.  You will need to create a smell training kit which often consists of the following 4 essential oils: fritz, lemon, eucalyptus, and clove.  You can select other scents, but it is recommended that you select one of each from the following 4 categories: floral, fruity, spicy, and resinous.  Essential oils can be purchased online individually or in kits.  The oils themselves do not have healing properties but are convenient concentrated fragrances that can be used daily.  3.  Smell training has been shown to be most effective if performed twice a day every day for 12 weeks.    Supplements  Take Beta Carotene 25,000 units a day (no prescriptions needed).    Precautions  Make sure you have a smoke detector installed and working.  Observe expiration dates on food.    Additional Information & Support  https://abscent.org/learn-us/smell-training  https://www.[facility].org.uk/smell-training/  http://www.monell.org  http://www.newsworks.org/index.php/local/item/65224-can-researchers-re-ignite-someones-sense-of-smell-through-nasal-stem-cells  Support the A Sense of Hope: The [employer] Anosmia Project https://www.Live[employer]/story/Monellanosmiahope

## 2022-03-04 NOTE — PROGRESS NOTES
Subjective:      Paul is a 71 y.o. male who comes for follow-up of anosmia.  His last visit with me was on 11/9/2021.  He has been using his Flonase daily without any improvement in his symptoms. Denies any yellow green mucus from his nose, no nasal congestion. Does report intermittent nasal burning that happens randomly throughout the day.     His current sinus regime consists of: Flonase    The assessment of quality and severity of symptoms as measured by the SNOT-22 score is 50 and the STOP-BANG score is 4.     The patient's medications, allergies, past medical, surgical, social and family histories were reviewed and updated as appropriate.    A detailed review of systems was obtained with pertinent positives as per the above HPI, and otherwise negative.        Objective:     /78   Pulse 79   Temp 97.7 °F (36.5 °C)   Wt 92.9 kg (204 lb 12.9 oz)   BMI 33.06 kg/m²        Constitutional:   Vital signs are normal. He appears well-developed and well-nourished.     Head:  Normocephalic and atraumatic.     Ears:  Hearing normal to normal and whispered voice; external ear normal without scars, lesions, or masses; ear canal, tympanic membrane, and middle ear normal..   Right Ear: No swelling. Tympanic membrane is not perforated and not bulging. No middle ear effusion.   Left Ear: No swelling. Tympanic membrane is not perforated and not bulging.  No middle ear effusion.     Nose:  Nose normal including turbinates, nasal mucosa, sinuses and nasal septum. No epistaxis.   See nasal endoscopy    Mouth/Throat  Oropharynx clear and moist without lesions or asymmetry and normal uvula midline. Normal dentition. No tonsillar abscesses. Tonsillar exudate.      Neck:  Neck normal without thyromegaly masses, asymmetry, normal tracheal structure, crepitus, and tenderness, thyroid normal, trachea normal, phonation normal, full range of motion with neck supple and no adenopathy. No stridor present.        Head (right  side): No submental adenopathy present.        Head (left side): No submental adenopathy present.     He has no cervical adenopathy.     Pulmonary/Chest:   No stridor.       Procedure    Nasal endoscopy performed.  See procedure note.    Nasal Endoscopy:  3/4/2022    The use of diagnostic nasal endoscopy was considered medically necessary for the evaluation and visualization of the nasal anatomy for symptoms suggestive of nasal or sinus origin. Physical examination (including a nasal speculum evaluation) did not provide sufficient clinical information to establish a diagnosis, or symptoms did not improve or worsened following treatment.     The nasal cavity was decongested with topical 1% phenylephrine and anesthetized with 4% lidocaine.  A rigid 0-degree endoscope was introduced into the nasal cavity.    The patient was seated in the examination chair. After discussion of risks and benefits, a nasal endoscope was inserted into the nose the endoscope was passed along the left nasal floor to the nasopharynx. It was then passed between the middle and superior meatus, nasal turbinates, nasal septum, nasopharynx and sphenoethmoid region. The nasal endoscope was withdrawn and there was no complications. An identical procedure was performed on the right side. I was present for the entire procedure.The patient tolerated the above procedure well. The findings of this procedure can be found in the dictated note from 3/4/2022 visit.                      Right sided septal deviation. Hypertrophic inferior turbinates. No mucopurulent drainage from the middle meatus bilaterally. No nasal masses or polyposis. No epistaxis. NP clear of masses or lesions.     Data Reviewed    WBC (K/uL)   Date Value   11/04/2021 8.63     Eosinophil % (%)   Date Value   11/04/2021 1.7     Eos # (K/uL)   Date Value   11/04/2021 0.2     Platelets (K/uL)   Date Value   11/04/2021 227     Glucose (mg/dL)   Date Value   07/02/2021 241 (H)     No results  found for: IGE    I independently reviewed the images of the CT sinuses dated 11/9/21. Pertinent findings include s-shaped septal deformity mucosal edema in the nasl cavity. No significant sinus opacification or polyposis noted.      Assessment:     1. Nasal septal deviation    2. Anosmia    3. Hypertrophy of both inferior nasal turbinates         Plan:     - Anosmia likely related to viral infection (COVID?)  - Discussed that there are no good strategies to recover smell  - Provided patient information on smell training exercises  - RTC PRN    Marty Sanders MD     No follow-ups on file.

## 2022-03-04 NOTE — PROGRESS NOTES
"    Subjective:      Paul is a 71 y.o. male who comes for follow-up of {display:47258:a:"an olfactory disturbance","aural fullness","sinus headaches","sinusitis"}.  His last visit with me was on 11/9/2021.  ***    His current sinus regime consists of:    The assessment of quality and severity of symptoms as measured by the SNOT-22 score is *** and the STOP-BANG score is ***.         %       The patient's medications, allergies, past medical, surgical, social and family histories were reviewed and updated as appropriate.    A detailed review of systems was obtained with pertinent positives as per the above HPI, and otherwise negative.        Objective:     /78   Pulse 79   Temp 97.7 °F (36.5 °C)   Wt 92.9 kg (204 lb 12.9 oz)   BMI 33.06 kg/m²      Physical Exam    Procedure    {display:19197:a:"Cerumen removal performed.  See procedure note.","Flexible laryngoscopy performed.  See procedure note.","Nasal endoscopy with debridement performed.  See procedure note.","Nasal endoscopy performed.  See procedure note.","None"}        Data Reviewed    WBC (K/uL)   Date Value   11/04/2021 8.63     Eosinophil % (%)   Date Value   11/04/2021 1.7     Eos # (K/uL)   Date Value   11/04/2021 0.2     Platelets (K/uL)   Date Value   11/04/2021 227     Glucose (mg/dL)   Date Value   07/02/2021 241 (H)     No results found for: IGE    {display:19197:o:"No sinus imaging available.","I independently reviewed the images of the CT sinuses dated ***. Pertinent findings include ***."}      Assessment:     1. Anosmia         Plan:     ***  No follow-ups on file.      "

## 2022-03-16 ENCOUNTER — PATIENT MESSAGE (OUTPATIENT)
Dept: ADMINISTRATIVE | Facility: HOSPITAL | Age: 72
End: 2022-03-16
Payer: MEDICARE

## 2022-05-05 ENCOUNTER — PATIENT MESSAGE (OUTPATIENT)
Dept: ADMINISTRATIVE | Facility: HOSPITAL | Age: 72
End: 2022-05-05
Payer: MEDICARE

## 2022-06-01 ENCOUNTER — PATIENT MESSAGE (OUTPATIENT)
Dept: ADMINISTRATIVE | Facility: HOSPITAL | Age: 72
End: 2022-06-01
Payer: MEDICARE

## 2022-06-08 DIAGNOSIS — E78.5 HYPERLIPIDEMIA, UNSPECIFIED HYPERLIPIDEMIA TYPE: ICD-10-CM

## 2022-06-08 RX ORDER — METFORMIN HYDROCHLORIDE 1000 MG/1
1000 TABLET ORAL
Qty: 90 TABLET | Refills: 3 | Status: SHIPPED | OUTPATIENT
Start: 2022-06-08 | End: 2023-08-02 | Stop reason: SDUPTHER

## 2022-06-08 NOTE — TELEPHONE ENCOUNTER
Care Due:                  Date            Visit Type   Department     Provider  --------------------------------------------------------------------------------                                EP -                              PRIMARY      [facility]  Last Visit: 02-      CARE ([facility])   MEDICINE       SHELLY CARO  Next Visit: None Scheduled  None         None Found                                                            Last  Test          Frequency    Reason                     Performed    Due Date  --------------------------------------------------------------------------------    CMP.........  12 months..  amLODIPine-benazepriL,     07- 06-                             atorvastatin.............    Lipid Panel.  12 months..  atorvastatin.............  07- 06-    [facility] Embedded Care Gaps. Reference number: [id number]. 6/08/2022   1:55:15 PM CDT

## 2022-06-08 NOTE — TELEPHONE ENCOUNTER
----- Message from Denae Mckenzie sent at 6/8/2022  1:50 PM CDT -----  Contact: [phone]  Requesting an RX refill or new RX.  Is this a refill or new RX: refill  RX name and strength (copy/paste from chart):   metFORMIN (GLUCOPHAGE) 1000 MG tablet  Is this a 30 day or 90 day RX: 90  Pharmacy name and phone # (copy/paste from chart):  [facility] - [facility] [address]  [address] [address]  Phone: [phone] Fax: [phone]       The doctors have asked that we provide their patients with the following 2 reminders -- prescription refills can take up to 72 hours, and a friendly reminder that in the future you can use your [facility] account to request refills: call back     Pt is out of the med.

## 2022-07-13 ENCOUNTER — PATIENT MESSAGE (OUTPATIENT)
Dept: ADMINISTRATIVE | Facility: HOSPITAL | Age: 72
End: 2022-07-13
Payer: MEDICARE

## 2022-07-13 DIAGNOSIS — E11.9 TYPE 2 DIABETES MELLITUS WITHOUT COMPLICATION: ICD-10-CM

## 2022-08-04 ENCOUNTER — PATIENT MESSAGE (OUTPATIENT)
Dept: ADMINISTRATIVE | Facility: HOSPITAL | Age: 72
End: 2022-08-04
Payer: MEDICARE

## 2022-08-05 DIAGNOSIS — E11.9 TYPE 2 DIABETES MELLITUS WITHOUT COMPLICATION, UNSPECIFIED WHETHER LONG TERM INSULIN USE: ICD-10-CM

## 2022-08-24 ENCOUNTER — PATIENT MESSAGE (OUTPATIENT)
Dept: ADMINISTRATIVE | Facility: HOSPITAL | Age: 72
End: 2022-08-24
Payer: MEDICARE

## 2022-08-31 DIAGNOSIS — Z79.4 TYPE 2 DIABETES MELLITUS, WITH LONG-TERM CURRENT USE OF INSULIN: ICD-10-CM

## 2022-08-31 DIAGNOSIS — E11.9 TYPE 2 DIABETES MELLITUS, WITH LONG-TERM CURRENT USE OF INSULIN: ICD-10-CM

## 2022-09-15 LAB
COMMENTS: ABNORMAL
EST. AVERAGE GLUCOSE BLD GHB EST-MCNC: 278 MG/DL
HBA1C MFR BLD: 11.3 % (ref 4.8–5.6)

## 2022-09-16 DIAGNOSIS — E11.65 UNCONTROLLED TYPE 2 DIABETES MELLITUS WITH HYPERGLYCEMIA: Primary | ICD-10-CM

## 2022-09-21 ENCOUNTER — TELEPHONE (OUTPATIENT)
Dept: INTERNAL MEDICINE | Facility: CLINIC | Age: 72
End: 2022-09-21
Payer: MEDICARE

## 2022-09-21 NOTE — TELEPHONE ENCOUNTER
Called and spoke to pt. Relayed message from PCP:  Pls call - diabetes is terribly controlled - a1c 11.3 - needs f/u in endocrine, since Irielle will be out , I believe.  Does he want appt closer to home?  Pt verbalized understanding. States he would prefer to come to [facility] however we do not see any available appointments any time soon. Please advise.

## 2022-09-21 NOTE — TELEPHONE ENCOUNTER
----- Message from Bree Larose MD sent at 9/16/2022  1:34 PM CDT -----  Pls call - diabetes is terribly controlled - a1c 11.3 - needs f/u in endocrine, since Irijimmy will be out , I believe.  Does he want appt closer to home?

## 2022-09-22 ENCOUNTER — PATIENT MESSAGE (OUTPATIENT)
Dept: INTERNAL MEDICINE | Facility: CLINIC | Age: 72
End: 2022-09-22
Payer: MEDICARE

## 2022-09-26 NOTE — PROGRESS NOTES
"Subjective:      Patient ID: Paul eDleon is a 71 y.o. male.    Chief Complaint:  Diabetes      History of Present Illness  T2DM  Diagnosed 10 years ago.  Known complications:  MI s/p stent placement, neuropathy (numbness over feet/toes)    Current Diabetes Regimen:  Metformin 1000 mg once daily   Tresiba 60 units at bedtime, over abdomen.   Novolog 2 - 3 units before meals (three times daily)   Glipizide 10 mg before breakfast       Diet/Exercise:  Tries to eat small portions three to four times a day   B: oatmeal and toast  L: baked chicken, beans and rice   D: baked chicken, beans and rice    Recent Hgb A1C:  Lab Results   Component Value Date    HGBA1C 10.2 (H) 11/04/2021       Glucose Monitoring:  Checks blood sugar after meal   Did not bring meter or log    Hypoglycemic Episodes:  Denies     Screening / DM Complications:    Nephropathy:  ACEi/ARB: Taking  Lab Results   Component Value Date    MICALBCREAT 17.4 11/04/2021       Last Lipid Panel:  Statin: Taking  Lab Results   Component Value Date    LDLCALC 37.8 (L) 07/02/2021    LDLCALC 37.8 (L) 07/02/2021       Neuropathy:numbness  Last foot exam : 07/13/2021  Last eye exam : 01/06/2021;  no laser surgery or DR    B12: stopped taking      Review of Systems  Has difficulties staying asleep   Otherwise negative ROS    Objective:   Physical Exam  Vitals:    09/27/22 1001   BP: 112/70   BP Location: Left arm   Patient Position: Sitting   BP Method: Medium (Manual)   Pulse: 80   SpO2: 96%   Weight: 97 kg (213 lb 15.3 oz)   Height: 5' 6" (1.676 m)       BP Readings from Last 3 Encounters:   09/27/22 112/70   03/04/22 126/78   02/02/22 106/64     Wt Readings from Last 1 Encounters:   09/27/22 1001 97 kg (213 lb 15.3 oz)         Body mass index is 34.53 kg/m².    Lab Review:   Lab Results   Component Value Date    HGBA1C 10.2 (H) 11/04/2021     Lab Results   Component Value Date    CHOL 80 (L) 07/02/2021    CHOL 80 (L) 07/02/2021    HDL 25 (L) 07/02/2021    " HDL 25 (L) 07/02/2021    LDLCALC 37.8 (L) 07/02/2021    LDLCALC 37.8 (L) 07/02/2021    TRIG 86 07/02/2021    TRIG 86 07/02/2021    CHOLHDL 31.3 07/02/2021    CHOLHDL 31.3 07/02/2021     Lab Results   Component Value Date     07/02/2021    K 3.9 07/02/2021     07/02/2021    CO2 25 07/02/2021     (H) 07/02/2021    BUN 14 07/02/2021    CREATININE 0.8 07/02/2021    CALCIUM 9.1 07/02/2021    PROT 6.8 07/02/2021    ALBUMIN 3.2 (L) 07/02/2021    BILITOT 1.0 07/02/2021    ALKPHOS 104 07/02/2021    AST 16 07/02/2021    ALT 5 (L) 07/02/2021    ANIONGAP 11 07/02/2021    ESTGFRAFRICA >60.0 07/02/2021    EGFRNONAA >60.0 07/02/2021    TSH 3.255 06/12/2020         Assessment and Plan     Type 2 diabetes mellitus, with long-term current use of insulin  Adjusted insulin:  tresiba 42 units at bedtime   Start novolog 6 units before meals + sliding scales   Reviewed multiple examples   Provided written instructions    Ok to add sliding scale before meals only, not at bedtime.     Hypertension associated with diabetes  Neg microalb  BP at goal   On ACEI, normal K and creat     Hyperlipidemia  Currently on statin   LDL at goal

## 2022-09-27 ENCOUNTER — LAB VISIT (OUTPATIENT)
Dept: LAB | Facility: HOSPITAL | Age: 72
End: 2022-09-27
Attending: INTERNAL MEDICINE
Payer: MEDICARE

## 2022-09-27 ENCOUNTER — OFFICE VISIT (OUTPATIENT)
Dept: ENDOCRINOLOGY | Facility: CLINIC | Age: 72
End: 2022-09-27
Payer: MEDICARE

## 2022-09-27 VITALS
HEART RATE: 80 BPM | SYSTOLIC BLOOD PRESSURE: 112 MMHG | BODY MASS INDEX: 34.38 KG/M2 | WEIGHT: 213.94 LBS | OXYGEN SATURATION: 96 % | HEIGHT: 66 IN | DIASTOLIC BLOOD PRESSURE: 70 MMHG

## 2022-09-27 DIAGNOSIS — E11.59 HYPERTENSION ASSOCIATED WITH DIABETES: ICD-10-CM

## 2022-09-27 DIAGNOSIS — E11.01 TYPE 2 DIABETES MELLITUS WITH HYPEROSMOLAR COMA, WITH LONG-TERM CURRENT USE OF INSULIN: ICD-10-CM

## 2022-09-27 DIAGNOSIS — E11.65 UNCONTROLLED TYPE 2 DIABETES MELLITUS WITH HYPERGLYCEMIA: ICD-10-CM

## 2022-09-27 DIAGNOSIS — Z79.4 TYPE 2 DIABETES MELLITUS WITH HYPEROSMOLAR COMA, WITH LONG-TERM CURRENT USE OF INSULIN: ICD-10-CM

## 2022-09-27 DIAGNOSIS — I15.2 HYPERTENSION ASSOCIATED WITH DIABETES: ICD-10-CM

## 2022-09-27 LAB
ALBUMIN/CREAT UR: 28.9 UG/MG (ref 0–30)
CREAT UR-MCNC: 83 MG/DL (ref 23–375)
MICROALBUMIN UR DL<=1MG/L-MCNC: 24 UG/ML

## 2022-09-27 PROCEDURE — 3008F PR BODY MASS INDEX (BMI) DOCUMENTED: ICD-10-PCS | Mod: CPTII,S$GLB,, | Performed by: INTERNAL MEDICINE

## 2022-09-27 PROCEDURE — 3072F PR LOW RISK FOR RETINOPATHY: ICD-10-PCS | Mod: CPTII,S$GLB,, | Performed by: INTERNAL MEDICINE

## 2022-09-27 PROCEDURE — 82043 UR ALBUMIN QUANTITATIVE: CPT | Performed by: INTERNAL MEDICINE

## 2022-09-27 PROCEDURE — 1159F PR MEDICATION LIST DOCUMENTED IN MEDICAL RECORD: ICD-10-PCS | Mod: CPTII,S$GLB,, | Performed by: INTERNAL MEDICINE

## 2022-09-27 PROCEDURE — 3078F PR MOST RECENT DIASTOLIC BLOOD PRESSURE < 80 MM HG: ICD-10-PCS | Mod: CPTII,S$GLB,, | Performed by: INTERNAL MEDICINE

## 2022-09-27 PROCEDURE — 1101F PT FALLS ASSESS-DOCD LE1/YR: CPT | Mod: CPTII,S$GLB,, | Performed by: INTERNAL MEDICINE

## 2022-09-27 PROCEDURE — 3074F SYST BP LT 130 MM HG: CPT | Mod: CPTII,S$GLB,, | Performed by: INTERNAL MEDICINE

## 2022-09-27 PROCEDURE — 3078F DIAST BP <80 MM HG: CPT | Mod: CPTII,S$GLB,, | Performed by: INTERNAL MEDICINE

## 2022-09-27 PROCEDURE — 99999 PR PBB SHADOW E&M-EST. PATIENT-LVL IV: ICD-10-PCS | Mod: PBBFAC,,, | Performed by: INTERNAL MEDICINE

## 2022-09-27 PROCEDURE — 82570 ASSAY OF URINE CREATININE: CPT | Performed by: INTERNAL MEDICINE

## 2022-09-27 PROCEDURE — 4010F ACE/ARB THERAPY RXD/TAKEN: CPT | Mod: CPTII,S$GLB,, | Performed by: INTERNAL MEDICINE

## 2022-09-27 PROCEDURE — 3008F BODY MASS INDEX DOCD: CPT | Mod: CPTII,S$GLB,, | Performed by: INTERNAL MEDICINE

## 2022-09-27 PROCEDURE — 99214 OFFICE O/P EST MOD 30 MIN: CPT | Mod: S$GLB,,, | Performed by: INTERNAL MEDICINE

## 2022-09-27 PROCEDURE — 1101F PR PT FALLS ASSESS DOC 0-1 FALLS W/OUT INJ PAST YR: ICD-10-PCS | Mod: CPTII,S$GLB,, | Performed by: INTERNAL MEDICINE

## 2022-09-27 PROCEDURE — 1159F MED LIST DOCD IN RCRD: CPT | Mod: CPTII,S$GLB,, | Performed by: INTERNAL MEDICINE

## 2022-09-27 PROCEDURE — 99214 PR OFFICE/OUTPT VISIT, EST, LEVL IV, 30-39 MIN: ICD-10-PCS | Mod: S$GLB,,, | Performed by: INTERNAL MEDICINE

## 2022-09-27 PROCEDURE — 4010F PR ACE/ARB THEARPY RXD/TAKEN: ICD-10-PCS | Mod: CPTII,S$GLB,, | Performed by: INTERNAL MEDICINE

## 2022-09-27 PROCEDURE — 3288F FALL RISK ASSESSMENT DOCD: CPT | Mod: CPTII,S$GLB,, | Performed by: INTERNAL MEDICINE

## 2022-09-27 PROCEDURE — 99999 PR PBB SHADOW E&M-EST. PATIENT-LVL IV: CPT | Mod: PBBFAC,,, | Performed by: INTERNAL MEDICINE

## 2022-09-27 PROCEDURE — 3072F LOW RISK FOR RETINOPATHY: CPT | Mod: CPTII,S$GLB,, | Performed by: INTERNAL MEDICINE

## 2022-09-27 PROCEDURE — 3074F PR MOST RECENT SYSTOLIC BLOOD PRESSURE < 130 MM HG: ICD-10-PCS | Mod: CPTII,S$GLB,, | Performed by: INTERNAL MEDICINE

## 2022-09-27 PROCEDURE — 3288F PR FALLS RISK ASSESSMENT DOCUMENTED: ICD-10-PCS | Mod: CPTII,S$GLB,, | Performed by: INTERNAL MEDICINE

## 2022-09-27 RX ORDER — GLIPIZIDE 10 MG/1
TABLET, FILM COATED, EXTENDED RELEASE ORAL
Qty: 90 TABLET | Refills: 3 | Status: ON HOLD | OUTPATIENT
Start: 2022-09-27 | End: 2023-06-21 | Stop reason: HOSPADM

## 2022-09-27 RX ORDER — INSULIN ASPART 100 [IU]/ML
INJECTION, SOLUTION INTRAVENOUS; SUBCUTANEOUS
Qty: 15 ML | Refills: 6 | Status: SHIPPED | OUTPATIENT
Start: 2022-09-27 | End: 2024-03-13

## 2022-09-27 RX ORDER — INSULIN PUMP SYRINGE, 3 ML
EACH MISCELLANEOUS
Qty: 1 EACH | Refills: 0 | Status: SHIPPED | OUTPATIENT
Start: 2022-09-27 | End: 2024-03-13

## 2022-09-27 RX ORDER — LANCETS
EACH MISCELLANEOUS
Qty: 450 EACH | Refills: 3 | Status: SHIPPED | OUTPATIENT
Start: 2022-09-27 | End: 2023-11-02 | Stop reason: SDUPTHER

## 2022-09-27 RX ORDER — PEN NEEDLE, DIABETIC 30 GX3/16"
NEEDLE, DISPOSABLE MISCELLANEOUS
Qty: 450 EACH | Refills: 3 | Status: SHIPPED | OUTPATIENT
Start: 2022-09-27 | End: 2023-11-02 | Stop reason: SDUPTHER

## 2022-09-27 NOTE — ASSESSMENT & PLAN NOTE
Adjusted insulin:  tresiba 42 units at bedtime   Start novolog 6 units before meals + sliding scales   Reviewed multiple examples   Provided written instructions    Ok to add sliding scale before meals only, not at bedtime.

## 2022-09-27 NOTE — PATIENT INSTRUCTIONS
Adjusted insulin:  tresiba 42 units at bedtime   Start novolog 6 units before meals plus sliding scales (or correction insulin)    Sliding scale (or correction insulin) before meals only, not at bedtime.   150 - 200 + 1 unit  201 - 250 + two units   251 - 300 + three units   301 - 350 + 4 units     For treatment of low blood sugars:   If your blood sugar is less than 70 but higher than 60, and you are not having any symptoms, please make sure you check your blood sugar again in 10 - 15 minutes, avoid tasks such as driving. If the repeat value is still in the same range, please drink a 1/4 cup of orange juice or 1 - 2 glucose tablets.     If your blood sugar reading is under 60, whether you are symptomatic or not, please treat. You can do this with 3 oral glucose tablets, juice, milk, other snacks, or a meal. Make sure you check 15 minutes after you treat.

## 2022-10-10 ENCOUNTER — PATIENT MESSAGE (OUTPATIENT)
Dept: ADMINISTRATIVE | Facility: HOSPITAL | Age: 72
End: 2022-10-10
Payer: MEDICARE

## 2022-10-11 ENCOUNTER — PATIENT MESSAGE (OUTPATIENT)
Dept: ENDOCRINOLOGY | Facility: CLINIC | Age: 72
End: 2022-10-11
Payer: MEDICARE

## 2022-10-20 ENCOUNTER — TELEPHONE (OUTPATIENT)
Dept: INTERNAL MEDICINE | Facility: CLINIC | Age: 72
End: 2022-10-20
Payer: MEDICARE

## 2022-10-20 NOTE — TELEPHONE ENCOUNTER
----- Message from Ashly Roldan sent at 10/20/2022  9:59 AM CDT -----  Contact: [phone]  Pt wanted to make you aware he had a heart attach. He needed 2 stents put in.  The doctor he saw is Dr Jose Armando Cooney @ [phone]. He is requesting you call the doctor to discuss his condition.If you want to see him please call him to set up appt.

## 2022-10-20 NOTE — TELEPHONE ENCOUNTER
Called and spoke to pt. Pt states he was calling to let PCP know that he had a heart attack. Pt states he was seen in [place]. Pt states he is seeing cardiologist in [place], Dr Cheung. Will schedule pt for hospital f/u.

## 2022-10-25 ENCOUNTER — OFFICE VISIT (OUTPATIENT)
Dept: INTERNAL MEDICINE | Facility: CLINIC | Age: 72
End: 2022-10-25
Payer: MEDICARE

## 2022-10-25 ENCOUNTER — OFFICE VISIT (OUTPATIENT)
Dept: PODIATRY | Facility: CLINIC | Age: 72
End: 2022-10-25
Payer: MEDICARE

## 2022-10-25 ENCOUNTER — IMMUNIZATION (OUTPATIENT)
Dept: INTERNAL MEDICINE | Facility: CLINIC | Age: 72
End: 2022-10-25
Payer: MEDICARE

## 2022-10-25 VITALS
HEART RATE: 75 BPM | DIASTOLIC BLOOD PRESSURE: 80 MMHG | WEIGHT: 204.13 LBS | BODY MASS INDEX: 32.95 KG/M2 | SYSTOLIC BLOOD PRESSURE: 129 MMHG

## 2022-10-25 VITALS
SYSTOLIC BLOOD PRESSURE: 110 MMHG | DIASTOLIC BLOOD PRESSURE: 68 MMHG | HEIGHT: 66 IN | HEART RATE: 69 BPM | OXYGEN SATURATION: 98 % | WEIGHT: 204.13 LBS | BODY MASS INDEX: 32.81 KG/M2

## 2022-10-25 DIAGNOSIS — E11.59 HYPERTENSION ASSOCIATED WITH DIABETES: ICD-10-CM

## 2022-10-25 DIAGNOSIS — E11.01 TYPE 2 DIABETES MELLITUS WITH HYPEROSMOLAR COMA, WITH LONG-TERM CURRENT USE OF INSULIN: ICD-10-CM

## 2022-10-25 DIAGNOSIS — Z79.4 TYPE 2 DIABETES MELLITUS WITH HYPEROSMOLAR COMA, WITH LONG-TERM CURRENT USE OF INSULIN: ICD-10-CM

## 2022-10-25 DIAGNOSIS — R20.2 PARESTHESIA OF BOTH LOWER EXTREMITIES: ICD-10-CM

## 2022-10-25 DIAGNOSIS — I73.9 INTERMITTENT CLAUDICATION: ICD-10-CM

## 2022-10-25 DIAGNOSIS — Z23 NEED FOR VACCINATION: Primary | ICD-10-CM

## 2022-10-25 DIAGNOSIS — I15.2 HYPERTENSION ASSOCIATED WITH DIABETES: ICD-10-CM

## 2022-10-25 DIAGNOSIS — E11.49 TYPE II DIABETES MELLITUS WITH NEUROLOGICAL MANIFESTATIONS: Primary | ICD-10-CM

## 2022-10-25 DIAGNOSIS — B35.1 ONYCHOMYCOSIS DUE TO DERMATOPHYTE: ICD-10-CM

## 2022-10-25 DIAGNOSIS — E78.5 HYPERLIPIDEMIA, UNSPECIFIED HYPERLIPIDEMIA TYPE: Primary | ICD-10-CM

## 2022-10-25 DIAGNOSIS — I25.10 CORONARY ARTERY DISEASE INVOLVING NATIVE HEART, UNSPECIFIED VESSEL OR LESION TYPE, UNSPECIFIED WHETHER ANGINA PRESENT: ICD-10-CM

## 2022-10-25 DIAGNOSIS — E11.65 UNCONTROLLED TYPE 2 DIABETES MELLITUS WITH HYPERGLYCEMIA: ICD-10-CM

## 2022-10-25 PROCEDURE — 99999 PR PBB SHADOW E&M-EST. PATIENT-LVL IV: CPT | Mod: PBBFAC,,, | Performed by: PODIATRIST

## 2022-10-25 PROCEDURE — 3074F PR MOST RECENT SYSTOLIC BLOOD PRESSURE < 130 MM HG: ICD-10-PCS | Mod: CPTII,S$GLB,, | Performed by: INTERNAL MEDICINE

## 2022-10-25 PROCEDURE — 4010F ACE/ARB THERAPY RXD/TAKEN: CPT | Mod: CPTII,S$GLB,, | Performed by: INTERNAL MEDICINE

## 2022-10-25 PROCEDURE — 3066F PR DOCUMENTATION OF TREATMENT FOR NEPHROPATHY: ICD-10-PCS | Mod: CPTII,S$GLB,, | Performed by: PODIATRIST

## 2022-10-25 PROCEDURE — 1126F PR PAIN SEVERITY QUANTIFIED, NO PAIN PRESENT: ICD-10-PCS | Mod: CPTII,S$GLB,, | Performed by: PODIATRIST

## 2022-10-25 PROCEDURE — 3072F LOW RISK FOR RETINOPATHY: CPT | Mod: CPTII,S$GLB,, | Performed by: PODIATRIST

## 2022-10-25 PROCEDURE — 3074F SYST BP LT 130 MM HG: CPT | Mod: CPTII,S$GLB,, | Performed by: PODIATRIST

## 2022-10-25 PROCEDURE — 3072F LOW RISK FOR RETINOPATHY: CPT | Mod: CPTII,S$GLB,, | Performed by: INTERNAL MEDICINE

## 2022-10-25 PROCEDURE — 1159F PR MEDICATION LIST DOCUMENTED IN MEDICAL RECORD: ICD-10-PCS | Mod: CPTII,S$GLB,, | Performed by: PODIATRIST

## 2022-10-25 PROCEDURE — 3046F HEMOGLOBIN A1C LEVEL >9.0%: CPT | Mod: CPTII,S$GLB,, | Performed by: PODIATRIST

## 2022-10-25 PROCEDURE — 11721 PR DEBRIDEMENT OF NAILS, 6 OR MORE: ICD-10-PCS | Mod: Q9,S$GLB,, | Performed by: PODIATRIST

## 2022-10-25 PROCEDURE — 3288F FALL RISK ASSESSMENT DOCD: CPT | Mod: CPTII,S$GLB,, | Performed by: INTERNAL MEDICINE

## 2022-10-25 PROCEDURE — 99214 OFFICE O/P EST MOD 30 MIN: CPT | Mod: S$GLB,,, | Performed by: INTERNAL MEDICINE

## 2022-10-25 PROCEDURE — 4010F ACE/ARB THERAPY RXD/TAKEN: CPT | Mod: CPTII,S$GLB,, | Performed by: PODIATRIST

## 2022-10-25 PROCEDURE — 1101F PR PT FALLS ASSESS DOC 0-1 FALLS W/OUT INJ PAST YR: ICD-10-PCS | Mod: CPTII,S$GLB,, | Performed by: INTERNAL MEDICINE

## 2022-10-25 PROCEDURE — 1101F PT FALLS ASSESS-DOCD LE1/YR: CPT | Mod: CPTII,S$GLB,, | Performed by: INTERNAL MEDICINE

## 2022-10-25 PROCEDURE — 11721 DEBRIDE NAIL 6 OR MORE: CPT | Mod: Q9,S$GLB,, | Performed by: PODIATRIST

## 2022-10-25 PROCEDURE — 3046F PR MOST RECENT HEMOGLOBIN A1C LEVEL > 9.0%: ICD-10-PCS | Mod: CPTII,S$GLB,, | Performed by: INTERNAL MEDICINE

## 2022-10-25 PROCEDURE — 3072F PR LOW RISK FOR RETINOPATHY: ICD-10-PCS | Mod: CPTII,S$GLB,, | Performed by: INTERNAL MEDICINE

## 2022-10-25 PROCEDURE — 3066F NEPHROPATHY DOC TX: CPT | Mod: CPTII,S$GLB,, | Performed by: PODIATRIST

## 2022-10-25 PROCEDURE — 3066F NEPHROPATHY DOC TX: CPT | Mod: CPTII,S$GLB,, | Performed by: INTERNAL MEDICINE

## 2022-10-25 PROCEDURE — 4010F PR ACE/ARB THEARPY RXD/TAKEN: ICD-10-PCS | Mod: CPTII,S$GLB,, | Performed by: INTERNAL MEDICINE

## 2022-10-25 PROCEDURE — 1160F PR REVIEW ALL MEDS BY PRESCRIBER/CLIN PHARMACIST DOCUMENTED: ICD-10-PCS | Mod: CPTII,S$GLB,, | Performed by: PODIATRIST

## 2022-10-25 PROCEDURE — 1126F PR PAIN SEVERITY QUANTIFIED, NO PAIN PRESENT: ICD-10-PCS | Mod: CPTII,S$GLB,, | Performed by: INTERNAL MEDICINE

## 2022-10-25 PROCEDURE — 1159F MED LIST DOCD IN RCRD: CPT | Mod: CPTII,S$GLB,, | Performed by: PODIATRIST

## 2022-10-25 PROCEDURE — 0124A COVID-19, MRNA, LNP-S, BIVALENT BOOSTER, PF, 30 MCG/0.3 ML DOSE: CPT | Mod: CV19,PBBFAC | Performed by: INTERNAL MEDICINE

## 2022-10-25 PROCEDURE — 3046F HEMOGLOBIN A1C LEVEL >9.0%: CPT | Mod: CPTII,S$GLB,, | Performed by: INTERNAL MEDICINE

## 2022-10-25 PROCEDURE — 3288F PR FALLS RISK ASSESSMENT DOCUMENTED: ICD-10-PCS | Mod: CPTII,S$GLB,, | Performed by: INTERNAL MEDICINE

## 2022-10-25 PROCEDURE — 3061F PR NEG MICROALBUMINURIA RESULT DOCUMENTED/REVIEW: ICD-10-PCS | Mod: CPTII,S$GLB,, | Performed by: PODIATRIST

## 2022-10-25 PROCEDURE — 99999 PR PBB SHADOW E&M-EST. PATIENT-LVL V: CPT | Mod: PBBFAC,,, | Performed by: INTERNAL MEDICINE

## 2022-10-25 PROCEDURE — 99999 PR PBB SHADOW E&M-EST. PATIENT-LVL V: ICD-10-PCS | Mod: PBBFAC,,, | Performed by: INTERNAL MEDICINE

## 2022-10-25 PROCEDURE — 3061F PR NEG MICROALBUMINURIA RESULT DOCUMENTED/REVIEW: ICD-10-PCS | Mod: CPTII,S$GLB,, | Performed by: INTERNAL MEDICINE

## 2022-10-25 PROCEDURE — 3078F PR MOST RECENT DIASTOLIC BLOOD PRESSURE < 80 MM HG: ICD-10-PCS | Mod: CPTII,S$GLB,, | Performed by: INTERNAL MEDICINE

## 2022-10-25 PROCEDURE — 99214 PR OFFICE/OUTPT VISIT, EST, LEVL IV, 30-39 MIN: ICD-10-PCS | Mod: 25,S$GLB,, | Performed by: PODIATRIST

## 2022-10-25 PROCEDURE — 99214 PR OFFICE/OUTPT VISIT, EST, LEVL IV, 30-39 MIN: ICD-10-PCS | Mod: S$GLB,,, | Performed by: INTERNAL MEDICINE

## 2022-10-25 PROCEDURE — 4010F PR ACE/ARB THEARPY RXD/TAKEN: ICD-10-PCS | Mod: CPTII,S$GLB,, | Performed by: PODIATRIST

## 2022-10-25 PROCEDURE — 1126F AMNT PAIN NOTED NONE PRSNT: CPT | Mod: CPTII,S$GLB,, | Performed by: PODIATRIST

## 2022-10-25 PROCEDURE — 3061F NEG MICROALBUMINURIA REV: CPT | Mod: CPTII,S$GLB,, | Performed by: INTERNAL MEDICINE

## 2022-10-25 PROCEDURE — 91312 COVID-19, MRNA, LNP-S, BIVALENT BOOSTER, PF, 30 MCG/0.3 ML DOSE: ICD-10-PCS | Mod: S$GLB,,, | Performed by: INTERNAL MEDICINE

## 2022-10-25 PROCEDURE — 91312 COVID-19, MRNA, LNP-S, BIVALENT BOOSTER, PF, 30 MCG/0.3 ML DOSE: CPT | Mod: S$GLB,,, | Performed by: INTERNAL MEDICINE

## 2022-10-25 PROCEDURE — 3066F PR DOCUMENTATION OF TREATMENT FOR NEPHROPATHY: ICD-10-PCS | Mod: CPTII,S$GLB,, | Performed by: INTERNAL MEDICINE

## 2022-10-25 PROCEDURE — 99999 PR PBB SHADOW E&M-EST. PATIENT-LVL IV: ICD-10-PCS | Mod: PBBFAC,,, | Performed by: PODIATRIST

## 2022-10-25 PROCEDURE — 1126F AMNT PAIN NOTED NONE PRSNT: CPT | Mod: CPTII,S$GLB,, | Performed by: INTERNAL MEDICINE

## 2022-10-25 PROCEDURE — 3074F PR MOST RECENT SYSTOLIC BLOOD PRESSURE < 130 MM HG: ICD-10-PCS | Mod: CPTII,S$GLB,, | Performed by: PODIATRIST

## 2022-10-25 PROCEDURE — 3046F PR MOST RECENT HEMOGLOBIN A1C LEVEL > 9.0%: ICD-10-PCS | Mod: CPTII,S$GLB,, | Performed by: PODIATRIST

## 2022-10-25 PROCEDURE — 3078F DIAST BP <80 MM HG: CPT | Mod: CPTII,S$GLB,, | Performed by: INTERNAL MEDICINE

## 2022-10-25 PROCEDURE — 3061F NEG MICROALBUMINURIA REV: CPT | Mod: CPTII,S$GLB,, | Performed by: PODIATRIST

## 2022-10-25 PROCEDURE — 99214 OFFICE O/P EST MOD 30 MIN: CPT | Mod: 25,S$GLB,, | Performed by: PODIATRIST

## 2022-10-25 PROCEDURE — 3072F PR LOW RISK FOR RETINOPATHY: ICD-10-PCS | Mod: CPTII,S$GLB,, | Performed by: PODIATRIST

## 2022-10-25 PROCEDURE — 1160F RVW MEDS BY RX/DR IN RCRD: CPT | Mod: CPTII,S$GLB,, | Performed by: PODIATRIST

## 2022-10-25 PROCEDURE — 3079F PR MOST RECENT DIASTOLIC BLOOD PRESSURE 80-89 MM HG: ICD-10-PCS | Mod: CPTII,S$GLB,, | Performed by: PODIATRIST

## 2022-10-25 PROCEDURE — 3079F DIAST BP 80-89 MM HG: CPT | Mod: CPTII,S$GLB,, | Performed by: PODIATRIST

## 2022-10-25 PROCEDURE — 3074F SYST BP LT 130 MM HG: CPT | Mod: CPTII,S$GLB,, | Performed by: INTERNAL MEDICINE

## 2022-10-25 RX ORDER — DICLOFENAC SODIUM 10 MG/G
2 GEL TOPICAL 3 TIMES DAILY
Qty: 100 G | Refills: 3 | Status: SHIPPED | OUTPATIENT
Start: 2022-10-25

## 2022-10-25 RX ORDER — NITROGLYCERIN 0.4 MG/1
0.4 TABLET SUBLINGUAL EVERY 5 MIN PRN
Qty: 30 TABLET | Refills: 2 | Status: ON HOLD | OUTPATIENT
Start: 2022-10-25 | End: 2023-06-21 | Stop reason: SDUPTHER

## 2022-10-25 NOTE — PROGRESS NOTES
Subjective:      Patient ID: Paul Deleon is a 72 y.o. male.    Chief Complaint: Diabetes Mellitus (Bree Larose MD/Ref Provider (PCP) 10/25/2022/), Diabetic Foot Exam, and Numbness    Paul is a 72 y.o. male who presents to the clinic upon referral from Dr. Larose  for evaluation and treatment of diabetic feet. Paul has a past medical history of Cataract, Coronary artery disease, Diabetes mellitus type II, GERD (gastroesophageal reflux disease), Heart attack, Hyperlipidemia, Hypertension, MI (mitral incompetence), MI (myocardial infarction), and Ulcer. Patient relates no major problem with feet. Main complaints today consist of diabetic foot exam and nails that are difficult to trim. Also has burning pain in the toes at times and they feel sensitive. Inquiring about treatment options for nails and pain.     PCP: Bree Larose MD    Date Last Seen by PCP:  10/25/2022     Current shoe gear: Tennis shoes    Hemoglobin A1C   Date Value Ref Range Status   09/27/2022 10.1 (H) 4.0 - 5.6 % Final     Comment:     ADA Screening Guidelines:  5.7-6.4%  Consistent with prediabetes  >or=6.5%  Consistent with diabetes    High levels of fetal hemoglobin interfere with the HbA1C  assay. Heterozygous hemoglobin variants (HbS, HgC, etc)do  not significantly interfere with this assay.   However, presence of multiple variants may affect accuracy.     11/04/2021 10.2 (H) 4.0 - 5.6 % Final     Comment:     ADA Screening Guidelines:  5.7-6.4%  Consistent with prediabetes  >or=6.5%  Consistent with diabetes    High levels of fetal hemoglobin interfere with the HbA1C  assay. Heterozygous hemoglobin variants (HbS, HgC, etc)do  not significantly interfere with this assay.   However, presence of multiple variants may affect accuracy.     07/02/2021 10.0 (H) 4.0 - 5.6 % Final     Comment:     ADA Screening Guidelines:  5.7-6.4%  Consistent with prediabetes  >or=6.5%  Consistent with diabetes    High levels of fetal hemoglobin  interfere with the HbA1C  assay. Heterozygous hemoglobin variants (HbS, HgC, etc)do  not significantly interfere with this assay.   However, presence of multiple variants may affect accuracy.     2020 8.1 % Final           Review of Systems   Constitutional: Negative for chills, fever and malaise/fatigue.   HENT:  Negative for hearing loss.    Cardiovascular:  Negative for claudication.   Respiratory:  Negative for shortness of breath.    Skin:  Positive for dry skin and nail changes. Negative for flushing and rash.   Musculoskeletal:  Negative for joint pain and myalgias.   Neurological:  Positive for paresthesias and sensory change. Negative for loss of balance and numbness.   Psychiatric/Behavioral:  Negative for altered mental status.          Objective:      Physical Exam  Cardiovascular:      Pulses:           Dorsalis pedis pulses are 2+ on the right side and 2+ on the left side.        Posterior tibial pulses are 2+ on the right side and 2+ on the left side.   Musculoskeletal:      Right lower le+ Edema present.      Left lower le+ Edema present.      Comments: Adequate joint ROM noted to all lower extremity muscle groups with no pain or crepitation noted. Muscle strength is 5/5 in all groups bilaterally.     Feet:      Right foot:      Protective Sensation: 5 sites tested.  5 sites sensed.      Left foot:      Protective Sensation: 5 sites tested.  5 sites sensed.   Skin:     Comments: No open lesions, lacerations or wounds noted. Nails are thickened, elongated, discolored yellow/brown with subungual debris and brittleness to b/l 1-5. Interdigital spaces clean, dry and intact b/l. No erythema noted to b/l foot. Skin texture thin, atrophic, dry. Pedal hair diminished. Toes cool to touch b/l.      Neurological:      Sensory: Sensory deficit present.      Comments: Light touch, proprioception, and sharp/dull sensation are all intact bilaterally. Protective threshold with the [place]  monofilament is intact bilaterally. Subjective paresthesias with no clearly identifiable source or trigger.              Assessment:       Encounter Diagnoses   Name Primary?    Type 2 diabetes mellitus with hyperosmolar coma, with long-term current use of insulin     Type II diabetes mellitus with neurological manifestations Yes    Onychomycosis due to dermatophyte     Paresthesia of both lower extremities          Plan:       Paul was seen today for diabetes mellitus, diabetic foot exam and numbness.    Diagnoses and all orders for this visit:    Type II diabetes mellitus with neurological manifestations    Type 2 diabetes mellitus with hyperosmolar coma, with long-term current use of insulin  -     Ambulatory referral/consult to Podiatry    Onychomycosis due to dermatophyte    Paresthesia of both lower extremities    Other orders  -     diclofenac sodium (VOLTAREN) 1 % Gel; Apply 2 g topically 3 (three) times daily. Apply to the area of foot or toe pain 2-3x per day or night as needed    I counseled the patient on his conditions, their implications and medical management.    - Shoe inspection. Diabetic Foot Education. Patient reminded of the importance of good nutrition and blood sugar control to help prevent podiatric complications of diabetes. Patient instructed on proper foot hygeine. We discussed wearing proper shoe gear, daily foot inspections, never walking without protective shoe gear, caution putting sharp instruments to feet     - Discussed DM foot care:  Wear comfortable, proper fitting shoes. Wash feet daily. Dry well. After drying, apply moisturizer to feet (no lotion to webspaces). Inspect feet daily for skin breaks, blisters, swelling, or redness. Wear cotton socks (preferably white)  Change socks every day. Do NOT walk barefoot. Do NOT use heating pads or warm/hot water soaks     With patient's permission, nails were aggressively reduced and debrided 1-5 b/l, removing all offending nail and  debris. Patient tolerated this well and no blood was drawn. Patient reports relief following the procedure.     OTC Kerasal cream to be applied to affected toenails for up to 1 year.    For paresthesias:   Rx Voltaren gel to be applied to affected area up to 3-4 x daily as needed for pain.     RTC 3 months, sooner PRN

## 2022-10-25 NOTE — PROGRESS NOTES
Subjective:       Patient ID: Paul Deleon is a 72 y.o. male.    Chief Complaint: Follow-up (Hospital f/u )    HPI  Admitted to [facility].  Med records not available.   MI Oct 12; stented at [facility] and LAD.  Plavix was added.   He has f/u With Dr Cooney NOv 1.  No angina since discharge.  But, he has had different type of CP.  E was evaluated by [employer] at [facility]  and was told he had significant left leg PAD.  C/o pain walking up steps, but now with regular walking.  He has been doing yard work with mild fatigue.    He has poorly controlled DM.  He saw Dr Miller 9/27- novolog added and tresiba reduced.  Sugars down to mid -100's.        Review of Systems   Constitutional:  Negative for activity change and unexpected weight change.   Respiratory:  Negative for chest tightness and shortness of breath.    Cardiovascular:  Negative for chest pain and leg swelling.   Gastrointestinal:  Negative for abdominal pain.   Neurological:  Negative for headaches.   Psychiatric/Behavioral:  Negative for dysphoric mood.        Objective:      Physical Exam  Constitutional:       General: He is not in acute distress.     Appearance: He is well-developed. He is not ill-appearing, toxic-appearing or diaphoretic.   Eyes:      General: No scleral icterus.  Neck:      Thyroid: No thyromegaly.      Vascular: No JVD.   Cardiovascular:      Rate and Rhythm: Normal rate and regular rhythm.      Heart sounds: Normal heart sounds. No murmur heard.  Pulmonary:      Effort: Pulmonary effort is normal. No respiratory distress.      Breath sounds: Normal breath sounds. No stridor. No wheezing, rhonchi or rales.   Abdominal:      General: There is no distension.      Palpations: Abdomen is soft. There is no mass.      Tenderness: There is no abdominal tenderness. There is no guarding.      Hernia: No hernia is present.   Musculoskeletal:      Cervical back: No rigidity or tenderness.      Right lower leg: No edema.       Left lower leg: No edema.   Lymphadenopathy:      Cervical: No cervical adenopathy.   Neurological:      Mental Status: He is alert and oriented to person, place, and time.   Psychiatric:         Mood and Affect: Mood normal.         Behavior: Behavior normal.         Thought Content: Thought content normal.       Assessment:       Problem List Items Addressed This Visit       Type 2 diabetes mellitus, with long-term current use of insulin    Relevant Orders    Hemoglobin A1C    Ambulatory referral/consult to Optometry    Ambulatory referral/consult to Podiatry    Hypertension associated with diabetes    Hyperlipidemia - Primary    Coronary artery disease     Other Visit Diagnoses       Uncontrolled type 2 diabetes mellitus with hyperglycemia        Intermittent claudication        Relevant Orders    VAS US Ankle Brachial Indices with Exercise    BMI 32.0-32.9,adult              Recent MI and stenting x 2  Plan:       Paul was seen today for follow-up.    Diagnoses and all orders for this visit:    Hyperlipidemia, unspecified hyperlipidemia type    Hypertension associated with diabetes    Coronary artery disease involving native heart, unspecified vessel or lesion type, unspecified whether angina present    Type 2 diabetes mellitus with hyperosmolar coma, with long-term current use of insulin  -     Hemoglobin A1C; Future  -     Ambulatory referral/consult to Optometry; Future  -     Ambulatory referral/consult to Podiatry; Future    Uncontrolled type 2 diabetes mellitus with hyperglycemia    Intermittent claudication  -     VAS US Ankle Brachial Indices with Exercise; Future    BMI 32.0-32.9,adult    Other orders  -     nitroGLYCERIN (NITROSTAT) 0.4 MG SL tablet; Place 1 tablet (0.4 mg total) under the tongue every 5 (five) minutes as needed for Chest pain.         Namita Mederos 3 mo with a1c   To f/u with Dr Cooney  (cardiology) in [place].  Covid booster.  Request outside records.

## 2022-11-04 ENCOUNTER — HOSPITAL ENCOUNTER (OUTPATIENT)
Dept: VASCULAR SURGERY | Facility: CLINIC | Age: 72
Discharge: HOME | End: 2022-11-04
Attending: INTERNAL MEDICINE
Payer: MEDICARE

## 2022-11-04 DIAGNOSIS — I73.9 INTERMITTENT CLAUDICATION: ICD-10-CM

## 2022-11-04 PROCEDURE — 93923 UPR/LXTR ART STDY 3+ LVLS: CPT | Mod: S$GLB,,, | Performed by: SURGERY

## 2022-11-04 PROCEDURE — 93923 PR NON-INVASIVE PHYSIOLOGIC STUDY EXTREMITY 3 LEVELS: ICD-10-PCS | Mod: S$GLB,,, | Performed by: SURGERY

## 2022-11-28 ENCOUNTER — OFFICE VISIT (OUTPATIENT)
Dept: OPTOMETRY | Facility: CLINIC | Age: 72
End: 2022-11-28
Payer: COMMERCIAL

## 2022-11-28 DIAGNOSIS — H25.13 NUCLEAR SCLEROTIC CATARACT OF BOTH EYES: ICD-10-CM

## 2022-11-28 DIAGNOSIS — E11.9 DIABETES MELLITUS WITHOUT COMPLICATION: Primary | ICD-10-CM

## 2022-11-28 DIAGNOSIS — H04.123 DRY EYE SYNDROME OF BILATERAL LACRIMAL GLANDS: ICD-10-CM

## 2022-11-28 PROCEDURE — 92015 PR REFRACTION: ICD-10-PCS | Mod: S$GLB,,, | Performed by: OPTOMETRIST

## 2022-11-28 PROCEDURE — 99999 PR PBB SHADOW E&M-EST. PATIENT-LVL III: ICD-10-PCS | Mod: PBBFAC,,, | Performed by: OPTOMETRIST

## 2022-11-28 PROCEDURE — 92015 DETERMINE REFRACTIVE STATE: CPT | Mod: S$GLB,,, | Performed by: OPTOMETRIST

## 2022-11-28 PROCEDURE — 92014 COMPRE OPH EXAM EST PT 1/>: CPT | Mod: S$GLB,,, | Performed by: OPTOMETRIST

## 2022-11-28 PROCEDURE — 92014 PR EYE EXAM, EST PATIENT,COMPREHESV: ICD-10-PCS | Mod: S$GLB,,, | Performed by: OPTOMETRIST

## 2022-11-28 PROCEDURE — 99999 PR PBB SHADOW E&M-EST. PATIENT-LVL III: CPT | Mod: PBBFAC,,, | Performed by: OPTOMETRIST

## 2022-11-28 NOTE — PROGRESS NOTES
HPI    CC: Diabetic Eye Exam  ISIDORO: 1/06/2021  (+) Changes in vision   (-) Pain  (-) Irritation   (+) Itching OS, patient also notes difficulty opening OS in the am  (-) Flashes  (-) Floaters  (+) Glasses wearer  (-) CL wearer  (+) Uses eye gtts  Does patient want a refraction today? Yes  (+) Eye injury  (-) Eye surgery   (-)POHx  (-)FOHx  (+)DM  Hemoglobin A1C       Date                     Value               Ref Range             Status                09/27/2022               10.1 (H)            4.0 - 5.6 %           Final            Last edited by Gayatri Desouza, OD on 11/28/2022  8:48 AM.            Assessment /Plan     For exam results, see Encounter Report.    Diabetes mellitus without complication  -     Ambulatory referral/consult to Optometry    Nuclear sclerotic cataract of both eyes    Dry eye syndrome of bilateral lacrimal glands      1. No retinopathy noted today.  Continued control with primary care physician and annual comprehensive eye exam.   2. Educated pt on findings. Not visually significant. No need for removal at this time. Monitor yearly.    3. Educated pt on findings. Recommended ATs BID OU and mookie QHS OS for added lubrication and comfort. Pt decided to start with Refresh Relieva bid OU and not add in the mookie at this time unless needed in the future.  If symptoms worsen or dont improve, RTC. Monitor.      Eyeglass Final Rx       Eyeglass Final Rx         Sphere Cylinder Axis    Right -2.50 +1.50 170    Left -2.00 +1.00 005      Type: DVO    Expiration Date: 11/28/2023                   RTC in 1 year for annual eye exam unless changes noted sooner.

## 2022-12-27 ENCOUNTER — TELEPHONE (OUTPATIENT)
Dept: INTERNAL MEDICINE | Facility: CLINIC | Age: 72
End: 2022-12-27
Payer: MEDICARE

## 2022-12-27 NOTE — TELEPHONE ENCOUNTER
----- Message from Azul Mendieta sent at 12/27/2022  3:50 PM CST -----  Contact: [phone]  Patient called, requested a courtesy call from Dr Flores's office about if paper work that he fax 4 days ago was fax to patient insurance. Please call and advise. Thank you

## 2023-01-06 ENCOUNTER — TELEPHONE (OUTPATIENT)
Dept: INTERNAL MEDICINE | Facility: CLINIC | Age: 73
End: 2023-01-06
Payer: MEDICARE

## 2023-01-06 DIAGNOSIS — I25.10 CORONARY ARTERY DISEASE, UNSPECIFIED VESSEL OR LESION TYPE, UNSPECIFIED WHETHER ANGINA PRESENT, UNSPECIFIED WHETHER NATIVE OR TRANSPLANTED HEART: Primary | ICD-10-CM

## 2023-01-06 RX ORDER — CLOPIDOGREL BISULFATE 75 MG/1
75 TABLET ORAL DAILY
Qty: 90 TABLET | Refills: 1 | Status: ON HOLD | OUTPATIENT
Start: 2023-01-06 | End: 2023-06-21 | Stop reason: SDUPTHER

## 2023-01-06 NOTE — TELEPHONE ENCOUNTER
Called and spoke to [facility]. Pharmacy is requesting an order for Plavix however this medication is not in pt current med list.

## 2023-01-06 NOTE — TELEPHONE ENCOUNTER
----- Message from Fernanda Hermosillo MA sent at 1/6/2023  3:59 PM CST -----  Contact: [phone]    ----- Message -----  From: Valentina Chahal  Sent: 1/6/2023   3:02 PM CST  To: [facility] LAINEZ Staff    Patient is returning a phone call.  Who left a message for the patient: Laura Gonzalez RN  Does patient know what this is regarding:  APPOINTMENT?  Would you like a call back, or a response through your [facility] portal?:   CALL BACK  Comments:

## 2023-01-06 NOTE — TELEPHONE ENCOUNTER
Called and spoke to pt. Pt states he was hospitalized in [place] after a MI. Pt states he was started on Plavix at that time by Dr Cooney. Pt is not out of his prescription and needs a refill. Pt states he has not followed up with cardiology after hospitalization. Pt is requesting a refill on his Plavix, cannot pend as it is not a current med. Will also pend a referral for cardiology.

## 2023-01-06 NOTE — TELEPHONE ENCOUNTER
----- Message from Irene Hermosillo sent at 1/6/2023  1:41 PM CST -----  Contact: Nicki [phone]  Nicki from [facility] requesting a call for status of plavix stated was fax over 1/2/2023.    Please call and advise

## 2023-01-07 NOTE — TELEPHONE ENCOUNTER
Called and spoke to pt. Explained that Plavix had been called in and scheduled pt an appointment with cardiology,

## 2023-01-09 NOTE — PROGRESS NOTES
Cardiology Clinic Note  Reason for Visit: post hospital admission for MI    HPI:   71 y/o male with hx of HTN, HLD, Obesity, CAD (STEMI s/p OM3 stent in 05/13, angiogram in 9/2013 with nonobstructive disease, reported PCI at OSH in 2014-no records), DM2, preserved EF. Last seen in our clinic in 2018 after presenting with chest pain that led to admission and stress test with SPECT and was negative for ischemia.     Presenting for follow up after recent admission for an acute MI in [place] on 10/12/2022 and had PCI to Lcx and LAD with 3.0 x 15 mm and 2.5 x 18 mm, respectively. Currently chest pain free. His METS is >4. Able to walk and go up a flight of stairs without angina. Not in heart failure clinically.     Denies palpitations, SOB/VAZ, PND, orthopnea, ISIDORO, presyncope/syncope, LH, dizziness, diaphoresis, N/V.    Interval: His BP cuff has alarmed for an irregular heart beat. He is also inquiring about dental extraction. He's is 2-1/2 months out from PCI and would recommend to hold off until 6 months to discontinue DAPT.    ROS:    Constitution: Negative for fever or chills. Negative for weight loss or gain.   HENT: Negative for sore throat or headaches. Negative for rhinorrhea.  Eyes: Negative for blurred or double vision.   Cardiovascular: See above  Pulmonary: Negative for SOB. Negative for cough.   Gastrointestinal: Negative for abdominal pain. Negative for nausea/ vomiting. Negative for diarrhea.   : Negative for dysuria.   Neurological: Negative for focal weakness or sensory changes.    PMH:     Past Medical History:   Diagnosis Date    Cataract     Coronary artery disease     Diabetes mellitus type II     GERD (gastroesophageal reflux disease)     Heart attack     Hyperlipidemia     Hypertension     MI (mitral incompetence)     MI (myocardial infarction)     Ulcer      Past Surgical History:   Procedure Laterality Date    ANKLE SURGERY      BACK SURGERY      CORONARY ANGIOPLASTY WITH STENT PLACEMENT       ELBOW SURGERY      lft    THROAT SURGERY       Allergies:   Review of patient's allergies indicates:  No Known Allergies  Medications:     Current Outpatient Medications on File Prior to Visit   Medication Sig Dispense Refill    amLODIPine-benazepriL (LOTREL) 5-40 mg per capsule TAKE 1 CAPSULE EVERY DAY 90 capsule 3    aspirin 325 MG tablet Take 325 mg by mouth once daily.      atorvastatin (LIPITOR) 80 MG tablet TAKE 1 TABLET EVERY EVENING 90 tablet 3    blood sugar diagnostic (FREESTYLE LITE STRIPS) Strp Inject 1 strip into the skin 3 (three) times daily. 200 each 11    blood sugar diagnostic Strp To check BG 4 times daily, to use with insurance preferred meter E11.9 450 each 3    blood-glucose meter kit To check BG 4 times daily, to use with insurance preferred meter E11.9 1 each 0    clopidogreL (PLAVIX) 75 mg tablet Take 1 tablet (75 mg total) by mouth once daily. 90 tablet 1    cyanocobalamin, vitamin B-12, (VITAMIN B-12 ORAL)       diclofenac sodium (VOLTAREN) 1 % Gel Apply 2 g topically 3 (three) times daily. Apply to the area of foot or toe pain 2-3x per day or night as needed 100 g 3    fluticasone propionate (FLONASE) 50 mcg/actuation nasal spray 1 spray (50 mcg total) by Each Nostril route once daily. 15.8 mL 6    glipiZIDE (GLUCOTROL) 10 MG TR24 Take 30 mins before breakfast or lunch. 90 tablet 3    insulin aspart U-100 (NOVOLOG FLEXPEN U-100 INSULIN) 100 unit/mL (3 mL) InPn pen 6 units before meals plus sliding scale for TDD 25 units 15 mL 6    lancets (FREESTYLE LANCETS) 28 gauge Misc USE THREE TIMES DAILY 200 each 11    lancets Misc To check BG 4 times daily, to use with insurance preferred meter E11.9 450 each 3    metFORMIN (GLUCOPHAGE) 1000 MG tablet Take 1 tablet (1,000 mg total) by mouth daily with breakfast. 90 tablet 3    nitroGLYCERIN (NITROSTAT) 0.4 MG SL tablet Place 1 tablet (0.4 mg total) under the tongue every 5 (five) minutes as needed for Chest pain. 30 tablet 2    pantoprazole  "(PROTONIX) 40 MG tablet TAKE 1 TABLET EVERY DAY 90 tablet 3    pen needle, diabetic (BD ULTRA-FINE SHORT PEN NEEDLE) 31 gauge x 5/16" Ndle Takes four injections daily E11.9 450 each 3    TRESIBA FLEXTOUCH U-200 200 unit/mL (3 mL) insulin pen ADMINISTER 60 UNITS UNDER THE SKIN EVERY EVENING 9 pen 3    vitamin E 400 UNIT capsule        No current facility-administered medications on file prior to visit.     Social History:     Social History     Tobacco Use    Smoking status: Never    Smokeless tobacco: Never   Substance Use Topics    Alcohol use: Yes     Alcohol/week: 0.0 standard drinks     Comment: rare alcohol     Family History:     Family History   Problem Relation Age of Onset    Heart disease Mother         cad    Heart disease Father         pacemaker    COPD Sister         awaiting lung transplant    Cancer Brother         lung, metastatic    Melanoma Neg Hx     Psoriasis Neg Hx     Lupus Neg Hx     Eczema Neg Hx     Acne Neg Hx     Amblyopia Neg Hx     Blindness Neg Hx     Cataracts Neg Hx     Glaucoma Neg Hx     Macular degeneration Neg Hx     Strabismus Neg Hx     Retinal detachment Neg Hx      Physical Exam:   /71 (BP Location: Left arm, Patient Position: Sitting, BP Method: Large (Automatic))   Pulse 77   Ht 5' 6" (1.676 m)   Wt 95.1 kg (209 lb 10.5 oz)   SpO2 96%   BMI 33.84 kg/m²      Constitutional: No acute distress, conversant  HEENT: Sclera anicteric, Pupils equal, round and reactive to light, extraocular motions intact, Oropharynx clear  Neck: No JVD, no carotid bruits  Cardiovascular: regular rate and rhythm, no murmur, rubs or gallops, normal S1/S2, chest wall nontender  Pulmonary: Clear to auscultation bilaterally  Abdominal: Abdomen soft, nontender, nondistended, positive bowel sounds  Extremities: No lower extremity edema,   Pulses: 2+ BL Radial, 2+ BL carotid, 2+ BL DP, 2+ BL PT, normal Allens Test BL  Skin: No ecchymosis, erythema, or ulcers  Psych: Alert and oriented x 3, " appropriate affect  Neuro: CNII-XII intact, no focal deficits    Labs:     Lab Results   Component Value Date     07/02/2021    K 3.9 07/02/2021     07/02/2021    CO2 25 07/02/2021    BUN 14 07/02/2021    CREATININE 0.8 07/02/2021    ANIONGAP 11 07/02/2021     Lab Results   Component Value Date    AST 16 07/02/2021    ALT 5 (L) 07/02/2021    ALKPHOS 104 07/02/2021    BILITOT 1.0 07/02/2021    ALBUMIN 3.2 (L) 07/02/2021     Lab Results   Component Value Date    CALCIUM 9.1 07/02/2021    MG 2.1 07/30/2018    PHOS 3.6 07/30/2018     Lab Results   Component Value Date    BNP 13 07/28/2018    BNP <10.0 08/27/2013    BNP <10.0 06/22/2012    Lab Results   Component Value Date    WBC 8.63 11/04/2021    HGB 13.8 (L) 11/04/2021    HCT 43.8 11/04/2021     11/04/2021    GRAN 5.5 11/04/2021    GRAN 63.4 11/04/2021     Lab Results   Component Value Date    INR 1.0 08/27/2013     Lab Results   Component Value Date    CHOL 80 (L) 07/02/2021    CHOL 80 (L) 07/02/2021    HDL 25 (L) 07/02/2021    HDL 25 (L) 07/02/2021    LDLCALC 37.8 (L) 07/02/2021    LDLCALC 37.8 (L) 07/02/2021    TRIG 86 07/02/2021    TRIG 86 07/02/2021     Lab Results   Component Value Date    HGBA1C 10.1 (H) 09/27/2022    HGBA1C 8.1 07/08/2020     Lab Results   Component Value Date    TSH 3.255 06/12/2020          Assessment:   71 y/o male with hx of HTN, HLD, Obesity, CAD (STEMI s/p OM3 stent in 05/13, angiogram in 9/2013 with nonobstructive disease, reported PCI at OSH in 2014-no records. NSTEMI 10/2022 and PCI to Lcx and LAD), DM2.     Inquiring about recommendations for DAPT therapy for dental extraction.     1. Hyperlipidemia, unspecified hyperlipidemia type        2. Coronary artery disease involving native heart, unspecified vessel or lesion type, unspecified whether angina present        3. Hypertension associated with diabetes        4. Obesity, unspecified classification, unspecified obesity type, unspecified whether serious  comorbidity present        5. Type 2 diabetes mellitus with other specified complication, with long-term current use of insulin            Plan:     Regarding discontinuation of DAPT prior to extraction, depends on presentation in October. Assuming it was in the setting of acute coronary syndrome (ACS) he should continue DAPT for 12 months but could discontinue after 6 months from having PCI (October 12th-2022) if delayed surgery risk is greater than stent thrombosis (Class IIb). However, ideally he would wait for 12 months from PCI prior to discontinuing DAPT. This will depend on patient preference and emergent need for dental extraction. Seems like he is significantly impaired from pain and is a risk of infection. The decision can be further discussed with his orthodontist. If his PCI was not in the setting of ACS, he could potentially discontinue DAPT after 3 months as a class IIb recommendation if delayed surgery risk is greater than stent thrombosis) and 6 months as a class I recommendation.     Last LDL from 7/2021 was 37.8. Will need to repeat prior to next visit. Continue with Statin.     Continue DAPT. If need to discontinue prior to 12 months, please see above risk.     Need an echocardiogram. Will request records from [facility].     He is not on a BB and will start low dose Metoprolol Succinate 25 mg      Will order a 48 hr Holter for his reported irregular rhythm.     Signed:  Juanito Rodriguez MD  Cardiology Fellow     RTC 6 mo    Follow-up:     Future Appointments   Date Time Provider Department Center   1/10/2023  8:00 AM Juanito Rodriguez MD [facility] CARDIO Joey Nguyen   1/25/2023  8:35 AM LABORATORY, [facility] LAB [place]   1/30/2023  8:00 AM Curry Schulz DPM [facility] POD [facility]

## 2023-01-10 ENCOUNTER — OFFICE VISIT (OUTPATIENT)
Dept: CARDIOLOGY | Facility: CLINIC | Age: 73
End: 2023-01-10
Payer: MEDICARE

## 2023-01-10 ENCOUNTER — PATIENT MESSAGE (OUTPATIENT)
Dept: CARDIOLOGY | Facility: CLINIC | Age: 73
End: 2023-01-10

## 2023-01-10 VITALS
HEIGHT: 66 IN | HEART RATE: 77 BPM | BODY MASS INDEX: 33.7 KG/M2 | SYSTOLIC BLOOD PRESSURE: 111 MMHG | OXYGEN SATURATION: 96 % | WEIGHT: 209.69 LBS | DIASTOLIC BLOOD PRESSURE: 71 MMHG

## 2023-01-10 DIAGNOSIS — I25.10 CORONARY ARTERY DISEASE, UNSPECIFIED VESSEL OR LESION TYPE, UNSPECIFIED WHETHER ANGINA PRESENT, UNSPECIFIED WHETHER NATIVE OR TRANSPLANTED HEART: ICD-10-CM

## 2023-01-10 DIAGNOSIS — E11.59 HYPERTENSION ASSOCIATED WITH DIABETES: ICD-10-CM

## 2023-01-10 DIAGNOSIS — E78.5 HYPERLIPIDEMIA, UNSPECIFIED HYPERLIPIDEMIA TYPE: Primary | ICD-10-CM

## 2023-01-10 DIAGNOSIS — Z79.4 TYPE 2 DIABETES MELLITUS WITH OTHER SPECIFIED COMPLICATION, WITH LONG-TERM CURRENT USE OF INSULIN: ICD-10-CM

## 2023-01-10 DIAGNOSIS — I25.10 CORONARY ARTERY DISEASE INVOLVING NATIVE HEART, UNSPECIFIED VESSEL OR LESION TYPE, UNSPECIFIED WHETHER ANGINA PRESENT: ICD-10-CM

## 2023-01-10 DIAGNOSIS — E11.69 TYPE 2 DIABETES MELLITUS WITH OTHER SPECIFIED COMPLICATION, WITH LONG-TERM CURRENT USE OF INSULIN: ICD-10-CM

## 2023-01-10 DIAGNOSIS — I15.2 HYPERTENSION ASSOCIATED WITH DIABETES: ICD-10-CM

## 2023-01-10 DIAGNOSIS — E66.9 OBESITY, UNSPECIFIED CLASSIFICATION, UNSPECIFIED OBESITY TYPE, UNSPECIFIED WHETHER SERIOUS COMORBIDITY PRESENT: ICD-10-CM

## 2023-01-10 PROCEDURE — 1126F PR PAIN SEVERITY QUANTIFIED, NO PAIN PRESENT: ICD-10-PCS | Mod: CPTII,GC,S$GLB, | Performed by: INTERNAL MEDICINE

## 2023-01-10 PROCEDURE — 1159F MED LIST DOCD IN RCRD: CPT | Mod: CPTII,GC,S$GLB, | Performed by: INTERNAL MEDICINE

## 2023-01-10 PROCEDURE — 93010 ELECTROCARDIOGRAM REPORT: CPT | Mod: S$GLB,,, | Performed by: INTERNAL MEDICINE

## 2023-01-10 PROCEDURE — 3072F PR LOW RISK FOR RETINOPATHY: ICD-10-PCS | Mod: CPTII,GC,S$GLB, | Performed by: INTERNAL MEDICINE

## 2023-01-10 PROCEDURE — 3288F PR FALLS RISK ASSESSMENT DOCUMENTED: ICD-10-PCS | Mod: CPTII,GC,S$GLB, | Performed by: INTERNAL MEDICINE

## 2023-01-10 PROCEDURE — 1126F AMNT PAIN NOTED NONE PRSNT: CPT | Mod: CPTII,GC,S$GLB, | Performed by: INTERNAL MEDICINE

## 2023-01-10 PROCEDURE — 99999 PR PBB SHADOW E&M-EST. PATIENT-LVL V: ICD-10-PCS | Mod: PBBFAC,GC,, | Performed by: INTERNAL MEDICINE

## 2023-01-10 PROCEDURE — 3078F PR MOST RECENT DIASTOLIC BLOOD PRESSURE < 80 MM HG: ICD-10-PCS | Mod: CPTII,GC,S$GLB, | Performed by: INTERNAL MEDICINE

## 2023-01-10 PROCEDURE — 3074F PR MOST RECENT SYSTOLIC BLOOD PRESSURE < 130 MM HG: ICD-10-PCS | Mod: CPTII,GC,S$GLB, | Performed by: INTERNAL MEDICINE

## 2023-01-10 PROCEDURE — 1101F PR PT FALLS ASSESS DOC 0-1 FALLS W/OUT INJ PAST YR: ICD-10-PCS | Mod: CPTII,GC,S$GLB, | Performed by: INTERNAL MEDICINE

## 2023-01-10 PROCEDURE — 99204 PR OFFICE/OUTPT VISIT, NEW, LEVL IV, 45-59 MIN: ICD-10-PCS | Mod: GC,S$GLB,, | Performed by: INTERNAL MEDICINE

## 2023-01-10 PROCEDURE — 3288F FALL RISK ASSESSMENT DOCD: CPT | Mod: CPTII,GC,S$GLB, | Performed by: INTERNAL MEDICINE

## 2023-01-10 PROCEDURE — 3074F SYST BP LT 130 MM HG: CPT | Mod: CPTII,GC,S$GLB, | Performed by: INTERNAL MEDICINE

## 2023-01-10 PROCEDURE — 93005 ELECTROCARDIOGRAM TRACING: CPT | Mod: S$GLB,,, | Performed by: INTERNAL MEDICINE

## 2023-01-10 PROCEDURE — 99999 PR PBB SHADOW E&M-EST. PATIENT-LVL V: CPT | Mod: PBBFAC,GC,, | Performed by: INTERNAL MEDICINE

## 2023-01-10 PROCEDURE — 3072F LOW RISK FOR RETINOPATHY: CPT | Mod: CPTII,GC,S$GLB, | Performed by: INTERNAL MEDICINE

## 2023-01-10 PROCEDURE — 93010 EKG 12-LEAD: ICD-10-PCS | Mod: S$GLB,,, | Performed by: INTERNAL MEDICINE

## 2023-01-10 PROCEDURE — 1159F PR MEDICATION LIST DOCUMENTED IN MEDICAL RECORD: ICD-10-PCS | Mod: CPTII,GC,S$GLB, | Performed by: INTERNAL MEDICINE

## 2023-01-10 PROCEDURE — 3008F PR BODY MASS INDEX (BMI) DOCUMENTED: ICD-10-PCS | Mod: CPTII,GC,S$GLB, | Performed by: INTERNAL MEDICINE

## 2023-01-10 PROCEDURE — 93005 EKG 12-LEAD: ICD-10-PCS | Mod: S$GLB,,, | Performed by: INTERNAL MEDICINE

## 2023-01-10 PROCEDURE — 99204 OFFICE O/P NEW MOD 45 MIN: CPT | Mod: GC,S$GLB,, | Performed by: INTERNAL MEDICINE

## 2023-01-10 PROCEDURE — 3008F BODY MASS INDEX DOCD: CPT | Mod: CPTII,GC,S$GLB, | Performed by: INTERNAL MEDICINE

## 2023-01-10 PROCEDURE — 3078F DIAST BP <80 MM HG: CPT | Mod: CPTII,GC,S$GLB, | Performed by: INTERNAL MEDICINE

## 2023-01-10 PROCEDURE — 1101F PT FALLS ASSESS-DOCD LE1/YR: CPT | Mod: CPTII,GC,S$GLB, | Performed by: INTERNAL MEDICINE

## 2023-01-10 RX ORDER — METOPROLOL SUCCINATE 25 MG/1
25 TABLET, EXTENDED RELEASE ORAL DAILY
Qty: 90 TABLET | Refills: 3 | Status: SHIPPED | OUTPATIENT
Start: 2023-01-10 | End: 2024-01-10

## 2023-01-10 RX ORDER — ASPIRIN 81 MG/1
81 TABLET ORAL DAILY
Qty: 90 TABLET | Refills: 3 | Status: ON HOLD | OUTPATIENT
Start: 2023-01-10 | End: 2023-06-21 | Stop reason: SDUPTHER

## 2023-01-26 ENCOUNTER — PATIENT MESSAGE (OUTPATIENT)
Dept: CARDIOLOGY | Facility: CLINIC | Age: 73
End: 2023-01-26
Payer: MEDICARE

## 2023-02-10 ENCOUNTER — PATIENT OUTREACH (OUTPATIENT)
Dept: ADMINISTRATIVE | Facility: HOSPITAL | Age: 73
End: 2023-02-10
Payer: MEDICARE

## 2023-02-10 NOTE — PROGRESS NOTES
Health Maintenance Due   Topic Date Due    TETANUS VACCINE  Never done    Shingles Vaccine (1 of 2) Never done    Lipid Panel  07/02/2022    Foot Exam  07/13/2022    Colorectal Cancer Screening  11/18/2022    Hemoglobin A1c  12/27/2022        updated. Triggered LINKS and Care Everywhere. Chart reviewed for [employer] Gap Report.    Blanca Dejesus LPN   Clinical Care Coordinator  Primary Care and Wellness

## 2023-02-26 RX ORDER — BLOOD-GLUCOSE METER
EACH MISCELLANEOUS
Qty: 1 EACH | Refills: 0 | OUTPATIENT
Start: 2023-02-26

## 2023-02-26 RX ORDER — LANCETS 33 GAUGE
EACH MISCELLANEOUS
Qty: 400 EACH | Refills: 0 | OUTPATIENT
Start: 2023-02-26

## 2023-02-26 RX ORDER — ISOPROPYL ALCOHOL 70 ML/100ML
SWAB TOPICAL
Refills: 0 | OUTPATIENT
Start: 2023-02-26

## 2023-02-26 RX ORDER — DEXTROSE 4 G
TABLET,CHEWABLE ORAL
Refills: 0 | OUTPATIENT
Start: 2023-02-26

## 2023-02-26 NOTE — TELEPHONE ENCOUNTER
Refill Decision Note   Paul Deleon  is requesting a refill authorization.  Brief Assessment and Rationale for Refill:  Quick Discontinue     Medication Therapy Plan:      Pharmacy is requesting new scripts for the following medications without required information, (sig/ frequency/qty/etc)      Medication Reconciliation Completed: No     Comments: Pharmacies have been requesting medications for patients without required information, (sig, frequency, qty, etc.). In addition, requests are sent for medication(s) pt. are currently not taking, and medications patients have never taken.    We have spoken to the pharmacies about these request types and advised their teams previously that we are unable to assess these New Script requests and require all details for these requests. This is a known issue and has been reported.     Note composed:4:12 PM 02/26/2023

## 2023-02-26 NOTE — TELEPHONE ENCOUNTER
Care Due:                  Date            Visit Type   Department     Provider  --------------------------------------------------------------------------------                                HOSPITAL     [facility] INTERNAL  Last Visit: 10-      FOLLOW UP    MEDICINE       SHELLY CARO  Next Visit: None Scheduled  None         None Found                                                            Last  Test          Frequency    Reason                     Performed    Due Date  --------------------------------------------------------------------------------    CBC.........  12 months..  clopidogreL..............  11-   10-    CMP.........  12 months..  amLODIPine-benazepriL,     07- 06-                             atorvastatin.............    Lipid Panel.  12 months..  atorvastatin.............  07- 06-    [facility] Embedded Care Gaps. Reference number: [id number]. 2/26/2023   4:10:11 PM CST

## 2023-03-06 RX ORDER — INSULIN DEGLUDEC 200 U/ML
INJECTION, SOLUTION SUBCUTANEOUS
Qty: 9 PEN | Refills: 2 | Status: ON HOLD | OUTPATIENT
Start: 2023-03-06 | End: 2023-06-20

## 2023-03-27 ENCOUNTER — PATIENT MESSAGE (OUTPATIENT)
Dept: ADMINISTRATIVE | Facility: HOSPITAL | Age: 73
End: 2023-03-27
Payer: MEDICARE

## 2023-04-03 ENCOUNTER — PATIENT MESSAGE (OUTPATIENT)
Dept: ADMINISTRATIVE | Facility: HOSPITAL | Age: 73
End: 2023-04-03
Payer: MEDICARE

## 2023-04-13 ENCOUNTER — PATIENT MESSAGE (OUTPATIENT)
Dept: ADMINISTRATIVE | Facility: HOSPITAL | Age: 73
End: 2023-04-13
Payer: MEDICARE

## 2023-04-13 DIAGNOSIS — E11.9 TYPE 2 DIABETES MELLITUS WITHOUT COMPLICATION, UNSPECIFIED WHETHER LONG TERM INSULIN USE: ICD-10-CM

## 2023-04-21 ENCOUNTER — PATIENT MESSAGE (OUTPATIENT)
Dept: ADMINISTRATIVE | Facility: HOSPITAL | Age: 73
End: 2023-04-21
Payer: MEDICARE

## 2023-06-14 ENCOUNTER — TELEPHONE (OUTPATIENT)
Dept: INTERNAL MEDICINE | Facility: CLINIC | Age: 73
End: 2023-06-14
Payer: MEDICARE

## 2023-06-14 NOTE — TELEPHONE ENCOUNTER
----- Message from Idalia Hargrove sent at 6/14/2023 11:35 AM CDT -----  Contact: [phone]  Pt wife requesting auth for heart stent       He went to ED 7pm on Monday       Pt needs auth from PCP    Please call [employer] and advise       Also call pt wife and let her know:[phone]

## 2023-06-19 ENCOUNTER — HOSPITAL ENCOUNTER (INPATIENT)
Facility: HOSPITAL | Age: 73
LOS: 2 days | Discharge: HOME | DRG: 313 | End: 2023-06-21
Attending: EMERGENCY MEDICINE | Admitting: STUDENT IN AN ORGANIZED HEALTH CARE EDUCATION/TRAINING PROGRAM
Payer: MEDICARE

## 2023-06-19 DIAGNOSIS — R07.89 OTHER CHEST PAIN: ICD-10-CM

## 2023-06-19 DIAGNOSIS — R07.89 CHEST DISCOMFORT: ICD-10-CM

## 2023-06-19 DIAGNOSIS — R07.9 CHEST PAIN: ICD-10-CM

## 2023-06-19 DIAGNOSIS — R07.9 CHEST PAIN SYNDROME: Primary | ICD-10-CM

## 2023-06-19 DIAGNOSIS — I24.9 ACS (ACUTE CORONARY SYNDROME): ICD-10-CM

## 2023-06-19 DIAGNOSIS — I20.0 UNSTABLE ANGINA: ICD-10-CM

## 2023-06-19 DIAGNOSIS — R59.0 MEDIASTINAL LYMPHADENOPATHY: ICD-10-CM

## 2023-06-19 LAB
ALBUMIN SERPL BCP-MCNC: 3.8 G/DL (ref 3.5–5.2)
ALP SERPL-CCNC: 91 U/L (ref 55–135)
ALT SERPL W/O P-5'-P-CCNC: 12 U/L (ref 10–44)
ANION GAP SERPL CALC-SCNC: 13 MMOL/L (ref 8–16)
APTT PPP: 24.4 SEC (ref 21–32)
APTT PPP: 41.7 SEC (ref 21–32)
AST SERPL-CCNC: 18 U/L (ref 10–40)
BASOPHILS # BLD AUTO: 0.07 K/UL (ref 0–0.2)
BASOPHILS NFR BLD: 0.8 % (ref 0–1.9)
BILIRUB SERPL-MCNC: 1.1 MG/DL (ref 0.1–1)
BNP SERPL-MCNC: 12 PG/ML (ref 0–99)
BUN SERPL-MCNC: 13 MG/DL (ref 8–23)
CALCIUM SERPL-MCNC: 9.5 MG/DL (ref 8.7–10.5)
CHLORIDE SERPL-SCNC: 106 MMOL/L (ref 95–110)
CO2 SERPL-SCNC: 18 MMOL/L (ref 23–29)
CREAT SERPL-MCNC: 0.8 MG/DL (ref 0.5–1.4)
DIFFERENTIAL METHOD: ABNORMAL
EOSINOPHIL # BLD AUTO: 0.1 K/UL (ref 0–0.5)
EOSINOPHIL NFR BLD: 1.4 % (ref 0–8)
ERYTHROCYTE [DISTWIDTH] IN BLOOD BY AUTOMATED COUNT: 13.8 % (ref 11.5–14.5)
EST. GFR  (NO RACE VARIABLE): >60 ML/MIN/1.73 M^2
GLUCOSE SERPL-MCNC: 147 MG/DL (ref 70–110)
HCT VFR BLD AUTO: 42.5 % (ref 40–54)
HGB BLD-MCNC: 13.8 G/DL (ref 14–18)
IMM GRANULOCYTES # BLD AUTO: 0.04 K/UL (ref 0–0.04)
IMM GRANULOCYTES NFR BLD AUTO: 0.5 % (ref 0–0.5)
INR PPP: 1 (ref 0.8–1.2)
LYMPHOCYTES # BLD AUTO: 2.3 K/UL (ref 1–4.8)
LYMPHOCYTES NFR BLD: 26.3 % (ref 18–48)
MCH RBC QN AUTO: 28.6 PG (ref 27–31)
MCHC RBC AUTO-ENTMCNC: 32.5 G/DL (ref 32–36)
MCV RBC AUTO: 88 FL (ref 82–98)
MONOCYTES # BLD AUTO: 0.6 K/UL (ref 0.3–1)
MONOCYTES NFR BLD: 6.9 % (ref 4–15)
NEUTROPHILS # BLD AUTO: 5.6 K/UL (ref 1.8–7.7)
NEUTROPHILS NFR BLD: 64.1 % (ref 38–73)
NRBC BLD-RTO: 0 /100 WBC
PLATELET # BLD AUTO: 221 K/UL (ref 150–450)
PMV BLD AUTO: 10.3 FL (ref 9.2–12.9)
POC CARDIAC TROPONIN I: 0.06 NG/ML (ref 0–0.08)
POCT GLUCOSE: 106 MG/DL (ref 70–110)
POCT GLUCOSE: 80 MG/DL (ref 70–110)
POCT GLUCOSE: 88 MG/DL (ref 70–110)
POCT GLUCOSE: 88 MG/DL (ref 70–110)
POTASSIUM SERPL-SCNC: 4.5 MMOL/L (ref 3.5–5.1)
PROT SERPL-MCNC: 7 G/DL (ref 6–8.4)
PROTHROMBIN TIME: 11.2 SEC (ref 9–12.5)
RBC # BLD AUTO: 4.82 M/UL (ref 4.6–6.2)
SAMPLE: NORMAL
SODIUM SERPL-SCNC: 137 MMOL/L (ref 136–145)
TROPONIN I SERPL DL<=0.01 NG/ML-MCNC: 0.01 NG/ML (ref 0–0.03)
TROPONIN I SERPL DL<=0.01 NG/ML-MCNC: <0.006 NG/ML (ref 0–0.03)
TROPONIN I SERPL DL<=0.01 NG/ML-MCNC: <0.006 NG/ML (ref 0–0.03)
WBC # BLD AUTO: 8.79 K/UL (ref 3.9–12.7)

## 2023-06-19 PROCEDURE — 99222 PR INITIAL HOSPITAL CARE,LEVL II: ICD-10-PCS | Mod: ,,, | Performed by: INTERNAL MEDICINE

## 2023-06-19 PROCEDURE — 83880 ASSAY OF NATRIURETIC PEPTIDE: CPT

## 2023-06-19 PROCEDURE — 94761 N-INVAS EAR/PLS OXIMETRY MLT: CPT

## 2023-06-19 PROCEDURE — 84484 ASSAY OF TROPONIN QUANT: CPT

## 2023-06-19 PROCEDURE — 36415 COLL VENOUS BLD VENIPUNCTURE: CPT | Performed by: STUDENT IN AN ORGANIZED HEALTH CARE EDUCATION/TRAINING PROGRAM

## 2023-06-19 PROCEDURE — 25000003 PHARM REV CODE 250: Performed by: STUDENT IN AN ORGANIZED HEALTH CARE EDUCATION/TRAINING PROGRAM

## 2023-06-19 PROCEDURE — 99222 1ST HOSP IP/OBS MODERATE 55: CPT | Mod: ,,, | Performed by: INTERNAL MEDICINE

## 2023-06-19 PROCEDURE — 80053 COMPREHEN METABOLIC PANEL: CPT

## 2023-06-19 PROCEDURE — 99285 EMERGENCY DEPT VISIT HI MDM: CPT | Mod: ,,, | Performed by: EMERGENCY MEDICINE

## 2023-06-19 PROCEDURE — 20000000 HC ICU ROOM

## 2023-06-19 PROCEDURE — 85730 THROMBOPLASTIN TIME PARTIAL: CPT | Mod: 91 | Performed by: STUDENT IN AN ORGANIZED HEALTH CARE EDUCATION/TRAINING PROGRAM

## 2023-06-19 PROCEDURE — 99900035 HC TECH TIME PER 15 MIN (STAT)

## 2023-06-19 PROCEDURE — 25000003 PHARM REV CODE 250

## 2023-06-19 PROCEDURE — 85610 PROTHROMBIN TIME: CPT

## 2023-06-19 PROCEDURE — 99233 PR SUBSEQUENT HOSPITAL CARE,LEVL III: ICD-10-PCS | Mod: GC,,, | Performed by: INTERNAL MEDICINE

## 2023-06-19 PROCEDURE — 93005 ELECTROCARDIOGRAM TRACING: CPT

## 2023-06-19 PROCEDURE — 93010 ELECTROCARDIOGRAM REPORT: CPT | Mod: ,,, | Performed by: INTERNAL MEDICINE

## 2023-06-19 PROCEDURE — 99223 PR INITIAL HOSPITAL CARE,LEVL III: ICD-10-PCS | Mod: AI,,, | Performed by: STUDENT IN AN ORGANIZED HEALTH CARE EDUCATION/TRAINING PROGRAM

## 2023-06-19 PROCEDURE — 99233 SBSQ HOSP IP/OBS HIGH 50: CPT | Mod: GC,,, | Performed by: INTERNAL MEDICINE

## 2023-06-19 PROCEDURE — 93010 EKG 12-LEAD: ICD-10-PCS | Mod: 76,,, | Performed by: INTERNAL MEDICINE

## 2023-06-19 PROCEDURE — 99223 1ST HOSP IP/OBS HIGH 75: CPT | Mod: AI,,, | Performed by: STUDENT IN AN ORGANIZED HEALTH CARE EDUCATION/TRAINING PROGRAM

## 2023-06-19 PROCEDURE — 96374 THER/PROPH/DIAG INJ IV PUSH: CPT

## 2023-06-19 PROCEDURE — 84484 ASSAY OF TROPONIN QUANT: CPT | Mod: 91 | Performed by: STUDENT IN AN ORGANIZED HEALTH CARE EDUCATION/TRAINING PROGRAM

## 2023-06-19 PROCEDURE — 99285 EMERGENCY DEPT VISIT HI MDM: CPT | Mod: 25

## 2023-06-19 PROCEDURE — 63600175 PHARM REV CODE 636 W HCPCS

## 2023-06-19 PROCEDURE — 85730 THROMBOPLASTIN TIME PARTIAL: CPT

## 2023-06-19 PROCEDURE — 93010 ELECTROCARDIOGRAM REPORT: CPT | Mod: 76,,, | Performed by: INTERNAL MEDICINE

## 2023-06-19 PROCEDURE — 99285 PR EMERGENCY DEPT VISIT,LEVEL V: ICD-10-PCS | Mod: ,,, | Performed by: EMERGENCY MEDICINE

## 2023-06-19 PROCEDURE — 25000242 PHARM REV CODE 250 ALT 637 W/ HCPCS: Performed by: STUDENT IN AN ORGANIZED HEALTH CARE EDUCATION/TRAINING PROGRAM

## 2023-06-19 PROCEDURE — 85025 COMPLETE CBC W/AUTO DIFF WBC: CPT

## 2023-06-19 RX ORDER — INSULIN ASPART 100 [IU]/ML
0-5 INJECTION, SOLUTION INTRAVENOUS; SUBCUTANEOUS
Status: DISCONTINUED | OUTPATIENT
Start: 2023-06-19 | End: 2023-06-21 | Stop reason: HOSPADM

## 2023-06-19 RX ORDER — METOPROLOL SUCCINATE 25 MG/1
25 TABLET, EXTENDED RELEASE ORAL DAILY
Status: DISCONTINUED | OUTPATIENT
Start: 2023-06-19 | End: 2023-06-21 | Stop reason: HOSPADM

## 2023-06-19 RX ORDER — CLOPIDOGREL 300 MG/1
600 TABLET, FILM COATED ORAL
Status: COMPLETED | OUTPATIENT
Start: 2023-06-19 | End: 2023-06-19

## 2023-06-19 RX ORDER — NITROGLYCERIN 0.4 MG/1
0.4 TABLET SUBLINGUAL EVERY 5 MIN PRN
Status: DISCONTINUED | OUTPATIENT
Start: 2023-06-19 | End: 2023-06-21 | Stop reason: HOSPADM

## 2023-06-19 RX ORDER — ISOSORBIDE MONONITRATE 30 MG/1
30 TABLET, EXTENDED RELEASE ORAL DAILY
Status: DISCONTINUED | OUTPATIENT
Start: 2023-06-19 | End: 2023-06-20

## 2023-06-19 RX ORDER — IPRATROPIUM BROMIDE AND ALBUTEROL SULFATE 2.5; .5 MG/3ML; MG/3ML
3 SOLUTION RESPIRATORY (INHALATION) EVERY 6 HOURS PRN
Status: DISCONTINUED | OUTPATIENT
Start: 2023-06-19 | End: 2023-06-21 | Stop reason: HOSPADM

## 2023-06-19 RX ORDER — IBUPROFEN 200 MG
16 TABLET ORAL
Status: DISCONTINUED | OUTPATIENT
Start: 2023-06-19 | End: 2023-06-19

## 2023-06-19 RX ORDER — AMLODIPINE AND BENAZEPRIL HYDROCHLORIDE 5; 20 MG/1; MG/1
1 CAPSULE ORAL DAILY
Status: DISCONTINUED | OUTPATIENT
Start: 2023-06-20 | End: 2023-06-19

## 2023-06-19 RX ORDER — SODIUM CHLORIDE 0.9 % (FLUSH) 0.9 %
10 SYRINGE (ML) INJECTION
Status: CANCELLED | OUTPATIENT
Start: 2023-06-19

## 2023-06-19 RX ORDER — SODIUM CHLORIDE 0.9 % (FLUSH) 0.9 %
10 SYRINGE (ML) INJECTION EVERY 8 HOURS PRN
Status: DISCONTINUED | OUTPATIENT
Start: 2023-06-19 | End: 2023-06-21 | Stop reason: HOSPADM

## 2023-06-19 RX ORDER — GLUCAGON 1 MG
1 KIT INJECTION
Status: DISCONTINUED | OUTPATIENT
Start: 2023-06-19 | End: 2023-06-21 | Stop reason: HOSPADM

## 2023-06-19 RX ORDER — ATORVASTATIN CALCIUM 40 MG/1
80 TABLET, FILM COATED ORAL NIGHTLY
Status: DISCONTINUED | OUTPATIENT
Start: 2023-06-20 | End: 2023-06-21 | Stop reason: HOSPADM

## 2023-06-19 RX ORDER — HEPARIN SODIUM,PORCINE/D5W 25000/250
0-40 INTRAVENOUS SOLUTION INTRAVENOUS CONTINUOUS
Status: DISCONTINUED | OUTPATIENT
Start: 2023-06-19 | End: 2023-06-20

## 2023-06-19 RX ORDER — POLYETHYLENE GLYCOL 3350 17 G/17G
17 POWDER, FOR SOLUTION ORAL 2 TIMES DAILY PRN
Status: DISCONTINUED | OUTPATIENT
Start: 2023-06-19 | End: 2023-06-21 | Stop reason: HOSPADM

## 2023-06-19 RX ORDER — NALOXONE HCL 0.4 MG/ML
0.02 VIAL (ML) INJECTION
Status: DISCONTINUED | OUTPATIENT
Start: 2023-06-19 | End: 2023-06-21 | Stop reason: HOSPADM

## 2023-06-19 RX ORDER — ASPIRIN 325 MG
325 TABLET ORAL
Status: COMPLETED | OUTPATIENT
Start: 2023-06-19 | End: 2023-06-19

## 2023-06-19 RX ORDER — PROCHLORPERAZINE EDISYLATE 5 MG/ML
5 INJECTION INTRAMUSCULAR; INTRAVENOUS EVERY 6 HOURS PRN
Status: DISCONTINUED | OUTPATIENT
Start: 2023-06-19 | End: 2023-06-21 | Stop reason: HOSPADM

## 2023-06-19 RX ORDER — DEXTROSE 40 %
30 GEL (GRAM) ORAL
Status: DISCONTINUED | OUTPATIENT
Start: 2023-06-19 | End: 2023-06-21 | Stop reason: HOSPADM

## 2023-06-19 RX ORDER — TALC
6 POWDER (GRAM) TOPICAL NIGHTLY PRN
Status: DISCONTINUED | OUTPATIENT
Start: 2023-06-19 | End: 2023-06-21 | Stop reason: HOSPADM

## 2023-06-19 RX ORDER — ASPIRIN 325 MG
325 TABLET ORAL
Status: CANCELLED | OUTPATIENT
Start: 2023-06-19 | End: 2023-06-19

## 2023-06-19 RX ORDER — MORPHINE SULFATE 4 MG/ML
4 INJECTION, SOLUTION INTRAMUSCULAR; INTRAVENOUS
Status: COMPLETED | OUTPATIENT
Start: 2023-06-19 | End: 2023-06-19

## 2023-06-19 RX ORDER — ONDANSETRON 8 MG/1
8 TABLET, ORALLY DISINTEGRATING ORAL EVERY 8 HOURS PRN
Status: DISCONTINUED | OUTPATIENT
Start: 2023-06-19 | End: 2023-06-21 | Stop reason: HOSPADM

## 2023-06-19 RX ORDER — FLUTICASONE PROPIONATE 50 MCG
1 SPRAY, SUSPENSION (ML) NASAL DAILY
Status: DISCONTINUED | OUTPATIENT
Start: 2023-06-19 | End: 2023-06-21 | Stop reason: HOSPADM

## 2023-06-19 RX ORDER — PANTOPRAZOLE SODIUM 40 MG/1
40 TABLET, DELAYED RELEASE ORAL DAILY
Status: DISCONTINUED | OUTPATIENT
Start: 2023-06-20 | End: 2023-06-21 | Stop reason: HOSPADM

## 2023-06-19 RX ORDER — ACETAMINOPHEN 325 MG/1
650 TABLET ORAL EVERY 4 HOURS PRN
Status: DISCONTINUED | OUTPATIENT
Start: 2023-06-19 | End: 2023-06-21 | Stop reason: HOSPADM

## 2023-06-19 RX ORDER — DEXTROSE 40 %
15 GEL (GRAM) ORAL
Status: DISCONTINUED | OUTPATIENT
Start: 2023-06-19 | End: 2023-06-21 | Stop reason: HOSPADM

## 2023-06-19 RX ORDER — ASPIRIN 81 MG/1
81 TABLET ORAL DAILY
Status: DISCONTINUED | OUTPATIENT
Start: 2023-06-20 | End: 2023-06-21 | Stop reason: HOSPADM

## 2023-06-19 RX ORDER — AMLODIPINE BESYLATE 10 MG/1
10 TABLET ORAL DAILY
Status: DISCONTINUED | OUTPATIENT
Start: 2023-06-20 | End: 2023-06-21 | Stop reason: HOSPADM

## 2023-06-19 RX ORDER — CLOPIDOGREL BISULFATE 75 MG/1
75 TABLET ORAL DAILY
Status: DISCONTINUED | OUTPATIENT
Start: 2023-06-20 | End: 2023-06-21 | Stop reason: HOSPADM

## 2023-06-19 RX ORDER — IBUPROFEN 200 MG
24 TABLET ORAL
Status: DISCONTINUED | OUTPATIENT
Start: 2023-06-19 | End: 2023-06-19

## 2023-06-19 RX ADMIN — NITROGLYCERIN 0.4 MG: 0.4 TABLET, ORALLY DISINTEGRATING SUBLINGUAL at 05:06

## 2023-06-19 RX ADMIN — CLOPIDOGREL BISULFATE 600 MG: 300 TABLET, FILM COATED ORAL at 12:06

## 2023-06-19 RX ADMIN — NITROGLYCERIN 0.4 MG: 0.4 TABLET, ORALLY DISINTEGRATING SUBLINGUAL at 02:06

## 2023-06-19 RX ADMIN — ASPIRIN 325 MG ORAL TABLET 325 MG: 325 PILL ORAL at 12:06

## 2023-06-19 RX ADMIN — ACETAMINOPHEN 650 MG: 325 TABLET ORAL at 02:06

## 2023-06-19 RX ADMIN — DEXTROSE 15000 MG: 15 GEL ORAL at 11:06

## 2023-06-19 RX ADMIN — HEPARIN SODIUM AND DEXTROSE 12 UNITS/KG/HR: 10000; 5 INJECTION INTRAVENOUS at 12:06

## 2023-06-19 RX ADMIN — METOPROLOL SUCCINATE 25 MG: 25 TABLET, EXTENDED RELEASE ORAL at 02:06

## 2023-06-19 RX ADMIN — MORPHINE SULFATE 4 MG: 4 INJECTION INTRAVENOUS at 09:06

## 2023-06-19 RX ADMIN — ISOSORBIDE MONONITRATE 30 MG: 30 TABLET, EXTENDED RELEASE ORAL at 05:06

## 2023-06-19 RX ADMIN — FLUTICASONE PROPIONATE 50 MCG: 50 SPRAY, METERED NASAL at 09:06

## 2023-06-19 NOTE — ED PROVIDER NOTES
Encounter Date: 6/19/2023       History     Chief Complaint   Patient presents with    Chest Pain     Took ntg last night and ran out, + stents     Paul Deleon is a 72 y.o. male with a PMH of MI s/p PCI with stenting October 2022, type 2 diabetes, hypertension, and hyperlipidemia presenting to [facility] Department with a chief complaint of 2 days of mid substernal chest pain that feels like heaviness, radiates to his right shoulder, fluctuates in intensity but is constant, and is 3/10 at present.  He states that he took nitroglycerin a total of 6 separate times over the last 2 days and did not get any relief.  He is not taken any nitroglycerin today.  He endorses dyspnea and nausea when the pain is severe. He takes aspirin and Plavix at home but did not take any today. Denies fever, chills, cough, emesis, abdominal pain, diarrhea, dysuria, or LE swelling. No recent travel, surgery, or immobilization. No history of PE or DVT.       The history is provided by the patient and medical records. No  was used.   Review of patient's allergies indicates:  No Known Allergies  Past Medical History:   Diagnosis Date    Cataract     Coronary artery disease     Diabetes mellitus type II     GERD (gastroesophageal reflux disease)     Heart attack     Hyperlipidemia     Hypertension     MI (mitral incompetence)     MI (myocardial infarction)     Ulcer      Past Surgical History:   Procedure Laterality Date    ANKLE SURGERY      BACK SURGERY      CORONARY ANGIOPLASTY WITH STENT PLACEMENT      ELBOW SURGERY      lft    THROAT SURGERY       Family History   Problem Relation Age of Onset    Heart disease Mother         cad    Heart disease Father         pacemaker    COPD Sister         awaiting lung transplant    Cancer Brother         lung, metastatic    Melanoma Neg Hx     Psoriasis Neg Hx     Lupus Neg Hx     Eczema Neg Hx     Acne Neg Hx     Amblyopia Neg Hx     Blindness Neg Hx     Cataracts Neg  Hx     Glaucoma Neg Hx     Macular degeneration Neg Hx     Strabismus Neg Hx     Retinal detachment Neg Hx      Social History     Tobacco Use    Smoking status: Never    Smokeless tobacco: Never   Substance Use Topics    Alcohol use: Yes     Alcohol/week: 0.0 standard drinks     Comment: rare alcohol    Drug use: No         Physical Exam     Initial Vitals [06/19/23 0801]   BP Pulse Resp Temp SpO2   116/68 81 20 97.9 °F (36.6 °C) 97 %      MAP       --         Physical Exam    Nursing note and vitals reviewed.  Constitutional: He appears well-developed and well-nourished. No distress.   HENT:   Head: Normocephalic and atraumatic.   Right Ear: External ear normal.   Left Ear: External ear normal.   Mouth/Throat: Oropharynx is clear and moist.   Eyes: Conjunctivae and EOM are normal. No scleral icterus.   Neck: Neck supple.   Cardiovascular:  Normal rate, regular rhythm, normal heart sounds and intact distal pulses.           Pulmonary/Chest: Breath sounds normal. No stridor. No respiratory distress. He has no wheezes. He has no rhonchi. He has no rales.   Abdominal: Abdomen is soft. He exhibits no distension. There is no abdominal tenderness. There is no rebound and no guarding.   Musculoskeletal:         General: No edema.      Cervical back: Neck supple.     Neurological: He is alert and oriented to person, place, and time. GCS score is 15. GCS eye subscore is 4. GCS verbal subscore is 5. GCS motor subscore is 6.   Skin: Skin is warm and dry. Capillary refill takes less than 2 seconds. No rash noted.   Psychiatric: He has a normal mood and affect.       ED Course   Procedures  Labs Reviewed   CBC W/ AUTO DIFFERENTIAL - Abnormal; Notable for the following components:       Result Value    Hemoglobin 13.8 (*)     All other components within normal limits    Narrative:     Release to patient->Immediate   COMPREHENSIVE METABOLIC PANEL - Abnormal; Notable for the following components:    CO2 18 (*)     Glucose 147 (*)      Total Bilirubin 1.1 (*)     All other components within normal limits    Narrative:     Release to patient->Immediate   TROPONIN I    Narrative:     Release to patient->Immediate   B-TYPE NATRIURETIC PEPTIDE    Narrative:     Release to patient->Immediate   TROPONIN I    Narrative:     Draw baseline aPTT prior to starting the heparin bolus or  infusion  (if patient is on warfarin prior to heparin therapy)   TROPONIN ISTAT   APTT    Narrative:     Draw baseline aPTT prior to starting the heparin bolus or  infusion  (if patient is on warfarin prior to heparin therapy)   PROTIME-INR    Narrative:     Draw baseline aPTT prior to starting the heparin bolus or  infusion  (if patient is on warfarin prior to heparin therapy)   POCT TROPONIN   POCT TROPONIN     EKG Readings: (Independently Interpreted)   Initial Reading: No STEMI. Previous EKG: Compared with most recent EKG Rhythm: Normal Sinus Rhythm. Heart Rate: 81. ST Segments: Normal ST Segments. Axis: Normal. Clinical Impression: Normal Sinus Rhythm   ECG Results              EKG 12-lead (Final result)  Result time 06/19/23 12:55:36      Final result by Interface, Lab In [facility] (06/19/23 12:55:36)                   Narrative:    Test Reason : R07.9,    Vent. Rate : 072 BPM     Atrial Rate : 072 BPM     P-R Int : 172 ms          QRS Dur : 080 ms      QT Int : 398 ms       P-R-T Axes : 044 -01 052 degrees     QTc Int : 435 ms    Normal sinus rhythm  Normal ECG  When compared with ECG of 19-JUN-2023 08:02,  No significant change was found  Confirmed by SANDOR SARABIA MD ([id number]) on 6/19/2023 12:55:29 PM    Referred By: [email or web address]   SELF           Confirmed By:SANDOR SARABIA MD                                     EKG 12-lead (Final result)  Result time 06/19/23 12:43:36      Final result by Interface, Lab In [facility] (06/19/23 12:43:36)                   Narrative:    Test Reason : R07.89,    Vent. Rate : 081 BPM     Atrial Rate : 081 BPM     P-R Int : 172 ms          QRS  Dur : 082 ms      QT Int : 382 ms       P-R-T Axes : 029 -12 042 degrees     QTc Int : 443 ms    Normal sinus rhythm  Normal ECG  When compared with ECG of 10-GERMAN-2023 10:08,  Premature ventricular complexes are no longer Present  Confirmed by SANDOR SARABIA MD ([id number]) on 6/19/2023 12:43:30 PM    Referred By: [email or web address]   SELF           Confirmed By:SANDOR SARABIA MD                                  Imaging Results              X-Ray Chest AP Portable (Final result)  Result time 06/19/23 09:36:40      Final result by Pedrito White MD (06/19/23 09:36:40)                   Impression:      No convincing evidence of acute cardiopulmonary disease.      Electronically signed by: Pedrito White  Date:    06/19/2023  Time:    09:36               Narrative:    EXAMINATION:  XR CHEST AP PORTABLE    CLINICAL HISTORY:  Chest Pain;    TECHNIQUE:  Single frontal view of the chest was performed.    COMPARISON:  Chest radiograph 07/28/2018    FINDINGS:  Monitoring leads overlie the chest.  Cardiomediastinal contours appear to be within normal limits.  Lungs appear essentially clear.  No definite pneumothorax or large volume pleural effusion.    No acute findings identified in the visualized abdomen.  Osseous and soft tissue structures appear without definite acute abnormality.                                    X-Rays:   Independently Interpreted Readings:   Chest X-Ray: No acute abnormalities.   Medications   heparin 25,000 units in dextrose 5% 250 mL (100 units/mL) infusion LOW INTENSITY nomogram - [facility] (12 Units/kg/hr × 74.2 kg (Adjusted) Intravenous New Bag 6/19/23 1252)   heparin 25,000 units in dextrose 5% (100 units/ml) IV bolus from bag - ADDITIONAL PRN BOLUS - 60 units/kg (max bolus 4000 units) (has no administration in time range)   heparin 25,000 units in dextrose 5% (100 units/ml) IV bolus from bag - ADDITIONAL PRN BOLUS - 30 units/kg (max bolus 4000 units) (has no administration in time range)   aspirin EC tablet 81  mg (has no administration in time range)   atorvastatin tablet 80 mg (has no administration in time range)   clopidogreL tablet 75 mg (has no administration in time range)   fluticasone propionate 50 mcg/actuation nasal spray 50 mcg (has no administration in time range)   metoprolol succinate (TOPROL-XL) 24 hr tablet 25 mg (25 mg Oral Given 6/19/23 1410)   pantoprazole EC tablet 40 mg (has no administration in time range)   sodium chloride 0.9% flush 10 mL (has no administration in time range)   albuterol-ipratropium 2.5 mg-0.5 mg/3 mL nebulizer solution 3 mL (has no administration in time range)   melatonin tablet 6 mg (has no administration in time range)   ondansetron disintegrating tablet 8 mg (has no administration in time range)   prochlorperazine injection Soln 5 mg (has no administration in time range)   polyethylene glycol packet 17 g (has no administration in time range)   naloxone 0.4 mg/mL injection 0.02 mg (has no administration in time range)   glucagon (human recombinant) injection 1 mg (has no administration in time range)   acetaminophen tablet 650 mg (650 mg Oral Given 6/19/23 1410)   insulin aspart U-100 pen 0-5 Units (has no administration in time range)   dextrose 10% bolus 125 mL 125 mL (has no administration in time range)   dextrose 10% bolus 250 mL 250 mL (has no administration in time range)   dextrose 40 % gel 15,000 mg (has no administration in time range)   dextrose 40 % gel 30,000 mg (has no administration in time range)   nitroGLYCERIN SL tablet 0.4 mg (0.4 mg Sublingual Given 6/19/23 1738)   nitroGLYCERIN 2% TD oint ointment 2 inch (0 inches Topical (Top) Hold 6/19/23 1515)   insulin detemir U-100 (Levemir) pen 10 Units (has no administration in time range)   isosorbide mononitrate 24 hr tablet 30 mg (30 mg Oral Given 6/19/23 1734)   amLODIPine tablet 10 mg (has no administration in time range)   morphine injection 4 mg (4 mg Intravenous Given 6/19/23 0910)   aspirin tablet 325 mg  (325 mg Oral Given 6/19/23 1214)   heparin 25,000 units in dextrose 5% (100 units/ml) IV bolus from bag INITIAL BOLUS (max bolus 4000 units) (4,000 Units Intravenous Bolus from Bag 6/19/23 1247)   clopidogreL tablet 600 mg (600 mg Oral Given 6/19/23 1214)     Medical Decision Making:   History:   Old Medical Records: I decided to obtain old medical records.  Old Records Summarized: records from clinic visits, records from previous admission(s), records from another hospital and other records.       <> Summary of Records: 6/13 NM stress OSH:  There is a partially reversible inferior defect, consistent with ischemia/    impaired perfusion and infarct.    Negative for myocardial ischemia using ECG criteria.     Initial Assessment:   Patient is afebrile, hemodynamically stable, and in no acute distress on arrival.   He complains of chest pain x 2 days that is waxing and waning in severity, not relieved by NTG. History of stents, on ASA and plavix but did not take today   Differential Diagnosis:   Differential diagnoses considered include, but not limited to:  ACS   Pneumonia   Pericarditis   Less likely PE, no tachycardia or hypoxia  Considered aortic dissection, but intact and equal distal pulses, no wide mediastinum on CXR      Independently Interpreted Test(s):   I have ordered and independently interpreted X-rays - see prior notes.  I have ordered and independently interpreted EKG Reading(s) - see prior notes  Clinical Tests:   Lab Tests: Ordered and Reviewed  Radiological Study: Ordered and Reviewed  Medical Tests: Ordered and Reviewed  ED Management:  EKG without evidence of STEMI   CXR without acute abnormalities     Initial troponin normal   Labs with slight anemia     Given his history and the quality of his pain, pt was started on ACS protocol with heparin gtt, plavix, and ASA   I discussed admission with hospital medicine who agreed to admit the patient for further evaluation and management.     Other:   I  have discussed this case with another health care provider.          Attending Attestation:   Physician Attestation Statement for Resident:  As the supervising MD   Physician Attestation Statement: I have personally seen and examined this patient.   I agree with the above history.  -: Emergent evaluation of a 71 yo male presenting with atraumatic chest pain, waxing and waning for the past few days.   Taking nitro with minimal relief. Pain occurs at rest.    As the supervising MD I agree with the above PE.   -: No distress  No vesicular rash overlying chest wall  Non-tender chest wall  Lungs clear  Non-tender abdomen   RRR  Intact and equal radial, DP pulses     As the supervising MD I agree with the above treatment, course, plan, and disposition.   -: On reassessment, pain resolved in ED.  No ischemic EKG changes and initial trop negative.   Given his history and description of pain, initiated ACS protocol.    He remains w/o pain in ED, but was instructed to inform team if pain returns.  Plan for admission to trend troponin, cardiology consult.       ED Diagnoses:  Chest pain    I have reviewed and agree with the residents interpretation of the following: lab data, x-rays and EKG.  I have reviewed the following: old records at this facility.              ED Course as of 06/19/23 1818 Mon Jun 19, 2023   1002 WBC: 8.79  No leukocytosis  [AB]   1043 Pain resolved after morphine.  [AB]      ED Course User Index  [AB] Troy Khan MD                 Clinical Impression:   Final diagnoses:  [R07.89] Chest discomfort  [R07.9] Chest pain  [I24.9] ACS (acute coronary syndrome)        ED Disposition Condition    Admit Stable                Alycia Hermosillo MD  Resident  06/19/23 1818       Troy Khan MD  06/20/23 2509

## 2023-06-19 NOTE — HPI
73 y/o male with hx of HTN, HLD, Obesity, CAD (STEMI s/p OM3 stent in 05/13, angiogram in 9/2013 with nonobstructive disease, reported PCI at OSH in 2014-no records), DM2, preserved EF who presents to [facility] with chest pain    His chest pain started this AM and has been constant even at rest.  He says that this is similar to his presentation prior.  SL NTG sometimes gets rid of chest pain.  Admitted to .  EKG with no acute changes.  Trops negative.  IC c/s for possible intervention on the RCA    Of note, he had a SPECT in 2018 which showed a partially reversible defect in the RCA territory.  Had a cath 5d ago (no images in our chart) which showed patent vessels but a 50% stenosis of the RCA that was not intervened upon.

## 2023-06-19 NOTE — NURSING
Pt arrived to unit experiencing 5/10 midsternal chest pain occasionally radiating down to his right arm. MD Tg notified and came to bedside to assess patient. Nitro x3 given as noted in MAR. Vitals remained stable. Patient now chest pain free.

## 2023-06-19 NOTE — SUBJECTIVE & OBJECTIVE
Past Medical History:   Diagnosis Date    Cataract     Coronary artery disease     Diabetes mellitus type II     GERD (gastroesophageal reflux disease)     Heart attack     Hyperlipidemia     Hypertension     MI (mitral incompetence)     MI (myocardial infarction)     Ulcer        Past Surgical History:   Procedure Laterality Date    ANKLE SURGERY      BACK SURGERY      CORONARY ANGIOPLASTY WITH STENT PLACEMENT      ELBOW SURGERY      lft    THROAT SURGERY         Review of patient's allergies indicates:  No Known Allergies    No current facility-administered medications on file prior to encounter.     Current Outpatient Medications on File Prior to Encounter   Medication Sig    amLODIPine-benazepriL (LOTREL) 5-40 mg per capsule TAKE 1 CAPSULE EVERY DAY    aspirin (ECOTRIN) 81 MG EC tablet Take 1 tablet (81 mg total) by mouth once daily.    atorvastatin (LIPITOR) 80 MG tablet TAKE 1 TABLET EVERY EVENING    blood sugar diagnostic (FREESTYLE LITE STRIPS) Strp Inject 1 strip into the skin 3 (three) times daily.    blood sugar diagnostic Strp To check BG 4 times daily, to use with insurance preferred meter E11.9    blood-glucose meter kit To check BG 4 times daily, to use with insurance preferred meter E11.9    clopidogreL (PLAVIX) 75 mg tablet Take 1 tablet (75 mg total) by mouth once daily.    cyanocobalamin, vitamin B-12, (VITAMIN B-12 ORAL)     diclofenac sodium (VOLTAREN) 1 % Gel Apply 2 g topically 3 (three) times daily. Apply to the area of foot or toe pain 2-3x per day or night as needed    fluticasone propionate (FLONASE) 50 mcg/actuation nasal spray 1 spray (50 mcg total) by Each Nostril route once daily.    glipiZIDE (GLUCOTROL) 10 MG TR24 Take 30 mins before breakfast or lunch.    insulin aspart U-100 (NOVOLOG FLEXPEN U-100 INSULIN) 100 unit/mL (3 mL) InPn pen 6 units before meals plus sliding scale for TDD 25 units    lancets (FREESTYLE LANCETS) 28 gauge Misc USE THREE TIMES DAILY    lancets Misc To check  "BG 4 times daily, to use with insurance preferred meter E11.9    metFORMIN (GLUCOPHAGE) 1000 MG tablet Take 1 tablet (1,000 mg total) by mouth daily with breakfast.    metoprolol succinate (TOPROL-XL) 25 MG 24 hr tablet Take 1 tablet (25 mg total) by mouth once daily.    nitroGLYCERIN (NITROSTAT) 0.4 MG SL tablet Place 1 tablet (0.4 mg total) under the tongue every 5 (five) minutes as needed for Chest pain.    pantoprazole (PROTONIX) 40 MG tablet TAKE 1 TABLET EVERY DAY    pen needle, diabetic (BD ULTRA-FINE SHORT PEN NEEDLE) 31 gauge x 5/16" Ndle Takes four injections daily E11.9    TRESIBA FLEXTOUCH U-200 200 unit/mL (3 mL) insulin pen ADMINISTER 60 UNITS UNDER THE SKIN EVERY EVENING    vitamin E 400 UNIT capsule      Family History       Problem Relation (Age of Onset)    COPD Sister    Cancer Brother    Heart disease Mother, Father          Tobacco Use    Smoking status: Never    Smokeless tobacco: Never   Substance and Sexual Activity    Alcohol use: Yes     Alcohol/week: 0.0 standard drinks     Comment: rare alcohol    Drug use: No    Sexual activity: Yes     Partners: Female     Birth control/protection: Other-see comments     Review of Systems   Constitutional:  Negative for activity change, appetite change, chills, fatigue and fever.   Respiratory:  Positive for shortness of breath. Negative for apnea and cough.    Cardiovascular:  Positive for chest pain. Negative for palpitations and leg swelling.   Gastrointestinal:  Negative for abdominal distention, constipation, diarrhea, nausea and vomiting.   Genitourinary:  Negative for difficulty urinating.   Neurological:  Negative for dizziness, tremors, weakness, light-headedness and numbness.   All other systems reviewed and are negative.  Objective:     Vital Signs (Most Recent):  Temp: 98.9 °F (37.2 °C) (06/19/23 1548)  Pulse: 64 (06/19/23 1548)  Resp: 18 (06/19/23 1548)  BP: (!) 103/58 (06/19/23 1548)  SpO2: 98 % (06/19/23 1548) Vital Signs (24h " Range):  Temp:  [97.9 °F (36.6 °C)-98.9 °F (37.2 °C)] 98.9 °F (37.2 °C)  Pulse:  [63-81] 64  Resp:  [16-22] 18  SpO2:  [97 %-98 %] 98 %  BP: (100-145)/(56-74) 103/58     Weight: 89.8 kg (198 lb)  Body mass index is 31.96 kg/m².     Physical Exam  Gen: in NAD, appears stated age  Neuro: AAOx3, motor, sensory, and strength grossly intact BL  HEENT: NTNC, EOMI, PERRL, MMM  CVS: RRR, no m/r/g; S1/S2 auscultated with no S3 or S4; capillary refill < 2 sec  Resp: lungs CTAB, no w/r/r; no belabored breathing or accessory muscle use appreciated   Abd: BS+ in all 4 quadrants; NTND, soft to palpation; no organomegaly appreciated   Extrem: pulses full, equal, and regular over all 4 extremities; no UE or LE edema BL    Significant Labs: All pertinent labs within the past 24 hours have been reviewed.  CBC:   Recent Labs   Lab 06/19/23  0858   WBC 8.79   HGB 13.8*   HCT 42.5        CMP:   Recent Labs   Lab 06/19/23  0858      K 4.5      CO2 18*   *   BUN 13   CREATININE 0.8   CALCIUM 9.5   PROT 7.0   ALBUMIN 3.8   BILITOT 1.1*   ALKPHOS 91   AST 18   ALT 12   ANIONGAP 13     Troponin:   Recent Labs   Lab 06/19/23  0858 06/19/23  1136 06/19/23  1451   TROPONINI 0.006 <0.006 <0.006     Urine Studies: No results for input(s): COLORU, APPEARANCEUA, PHUR, SPECGRAV, PROTEINUA, GLUCUA, KETONESU, BILIRUBINUA, OCCULTUA, NITRITE, UROBILINOGEN, LEUKOCYTESUR, RBCUA, WBCUA, BACTERIA, SQUAMEPITHEL, HYALINECASTS in the last 48 hours.    Invalid input(s): WRIGHTSUR    Significant Imaging: I have reviewed all pertinent imaging results/findings within the past 24 hours.

## 2023-06-19 NOTE — ASSESSMENT & PLAN NOTE
- he has h/o stent placement 10/2022- and previously in 2013 and possibly 2014?   - continue asa, plavix, HI statin

## 2023-06-19 NOTE — CONSULTS
[facility] - [facility]iology Stepdown  Cardiology  Consult Note    Patient Name: Paul Deleon  MRN: [id number]  Admission Date: 6/19/2023  Hospital Length of Stay: 0 days  Code Status: Full Code   Attending Provider: Erin Mas MD   Consulting Provider: Vinicio Olson MD  Primary Care Physician: Bree Larose MD  Principal Problem:Unstable angina    Patient information was obtained from patient, past medical records and ER records.     Inpatient consult to Cardiology  Consult performed by: Vinicio Olson MD  Consult ordered by: Erin Mas MD  Reason for consult: unstable angina  Assessment/Recommendations: Please see A&P        Subjective:     Reason for consult:  Unstable angina     HPI:   Patient is a 72 year old male with history of CAD (STEMI s/p PCI OM 3 in May 2023, coronary angiogram 09/2013 with nonobstructive disease, reported PCI in 2014 at OSH), T2DM, HTN, HLD, Obesity, preserved EF that presents for chest discomfort, crescendo-decrescendo, radiating to right arm, pressure like pain. Also reports a different sharp chest pain with movement that is completely separate from his current discomfort. He states the pressure pain started on Saturday while cutting his grass. Tried NTG x 3 and discomfort only relieved from 8 --> 6. Tried the same on Saturday with similar results. Couldn't take the discomfort any more and decided to come in to the hospital.   Recently underwent stress testing with SPECT imaging at [facility] on 06/13/2023 and was found to have a partially reversible inferior wall defect. Underwent coronary angiogram 06/14/2023 and found to have a RCA/PDA 50% defect with patent stents in the LAD and OM3. TTE at [facility] showed LVEF 65%.       Past Medical History:   Diagnosis Date    Cataract     Coronary artery disease     Diabetes mellitus type II     GERD (gastroesophageal reflux disease)     Heart attack     Hyperlipidemia      Hypertension     MI (mitral incompetence)     MI (myocardial infarction)     Ulcer        Past Surgical History:   Procedure Laterality Date    ANKLE SURGERY      BACK SURGERY      CORONARY ANGIOPLASTY WITH STENT PLACEMENT      ELBOW SURGERY      lft    THROAT SURGERY         Review of patient's allergies indicates:  No Known Allergies    No current facility-administered medications on file prior to encounter.     Current Outpatient Medications on File Prior to Encounter   Medication Sig    amLODIPine-benazepriL (LOTREL) 5-40 mg per capsule TAKE 1 CAPSULE EVERY DAY    aspirin (ECOTRIN) 81 MG EC tablet Take 1 tablet (81 mg total) by mouth once daily.    atorvastatin (LIPITOR) 80 MG tablet TAKE 1 TABLET EVERY EVENING    blood sugar diagnostic (FREESTYLE LITE STRIPS) Strp Inject 1 strip into the skin 3 (three) times daily.    blood sugar diagnostic Strp To check BG 4 times daily, to use with insurance preferred meter E11.9    blood-glucose meter kit To check BG 4 times daily, to use with insurance preferred meter E11.9    clopidogreL (PLAVIX) 75 mg tablet Take 1 tablet (75 mg total) by mouth once daily.    cyanocobalamin, vitamin B-12, (VITAMIN B-12 ORAL)     diclofenac sodium (VOLTAREN) 1 % Gel Apply 2 g topically 3 (three) times daily. Apply to the area of foot or toe pain 2-3x per day or night as needed    fluticasone propionate (FLONASE) 50 mcg/actuation nasal spray 1 spray (50 mcg total) by Each Nostril route once daily.    glipiZIDE (GLUCOTROL) 10 MG TR24 Take 30 mins before breakfast or lunch.    insulin aspart U-100 (NOVOLOG FLEXPEN U-100 INSULIN) 100 unit/mL (3 mL) InPn pen 6 units before meals plus sliding scale for TDD 25 units    lancets (FREESTYLE LANCETS) 28 gauge Misc USE THREE TIMES DAILY    lancets Misc To check BG 4 times daily, to use with insurance preferred meter E11.9    metFORMIN (GLUCOPHAGE) 1000 MG tablet Take 1 tablet (1,000 mg total) by mouth daily with breakfast.     "metoprolol succinate (TOPROL-XL) 25 MG 24 hr tablet Take 1 tablet (25 mg total) by mouth once daily.    nitroGLYCERIN (NITROSTAT) 0.4 MG SL tablet Place 1 tablet (0.4 mg total) under the tongue every 5 (five) minutes as needed for Chest pain.    pantoprazole (PROTONIX) 40 MG tablet TAKE 1 TABLET EVERY DAY    pen needle, diabetic (BD ULTRA-FINE SHORT PEN NEEDLE) 31 gauge x 5/16" Ndle Takes four injections daily E11.9    TRESIBA FLEXTOUCH U-200 200 unit/mL (3 mL) insulin pen ADMINISTER 60 UNITS UNDER THE SKIN EVERY EVENING    vitamin E 400 UNIT capsule      Family History       Problem Relation (Age of Onset)    COPD Sister    Cancer Brother    Heart disease Mother, Father          Tobacco Use    Smoking status: Never    Smokeless tobacco: Never   Substance and Sexual Activity    Alcohol use: Yes     Alcohol/week: 0.0 standard drinks     Comment: rare alcohol    Drug use: No    Sexual activity: Yes     Partners: Female     Birth control/protection: Other-see comments     Review of Systems   Constitutional: Negative for chills and fever.   Cardiovascular:  Positive for chest pain (1/10 after 2 NTGs). Negative for orthopnea and palpitations.   Respiratory:  Negative for cough and shortness of breath.    Gastrointestinal:  Negative for abdominal pain.   Neurological:  Negative for dizziness, headaches and light-headedness.   Psychiatric/Behavioral:  Negative for altered mental status.    Objective:     Vital Signs (Most Recent):  Temp: 98.9 °F (37.2 °C) (06/19/23 1548)  Pulse: 64 (06/19/23 1548)  Resp: 18 (06/19/23 1548)  BP: (!) 103/58 (06/19/23 1548)  SpO2: 98 % (06/19/23 1548) Vital Signs (24h Range):  Temp:  [97.9 °F (36.6 °C)-98.9 °F (37.2 °C)] 98.9 °F (37.2 °C)  Pulse:  [63-81] 64  Resp:  [16-22] 18  SpO2:  [97 %-98 %] 98 %  BP: (100-145)/(56-74) 103/58     Weight: 89.8 kg (198 lb)  Body mass index is 31.96 kg/m².    SpO2: 98 %       No intake or output data in the 24 hours ending 06/19/23 " 1641    Lines/Drains/Airways       Peripheral Intravenous Line  Duration                  Peripheral IV - Single Lumen 20 G Anterior;Distal;Left Forearm -- days                     Physical Exam  Vitals reviewed.   Constitutional:       General: He is not in acute distress.     Appearance: Normal appearance. He is obese. He is not toxic-appearing.   HENT:      Head: Normocephalic and atraumatic.      Mouth/Throat:      Mouth: Mucous membranes are moist.   Cardiovascular:      Rate and Rhythm: Normal rate and regular rhythm.      Heart sounds: No murmur heard.    No friction rub. No gallop.   Pulmonary:      Effort: Pulmonary effort is normal. No respiratory distress.      Breath sounds: Normal breath sounds.   Abdominal:      Palpations: Abdomen is soft.   Musculoskeletal:      Right lower leg: No edema.      Left lower leg: No edema.   Skin:     General: Skin is warm.   Neurological:      Mental Status: He is alert. Mental status is at baseline.        Significant Labs: BMP:   Recent Labs   Lab 06/19/23  0858   *      K 4.5      CO2 18*   BUN 13   CREATININE 0.8   CALCIUM 9.5   , CMP   Recent Labs   Lab 06/19/23  0858      K 4.5      CO2 18*   *   BUN 13   CREATININE 0.8   CALCIUM 9.5   PROT 7.0   ALBUMIN 3.8   BILITOT 1.1*   ALKPHOS 91   AST 18   ALT 12   ANIONGAP 13   , CBC   Recent Labs   Lab 06/19/23  0858   WBC 8.79   HGB 13.8*   HCT 42.5      , INR   Recent Labs   Lab 06/19/23  1136   INR 1.0   , Lipid Panel No results for input(s): CHOL, HDL, LDLCALC, TRIG, CHOLHDL in the last 48 hours., Troponin   Recent Labs   Lab 06/19/23  0858 06/19/23  1136 06/19/23  1451   TROPONINI 0.006 <0.006 <0.006   , and All pertinent lab results from the last 24 hours have been reviewed.    Significant Imaging: Echocardiogram: Transthoracic echo (TTE) complete (Cupid Only): No results found for this or any previous visit. and EKG: independently reviewed, no TWI or ST changes  concerning for acute ischemia    TTE with LVEF 65% without valvular abnormalities.     Assessment and Plan:     * Unstable angina  Patient is a 72 year old male with history of CAD (STEMI s/p PCI OM 3 in May 2023, coronary angiogram 09/2013 with nonobstructive disease, reported PCI in 2014 at OSH), T2DM, HTN, HLD, Obesity, preserved EF that presents for chest discomfort, crescendo-decrescendo, radiating to right arm, pressure like pain. Recently underwent stress testing with SPECT imaging at [facility] on 06/13/2023 and was found to have a partially reversible inferior wall defect. Underwent coronary angiogram 06/14/2023 and found to have a RCA/PDA 50% defect with patent stents in the LAD and OM3. TTE at [facility] showed LVEF 65%.   Spoke with the Interventional Cardiologist at [facility] , no iFR/FFR performed, communicated that this gentleman had returned for chest discomfort.  ECG without TWI or ST changes concerning for ischemia  Troponins flat and low (0.006) x 2  TTE from [facility] preserved LVEF 65% without valvular abnormalities    - Gave SL NTG 3rd dose on evaluation, CP completely resolved  - Interventional consultation considering 9 doses of SL NTG taken over 3 days , appreciate recommendations  - Aspirin + statin + P2Y12 + heparin gtt as per ACS protocol  - beta Blocker as tolerated  - 2 inch film of nitro paste to the chest  - Transfer patient to CCU for closer monitoring +/- need for NTG gtt  - Patient needs a fresh bottle of SL NTG, advised patient to obtain a fresh bottle after use for NTG can oxidize after 60-90 days    Coronary artery disease  - See unstable angina    HLD  - LDL 51 mg / dL 06/13/2032  - Continue atorvastatin 80 mg PO HS      VTE Risk Mitigation (From admission, onward)         Ordered     Reason for No Pharmacological VTE Prophylaxis  Once        Question:  Reasons:  Answer:  IV Heparin w/in 24 hrs. Pre or Post-Op    06/19/23 8960      IP VTE HIGH RISK PATIENT  Once         06/19/23 1250     Place sequential compression device  Until discontinued         06/19/23 1250     heparin 25,000 units in dextrose 5% (100 units/ml) IV bolus from bag - ADDITIONAL PRN BOLUS - 60 units/kg (max bolus 4000 units)  As needed (PRN)        Question:  Heparin Infusion Adjustment (DO NOT MODIFY ANSWER)  Answer:  [email or web address]\epic\Images\Pharmacy\HeparinInfusions\heparin LOW INTENSITY nomogram for [facility] [id number].pdf    06/19/23 1107     heparin 25,000 units in dextrose 5% (100 units/ml) IV bolus from bag - ADDITIONAL PRN BOLUS - 30 units/kg (max bolus 4000 units)  As needed (PRN)        Question:  Heparin Infusion Adjustment (DO NOT MODIFY ANSWER)  Answer:  [email or web address]\epic\Images\Pharmacy\HeparinInfusions\heparin LOW INTENSITY nomogram for [facility] [id number].pdf    06/19/23 1107     heparin 25,000 units in dextrose 5% 250 mL (100 units/mL) infusion LOW INTENSITY nomogram - [facility]  Continuous        Question Answer Comment   Heparin Infusion Adjustment (DO NOT MODIFY ANSWER) [email or web address]\epic\Images\Pharmacy\HeparinInfusions\heparin LOW INTENSITY nomogram for [facility] [id number].pdf    Begin at (in units/kg/hr) 12        06/19/23 1107            Discussed with staff.    Thank you for your consult. As cares have transition to CCU, we will sign off. Please contact us if you have any additional questions.    Vinicio Olson MD  Cardiology PGY5  [facility] - Cardiology Stepdown

## 2023-06-19 NOTE — ASSESSMENT & PLAN NOTE
Patient's FSGs are controlled on current medication regimen.  Last A1c reviewed-   Lab Results   Component Value Date    HGBA1C 10.2 (H) 06/13/2023     Most recent fingerstick glucose reviewed-   Recent Labs   Lab 06/19/23  1324   POCTGLUCOSE 106     Current correctional scale  Low  Maintain anti-hyperglycemic dose as follows-   Antihyperglycemics (From admission, onward)    Start     Stop Route Frequency Ordered    06/19/23 2100  insulin detemir U-100 (Levemir) pen 10 Units         -- SubQ Nightly 06/19/23 1610    06/19/23 1349  insulin aspart U-100 pen 0-5 Units         -- SubQ Before meals & nightly PRN 06/19/23 1250        Hold Oral hypoglycemics while patient is in the hospital.

## 2023-06-19 NOTE — PLAN OF CARE
"Physician Jessica Marti requesting copy of patient left heart cath from [facility]. Gave MD a copy of release of medical records form to have patient sign and copy faxed over to the facility after calling medical records [phone] . Received 2 numbers to to fax to and sent copy to both numbers. Our team requested to see if the other facility could send images to a program [employer] web. Physician spoke with medical records person and we also sent the back up address in fax.   Your fax has been successfully sent to [phone] at [phone].  ------------------------------------------------------------  From: [id number]  ------------------------------------------------------------  6/19/2023 4:37:36 PM Transmission Record          Sent to [phone] with remote ID "[id number]"          Result: (0/339;0/0) Success          Page record: 1 - 3          Elapsed time: 01:56 on channel 44    Your fax has been successfully sent to [phone] at [phone].  ------------------------------------------------------------  From: [id number]  ------------------------------------------------------------  6/19/2023 4:38:34 PM Transmission Record          Sent to [phone] with remote ID "[id number]"          Result: (0/339;0/0) Success          Page record: 1 - 3          Elapsed time: 01:57 on channel 9    Your fax has been successfully sent to [phone] at [phone].  ------------------------------------------------------------  From: [id number]  ------------------------------------------------------------  6/19/2023 4:47:20 PM Transmission Record          Sent to [phone] with remote ID "[id number]"          Result: (0/339;0/0) Success          Page record: 1 - 3          Elapsed time: 01:56 on channel 32    Your fax has been successfully sent to [phone] at [phone].  ------------------------------------------------------------  From: " "[id number]  ------------------------------------------------------------  6/19/2023 4:47:57 PM Transmission Record          Sent to [phone] with remote ID "[id number]"          Result: (0/339;0/0) Success          Page record: 1 - 3          Elapsed time: 01:56 on channel 16    Physician aware information faxed . Original consent for medical release of information placed in paper chart on unit.     Krysten Stokes RN    [phone]      "

## 2023-06-19 NOTE — PLAN OF CARE
Cardiology plan of care note    This is a 72 year old man with CAD (PCI to OM3 in 2013, reported PCI in 2014 unfortunately no records, PCI to LCX and LAD in 2022), HTN, HLD, DM2, who presented to [facility] with chest pain. He was initially on the hospital medicine service. Unfortunately patient developed worsening chest pain, EKG and troponins have been negative . Interventional cardiology was consulted, and are recommending medical management pending review of recent C images from [facility].      Patient was recently admitted to [facility] in [place] for chest pain and underwent LHC there on 6/14. Cath report below:   Results hemodynamic results: Opening pressure was 110/60, LV pressure was 110/20, pullback showed no gradient across the aortic valve.  Angiographic findings: The study was revaccinated over quality, the   patient in the right dominant circulation.  Left main was normal.  The LAD   was irregular with a patent stent in the mid section.  The circumflex was   a large vessel with a patent stent in the mid section.  Right coronary   artery was a large vessel supplying a large PDA.  There was about 50%   narrowing distally in the AV groove branch.  Prior to its division to a   PDA and posterolateral branch.  Ventricular function was normal with an EF   of 65%.  Impression: Multivessel coronary artery disease,    Patient will be transferred to the CCU service for further care of unstable angina.     - Records and images from OSH have been requested  - Start Imdur 30mg daily  - Increase amlodipine to 10mg tomorrow, stopped benazepril  - Continue Toprol 25mg daily  - Heparin, DAPT, high intensity statin    Jessica Marti MD  CCU resident PGY2

## 2023-06-19 NOTE — CONSULTS
[facility] - Cardiology Stepdown  Interventional Cardiology  Consult Note    Patient Name: Paul Deleon  MRN: [id number]  Admission Date: 6/19/2023  Hospital Length of Stay: 0 days  Code Status: Full Code   Attending Provider: Erin Mas MD  Consulting Provider: Paramjit Velasquez MD  Primary Care Physician: Bree Larose MD  Principal Problem:<principal problem not specified>    Patient information was obtained from patient and ER records.     Inpatient consult to Interventional Cardiology  Consult performed by: Paramjit Velasquez MD  Consult ordered by: Vinicio Olson MD        Subjective:     Chief Complaint:  Chest pain     HPI:  73 y/o male with hx of HTN, HLD, Obesity, CAD (STEMI s/p OM3 stent in 05/13, angiogram in 9/2013 with nonobstructive disease, reported PCI at OSH in 2014-no records), DM2, preserved EF who [place] to [facility] [place] pain    His chest pain started this AM and has been constant even at rest.  He says that this is similar to his presentation prior.  SL NTG sometimes gets rid of chest pain.  Admitted to .  EKG with no acute changes.  Trops negative.  IC c/s for possible intervention on the RCA    Of note, he had a SPECT in 2018 which showed a partially reversible defect in the RCA territory.  Had a cath 5d ago (no images in our chart) which showed patent vessels but a 50% stenosis of the RCA that was not intervened upon.      Past Medical History:   Diagnosis Date    Cataract     Coronary artery disease     Diabetes mellitus type II     GERD (gastroesophageal reflux disease)     Heart attack     Hyperlipidemia     Hypertension     MI (mitral incompetence)     MI (myocardial infarction)     Ulcer        Past Surgical History:   Procedure Laterality Date    ANKLE SURGERY      BACK SURGERY      CORONARY ANGIOPLASTY WITH STENT PLACEMENT      ELBOW SURGERY      lft    THROAT SURGERY         Review of patient's allergies indicates:  No Known Allergies    PTA Medications  "  Medication Sig    amLODIPine-benazepriL (LOTREL) 5-40 mg per capsule TAKE 1 CAPSULE EVERY DAY    aspirin (ECOTRIN) 81 MG EC tablet Take 1 tablet (81 mg total) by mouth once daily.    atorvastatin (LIPITOR) 80 MG tablet TAKE 1 TABLET EVERY EVENING    blood sugar diagnostic (FREESTYLE LITE STRIPS) Strp Inject 1 strip into the skin 3 (three) times daily.    blood sugar diagnostic Strp To check BG 4 times daily, to use with insurance preferred meter E11.9    blood-glucose meter kit To check BG 4 times daily, to use with insurance preferred meter E11.9    clopidogreL (PLAVIX) 75 mg tablet Take 1 tablet (75 mg total) by mouth once daily.    cyanocobalamin, vitamin B-12, (VITAMIN B-12 ORAL)     diclofenac sodium (VOLTAREN) 1 % Gel Apply 2 g topically 3 (three) times daily. Apply to the area of foot or toe pain 2-3x per day or night as needed    fluticasone propionate (FLONASE) 50 mcg/actuation nasal spray 1 spray (50 mcg total) by Each Nostril route once daily.    glipiZIDE (GLUCOTROL) 10 MG TR24 Take 30 mins before breakfast or lunch.    insulin aspart U-100 (NOVOLOG FLEXPEN U-100 INSULIN) 100 unit/mL (3 mL) InPn pen 6 units before meals plus sliding scale for TDD 25 units    lancets (FREESTYLE LANCETS) 28 gauge Misc USE THREE TIMES DAILY    lancets Misc To check BG 4 times daily, to use with insurance preferred meter E11.9    metFORMIN (GLUCOPHAGE) 1000 MG tablet Take 1 tablet (1,000 mg total) by mouth daily with breakfast.    metoprolol succinate (TOPROL-XL) 25 MG 24 hr tablet Take 1 tablet (25 mg total) by mouth once daily.    nitroGLYCERIN (NITROSTAT) 0.4 MG SL tablet Place 1 tablet (0.4 mg total) under the tongue every 5 (five) minutes as needed for Chest pain.    pantoprazole (PROTONIX) 40 MG tablet TAKE 1 TABLET EVERY DAY    pen needle, diabetic (BD ULTRA-FINE SHORT PEN NEEDLE) 31 gauge x 5/16" Ndle Takes four injections daily E11.9    TRESIBA FLEXTOUCH U-200 200 unit/mL (3 mL) insulin pen ADMINISTER 60 UNITS " UNDER THE SKIN EVERY EVENING    vitamin E 400 UNIT capsule      Family History       Problem Relation (Age of Onset)    COPD Sister    Cancer Brother    Heart disease Mother, Father          Tobacco Use    Smoking status: Never    Smokeless tobacco: Never   Substance and Sexual Activity    Alcohol use: Yes     Alcohol/week: 0.0 standard drinks     Comment: rare alcohol    Drug use: No    Sexual activity: Yes     Partners: Female     Birth control/protection: Other-see comments     Review of Systems   Constitutional: Negative for fever and malaise/fatigue.   HENT:  Negative for congestion and sore throat.    Eyes:  Negative for blurred vision, vision loss in left eye and vision loss in right eye.   Cardiovascular:  Positive for chest pain. Negative for dyspnea on exertion, irregular heartbeat, leg swelling, near-syncope, palpitations and syncope.   Respiratory:  Negative for shortness of breath and wheezing.    Endocrine: Negative.    Hematologic/Lymphatic: Negative.    Skin: Negative.    Musculoskeletal:  Negative for arthritis, back pain, joint pain and myalgias.   Gastrointestinal:  Negative for abdominal pain, hematemesis, hematochezia and melena.   Genitourinary: Negative.    Neurological:  Negative for light-headedness and loss of balance.   Psychiatric/Behavioral: Negative.     Objective:     Vital Signs (Most Recent):  Temp: 98.3 °F (36.8 °C) (06/19/23 1316)  Pulse: 71 (06/19/23 1501)  Resp: 16 (06/19/23 1316)  BP: (!) 100/56 (06/19/23 1424)  SpO2: 97 % (06/19/23 1316) Vital Signs (24h Range):  Temp:  [97.9 °F (36.6 °C)-98.3 °F (36.8 °C)] 98.3 °F (36.8 °C)  Pulse:  [64-81] 71  Resp:  [16-22] 16  SpO2:  [97 %-98 %] 97 %  BP: (100-145)/(56-74) 100/56     Weight: 89.8 kg (198 lb)  Body mass index is 31.96 kg/m².    SpO2: 97 %       No intake or output data in the 24 hours ending 06/19/23 1539    Lines/Drains/Airways       Peripheral Intravenous Line  Duration                  Peripheral IV - Single Lumen 20 G  Anterior;Distal;Left Forearm -- days                     Physical Exam  Vitals and nursing note reviewed.   Constitutional:       Appearance: Normal appearance. He is normal weight. He is not toxic-appearing.   HENT:      Head: Normocephalic and atraumatic.   Eyes:      General: No scleral icterus.     Extraocular Movements: Extraocular movements intact.   Neck:      Vascular: No carotid bruit.   Cardiovascular:      Rate and Rhythm: Normal rate and regular rhythm.      Pulses: Normal pulses.      Heart sounds: Normal heart sounds. No murmur heard.    No gallop.   Pulmonary:      Effort: Pulmonary effort is normal. No respiratory distress.      Breath sounds: Normal breath sounds. No wheezing or rales.   Abdominal:      General: Abdomen is flat. Bowel sounds are normal.      Palpations: Abdomen is soft. There is no mass.   Musculoskeletal:      Cervical back: Normal range of motion.      Right lower leg: No edema.      Left lower leg: No edema.   Skin:     General: Skin is warm and dry.      Capillary Refill: Capillary refill takes less than 2 seconds.      Findings: No rash.   Neurological:      General: No focal deficit present.      Mental Status: He is alert and oriented to person, place, and time. Mental status is at baseline.          Significant Labs: All pertinent lab results from the last 24 hours have been reviewed.    Significant Imaging: Echocardiogram: Transthoracic echo (TTE) complete (Cupid Only): No results found for this or any previous visit.    Assessment and Plan:     Cardiac/Vascular  Chest pain syndrome  Unclear if this is truly cardiac or not.  Trops and EKG have been negative.  SL NTG sometimes alleviates the pain.  Was chest pain free on interview  - discussed case with Dr. Moore and reviewed EMR.  No images of [facility]/2023 [facility] in the chart  - please obtain records from [facility] regarding his recent angiogram  - for now recommend medical management until we get his angiogram and once obtained, will  "review and make further recommendations        VTE Risk Mitigation (From admission, onward)           Ordered     Reason for No Pharmacological VTE Prophylaxis  Once        Question:  Reasons:  Answer:  IV Heparin w/in 24 hrs. Pre or Post-Op    06/19/23 1250     IP VTE HIGH RISK PATIENT  Once         06/19/23 1250     Place sequential compression device  Until discontinued         06/19/23 1250     heparin 25,000 units in dextrose 5% (100 units/ml) IV bolus from bag - ADDITIONAL PRN BOLUS - 60 units/kg (max bolus 4000 units)  As needed (PRN)        Question:  Heparin Infusion Adjustment (DO NOT MODIFY ANSWER)  Answer:  \[email or web address]\Images\Pharmacy\HeparinInfusions\heparin LOW INTENSITY nomogram for [facility] [id number]    06/19/23 1107     heparin 25,000 units in dextrose 5% (100 units/ml) IV bolus from bag - ADDITIONAL PRN BOLUS - 30 units/kg (max bolus 4000 units)  As needed (PRN)        Question:  Heparin Infusion Adjustment (DO NOT MODIFY ANSWER)  Answer:  \[email or web address]\[email or web address]\Images\Pharmacy\HeparinInfusions\heparin LOW INTENSITY nomogram for [facility] [id number]    06/19/23 1107     heparin 25,000 units in dextrose 5% 250 mL (100 units/mL) infusion LOW INTENSITY nomogram - [facility]  Continuous        Question Answer Comment   Heparin Infusion Adjustment (DO NOT MODIFY ANSWER) \\[email or web address]\Images\Pharmacy\HeparinInfusions\heparin LOW INTENSITY nomogram for [facility] [id number]    Begin at (in units/kg/hr) 12        06/19/23 1107                    Thank you for your consult. I will follow-up with patient. Please contact us if you have any additional questions.    Paramjit Velasquez MD  Interventional Cardiology   [facility] - Cardiology Stepdown    I have seen the patient, reviewed the Fellow's history and physical, assessment and plan. I have personally interviewed and examined the patient and agree with the findings. "50%" RCA on films 5 days ago at OSH, otherwise non-obst disease. Please obtain films overnight for review " prior to additional angiogram at this time and optimize medical therapy unless patient develops STEMI smx at which time we will perform emergent cath.   Jabier Moore MD

## 2023-06-19 NOTE — ED NOTES
"Patient identifiers for Paul Deleon 72 y.o. male checked and correct.  Chief Complaint   Patient presents with    Chest Pain     Took ntg last night and ran out, + stents     Past Medical History:   Diagnosis Date    Cataract     Coronary artery disease     Diabetes mellitus type II     GERD (gastroesophageal reflux disease)     Heart attack     Hyperlipidemia     Hypertension     MI (mitral incompetence)     MI (myocardial infarction)     Ulcer      Allergies reported: Review of patient's allergies indicates:  No Known Allergies    Pt states he took "6 nitros last week", no relief. Pt now out of nitro at home.     LOC: Patient is awake, alert, and aware of environment with an appropriate affect. Patient is oriented x 4 and speaking appropriately.  APPEARANCE: Patient resting comfortably and in no acute distress. Patient is clean and well groomed, patient's clothing is properly fastened.  HEENT: WDL  SKIN: The skin is warm and dry. Patient has normal skin turgor and moist mucus membranes.   MUSKULOSKELETAL: Patient is moving all extremities well, no obvious deformities noted. Pulses intact.   RESPIRATORY: Airway is open and patent. Respirations are spontaneous and non-labored with normal effort and rate.  CARDIAC: Patient has a normal rate and rhythm. 70 on cardiac monitor. No peripheral edema noted. Pt endorses chest pain x "weeks", constant, Hx stents.   ABDOMEN: No distention noted. Soft and non-tender upon palpation.  NEUROLOGICAL: pupils 3mm, PERRL. Facial expression is symmetrical. Hand grasps are equal bilaterally. Normal sensation in all extremities when touched with finger.          "

## 2023-06-19 NOTE — SUBJECTIVE & OBJECTIVE
Past Medical History:   Diagnosis Date    Cataract     Coronary artery disease     Diabetes mellitus type II     GERD (gastroesophageal reflux disease)     Heart attack     Hyperlipidemia     Hypertension     MI (mitral incompetence)     MI (myocardial infarction)     Ulcer        Past Surgical History:   Procedure Laterality Date    ANKLE SURGERY      BACK SURGERY      CORONARY ANGIOPLASTY WITH STENT PLACEMENT      ELBOW SURGERY      lft    THROAT SURGERY         Review of patient's allergies indicates:  No Known Allergies    No current facility-administered medications on file prior to encounter.     Current Outpatient Medications on File Prior to Encounter   Medication Sig    amLODIPine-benazepriL (LOTREL) 5-40 mg per capsule TAKE 1 CAPSULE EVERY DAY    aspirin (ECOTRIN) 81 MG EC tablet Take 1 tablet (81 mg total) by mouth once daily.    atorvastatin (LIPITOR) 80 MG tablet TAKE 1 TABLET EVERY EVENING    blood sugar diagnostic (FREESTYLE LITE STRIPS) Strp Inject 1 strip into the skin 3 (three) times daily.    blood sugar diagnostic Strp To check BG 4 times daily, to use with insurance preferred meter E11.9    blood-glucose meter kit To check BG 4 times daily, to use with insurance preferred meter E11.9    clopidogreL (PLAVIX) 75 mg tablet Take 1 tablet (75 mg total) by mouth once daily.    cyanocobalamin, vitamin B-12, (VITAMIN B-12 ORAL)     diclofenac sodium (VOLTAREN) 1 % Gel Apply 2 g topically 3 (three) times daily. Apply to the area of foot or toe pain 2-3x per day or night as needed    fluticasone propionate (FLONASE) 50 mcg/actuation nasal spray 1 spray (50 mcg total) by Each Nostril route once daily.    glipiZIDE (GLUCOTROL) 10 MG TR24 Take 30 mins before breakfast or lunch.    insulin aspart U-100 (NOVOLOG FLEXPEN U-100 INSULIN) 100 unit/mL (3 mL) InPn pen 6 units before meals plus sliding scale for TDD 25 units    lancets (FREESTYLE LANCETS) 28 gauge Misc USE THREE TIMES DAILY    lancets Misc To check  "BG 4 times daily, to use with insurance preferred meter E11.9    metFORMIN (GLUCOPHAGE) 1000 MG tablet Take 1 tablet (1,000 mg total) by mouth daily with breakfast.    metoprolol succinate (TOPROL-XL) 25 MG 24 hr tablet Take 1 tablet (25 mg total) by mouth once daily.    nitroGLYCERIN (NITROSTAT) 0.4 MG SL tablet Place 1 tablet (0.4 mg total) under the tongue every 5 (five) minutes as needed for Chest pain.    pantoprazole (PROTONIX) 40 MG tablet TAKE 1 TABLET EVERY DAY    pen needle, diabetic (BD ULTRA-FINE SHORT PEN NEEDLE) 31 gauge x 5/16" Ndle Takes four injections daily E11.9    TRESIBA FLEXTOUCH U-200 200 unit/mL (3 mL) insulin pen ADMINISTER 60 UNITS UNDER THE SKIN EVERY EVENING    vitamin E 400 UNIT capsule      Family History       Problem Relation (Age of Onset)    COPD Sister    Cancer Brother    Heart disease Mother, Father          Tobacco Use    Smoking status: Never    Smokeless tobacco: Never   Substance and Sexual Activity    Alcohol use: Yes     Alcohol/week: 0.0 standard drinks     Comment: rare alcohol    Drug use: No    Sexual activity: Yes     Partners: Female     Birth control/protection: Other-see comments     Review of Systems   Constitutional: Negative for chills and fever.   Cardiovascular:  Positive for chest pain (1/10 after 2 NTGs). Negative for orthopnea and palpitations.   Respiratory:  Negative for cough and shortness of breath.    Gastrointestinal:  Negative for abdominal pain.   Neurological:  Negative for dizziness, headaches and light-headedness.   Psychiatric/Behavioral:  Negative for altered mental status.    Objective:     Vital Signs (Most Recent):  Temp: 98.9 °F (37.2 °C) (06/19/23 1548)  Pulse: 64 (06/19/23 1548)  Resp: 18 (06/19/23 1548)  BP: (!) 103/58 (06/19/23 1548)  SpO2: 98 % (06/19/23 1548) Vital Signs (24h Range):  Temp:  [97.9 °F (36.6 °C)-98.9 °F (37.2 °C)] 98.9 °F (37.2 °C)  Pulse:  [63-81] 64  Resp:  [16-22] 18  SpO2:  [97 %-98 %] 98 %  BP: (100-145)/(56-74) " 103/58     Weight: 89.8 kg (198 lb)  Body mass index is 31.96 kg/m².    SpO2: 98 %       No intake or output data in the 24 hours ending 06/19/23 1641    Lines/Drains/Airways       Peripheral Intravenous Line  Duration                  Peripheral IV - Single Lumen 20 G Anterior;Distal;Left Forearm -- days                     Physical Exam  Vitals reviewed.   Constitutional:       General: He is not in acute distress.     Appearance: Normal appearance. He is obese. He is not toxic-appearing.   HENT:      Head: Normocephalic and atraumatic.      Mouth/Throat:      Mouth: Mucous membranes are moist.   Cardiovascular:      Rate and Rhythm: Normal rate and regular rhythm.      Heart sounds: No murmur heard.    No friction rub. No gallop.   Pulmonary:      Effort: Pulmonary effort is normal. No respiratory distress.      Breath sounds: Normal breath sounds.   Abdominal:      Palpations: Abdomen is soft.   Musculoskeletal:      Right lower leg: No edema.      Left lower leg: No edema.   Skin:     General: Skin is warm.   Neurological:      Mental Status: He is alert. Mental status is at baseline.        Significant Labs: BMP:   Recent Labs   Lab 06/19/23  0858   *      K 4.5      CO2 18*   BUN 13   CREATININE 0.8   CALCIUM 9.5   , CMP   Recent Labs   Lab 06/19/23  0858      K 4.5      CO2 18*   *   BUN 13   CREATININE 0.8   CALCIUM 9.5   PROT 7.0   ALBUMIN 3.8   BILITOT 1.1*   ALKPHOS 91   AST 18   ALT 12   ANIONGAP 13   , CBC   Recent Labs   Lab 06/19/23  0858   WBC 8.79   HGB 13.8*   HCT 42.5      , INR   Recent Labs   Lab 06/19/23  1136   INR 1.0   , Lipid Panel No results for input(s): CHOL, HDL, LDLCALC, TRIG, CHOLHDL in the last 48 hours., Troponin   Recent Labs   Lab 06/19/23  0858 06/19/23  1136 06/19/23  1451   TROPONINI 0.006 <0.006 <0.006   , and All pertinent lab results from the last 24 hours have been reviewed.    Significant Imaging: Echocardiogram: Transthoracic  echo (TTE) complete (Cupid Only): No results found for this or any previous visit. and EKG: independently reviewed, no TWI or ST changes concerning for acute ischemia    TTE with LVEF 65% without valvular abnormalities.

## 2023-06-19 NOTE — ASSESSMENT & PLAN NOTE
"- patient with chest pain occurring at rest and lasting for now 72hrs, now with relief with Nitroglycerin x3 doses today, no new ECG changes or trop elevation- however, he recently had [facility] completed about 5 days ago with the following findings:   "Angiographic findings:   The study was revaccinated over quality, the patient in the right dominant circulation.  Left main was normal.  The LAD was irregular with a patent stent in the mid section. The circumflex was   a large vessel with a patent stent in the mid section.  Right coronary artery was a large vessel supplying a large PDA.  There was about 50% narrowing distally in the AV groove branch.  Prior to its division to a PDA and posterolateral branch.  Ventricular function was normal with an EF of 65%.    Impression: Multivessel coronary artery disease"  - He has received 3 doses of nitroglycerin on the floor with resolution of chest pain, but now he has received a total of 9 doses of nitroglycerin over 72hrs  - He may need further up-titration of medications for better anginal relief- including imdur vs. Isordil   - continue asa, plavix, heparin gtt  - gen cards and interventional cards consulted  - continue tele  - K>4, Mg >2    "

## 2023-06-19 NOTE — HPI
"Mr. Deleon is a 72yoM w/PMHx of CAD (STEMI w/stent to OM3 05/2013, reported PCI at OSH in 2014- no records, PCI to Lcx and LAD 10/2022- [place]), HTN, HLD, DM2, obesity who presented to [facility] w/chest pain.     Last seen by Dr. Rodriguez 01/2023. Of note, reported to [facility] in [place], [place] for unstable angina- hospitalized from 06/12 till 06/14. At this time, he underwent [facility]- "clean cath" as discharge note- he was discharged home. Cath results:    "Angiographic findings:   The study was revaccinated over quality, the patient in the right dominant circulation.  Left main was normal.  The LAD was irregular with a patent stent in the mid section. The circumflex was   a large vessel with a patent stent in the mid section.  Right coronary artery was a large vessel supplying a large PDA.  There was about 50% narrowing distally in the AV groove branch.  Prior to its division to a PDA and posterolateral branch.  Ventricular function was normal with an EF of 65%.    Impression: Multivessel coronary artery disease"    Of note, patient reports chest pain starting on Saturday- he was mowing his lawn (riding ) and gardening when he experienced substernal chest pain described as "nagging pressure" and "sharp." Pain has been constant since then- radiates to right arm, and is associated with shortness of breath. He has tried acid reducers, tylenol, nitroglycerin (6 doses over 48hrs total) with no relief. Pain at worst is 8/10- with nitro- 6/10. He reports that pain is similar to previous heart attack. Pain is slightly improved with lying flat as well. It worsens with movement (I.e., getting up from bed). Pain is NOT reproducible on palpation of chest wall. Denies any recent trauma or injury. No recent illnesses. Pain has not fully gone away since Saturday- it will wax and wane in intensity.    Upon presentation to ED, vitals wnl. CBC, CMP, coag, BNP, trop unremarkable. ECG w/no acute st or t wave " changes. CXR unremarkable. He was given asa 325mg, plavix 600mg, morphine 4mg IV, and started on heparin gtt. Admitted to hospital medicine.

## 2023-06-19 NOTE — NURSING TRANSFER
Nursing Transfer Note      6/19/2023     Reason patient is being transferred: ICU    Transfer To: ICU    Transfer via bed    Transfer with transport monitor    Transported by BETH Lopez and CHARISMA Lee    Medicines sent: Heparin gtt 12 units/kg/hr    Chart send with patient: Yes    Notified: spouse

## 2023-06-19 NOTE — ASSESSMENT & PLAN NOTE
Unclear if this is truly cardiac or not.  Trops and EKG have been negative.  SL NTG sometimes alleviates the pain.  Was chest pain free on interview  - discussed case with Dr. Moore and reviewed EMR.  No images of 6/2023 [facility] in the chart  - please obtain records from [facility] regarding his recent angiogram  - for now recommend medical management until we get his angiogram and once obtained, will review and make further recommendations

## 2023-06-19 NOTE — ASSESSMENT & PLAN NOTE
- BP currently well-controlled  - Continue home regimen of metoprolol 25mg qday, amlodipine-benazepril 5-20 (can double if need be, patient normally on 5-40 at home)  - Will continue to monitor and further titrate antihypertensives as clinically indicated

## 2023-06-19 NOTE — SUBJECTIVE & OBJECTIVE
Past Medical History:   Diagnosis Date    Cataract     Coronary artery disease     Diabetes mellitus type II     GERD (gastroesophageal reflux disease)     Heart attack     Hyperlipidemia     Hypertension     MI (mitral incompetence)     MI (myocardial infarction)     Ulcer        Past Surgical History:   Procedure Laterality Date    ANKLE SURGERY      BACK SURGERY      CORONARY ANGIOPLASTY WITH STENT PLACEMENT      ELBOW SURGERY      lft    THROAT SURGERY         Review of patient's allergies indicates:  No Known Allergies    PTA Medications   Medication Sig    amLODIPine-benazepriL (LOTREL) 5-40 mg per capsule TAKE 1 CAPSULE EVERY DAY    aspirin (ECOTRIN) 81 MG EC tablet Take 1 tablet (81 mg total) by mouth once daily.    atorvastatin (LIPITOR) 80 MG tablet TAKE 1 TABLET EVERY EVENING    blood sugar diagnostic (FREESTYLE LITE STRIPS) Strp Inject 1 strip into the skin 3 (three) times daily.    blood sugar diagnostic Strp To check BG 4 times daily, to use with insurance preferred meter E11.9    blood-glucose meter kit To check BG 4 times daily, to use with insurance preferred meter E11.9    clopidogreL (PLAVIX) 75 mg tablet Take 1 tablet (75 mg total) by mouth once daily.    cyanocobalamin, vitamin B-12, (VITAMIN B-12 ORAL)     diclofenac sodium (VOLTAREN) 1 % Gel Apply 2 g topically 3 (three) times daily. Apply to the area of foot or toe pain 2-3x per day or night as needed    fluticasone propionate (FLONASE) 50 mcg/actuation nasal spray 1 spray (50 mcg total) by Each Nostril route once daily.    glipiZIDE (GLUCOTROL) 10 MG TR24 Take 30 mins before breakfast or lunch.    insulin aspart U-100 (NOVOLOG FLEXPEN U-100 INSULIN) 100 unit/mL (3 mL) InPn pen 6 units before meals plus sliding scale for TDD 25 units    lancets (FREESTYLE LANCETS) 28 gauge Misc USE THREE TIMES DAILY    lancets Misc To check BG 4 times daily, to use with insurance preferred meter E11.9    metFORMIN (GLUCOPHAGE) 1000 MG tablet Take 1 tablet  "(1,000 mg total) by mouth daily with breakfast.    metoprolol succinate (TOPROL-XL) 25 MG 24 hr tablet Take 1 tablet (25 mg total) by mouth once daily.    nitroGLYCERIN (NITROSTAT) 0.4 MG SL tablet Place 1 tablet (0.4 mg total) under the tongue every 5 (five) minutes as needed for Chest pain.    pantoprazole (PROTONIX) 40 MG tablet TAKE 1 TABLET EVERY DAY    pen needle, diabetic (BD ULTRA-FINE SHORT PEN NEEDLE) 31 gauge x 5/16" Ndle Takes four injections daily E11.9    TRESIBA FLEXTOUCH U-200 200 unit/mL (3 mL) insulin pen ADMINISTER 60 UNITS UNDER THE SKIN EVERY EVENING    vitamin E 400 UNIT capsule      Family History       Problem Relation (Age of Onset)    COPD Sister    Cancer Brother    Heart disease Mother, Father          Tobacco Use    Smoking status: Never    Smokeless tobacco: Never   Substance and Sexual Activity    Alcohol use: Yes     Alcohol/week: 0.0 standard drinks     Comment: rare alcohol    Drug use: No    Sexual activity: Yes     Partners: Female     Birth control/protection: Other-see comments     Review of Systems   Constitutional: Negative for fever and malaise/fatigue.   HENT:  Negative for congestion and sore throat.    Eyes:  Negative for blurred vision, vision loss in left eye and vision loss in right eye.   Cardiovascular:  Positive for chest pain. Negative for dyspnea on exertion, irregular heartbeat, leg swelling, near-syncope, palpitations and syncope.   Respiratory:  Negative for shortness of breath and wheezing.    Endocrine: Negative.    Hematologic/Lymphatic: Negative.    Skin: Negative.    Musculoskeletal:  Negative for arthritis, back pain, joint pain and myalgias.   Gastrointestinal:  Negative for abdominal pain, hematemesis, hematochezia and melena.   Genitourinary: Negative.    Neurological:  Negative for light-headedness and loss of balance.   Psychiatric/Behavioral: Negative.     Objective:     Vital Signs (Most Recent):  Temp: 98.3 °F (36.8 °C) (06/19/23 1316)  Pulse: 71 " (06/19/23 1501)  Resp: 16 (06/19/23 1316)  BP: (!) 100/56 (06/19/23 1424)  SpO2: 97 % (06/19/23 1316) Vital Signs (24h Range):  Temp:  [97.9 °F (36.6 °C)-98.3 °F (36.8 °C)] 98.3 °F (36.8 °C)  Pulse:  [64-81] 71  Resp:  [16-22] 16  SpO2:  [97 %-98 %] 97 %  BP: (100-145)/(56-74) 100/56     Weight: 89.8 kg (198 lb)  Body mass index is 31.96 kg/m².    SpO2: 97 %       No intake or output data in the 24 hours ending 06/19/23 1539    Lines/Drains/Airways       Peripheral Intravenous Line  Duration                  Peripheral IV - Single Lumen 20 G Anterior;Distal;Left Forearm -- days                     Physical Exam  Vitals and nursing note reviewed.   Constitutional:       Appearance: Normal appearance. He is normal weight. He is not toxic-appearing.   HENT:      Head: Normocephalic and atraumatic.   Eyes:      General: No scleral icterus.     Extraocular Movements: Extraocular movements intact.   Neck:      Vascular: No carotid bruit.   Cardiovascular:      Rate and Rhythm: Normal rate and regular rhythm.      Pulses: Normal pulses.      Heart sounds: Normal heart sounds. No murmur heard.    No gallop.   Pulmonary:      Effort: Pulmonary effort is normal. No respiratory distress.      Breath sounds: Normal breath sounds. No wheezing or rales.   Abdominal:      General: Abdomen is flat. Bowel sounds are normal.      Palpations: Abdomen is soft. There is no mass.   Musculoskeletal:      Cervical back: Normal range of motion.      Right lower leg: No edema.      Left lower leg: No edema.   Skin:     General: Skin is warm and dry.      Capillary Refill: Capillary refill takes less than 2 seconds.      Findings: No rash.   Neurological:      General: No focal deficit present.      Mental Status: He is alert and oriented to person, place, and time. Mental status is at baseline.          Significant Labs: All pertinent lab results from the last 24 hours have been reviewed.    Significant Imaging: Echocardiogram: Transthoracic  echo (TTE) complete (Cupid Only): No results found for this or any previous visit.

## 2023-06-19 NOTE — ASSESSMENT & PLAN NOTE
Patient is a 72 year old male with history of CAD (STEMI s/p PCI OM 3 in May 2023, coronary angiogram 09/2013 with nonobstructive disease, reported PCI in 2014 at OSH), T2DM, HTN, HLD, Obesity, preserved EF that presents for chest discomfort, crescendo-decrescendo, radiating to right arm, pressure like pain. Recently underwent stress testing with SPECT imaging at [facility] on 06/13/2023 and was found to have a partially reversible inferior wall defect. Underwent coronary angiogram 06/14/2023 and found to have a RCA/PDA 50% defect with patent stents in the LAD and OM3. TTE at [facility] showed LVEF 65%.   Spoke with the Interventional Cardiologist at [facility] , no iFR/FFR performed, communicated that this gentleman had returned for chest discomfort.  ECG without TWI or ST changes concerning for ischemia  Troponins flat and low (0.006) x 2  TTE from [place] preserved LVEF 65% without valvular abnormalities    - Gave SL NTG 3rd dose on evaluation, CP completely resolved  - Interventional consultation considering 9 doses of SL NTG taken over 3 days , appreciate recommendations  - Aspirin + statin + P2Y12 + heparin gtt as per ACS protocol  - beta Blocker as tolerated  - 2 inch film of nitro paste to the chest  - Transfer patient to CCU for closer monitoring +/- need for NTG gtt  - Patient needs a fresh bottle of SL NTG, advised patient to obtain a fresh bottle after use for NTG can oxidize after 60-90 days

## 2023-06-19 NOTE — H&P
"[facility]ey Nguyen - Cardiology [facility] Medicine  History & Physical    Patient Name: Paul Deleon  MRN: [id number]  Patient Class: IP- Inpatient  Admission Date: 6/19/2023  Attending Physician: Erin Mas MD   Primary Care Provider: Bree Larose MD      Patient information was obtained from patient and ER records.     Subjective:     Principal Problem:Unstable angina    Chief Complaint:   Chief Complaint   Patient presents with    Chest Pain     Took ntg last night and ran out, + stents        HPI: Mr. Deleon is a 72yoM w/PMHx of CAD (STEMI w/stent to OM3 05/2013, reported PCI at OSH in 2014- no records, PCI to Lcx and LAD 10/2022- [place]), HTN, HLD, DM2, obesity who presented to [facility] w/chest pain.     Last seen by Dr. Rodriguez 01/2023. Of note, reported to [facility] in [place], [place] for unstable angina- hospitalized from 06/12 till 06/14. At this time, he underwent [facility]- "clean cath" as discharge note- he was discharged home. Cath results:    "Angiographic findings:   The study was revaccinated over quality, the patient in the right dominant circulation.  Left main was normal.  The LAD was irregular with a patent stent in the mid section. The circumflex was   a large vessel with a patent stent in the mid section.  Right coronary artery was a large vessel supplying a large PDA.  There was about 50% narrowing distally in the AV groove branch.  Prior to its division to a PDA and posterolateral branch.  Ventricular function was normal with an EF of 65%.    Impression: Multivessel coronary artery disease"    Of note, patient reports chest pain starting on Saturday- he was mowing his lawn (riding ) and gardening when he experienced substernal chest pain described as "nagging pressure" and "sharp." Pain has been constant since then- radiates to right arm, and is associated with shortness of breath. He has tried acid reducers, tylenol, nitroglycerin (6 doses over 48hrs " total) with no relief. Pain at worst is 8/10- with nitro- 6/10. He reports that pain is similar to previous heart attack. Pain is slightly improved with lying flat as well. It worsens with movement (I.e., getting up from bed). Pain is NOT reproducible on palpation of chest wall. Denies any recent trauma or injury. No recent illnesses. Pain has not fully gone away since Saturday- it will wax and wane in intensity.    Upon presentation to ED, vitals wnl. CBC, CMP, coag, BNP, trop unremarkable. ECG w/no acute st or t wave changes. CXR unremarkable. He was given asa 325mg, plavix 600mg, morphine 4mg IV, and started on heparin gtt. Admitted to hospital medicine.       Past Medical History:   Diagnosis Date    Cataract     Coronary artery disease     Diabetes mellitus type II     GERD (gastroesophageal reflux disease)     Heart attack     Hyperlipidemia     Hypertension     MI (mitral incompetence)     MI (myocardial infarction)     Ulcer        Past Surgical History:   Procedure Laterality Date    ANKLE SURGERY      BACK SURGERY      CORONARY ANGIOPLASTY WITH STENT PLACEMENT      ELBOW SURGERY      lft    THROAT SURGERY         Review of patient's allergies indicates:  No Known Allergies    No current facility-administered medications on file prior to encounter.     Current Outpatient Medications on File Prior to Encounter   Medication Sig    amLODIPine-benazepriL (LOTREL) 5-40 mg per capsule TAKE 1 CAPSULE EVERY DAY    aspirin (ECOTRIN) 81 MG EC tablet Take 1 tablet (81 mg total) by mouth once daily.    atorvastatin (LIPITOR) 80 MG tablet TAKE 1 TABLET EVERY EVENING    blood sugar diagnostic (FREESTYLE LITE STRIPS) Strp Inject 1 strip into the skin 3 (three) times daily.    blood sugar diagnostic Strp To check BG 4 times daily, to use with insurance preferred meter E11.9    blood-glucose meter kit To check BG 4 times daily, to use with insurance preferred meter E11.9    clopidogreL (PLAVIX) 75 mg  "tablet Take 1 tablet (75 mg total) by mouth once daily.    cyanocobalamin, vitamin B-12, (VITAMIN B-12 ORAL)     diclofenac sodium (VOLTAREN) 1 % Gel Apply 2 g topically 3 (three) times daily. Apply to the area of foot or toe pain 2-3x per day or night as needed    fluticasone propionate (FLONASE) 50 mcg/actuation nasal spray 1 spray (50 mcg total) by Each Nostril route once daily.    glipiZIDE (GLUCOTROL) 10 MG TR24 Take 30 mins before breakfast or lunch.    insulin aspart U-100 (NOVOLOG FLEXPEN U-100 INSULIN) 100 unit/mL (3 mL) InPn pen 6 units before meals plus sliding scale for TDD 25 units    lancets (FREESTYLE LANCETS) 28 gauge Misc USE THREE TIMES DAILY    lancets Misc To check BG 4 times daily, to use with insurance preferred meter E11.9    metFORMIN (GLUCOPHAGE) 1000 MG tablet Take 1 tablet (1,000 mg total) by mouth daily with breakfast.    metoprolol succinate (TOPROL-XL) 25 MG 24 hr tablet Take 1 tablet (25 mg total) by mouth once daily.    nitroGLYCERIN (NITROSTAT) 0.4 MG SL tablet Place 1 tablet (0.4 mg total) under the tongue every 5 (five) minutes as needed for Chest pain.    pantoprazole (PROTONIX) 40 MG tablet TAKE 1 TABLET EVERY DAY    pen needle, diabetic (BD ULTRA-FINE SHORT PEN NEEDLE) 31 gauge x 5/16" Ndle Takes four injections daily E11.9    TRESIBA FLEXTOUCH U-200 200 unit/mL (3 mL) insulin pen ADMINISTER 60 UNITS UNDER THE SKIN EVERY EVENING    vitamin E 400 UNIT capsule      Family History       Problem Relation (Age of Onset)    COPD Sister    Cancer Brother    Heart disease Mother, Father          Tobacco Use    Smoking status: Never    Smokeless tobacco: Never   Substance and Sexual Activity    Alcohol use: Yes     Alcohol/week: 0.0 standard drinks     Comment: rare alcohol    Drug use: No    Sexual activity: Yes     Partners: Female     Birth control/protection: Other-see comments     Review of Systems   Constitutional:  Negative for activity change, appetite change, " chills, fatigue and fever.   Respiratory:  Positive for shortness of breath. Negative for apnea and cough.    Cardiovascular:  Positive for chest pain. Negative for palpitations and leg swelling.   Gastrointestinal:  Negative for abdominal distention, constipation, diarrhea, nausea and vomiting.   Genitourinary:  Negative for difficulty urinating.   Neurological:  Negative for dizziness, tremors, weakness, light-headedness and numbness.   All other systems reviewed and are negative.  Objective:     Vital Signs (Most Recent):  Temp: 98.9 °F (37.2 °C) (06/19/23 1548)  Pulse: 64 (06/19/23 1548)  Resp: 18 (06/19/23 1548)  BP: (!) 103/58 (06/19/23 1548)  SpO2: 98 % (06/19/23 1548) Vital Signs (24h Range):  Temp:  [97.9 °F (36.6 °C)-98.9 °F (37.2 °C)] 98.9 °F (37.2 °C)  Pulse:  [63-81] 64  Resp:  [16-22] 18  SpO2:  [97 %-98 %] 98 %  BP: (100-145)/(56-74) 103/58     Weight: 89.8 kg (198 lb)  Body mass index is 31.96 kg/m².     Physical Exam  Gen: in NAD, appears stated age  Neuro: AAOx3, motor, sensory, and strength grossly intact BL  HEENT: NTNC, EOMI, PERRL, MMM  CVS: RRR, no m/r/g; S1/S2 auscultated with no S3 or S4; capillary refill < 2 sec  Resp: lungs CTAB, no w/r/r; no belabored breathing or accessory muscle use appreciated   Abd: BS+ in all 4 quadrants; NTND, soft to palpation; no organomegaly appreciated   Extrem: pulses full, equal, and regular over all 4 extremities; no UE or LE edema BL    Significant Labs: All pertinent labs within the past 24 hours have been reviewed.  CBC:   Recent Labs   Lab 06/19/23  0858   WBC 8.79   HGB 13.8*   HCT 42.5        CMP:   Recent Labs   Lab 06/19/23  0858      K 4.5      CO2 18*   *   BUN 13   CREATININE 0.8   CALCIUM 9.5   PROT 7.0   ALBUMIN 3.8   BILITOT 1.1*   ALKPHOS 91   AST 18   ALT 12   ANIONGAP 13     Troponin:   Recent Labs   Lab 06/19/23  0858 06/19/23  1136 06/19/23  1451   TROPONINI 0.006 <0.006 <0.006     Urine Studies: No results for  "input(s): COLORU, APPEARANCEUA, PHUR, SPECGRAV, PROTEINUA, GLUCUA, KETONESU, BILIRUBINUA, OCCULTUA, NITRITE, UROBILINOGEN, LEUKOCYTESUR, RBCUA, WBCUA, BACTERIA, SQUAMEPITHEL, HYALINECASTS in the last 48 hours.    Invalid input(s): DOMINIQUE    Significant Imaging: I have reviewed all pertinent imaging results/findings within the past 24 hours.    Assessment/Plan:     * Unstable angina  - patient with chest pain occurring at rest and lasting for now 72hrs, now with relief with Nitroglycerin x3 doses today, no new ECG changes or trop elevation- however, he recently had LHC completed about 5 days ago with the following findings:   "Angiographic findings:   The study was revaccinated over quality, the patient in the right dominant circulation.  Left main was normal.  The LAD was irregular with a patent stent in the mid section. The circumflex was   a large vessel with a patent stent in the mid section.  Right coronary artery was a large vessel supplying a large PDA.  There was about 50% narrowing distally in the AV groove branch.  Prior to its division to a PDA and posterolateral branch.  Ventricular function was normal with an EF of 65%.    Impression: Multivessel coronary artery disease"  - He has received 3 doses of nitroglycerin on the floor with resolution of chest pain, but now he has received a total of 9 doses of nitroglycerin over 72hrs  - He may need further up-titration of medications for better anginal relief- including imdur vs. Isordil   - continue asa, plavix, heparin gtt  - gen cards and interventional cards consulted  - continue tele  - K>4, Mg >2      Type 2 diabetes mellitus, with long-term current use of insulin  Patient's FSGs are controlled on current medication regimen.  Last A1c reviewed-   Lab Results   Component Value Date    HGBA1C 10.2 (H) 06/13/2023     Most recent fingerstick glucose reviewed-   Recent Labs   Lab 06/19/23  1324   POCTGLUCOSE 106     Current correctional scale  Low  Maintain " anti-hyperglycemic dose as follows-   Antihyperglycemics (From admission, onward)    Start     Stop Route Frequency Ordered    06/19/23 2100  insulin detemir U-100 (Levemir) pen 10 Units         -- SubQ Nightly 06/19/23 1610    06/19/23 1349  insulin aspart U-100 pen 0-5 Units         -- SubQ Before meals & nightly PRN 06/19/23 1250        Hold Oral hypoglycemics while patient is in the hospital.    Coronary artery disease  - he has h/o stent placement 10/2022- and previously in 2013 and possibly 2014?   - continue asa, plavix, HI statin      Hypertension associated with diabetes  - BP currently well-controlled  - Continue home regimen of metoprolol 25mg qday, amlodipine-benazepril 5-20 (can double if need be, patient normally on 5-40 at home)  - Will continue to monitor and further titrate antihypertensives as clinically indicated         VTE Risk Mitigation (From admission, onward)         Ordered     Reason for No Pharmacological VTE Prophylaxis  Once        Question:  Reasons:  Answer:  IV Heparin w/in 24 hrs. Pre or Post-Op    06/19/23 1250     IP VTE HIGH RISK PATIENT  Once         06/19/23 1250     Place sequential compression device  Until discontinued         06/19/23 1250     heparin 25,000 units in dextrose 5% (100 units/ml) IV bolus from bag - ADDITIONAL PRN BOLUS - 60 units/kg (max bolus 4000 units)  As needed (PRN)        Question:  Heparin Infusion Adjustment (DO NOT MODIFY ANSWER)  Answer:  [email or web address]\Images\Pharmacy\HeparinInfusions\heparin LOW INTENSITY nomogram for [facility] [id number]    06/19/23 1107     heparin 25,000 units in dextrose 5% (100 units/ml) IV bolus from bag - ADDITIONAL PRN BOLUS - 30 units/kg (max bolus 4000 units)  As needed (PRN)        Question:  Heparin Infusion Adjustment (DO NOT MODIFY ANSWER)  Answer:  [email or web address]\Images\Pharmacy\HeparinInfusions\heparin LOW INTENSITY nomogram for [facility] [id number]    06/19/23 1107     heparin 25,000 units in dextrose 5% 250 mL (100  units/mL) infusion LOW INTENSITY nomogram - [facility]  Continuous        Question Answer Comment   Heparin Infusion Adjustment (DO NOT MODIFY ANSWER) [email or web address]\Images\Pharmacy\HeparinInfusions\heparin LOW INTENSITY nomogram for [facility] [id number]    Begin at (in units/kg/hr) 12        06/19/23 1107                             Erin Mas MD  Department of Hospital Medicine  [facility] - Cardiology Stepdown

## 2023-06-19 NOTE — HPI
Paul Deleon is a 72 year old man with CAD (STEMI s/p PCI OM 3 in May 2013, reported PCI in 2014 at OSH), T2DM, HTN, HLD, that presents for chest discomfort and occasional pressure like pain radiating to the right arm. Also reports a different sharp chest pain with movement that is completely separate from his current discomfort. He states the pressure pain started on Saturday while cutting his grass. Tried NTG x 3 and discomfort only relieved from 8 --> 6. Tried the same on Sunday with similar results. Couldn't take the discomfort any more and decided to come in to the hospital Monday.    Recently underwent stress testing with SPECT imaging at [facility] on 06/13/2023 and was found to have a partially reversible inferior wall defect. Underwent coronary angiogram 06/14/2023 and found to have a RCA/PDA 50% defect with patent stents in the LAD and OM3. TTE at [facility] showed LVEF 65%.     Patient was initially admitted to the hospital medicine service however developed chest pain that was refractory to multiple doses of sublingual NTG; general and interventional cardiology evaluated the patient. Patient was transferred to the CCU service for further monitoring of unstable angina. IC recommended obtaining images of recent angiogram from [facility] prior to repeating the procedure.

## 2023-06-19 NOTE — PROVIDER PROGRESS NOTES - EMERGENCY DEPT.
Encounter Date: 6/19/2023    ED Physician Progress Notes         EKG - STEMI Decision  Initial Reading: No STEMI present.

## 2023-06-20 PROBLEM — R59.0 MEDIASTINAL LYMPHADENOPATHY: Status: ACTIVE | Noted: 2023-06-20

## 2023-06-20 LAB
ANION GAP SERPL CALC-SCNC: 13 MMOL/L (ref 8–16)
APTT PPP: 126.9 SEC (ref 21–32)
APTT PPP: 33.8 SEC (ref 21–32)
APTT PPP: 51.8 SEC (ref 21–32)
BUN SERPL-MCNC: 11 MG/DL (ref 8–23)
CALCIUM SERPL-MCNC: 8.9 MG/DL (ref 8.7–10.5)
CHLORIDE SERPL-SCNC: 104 MMOL/L (ref 95–110)
CO2 SERPL-SCNC: 23 MMOL/L (ref 23–29)
CREAT SERPL-MCNC: 0.7 MG/DL (ref 0.5–1.4)
D DIMER PPP IA.FEU-MCNC: 0.71 MG/L FEU
ERYTHROCYTE [DISTWIDTH] IN BLOOD BY AUTOMATED COUNT: 13.9 % (ref 11.5–14.5)
EST. GFR  (NO RACE VARIABLE): >60 ML/MIN/1.73 M^2
GLUCOSE SERPL-MCNC: 95 MG/DL (ref 70–110)
HCT VFR BLD AUTO: 36.3 % (ref 40–54)
HGB BLD-MCNC: 12.2 G/DL (ref 14–18)
MAGNESIUM SERPL-MCNC: 1.8 MG/DL (ref 1.6–2.6)
MCH RBC QN AUTO: 28.6 PG (ref 27–31)
MCHC RBC AUTO-ENTMCNC: 33.6 G/DL (ref 32–36)
MCV RBC AUTO: 85 FL (ref 82–98)
PLATELET # BLD AUTO: 210 K/UL (ref 150–450)
PMV BLD AUTO: 10.4 FL (ref 9.2–12.9)
POCT GLUCOSE: 101 MG/DL (ref 70–110)
POCT GLUCOSE: 158 MG/DL (ref 70–110)
POCT GLUCOSE: 77 MG/DL (ref 70–110)
POCT GLUCOSE: 86 MG/DL (ref 70–110)
POCT GLUCOSE: 90 MG/DL (ref 70–110)
POCT GLUCOSE: 90 MG/DL (ref 70–110)
POTASSIUM SERPL-SCNC: 3.7 MMOL/L (ref 3.5–5.1)
RBC # BLD AUTO: 4.26 M/UL (ref 4.6–6.2)
SODIUM SERPL-SCNC: 140 MMOL/L (ref 136–145)
TROPONIN I SERPL DL<=0.01 NG/ML-MCNC: <0.006 NG/ML (ref 0–0.03)
WBC # BLD AUTO: 8.38 K/UL (ref 3.9–12.7)

## 2023-06-20 PROCEDURE — 63600175 PHARM REV CODE 636 W HCPCS

## 2023-06-20 PROCEDURE — 25000003 PHARM REV CODE 250: Performed by: HOSPITALIST

## 2023-06-20 PROCEDURE — 25000003 PHARM REV CODE 250: Performed by: STUDENT IN AN ORGANIZED HEALTH CARE EDUCATION/TRAINING PROGRAM

## 2023-06-20 PROCEDURE — 85027 COMPLETE CBC AUTOMATED: CPT | Performed by: STUDENT IN AN ORGANIZED HEALTH CARE EDUCATION/TRAINING PROGRAM

## 2023-06-20 PROCEDURE — 85730 THROMBOPLASTIN TIME PARTIAL: CPT | Performed by: STUDENT IN AN ORGANIZED HEALTH CARE EDUCATION/TRAINING PROGRAM

## 2023-06-20 PROCEDURE — 20000000 HC ICU ROOM

## 2023-06-20 PROCEDURE — 94761 N-INVAS EAR/PLS OXIMETRY MLT: CPT

## 2023-06-20 PROCEDURE — 25000242 PHARM REV CODE 250 ALT 637 W/ HCPCS: Performed by: STUDENT IN AN ORGANIZED HEALTH CARE EDUCATION/TRAINING PROGRAM

## 2023-06-20 PROCEDURE — 25000003 PHARM REV CODE 250

## 2023-06-20 PROCEDURE — 83735 ASSAY OF MAGNESIUM: CPT | Performed by: STUDENT IN AN ORGANIZED HEALTH CARE EDUCATION/TRAINING PROGRAM

## 2023-06-20 PROCEDURE — 85379 FIBRIN DEGRADATION QUANT: CPT | Performed by: INTERNAL MEDICINE

## 2023-06-20 PROCEDURE — 80048 BASIC METABOLIC PNL TOTAL CA: CPT | Performed by: STUDENT IN AN ORGANIZED HEALTH CARE EDUCATION/TRAINING PROGRAM

## 2023-06-20 PROCEDURE — 25500020 PHARM REV CODE 255: Performed by: INTERNAL MEDICINE

## 2023-06-20 PROCEDURE — 84484 ASSAY OF TROPONIN QUANT: CPT | Performed by: STUDENT IN AN ORGANIZED HEALTH CARE EDUCATION/TRAINING PROGRAM

## 2023-06-20 PROCEDURE — 99231 PR SUBSEQUENT HOSPITAL CARE,LEVL I: ICD-10-PCS | Mod: ,,, | Performed by: INTERNAL MEDICINE

## 2023-06-20 PROCEDURE — 99900035 HC TECH TIME PER 15 MIN (STAT)

## 2023-06-20 PROCEDURE — 85730 THROMBOPLASTIN TIME PARTIAL: CPT | Mod: 91 | Performed by: INTERNAL MEDICINE

## 2023-06-20 PROCEDURE — 99231 SBSQ HOSP IP/OBS SF/LOW 25: CPT | Mod: ,,, | Performed by: INTERNAL MEDICINE

## 2023-06-20 RX ORDER — AMOXICILLIN 250 MG
2 CAPSULE ORAL 2 TIMES DAILY
Status: DISCONTINUED | OUTPATIENT
Start: 2023-06-20 | End: 2023-06-21 | Stop reason: HOSPADM

## 2023-06-20 RX ORDER — ALUMINUM HYDROXIDE, MAGNESIUM HYDROXIDE, AND SIMETHICONE 2400; 240; 2400 MG/30ML; MG/30ML; MG/30ML
30 SUSPENSION ORAL EVERY 6 HOURS PRN
Status: DISCONTINUED | OUTPATIENT
Start: 2023-06-20 | End: 2023-06-21 | Stop reason: HOSPADM

## 2023-06-20 RX ORDER — POTASSIUM CHLORIDE 20 MEQ/1
40 TABLET, EXTENDED RELEASE ORAL ONCE
Status: COMPLETED | OUTPATIENT
Start: 2023-06-20 | End: 2023-06-20

## 2023-06-20 RX ORDER — POLYETHYLENE GLYCOL 3350 17 G/17G
17 POWDER, FOR SOLUTION ORAL 2 TIMES DAILY
Status: DISCONTINUED | OUTPATIENT
Start: 2023-06-20 | End: 2023-06-21 | Stop reason: HOSPADM

## 2023-06-20 RX ORDER — ENOXAPARIN SODIUM 100 MG/ML
40 INJECTION SUBCUTANEOUS EVERY 24 HOURS
Status: DISCONTINUED | OUTPATIENT
Start: 2023-06-20 | End: 2023-06-21 | Stop reason: HOSPADM

## 2023-06-20 RX ORDER — ISOSORBIDE MONONITRATE 60 MG/1
60 TABLET, EXTENDED RELEASE ORAL DAILY
Status: DISCONTINUED | OUTPATIENT
Start: 2023-06-20 | End: 2023-06-21

## 2023-06-20 RX ORDER — LANOLIN ALCOHOL/MO/W.PET/CERES
400 CREAM (GRAM) TOPICAL ONCE
Status: COMPLETED | OUTPATIENT
Start: 2023-06-20 | End: 2023-06-20

## 2023-06-20 RX ORDER — INSULIN DEGLUDEC 200 U/ML
INJECTION, SOLUTION SUBCUTANEOUS
Qty: 3 PEN | Refills: 6 | Status: SHIPPED | OUTPATIENT
Start: 2023-06-20 | End: 2024-03-13 | Stop reason: SDUPTHER

## 2023-06-20 RX ADMIN — ATORVASTATIN CALCIUM 80 MG: 40 TABLET, FILM COATED ORAL at 09:06

## 2023-06-20 RX ADMIN — Medication 400 MG: at 11:06

## 2023-06-20 RX ADMIN — PANTOPRAZOLE SODIUM 40 MG: 40 TABLET, DELAYED RELEASE ORAL at 08:06

## 2023-06-20 RX ADMIN — ACETAMINOPHEN 650 MG: 325 TABLET ORAL at 11:06

## 2023-06-20 RX ADMIN — IOHEXOL 100 ML: 350 INJECTION, SOLUTION INTRAVENOUS at 02:06

## 2023-06-20 RX ADMIN — NITROGLYCERIN 0.4 MG: 0.4 TABLET, ORALLY DISINTEGRATING SUBLINGUAL at 09:06

## 2023-06-20 RX ADMIN — METOPROLOL SUCCINATE 25 MG: 25 TABLET, EXTENDED RELEASE ORAL at 08:06

## 2023-06-20 RX ADMIN — ISOSORBIDE MONONITRATE 60 MG: 60 TABLET, EXTENDED RELEASE ORAL at 08:06

## 2023-06-20 RX ADMIN — SENNOSIDES AND DOCUSATE SODIUM 2 TABLET: 50; 8.6 TABLET ORAL at 09:06

## 2023-06-20 RX ADMIN — AMLODIPINE BESYLATE 10 MG: 10 TABLET ORAL at 08:06

## 2023-06-20 RX ADMIN — Medication 6 MG: at 09:06

## 2023-06-20 RX ADMIN — POLYETHYLENE GLYCOL 3350 17 G: 17 POWDER, FOR SOLUTION ORAL at 09:06

## 2023-06-20 RX ADMIN — NITROGLYCERIN 0.4 MG: 0.4 TABLET, ORALLY DISINTEGRATING SUBLINGUAL at 06:06

## 2023-06-20 RX ADMIN — HEPARIN SODIUM AND DEXTROSE 14 UNITS/KG/HR: 10000; 5 INJECTION INTRAVENOUS at 03:06

## 2023-06-20 RX ADMIN — ACETAMINOPHEN 650 MG: 325 TABLET ORAL at 06:06

## 2023-06-20 RX ADMIN — FLUTICASONE PROPIONATE 50 MCG: 50 SPRAY, METERED NASAL at 08:06

## 2023-06-20 RX ADMIN — NITROGLYCERIN 0.4 MG: 0.4 TABLET, ORALLY DISINTEGRATING SUBLINGUAL at 02:06

## 2023-06-20 RX ADMIN — CLOPIDOGREL BISULFATE 75 MG: 75 TABLET ORAL at 08:06

## 2023-06-20 RX ADMIN — ENOXAPARIN SODIUM 40 MG: 40 INJECTION SUBCUTANEOUS at 05:06

## 2023-06-20 RX ADMIN — POTASSIUM CHLORIDE 40 MEQ: 1500 TABLET, EXTENDED RELEASE ORAL at 11:06

## 2023-06-20 RX ADMIN — ASPIRIN 81 MG: 81 TABLET, COATED ORAL at 08:06

## 2023-06-20 NOTE — SUBJECTIVE & OBJECTIVE
Past Medical History:   Diagnosis Date    Cataract     Coronary artery disease     Diabetes mellitus type II     GERD (gastroesophageal reflux disease)     Heart attack     Hyperlipidemia     Hypertension     MI (mitral incompetence)     MI (myocardial infarction)     Ulcer        Past Surgical History:   Procedure Laterality Date    ANKLE SURGERY      BACK SURGERY      CORONARY ANGIOPLASTY WITH STENT PLACEMENT      ELBOW SURGERY      lft    THROAT SURGERY         Review of patient's allergies indicates:  No Known Allergies    No current facility-administered medications on file prior to encounter.     Current Outpatient Medications on File Prior to Encounter   Medication Sig    amLODIPine-benazepriL (LOTREL) 5-40 mg per capsule TAKE 1 CAPSULE EVERY DAY    aspirin (ECOTRIN) 81 MG EC tablet Take 1 tablet (81 mg total) by mouth once daily.    atorvastatin (LIPITOR) 80 MG tablet TAKE 1 TABLET EVERY EVENING    blood sugar diagnostic (FREESTYLE LITE STRIPS) Strp Inject 1 strip into the skin 3 (three) times daily.    blood sugar diagnostic Strp To check BG 4 times daily, to use with insurance preferred meter E11.9    blood-glucose meter kit To check BG 4 times daily, to use with insurance preferred meter E11.9    clopidogreL (PLAVIX) 75 mg tablet Take 1 tablet (75 mg total) by mouth once daily.    cyanocobalamin, vitamin B-12, (VITAMIN B-12 ORAL)     diclofenac sodium (VOLTAREN) 1 % Gel Apply 2 g topically 3 (three) times daily. Apply to the area of foot or toe pain 2-3x per day or night as needed    fluticasone propionate (FLONASE) 50 mcg/actuation nasal spray 1 spray (50 mcg total) by Each Nostril route once daily.    glipiZIDE (GLUCOTROL) 10 MG TR24 Take 30 mins before breakfast or lunch.    insulin aspart U-100 (NOVOLOG FLEXPEN U-100 INSULIN) 100 unit/mL (3 mL) InPn pen 6 units before meals plus sliding scale for TDD 25 units    lancets (FREESTYLE LANCETS) 28 gauge Misc USE THREE TIMES DAILY    lancets Misc To check  "BG 4 times daily, to use with insurance preferred meter E11.9    metFORMIN (GLUCOPHAGE) 1000 MG tablet Take 1 tablet (1,000 mg total) by mouth daily with breakfast.    metoprolol succinate (TOPROL-XL) 25 MG 24 hr tablet Take 1 tablet (25 mg total) by mouth once daily.    nitroGLYCERIN (NITROSTAT) 0.4 MG SL tablet Place 1 tablet (0.4 mg total) under the tongue every 5 (five) minutes as needed for Chest pain.    pantoprazole (PROTONIX) 40 MG tablet TAKE 1 TABLET EVERY DAY    pen needle, diabetic (BD ULTRA-FINE SHORT PEN NEEDLE) 31 gauge x 5/16" Ndle Takes four injections daily E11.9    TRESIBA FLEXTOUCH U-200 200 unit/mL (3 mL) insulin pen ADMINISTER 60 UNITS UNDER THE SKIN EVERY EVENING    vitamin E 400 UNIT capsule      Family History       Problem Relation (Age of Onset)    COPD Sister    Cancer Brother    Heart disease Mother, Father          Tobacco Use    Smoking status: Never    Smokeless tobacco: Never   Substance and Sexual Activity    Alcohol use: Yes     Alcohol/week: 0.0 standard drinks     Comment: rare alcohol    Drug use: No    Sexual activity: Yes     Partners: Female     Birth control/protection: Other-see comments     Review of Systems   Constitutional: Negative.   HENT: Negative.     Cardiovascular:  Positive for chest pain. Negative for claudication, cyanosis, dyspnea on exertion, irregular heartbeat, leg swelling, near-syncope, orthopnea and palpitations.   Respiratory: Negative.  Negative for shortness of breath, sputum production and wheezing.    Endocrine: Negative.    Musculoskeletal: Negative.    Genitourinary: Negative.    Neurological: Negative.    All other systems reviewed and are negative.  Objective:     Vital Signs (Most Recent):  Temp: 98.3 °F (36.8 °C) (06/20/23 0300)  Pulse: 66 (06/20/23 0635)  Resp: (!) 9 (06/20/23 0635)  BP: 121/67 (06/20/23 0635)  SpO2: (!) 94 % (06/20/23 0635) Vital Signs (24h Range):  Temp:  [97.3 °F (36.3 °C)-98.9 °F (37.2 °C)] 98.3 °F (36.8 °C)  Pulse:  " [56-72] 66  Resp:  [9-22] 9  SpO2:  [91 %-98 %] 94 %  BP: ()/(56-74) 121/67     Weight: 89.4 kg (197 lb 1.5 oz)  Body mass index is 31.81 kg/m².    SpO2: (!) 94 %         Intake/Output Summary (Last 24 hours) at 6/20/2023 0810  Last data filed at 6/20/2023 0600  Gross per 24 hour   Intake 0 ml   Output 850 ml   Net -850 ml       Lines/Drains/Airways       Peripheral Intravenous Line  Duration                  Peripheral IV - Single Lumen 20 G Anterior;Distal;Left Forearm -- days         Peripheral IV - Single Lumen 06/19/23 2050 20 G Anterior;Right Forearm <1 day                     Physical Exam  Vitals reviewed.   Constitutional:       Appearance: He is not ill-appearing or diaphoretic.   Cardiovascular:      Rate and Rhythm: Normal rate and regular rhythm.      Pulses: Normal pulses.      Heart sounds: Normal heart sounds. No murmur heard.     Comments: Chest and abd nontender to palpation  Pulmonary:      Effort: Pulmonary effort is normal.      Breath sounds: Normal breath sounds. No wheezing or rales.   Abdominal:      General: Abdomen is flat.      Palpations: Abdomen is soft.   Musculoskeletal:      Right lower leg: No edema.      Left lower leg: No edema.   Skin:     General: Skin is warm and dry.   Neurological:      General: No focal deficit present.      Mental Status: He is alert and oriented to person, place, and time.   Psychiatric:         Mood and Affect: Mood normal.        Significant Labs: CMP   Recent Labs   Lab 06/19/23  0858 06/20/23  0200    140   K 4.5 3.7    104   CO2 18* 23   * 95   BUN 13 11   CREATININE 0.8 0.7   CALCIUM 9.5 8.9   PROT 7.0  --    ALBUMIN 3.8  --    BILITOT 1.1*  --    ALKPHOS 91  --    AST 18  --    ALT 12  --    ANIONGAP 13 13    and CBC   Recent Labs   Lab 06/19/23  0858 06/20/23  0200   WBC 8.79 8.38   HGB 13.8* 12.2*   HCT 42.5 36.3*    210       Significant Imaging:  Reviewed

## 2023-06-20 NOTE — ASSESSMENT & PLAN NOTE
Patient underwent coronary angiogram 06/14/2023 and found to have a RCA/PDA 50% defect with patent stents in the LAD and OM3. TTE at [facility] showed LVEF 65%. Spoke with the Interventional Cardiologist at [facility] , no iFR/FFR performed, communicated that this gentleman had returned for chest discomfort.  ECG without TWI or ST changes concerning for ischemia  Troponins flat and low (0.006) x 2  Patient requiring multiple doses of sublingual NTG in addition to Imdur this admission thus transferred to CCU for presumed unstable angina.    Images from OSH reviewed in [facility] - consistent with nonobstructive CAD  Investigated non-cardiac causes of chest pain, US abd without gallstones and PE study negative    Plan  - PET Stress repeated and unremarkable, no reversible ischemia   - Imdur 60mg daily  - Toprol 25mg daily  - Amlodipine  - SLG NTG prn  - Aspirin + statin + P2Y12

## 2023-06-20 NOTE — PLAN OF CARE
[facility] - Cardiac Intensive Care  Initial Discharge Assessment       Primary Care Provider: Bree Larose MD    Admission Diagnosis: Chest discomfort [R07.89]  ACS (acute coronary syndrome) [I24.9]  Chest pain [R07.9]    Admission Date: 6/19/2023  Expected Discharge Date: 6/21/2023    Transition of Care Barriers: None    Payor: [employer] MANAGED MEDICARE / Plan: [employer] MEDICARE PPO / Product Type: Medicare Advantage /     Extended Emergency Contact Information  Primary Emergency Contact: Destiny Deleon  Address: [facility] [address] [place] [address] [facility]  Home Phone: [phone]  Mobile Phone: [phone]  Relation: Spouse    Discharge Plan A: Home with family  Discharge Plan B: Home with family, Home Health      [id number] - [place], [place] - [address] AT [facility]  & [facility]  [address] [place] [address]  Phone: [phone] Fax: [phone]    [facility] - [facility] [address]  [address] [facility] [address]  Phone: [phone] Fax: [phone]      Initial Assessment (most recent)       Adult Discharge Assessment - 06/20/23 1549          Discharge Assessment    Assessment Type Discharge Planning Assessment     Confirmed/corrected address, phone number and insurance Yes     Confirmed Demographics Correct on Facesheet     Source of Information patient;family     Communicated CLARE with patient/caregiver Date not available/Unable to determine     Reason For Admission Unstable angina     People in Home spouse     Do you expect to return to your current living situation? Yes     Do you have help at home or someone to help you manage your care at home? Yes     Who are your caregiver(s) and their phone number(s)? Destiny Deleon (spouse) [phone]     Prior to hospitilization cognitive status: Alert/Oriented     Current cognitive status: Alert/Oriented     Equipment Currently Used at Home  blood pressure machine;glucometer     Readmission within 30 days? No     Patient currently being followed by outpatient case management? No     Do you currently have service(s) that help you manage your care at home? No     Do you take prescription medications? Yes     Do you have prescription coverage? Yes     Coverage [employer] managed care     Do you have any problems affording any of your prescribed medications? No     Is the patient taking medications as prescribed? yes     Who is going to help you get home at discharge? Destiny Deleon (spouse) [phone]     How do you get to doctors appointments? car, drives self;family or friend will provide     Are you on dialysis? No     Do you take coumadin? No     Discharge Plan A Home with family     Discharge Plan B Home with family;Home Health     DME Needed Upon Discharge  none     Discharge Plan discussed with: Patient;Spouse/sig other     Name(s) and Number(s) Destiny Deleon (spouse) [phone]     Transition of Care Barriers None        Physical Activity    On average, how many days per week do you engage in moderate to strenuous exercise (like a brisk walk)? 3 days     On average, how many minutes do you engage in exercise at this level? 100 min        Financial Resource Strain    How hard is it for you to pay for the very basics like food, housing, medical care, and heating? Not hard at all        Housing Stability    In the last 12 months, was there a time when you were not able to pay the mortgage or rent on time? No     In the last 12 months, how many places have you lived? 1     In the last 12 months, was there a time when you did not have a steady place to sleep or slept in a shelter (including now)? No        Transportation Needs    In the past 12 months, has lack of transportation kept you from medical appointments or from getting medications? No     In the past 12 months, has lack of transportation kept you from meetings, work, or from getting  things needed for daily living? No        Food Insecurity    Within the past 12 months, you worried that your food would run out before you got the money to buy more. Never true     Within the past 12 months, the food you bought just didn't last and you didn't have money to get more. Never true        Stress    Do you feel stress - tense, restless, nervous, or anxious, or unable to sleep at night because your mind is troubled all the time - these days? To some extent        Social Connections    In a typical week, how many times do you talk on the phone with family, friends, or neighbors? More than three times a week     How often do you get together with friends or relatives? Once a week     How often do you attend Yazidi or Mandaeism services? Never     Do you belong to any clubs or organizations such as Yazidi groups, unions, fraternal or athletic groups, or school groups? No     How often do you attend meetings of the clubs or organizations you belong to? Never     Are you , , , , never , or living with a partner?         Alcohol Use    Q1: How often do you have a drink containing alcohol? Never     Q2: How many drinks containing alcohol do you have on a typical day when you are drinking? Patient does not drink     Q3: How often do you have six or more drinks on one occasion? Never        OTHER    Name(s) of People in Home Destiny Deleon (spouse) [phone]                      CM met with patient and Destiny Deleon (spouse) [phone] at the bedside. Patient verified his name and , PCP, insurance and pharmacy. Patient denied use of coumadin and is not dialysis. Lives with spouse whom will be the person to bring him home. House is single story.Patient stated he is independent with his ADL's . He stated he has on equipment for himself. Will continue to monitor for discharge needs.     Krysten Stokes RN    [phone]

## 2023-06-20 NOTE — NURSING
AAOX4,VSS,O2 sats>92% on room air. Plan of care discussed with patient and wife.Patient has no complaints of pain/SOB. Discussed medications and care. Patient has no questions at this time.Pt visualised and stable.Call light within reach.Pt resting,bed at lowest position. Patient being transferred to CCU. Report given to BETH Jovel on CSU to complete transfer to CCU.

## 2023-06-20 NOTE — H&P
[facility] - Cardiac Intensive Care  Cardiology  History and Physical     Patient Name: Paul Deleon  MRN: [id number]  Admission Date: 6/19/2023  Code Status: Full Code   Attending Provider: Hung Lucas MD   Primary Care Physician: Bree Larose MD  Principal Problem:Unstable angina    Patient information was obtained from patient, past medical records and ER records.     Subjective:     Chief Complaint:  Chest pain     HPI:  Paul Deleon is a 72 year old man with CAD (STEMI s/p PCI OM 3 in May 2013, reported PCI in 2014 at OSH), T2DM, HTN, HLD, that presents for chest discomfort and occasional pressure like pain radiating to the right arm. Also reports a different sharp chest pain with movement that is completely separate from his current discomfort. He states the pressure pain started on Saturday while cutting his grass. Tried NTG x 3 and discomfort only relieved from 8 --> 6. Tried the same on Sunday with similar results. Couldn't take the discomfort any more and decided to come in to the hospital Monday.    Recently underwent stress testing with SPECT imaging at [facility] on 06/13/2023 and was found to have a partially reversible inferior wall defect. Underwent coronary angiogram 06/14/2023 and found to have a RCA/PDA 50% defect with patent stents in the LAD and OM3. TTE at [facility] showed LVEF 65%.     Patient was initially admitted to the hospital medicine service however developed chest pain that was refractory to multiple doses of sublingual NTG; general and interventional cardiology evaluated the patient. Patient was transferred to the CCU service for further monitoring of unstable angina. IC recommended obtaining images of recent angiogram from [facility] prior to repeating the procedure.      Past Medical History:   Diagnosis Date    Cataract     Coronary artery disease     Diabetes mellitus type II     GERD (gastroesophageal reflux  disease)     Heart attack     Hyperlipidemia     Hypertension     MI (mitral incompetence)     MI (myocardial infarction)     Ulcer        Past Surgical History:   Procedure Laterality Date    ANKLE SURGERY      BACK SURGERY      CORONARY ANGIOPLASTY WITH STENT PLACEMENT      ELBOW SURGERY      lft    THROAT SURGERY         Review of patient's allergies indicates:  No Known Allergies    No current facility-administered medications on file prior to encounter.     Current Outpatient Medications on File Prior to Encounter   Medication Sig    amLODIPine-benazepriL (LOTREL) 5-40 mg per capsule TAKE 1 CAPSULE EVERY DAY    aspirin (ECOTRIN) 81 MG EC tablet Take 1 tablet (81 mg total) by mouth once daily.    atorvastatin (LIPITOR) 80 MG tablet TAKE 1 TABLET EVERY EVENING    blood sugar diagnostic (FREESTYLE LITE STRIPS) Strp Inject 1 strip into the skin 3 (three) times daily.    blood sugar diagnostic Strp To check BG 4 times daily, to use with insurance preferred meter E11.9    blood-glucose meter kit To check BG 4 times daily, to use with insurance preferred meter E11.9    clopidogreL (PLAVIX) 75 mg tablet Take 1 tablet (75 mg total) by mouth once daily.    cyanocobalamin, vitamin B-12, (VITAMIN B-12 ORAL)     diclofenac sodium (VOLTAREN) 1 % Gel Apply 2 g topically 3 (three) times daily. Apply to the area of foot or toe pain 2-3x per day or night as needed    fluticasone propionate (FLONASE) 50 mcg/actuation nasal spray 1 spray (50 mcg total) by Each Nostril route once daily.    glipiZIDE (GLUCOTROL) 10 MG TR24 Take 30 mins before breakfast or lunch.    insulin aspart U-100 (NOVOLOG FLEXPEN U-100 INSULIN) 100 unit/mL (3 mL) InPn pen 6 units before meals plus sliding scale for TDD 25 units    lancets (FREESTYLE LANCETS) 28 gauge Misc USE THREE TIMES DAILY    lancets Misc To check BG 4 times daily, to use with insurance preferred meter E11.9    metFORMIN (GLUCOPHAGE) 1000 MG tablet Take 1 tablet (1,000 mg total) by mouth  "daily with breakfast.    metoprolol succinate (TOPROL-XL) 25 MG 24 hr tablet Take 1 tablet (25 mg total) by mouth once daily.    nitroGLYCERIN (NITROSTAT) 0.4 MG SL tablet Place 1 tablet (0.4 mg total) under the tongue every 5 (five) minutes as needed for Chest pain.    pantoprazole (PROTONIX) 40 MG tablet TAKE 1 TABLET EVERY DAY    pen needle, diabetic (BD ULTRA-FINE SHORT PEN NEEDLE) 31 gauge x 5/16" Ndle Takes four injections daily E11.9    TRESIBA FLEXTOUCH U-200 200 unit/mL (3 mL) insulin pen ADMINISTER 60 UNITS UNDER THE SKIN EVERY EVENING    vitamin E 400 UNIT capsule      Family History       Problem Relation (Age of Onset)    COPD Sister    Cancer Brother    Heart disease Mother, Father          Tobacco Use    Smoking status: Never    Smokeless tobacco: Never   Substance and Sexual Activity    Alcohol use: Yes     Alcohol/week: 0.0 standard drinks     Comment: rare alcohol    Drug use: No    Sexual activity: Yes     Partners: Female     Birth control/protection: Other-see comments     Review of Systems   Constitutional: Negative.   HENT: Negative.     Cardiovascular:  Positive for chest pain. Negative for claudication, cyanosis, dyspnea on exertion, irregular heartbeat, leg swelling, near-syncope, orthopnea and palpitations.   Respiratory: Negative.  Negative for shortness of breath, sputum production and wheezing.    Endocrine: Negative.    Musculoskeletal: Negative.    Genitourinary: Negative.    Neurological: Negative.    All other systems reviewed and are negative.  Objective:     Vital Signs (Most Recent):  Temp: 98.3 °F (36.8 °C) (06/20/23 0300)  Pulse: 66 (06/20/23 0635)  Resp: (!) 9 (06/20/23 0635)  BP: 121/67 (06/20/23 0635)  SpO2: (!) 94 % (06/20/23 0635) Vital Signs (24h Range):  Temp:  [97.3 °F (36.3 °C)-98.9 °F (37.2 °C)] 98.3 °F (36.8 °C)  Pulse:  [56-72] 66  Resp:  [9-22] 9  SpO2:  [91 %-98 %] 94 %  BP: ()/(56-74) 121/67     Weight: 89.4 kg (197 lb 1.5 oz)  Body mass index is 31.81 " kg/m².    SpO2: (!) 94 %         Intake/Output Summary (Last 24 hours) at 6/20/2023 0810  Last data filed at 6/20/2023 0600  Gross per 24 hour   Intake 0 ml   Output 850 ml   Net -850 ml       Lines/Drains/Airways       Peripheral Intravenous Line  Duration                  Peripheral IV - Single Lumen 20 G Anterior;Distal;Left Forearm -- days         Peripheral IV - Single Lumen 06/19/23 2050 20 G Anterior;Right Forearm <1 day                     Physical Exam  Vitals reviewed.   Constitutional:       Appearance: He is not ill-appearing or diaphoretic.   Cardiovascular:      Rate and Rhythm: Normal rate and regular rhythm.      Pulses: Normal pulses.      Heart sounds: Normal heart sounds. No murmur heard.     Comments: Chest and abd nontender to palpation  Pulmonary:      Effort: Pulmonary effort is normal.      Breath sounds: Normal breath sounds. No wheezing or rales.   Abdominal:      General: Abdomen is flat.      Palpations: Abdomen is soft.   Musculoskeletal:      Right lower leg: No edema.      Left lower leg: No edema.   Skin:     General: Skin is warm and dry.   Neurological:      General: No focal deficit present.      Mental Status: He is alert and oriented to person, place, and time.   Psychiatric:         Mood and Affect: Mood normal.        Significant Labs: CMP   Recent Labs   Lab 06/19/23  0858 06/20/23  0200    140   K 4.5 3.7    104   CO2 18* 23   * 95   BUN 13 11   CREATININE 0.8 0.7   CALCIUM 9.5 8.9   PROT 7.0  --    ALBUMIN 3.8  --    BILITOT 1.1*  --    ALKPHOS 91  --    AST 18  --    ALT 12  --    ANIONGAP 13 13    and CBC   Recent Labs   Lab 06/19/23  0858 06/20/23  0200   WBC 8.79 8.38   HGB 13.8* 12.2*   HCT 42.5 36.3*    210       Significant Imaging:  Reviewed    Assessment and Plan:     * Unstable angina  Patient underwent coronary angiogram 06/14/2023 and found to have a RCA/PDA 50% defect with patent stents in the LAD and OM3. TTE at [facility]  showed LVEF 65%. Spoke with the Interventional Cardiologist at [facility] , no iFR/FFR performed, communicated that this gentleman had returned for chest discomfort.  ECG without TWI or ST changes concerning for ischemia  Troponins flat and low (0.006) x 2  Patient requiring multiple doses of sublingual NTG in addition to Imdur this admission thus transferred to CCU.     Images from OSH reviewed in [facility] - nonsignificant CAD    Plan  - PET Stress tomorrow  - Evalute non-cardiac causes of pain with US abd for gallstones, and CTA PE study  - Imdur 60mg daily  - Toprol 25mg daily  - Amlodipine  - SLG NTG prn  - Interventional cardiology still following  - Aspirin + statin + P2Y12, stop heparin today  - Patient needs a fresh bottle of SL NTG prior to DC, advised patient to obtain a fresh bottle after use for NTG can oxidize after 60-90 days    Type 2 diabetes mellitus, with long-term current use of insulin  - BG low-normal, SSI for goal -180 while inpatient  - Holding detemir    Hypertension associated with diabetes  - Amlodipine plus anti-anginals  - Stopped benazepril    Hyperlipidemia  - Statin        VTE Risk Mitigation (From admission, onward)           Ordered     Reason for No Pharmacological VTE Prophylaxis  Once        Question:  Reasons:  Answer:  IV Heparin w/in 24 hrs. Pre or Post-Op    06/19/23 1250     IP VTE HIGH RISK PATIENT  Once         06/19/23 1250     Place sequential compression device  Until discontinued         06/19/23 1250     heparin 25,000 units in dextrose 5% (100 units/ml) IV bolus from bag - ADDITIONAL PRN BOLUS - 60 units/kg (max bolus 4000 units)  As needed (PRN)        Question:  Heparin Infusion Adjustment (DO NOT MODIFY ANSWER)  Answer:  [email or web address]\Images\Pharmacy\HeparinInfusions\heparin LOW INTENSITY nomogram for [facility] [id number]    06/19/23 1107     heparin 25,000 units in dextrose 5% (100 units/ml) IV bolus from bag - ADDITIONAL PRN BOLUS - 30 units/kg  (max bolus 4000 units)  As needed (PRN)        Question:  Heparin Infusion Adjustment (DO NOT MODIFY ANSWER)  Answer:  [email or web address]\Images\Pharmacy\HeparinInfusions\heparin LOW INTENSITY nomogram for [facility] [id number]    06/19/23 1107     heparin 25,000 units in dextrose 5% 250 mL (100 units/mL) infusion LOW INTENSITY nomogram - [facility]  Continuous        Question Answer Comment   Heparin Infusion Adjustment (DO NOT MODIFY ANSWER) [email or web address]\Images\Pharmacy\HeparinInfusions\heparin LOW INTENSITY nomogram for [facility] [id number]    Begin at (in units/kg/hr) 12        06/19/23 1107                    Jessica Marti MD  Cardiology   [facility] - Cardiac Intensive Care

## 2023-06-20 NOTE — PLAN OF CARE
Cardiac ICU Care Plan    POC reviewed with Paul Deleon and family. Questions and concerns addressed. No acute events today. Pt progressing toward goals. Will continue to monitor. See below and flowsheets for full assessment and VS info.       Neuro:  Benedict Coma Scale  Best Eye Response: 4-->(E4) spontaneous  Best Motor Response: 6-->(M6) obeys commands  Best Verbal Response: 5-->(V5) oriented  Benedict Coma Scale Score: 15  Assessment Qualifiers: patient not sedated/intubated  Pupil PERRLA: yes    24 hr Temp:  [97.3 °F (36.3 °C)-98.9 °F (37.2 °C)]      CV:  Rhythm: normal sinus rhythm   DVT prophylaxis: VTE Required Core Measure: Pharmacological prophylaxis initiated/maintained                            Pulses  Right Radial Pulse: 2+ (normal)  Left Radial Pulse: 2+ (normal)  Right Dorsalis Pedis Pulse: 2+ (normal)  Left Dorsalis Pedis Pulse: 2+ (normal)  Right Posterior Tibial Pulse: 2+ (normal)  Left Posterior Tibial Pulse: 2+ (normal)    Resp:          GI/:  GI prophylaxis: yes  Diet/Nutrition Received: NPO  Last Bowel Movement: 06/17/23  Voiding Characteristics: voids spontaneously without difficulty   Intake/Output Summary (Last 24 hours) at 6/20/2023 1515  Last data filed at 6/20/2023 1501  Gross per 24 hour   Intake 829.67 ml   Output 1250 ml   Net -420.33 ml        Nutritional Supplement Intake: Quantity 0, Type:  n/a    Labs/Accuchecks:  Recent Labs   Lab 06/19/23  0858 06/20/23  0200   WBC 8.79 8.38   RBC 4.82 4.26*   HGB 13.8* 12.2*   HCT 42.5 36.3*    210      Recent Labs   Lab 06/19/23  1136 06/19/23  1849 06/20/23  0200 06/20/23  0845 06/20/23  1015   INR 1.0  --   --   --   --    APTT 24.4   < > 33.8* 126.9* 51.8*    < > = values in this interval not displayed.      Recent Labs     06/19/23  0858 06/20/23  0200    140   K 4.5 3.7   CO2 18* 23    104   BUN 13 11   CREATININE 0.8 0.7   ALKPHOS 91  --    ALT 12  --    AST 18  --    BILITOT 1.1*  --        Recent Labs   Lab  06/19/23  1136 06/19/23  1451 06/20/23  0200   TROPONINI <0.006 <0.006 <0.006    No results for input(s): PH, PCO2, PO2, HCO3, POCSATURATED, BE in the last 72 hours.    Electrolytes: Electrolytes replaced  Accuchecks: ACHS    Gtts/LDAs:      Lines/Drains/Airways       Peripheral Intravenous Line  Duration                  Peripheral IV - Single Lumen 20 G Anterior;Distal;Left Forearm -- days         Peripheral IV - Single Lumen 06/19/23 2050 20 G Anterior;Right Forearm <1 day                    Skin/Wounds  Bathing/Skin Care: bath, complete;dressed/undressed;electrode patches/site rotation;linen changed (06/19/23 1935)  Wounds: No  Wound care consulted: No

## 2023-06-20 NOTE — NURSING
End of Shift Note:    Received patient at 1935 last night.    AAOx4.      On RA.      SB-NSR. HR 50s-60s.  SBPs: 100s-130s.  Pt CP controlled throughout shift w/ Nitro SL x2 given at 0230 & 0635 this AM.       AM PTT 33.8. Heparin titrated per protocol.  Heparin infusing @ 14 units/kg/hr.     NPO.    UOP per urinal= 850mL.  No BM.      MAEW. Able to ambulate.    Bath given w/ full linen change.    Destiny (wife) at BS and updated on POC w/ patient.      Report given to oncoming nurse.    Temp:  [97.3 °F (36.3 °C)-98.3 °F (36.8 °C)]   Pulse:  [56-69]   Resp:  [9-21]   BP: ()/(58-69)   SpO2:  [91 %-97 %]

## 2023-06-20 NOTE — ASSESSMENT & PLAN NOTE
Patient underwent coronary angiogram 06/14/2023 and found to have a RCA/PDA 50% defect with patent stents in the LAD and OM3. TTE at [facility] showed LVEF 65%. Spoke with the Interventional Cardiologist at [facility] , no iFR/FFR performed, communicated that this gentleman had returned for chest discomfort.  ECG without TWI or ST changes concerning for ischemia  Troponins flat and low (0.006) x 2  Patient requiring multiple doses of sublingual NTG in addition to Imdur this admission thus transferred to CCU.     - Imdur 60mg daily  - Toprol 25mg daily  - Amlodipine  - SLG NTG prn  - Pending images to be sent from OSH  - Interventional cardiology still following  - Aspirin + statin + P2Y12 + heparin gtt as per ACS protocol  - Patient needs a fresh bottle of SL NTG prior to DC, advised patient to obtain a fresh bottle after use for NTG can oxidize after 60-90 days

## 2023-06-20 NOTE — PLAN OF CARE
Per Mikaela Blackburn RN the information was in [employer] and physician informed of same.     Krysten Stokes RN    [phone]

## 2023-06-21 VITALS
SYSTOLIC BLOOD PRESSURE: 106 MMHG | OXYGEN SATURATION: 95 % | RESPIRATION RATE: 50 BRPM | WEIGHT: 197 LBS | HEIGHT: 66 IN | TEMPERATURE: 98 F | HEART RATE: 80 BPM | DIASTOLIC BLOOD PRESSURE: 65 MMHG | BODY MASS INDEX: 31.66 KG/M2

## 2023-06-21 PROBLEM — R07.89 OTHER CHEST PAIN: Status: ACTIVE | Noted: 2023-06-19

## 2023-06-21 LAB
ANION GAP SERPL CALC-SCNC: 11 MMOL/L (ref 8–16)
BUN SERPL-MCNC: 11 MG/DL (ref 8–23)
CALCIUM SERPL-MCNC: 9.3 MG/DL (ref 8.7–10.5)
CFR FLOW - ANTERIOR: 2.66
CFR FLOW - INFERIOR: 2.56
CFR FLOW - LATERAL: 2.68
CFR FLOW - MAX: 3.63
CFR FLOW - MIN: 2.04
CFR FLOW - SEPTAL: 2.86
CFR FLOW - WHOLE HEART: 2.69
CHLORIDE SERPL-SCNC: 106 MMOL/L (ref 95–110)
CO2 SERPL-SCNC: 18 MMOL/L (ref 23–29)
CREAT SERPL-MCNC: 0.8 MG/DL (ref 0.5–1.4)
CV PHARM DOSE: 0.4 MG
CV STRESS BASE HR: 68 BPM
DIASTOLIC BLOOD PRESSURE: 72 MMHG
EJECTION FRACTION- HIGH: 59 %
END DIASTOLIC INDEX-HIGH: 155 ML/M2
END DIASTOLIC INDEX-LOW: 91 ML/M2
END SYSTOLIC INDEX-HIGH: 78 ML/M2
END SYSTOLIC INDEX-LOW: 40 ML/M2
ERYTHROCYTE [DISTWIDTH] IN BLOOD BY AUTOMATED COUNT: 13.8 % (ref 11.5–14.5)
EST. GFR  (NO RACE VARIABLE): >60 ML/MIN/1.73 M^2
GLUCOSE SERPL-MCNC: 159 MG/DL (ref 70–110)
HCT VFR BLD AUTO: 38.1 % (ref 40–54)
HGB BLD-MCNC: 12.9 G/DL (ref 14–18)
MAGNESIUM SERPL-MCNC: 1.8 MG/DL (ref 1.6–2.6)
MCH RBC QN AUTO: 29.2 PG (ref 27–31)
MCHC RBC AUTO-ENTMCNC: 33.9 G/DL (ref 32–36)
MCV RBC AUTO: 86 FL (ref 82–98)
NUC REST DIASTOLIC VOLUME INDEX: 49
NUC REST EJECTION FRACTION: 68
NUC REST SYSTOLIC VOLUME INDEX: 16
NUC STRESS DIASTOLIC VOLUME INDEX: 51
NUC STRESS EJECTION FRACTION: 79 %
NUC STRESS SYSTOLIC VOLUME INDEX: 11
OHS CV CPX 85 PERCENT MAX PREDICTED HEART RATE MALE: 126
OHS CV CPX MAX PREDICTED HEART RATE: 148
OHS CV CPX PATIENT IS FEMALE: 0
OHS CV CPX PATIENT IS MALE: 1
OHS CV CPX PEAK DIASTOLIC BLOOD PRESSURE: 55 MMHG
OHS CV CPX PEAK HEAR RATE: 74 BPM
OHS CV CPX PEAK RATE PRESSURE PRODUCT: 7326
OHS CV CPX PEAK SYSTOLIC BLOOD PRESSURE: 99 MMHG
OHS CV CPX PERCENT MAX PREDICTED HEART RATE ACHIEVED: 50
OHS CV CPX RATE PRESSURE PRODUCT PRESENTING: 9180
PLATELET # BLD AUTO: 209 K/UL (ref 150–450)
PMV BLD AUTO: 10.4 FL (ref 9.2–12.9)
POCT GLUCOSE: 130 MG/DL (ref 70–110)
POCT GLUCOSE: 176 MG/DL (ref 70–110)
POCT GLUCOSE: 246 MG/DL (ref 70–110)
POTASSIUM SERPL-SCNC: 4.6 MMOL/L (ref 3.5–5.1)
RBC # BLD AUTO: 4.42 M/UL (ref 4.6–6.2)
REST FLOW - ANTERIOR: 0.61 CC/MIN/G
REST FLOW - INFERIOR: 0.58 CC/MIN/G
REST FLOW - LATERAL: 0.71 CC/MIN/G
REST FLOW - MAX: 0.79 CC/MIN/G
REST FLOW - MIN: 0.41 CC/MIN/G
REST FLOW - SEPTAL: 0.6 CC/MIN/G
REST FLOW - WHOLE HEART: 0.63 CC/MIN/G
RETIRED EF AND QEF - SEE NOTES: 47 %
SODIUM SERPL-SCNC: 135 MMOL/L (ref 136–145)
STRESS FLOW - ANTERIOR: 1.63 CC/MIN/G
STRESS FLOW - INFERIOR: 1.49 CC/MIN/G
STRESS FLOW - LATERAL: 1.91 CC/MIN/G
STRESS FLOW - MAX: 2.23 CC/MIN/G
STRESS FLOW - MIN: 0.91 CC/MIN/G
STRESS FLOW - SEPTAL: 1.72 CC/MIN/G
STRESS FLOW - WHOLE HEART: 1.69 CC/MIN/G
SYSTOLIC BLOOD PRESSURE: 135 MMHG
WBC # BLD AUTO: 6.27 K/UL (ref 3.9–12.7)

## 2023-06-21 PROCEDURE — 63600175 PHARM REV CODE 636 W HCPCS: Performed by: INTERNAL MEDICINE

## 2023-06-21 PROCEDURE — 25000003 PHARM REV CODE 250

## 2023-06-21 PROCEDURE — 85027 COMPLETE CBC AUTOMATED: CPT | Performed by: STUDENT IN AN ORGANIZED HEALTH CARE EDUCATION/TRAINING PROGRAM

## 2023-06-21 PROCEDURE — 99239 HOSP IP/OBS DSCHRG MGMT >30: CPT | Mod: GC,,, | Performed by: INTERNAL MEDICINE

## 2023-06-21 PROCEDURE — 83735 ASSAY OF MAGNESIUM: CPT | Performed by: STUDENT IN AN ORGANIZED HEALTH CARE EDUCATION/TRAINING PROGRAM

## 2023-06-21 PROCEDURE — 1111F PR DISCHARGE MEDS RECONCILED W/ CURRENT OUTPATIENT MED LIST: ICD-10-PCS | Mod: CPTII,GC,, | Performed by: INTERNAL MEDICINE

## 2023-06-21 PROCEDURE — 80048 BASIC METABOLIC PNL TOTAL CA: CPT | Performed by: STUDENT IN AN ORGANIZED HEALTH CARE EDUCATION/TRAINING PROGRAM

## 2023-06-21 PROCEDURE — 1111F DSCHRG MED/CURRENT MED MERGE: CPT | Mod: CPTII,GC,, | Performed by: INTERNAL MEDICINE

## 2023-06-21 PROCEDURE — 25000003 PHARM REV CODE 250: Performed by: INTERNAL MEDICINE

## 2023-06-21 PROCEDURE — 25000242 PHARM REV CODE 250 ALT 637 W/ HCPCS: Performed by: STUDENT IN AN ORGANIZED HEALTH CARE EDUCATION/TRAINING PROGRAM

## 2023-06-21 PROCEDURE — 25000003 PHARM REV CODE 250: Performed by: HOSPITALIST

## 2023-06-21 PROCEDURE — 25000003 PHARM REV CODE 250: Performed by: STUDENT IN AN ORGANIZED HEALTH CARE EDUCATION/TRAINING PROGRAM

## 2023-06-21 PROCEDURE — 99239 PR HOSPITAL DISCHARGE DAY,>30 MIN: ICD-10-PCS | Mod: GC,,, | Performed by: INTERNAL MEDICINE

## 2023-06-21 PROCEDURE — 63600175 PHARM REV CODE 636 W HCPCS

## 2023-06-21 PROCEDURE — 94761 N-INVAS EAR/PLS OXIMETRY MLT: CPT

## 2023-06-21 RX ORDER — CAFFEINE CITRATE 20 MG/ML
60 SOLUTION INTRAVENOUS ONCE
Status: COMPLETED | OUTPATIENT
Start: 2023-06-21 | End: 2023-06-21

## 2023-06-21 RX ORDER — CLOPIDOGREL BISULFATE 75 MG/1
75 TABLET ORAL DAILY
Qty: 30 TABLET | Refills: 3 | Status: SHIPPED | OUTPATIENT
Start: 2023-06-21 | End: 2024-06-20

## 2023-06-21 RX ORDER — NITROGLYCERIN 0.4 MG/1
0.4 TABLET SUBLINGUAL EVERY 5 MIN PRN
Qty: 25 TABLET | Refills: 2 | Status: SHIPPED | OUTPATIENT
Start: 2023-06-21 | End: 2023-06-21 | Stop reason: SDUPTHER

## 2023-06-21 RX ORDER — LANOLIN ALCOHOL/MO/W.PET/CERES
400 CREAM (GRAM) TOPICAL ONCE
Status: COMPLETED | OUTPATIENT
Start: 2023-06-21 | End: 2023-06-21

## 2023-06-21 RX ORDER — ASPIRIN 81 MG/1
81 TABLET ORAL DAILY
Qty: 30 TABLET | Refills: 11 | Status: SHIPPED | OUTPATIENT
Start: 2023-06-21 | End: 2024-06-20

## 2023-06-21 RX ORDER — ATORVASTATIN CALCIUM 80 MG/1
80 TABLET, FILM COATED ORAL NIGHTLY
Qty: 90 TABLET | Refills: 3 | Status: SHIPPED | OUTPATIENT
Start: 2023-06-21 | End: 2024-06-15

## 2023-06-21 RX ORDER — CYCLOBENZAPRINE HCL 10 MG
10 TABLET ORAL 3 TIMES DAILY PRN
Qty: 30 TABLET | Refills: 0 | Status: SHIPPED | OUTPATIENT
Start: 2023-06-21 | End: 2023-07-01

## 2023-06-21 RX ORDER — NITROGLYCERIN 0.4 MG/1
0.4 TABLET SUBLINGUAL EVERY 5 MIN PRN
Qty: 25 TABLET | Refills: 2 | Status: SHIPPED | OUTPATIENT
Start: 2023-06-21 | End: 2024-06-20

## 2023-06-21 RX ORDER — NITROGLYCERIN 400 UG/1
1 SPRAY ORAL EVERY 5 MIN PRN
Status: COMPLETED | OUTPATIENT
Start: 2023-06-21 | End: 2023-06-21

## 2023-06-21 RX ORDER — REGADENOSON 0.08 MG/ML
0.4 INJECTION, SOLUTION INTRAVENOUS
Status: COMPLETED | OUTPATIENT
Start: 2023-06-21 | End: 2023-06-21

## 2023-06-21 RX ORDER — CLOPIDOGREL BISULFATE 75 MG/1
75 TABLET ORAL DAILY
Qty: 30 TABLET | Refills: 11 | Status: SHIPPED | OUTPATIENT
Start: 2023-06-21 | End: 2023-06-21 | Stop reason: SDUPTHER

## 2023-06-21 RX ORDER — AMINOPHYLLINE 25 MG/ML
75 INJECTION, SOLUTION INTRAVENOUS ONCE
Status: COMPLETED | OUTPATIENT
Start: 2023-06-21 | End: 2023-06-21

## 2023-06-21 RX ORDER — MAGNESIUM SULFATE HEPTAHYDRATE 40 MG/ML
2 INJECTION, SOLUTION INTRAVENOUS ONCE
Status: DISCONTINUED | OUTPATIENT
Start: 2023-06-21 | End: 2023-06-21 | Stop reason: HOSPADM

## 2023-06-21 RX ORDER — ISOSORBIDE MONONITRATE 60 MG/1
60 TABLET, EXTENDED RELEASE ORAL DAILY
Qty: 30 TABLET | Refills: 11 | Status: SHIPPED | OUTPATIENT
Start: 2023-06-21 | End: 2023-06-21 | Stop reason: HOSPADM

## 2023-06-21 RX ORDER — AMLODIPINE BESYLATE 10 MG/1
10 TABLET ORAL DAILY
Qty: 30 TABLET | Refills: 11 | Status: SHIPPED | OUTPATIENT
Start: 2023-06-21 | End: 2023-07-05

## 2023-06-21 RX ADMIN — AMINOPHYLLINE 75 MG: 25 INJECTION, SOLUTION INTRAVENOUS at 10:06

## 2023-06-21 RX ADMIN — REGADENOSON 0.4 MG: 0.08 INJECTION, SOLUTION INTRAVENOUS at 10:06

## 2023-06-21 RX ADMIN — PANTOPRAZOLE SODIUM 40 MG: 40 TABLET, DELAYED RELEASE ORAL at 08:06

## 2023-06-21 RX ADMIN — ASPIRIN 81 MG: 81 TABLET, COATED ORAL at 08:06

## 2023-06-21 RX ADMIN — SENNOSIDES AND DOCUSATE SODIUM 2 TABLET: 50; 8.6 TABLET ORAL at 08:06

## 2023-06-21 RX ADMIN — NITROGLYCERIN 1 SPRAY: 400 SPRAY ORAL at 10:06

## 2023-06-21 RX ADMIN — Medication 400 MG: at 09:06

## 2023-06-21 RX ADMIN — ACETAMINOPHEN 650 MG: 325 TABLET ORAL at 11:06

## 2023-06-21 RX ADMIN — ISOSORBIDE MONONITRATE 60 MG: 60 TABLET, EXTENDED RELEASE ORAL at 08:06

## 2023-06-21 RX ADMIN — NITROGLYCERIN 0.4 MG: 0.4 TABLET, ORALLY DISINTEGRATING SUBLINGUAL at 03:06

## 2023-06-21 RX ADMIN — FLUTICASONE PROPIONATE 50 MCG: 50 SPRAY, METERED NASAL at 08:06

## 2023-06-21 RX ADMIN — AMLODIPINE BESYLATE 10 MG: 10 TABLET ORAL at 08:06

## 2023-06-21 RX ADMIN — METOPROLOL SUCCINATE 25 MG: 25 TABLET, EXTENDED RELEASE ORAL at 08:06

## 2023-06-21 RX ADMIN — CLOPIDOGREL BISULFATE 75 MG: 75 TABLET ORAL at 08:06

## 2023-06-21 RX ADMIN — CAFFEINE CITRATE 60 MG: 20 INJECTION INTRAVENOUS at 10:06

## 2023-06-21 RX ADMIN — NITROGLYCERIN 0.4 MG: 0.4 TABLET, ORALLY DISINTEGRATING SUBLINGUAL at 04:06

## 2023-06-21 NOTE — PROGRESS NOTES
[facility] - [facility]  Cardiology  Progress Note    Patient Name: Paul Deleon  MRN: [id number]  Admission Date: 6/19/2023  Hospital Length of Stay: 2 days  Code Status: Full Code   Attending Physician: Hung Lucas MD   Primary Care Physician: Bree Larose MD  Expected Discharge Date: 6/21/2023  Principal Problem:Other chest pain    Subjective:     Hospital Course:   Patient admitted to hospital medicine initially for chest pain. Used multiple doses of sublingual NTG without much relief of pain therefore gen cards and IC consulted, patient transferred to CCU service for presumed unstable angina. [facility] imagees from 6/14 [facility] were requested and transferred morning of 6/20, pending multi-disciplinary review. Ultimately it was decided that the patient's degree of stenosis does not match his chest pain, thus today the plan was to assess alternative causes of chest pain eg gallstones and PE. PET stress ordered for 6/21.      Interval History: Patient still reporting intermitted chest pain described as pressure like. [facility] images to be reviewed today.    Investigating other non-cardiac causes of chest pain today.    Review of Systems   Constitutional: Negative.   HENT: Negative.     Cardiovascular:  Positive for chest pain. Negative for claudication, cyanosis, dyspnea on exertion, irregular heartbeat, leg swelling, near-syncope, orthopnea and palpitations.   Respiratory: Negative.  Negative for shortness of breath, sputum production and wheezing.    Endocrine: Negative.    Musculoskeletal: Negative.    Genitourinary: Negative.    Neurological: Negative.    All other systems reviewed and are negative.  Objective:     Vital Signs (Most Recent):  Temp: 98 °F (36.7 °C) (06/21/23 1101)  Pulse: 75 (06/21/23 1300)  Resp: (!) 21 (06/21/23 1300)  BP: 106/65 (06/21/23 1300)  SpO2: (!) 92 % (06/21/23 1300) Vital Signs (24h Range):  Temp:  [97.1 °F (36.2 °C)-98.3 °F (36.8 °C)] 98 °F (36.7  °C)  Pulse:  [63-78] 75  Resp:  [9-40] 21  SpO2:  [91 %-97 %] 92 %  BP: ()/(53-78) 106/65     Weight: 89.4 kg (197 lb)  Body mass index is 31.8 kg/m².     SpO2: (!) 92 %         Intake/Output Summary (Last 24 hours) at 6/21/2023 1439  Last data filed at 6/21/2023 0901  Gross per 24 hour   Intake 870 ml   Output 2000 ml   Net -1130 ml       Lines/Drains/Airways       None                      Physical Exam  Vitals reviewed.   Constitutional:       Appearance: He is not ill-appearing or diaphoretic.   Cardiovascular:      Rate and Rhythm: Normal rate and regular rhythm.      Pulses: Normal pulses.      Heart sounds: Normal heart sounds. No murmur heard.     Comments: Chest and abd nontender to palpation  Pulmonary:      Effort: Pulmonary effort is normal.      Breath sounds: Normal breath sounds. No wheezing or rales.   Abdominal:      General: Abdomen is flat.      Palpations: Abdomen is soft.   Musculoskeletal:      Right lower leg: No edema.      Left lower leg: No edema.   Skin:     General: Skin is warm and dry.   Neurological:      General: No focal deficit present.      Mental Status: He is alert and oriented to person, place, and time.   Psychiatric:         Mood and Affect: Mood normal.          Significant Labs: All pertinent lab results from the last 24 hours have been reviewed.    Significant Imaging:  Reviewed    Assessment and Plan:     * Other chest pain  Patient underwent coronary angiogram 06/14/2023 and found to have a RCA/PDA 50% defect with patent stents in the LAD and OM3. TTE at [facility] showed LVEF 65%. Spoke with the Interventional Cardiologist at [facility] , no iFR/FFR performed, communicated that this gentleman had returned for chest discomfort.  ECG without TWI or ST changes concerning for ischemia  Troponins flat and low (0.006) x 2  Patient requiring multiple doses of sublingual NTG in addition to Imdur this admission thus transferred to CCU for presumed  unstable angina.    Images from OSH reviewed in [facility] - consistent with nonobstructive CAD    Plan  - PET Stress tomorrow  - US abdomen for gallstones, and CTPE study today  - Imdur 60mg daily  - Toprol 25mg daily  - Amlodipine  - SLG NTG prn  - Aspirin + statin + P2Y12    Mediastinal lymphadenopathy  - Found incidentally on CT PE study   - F/u outpatient with pulmonology    Type 2 diabetes mellitus, with long-term current use of insulin  - BG low-normal, SSI for goal -180 while inpatient  - Holding detemir    Hypertension associated with diabetes  - Amlodipine plus anti-anginals  - Stopped benazepril    Hyperlipidemia  - Statin        VTE Risk Mitigation (From admission, onward)         Ordered     enoxaparin injection 40 mg  Every 24 hours         06/20/23 1029     Reason for No Pharmacological VTE Prophylaxis  Once        Question:  Reasons:  Answer:  IV Heparin w/in 24 hrs. Pre or Post-Op    06/19/23 1250     IP VTE HIGH RISK PATIENT  Once         06/19/23 1250     Place sequential compression device  Until discontinued         06/19/23 1250                Jessica Marti MD  Cardiology  [facility] - Cardiac Intensive [facility]

## 2023-06-21 NOTE — DISCHARGE SUMMARY
[facility] - Cardiac Intensive [facility]  Cardiology  Discharge Summary      Patient Name: Paul Deleon  MRN: [id number]  Admission Date: 6/19/2023  Hospital Length of Stay: 2 days  Discharge Date and Time:  06/21/2023 9:03 AM  Attending Physician: Hung Lucas MD    Discharging Provider: Jessica Marti MD  Primary Care Physician: Bree Larose MD    HPI:   Paul Deleon is a 72 year old man with CAD (STEMI s/p PCI OM 3 in May 2013, reported PCI in 2014 at OSH), T2DM, HTN, HLD, that presents for chest discomfort and occasional pressure like pain radiating to the right arm. Also reports a different sharp chest pain with movement that is completely separate from his current discomfort. He states the pressure pain started on Saturday while cutting his grass. Tried NTG x 3 and discomfort only relieved from 8 --> 6. Tried the same on Sunday with similar results. Couldn't take the discomfort any more and decided to come in to the hospital Monday.    Recently underwent stress testing with SPECT imaging at [facility] on 06/13/2023 and was found to have a partially reversible inferior wall defect. Underwent coronary angiogram 06/14/2023 and found to have a RCA/PDA 50% defect with patent stents in the LAD and OM3. TTE at [facility] showed LVEF 65%.     Patient was initially admitted to the hospital medicine service however developed chest pain that was refractory to multiple doses of sublingual NTG; general and interventional cardiology evaluated the patient. Patient was transferred to the CCU service for further monitoring of unstable angina. IC recommended obtaining images of recent angiogram from [facility] prior to repeating the procedure.      * No surgery found *     Indwelling Lines/Drains at time of discharge:  Lines/Drains/Airways       None                   Hospital Course:  Patient admitted to hospital medicine initially for chest pain. Used multiple doses  "of sublingual NTG without much relief of pain therefore gen cards and IC consulted, patient transferred to CCU service for presumed unstable angina. [facility] imagees from 6/14 [facility] were transferred and reviewed, and showed nonobstructive CAD that should not explain patient's chest pain. Alternative sources of chest pain investigated and were all negative (CT PE study and US gallbladder). Patient underwent PET stress on 6/21 that did not show a reversible area of ischemia. Patient experienced chest pain throughout the PET study, however, as the study did not show any ischemia, patient's chest pain is very likely non-cardiac. Patient continued to have chest pain despite Imdur and sublingual NTG, metoprolol and CCB. Ultimately Imdur was stopped due to patient developing headaches. Patient ok with going home and following up with PCP and cardiology outpatient.      Of note his CT showed a 1cm mediastinal lymph node of uncertain significance; pulmonology referral was placed on discharge to follow this up. GI referral also placed for possible GI causes of pain and need for outpatient EGD. Patient discharged home in stable condition.       Goals of Care Treatment Preferences:  Code Status: Full Code      Consults:   Consults (From admission, onward)          Status Ordering Provider     Inpatient consult to Interventional Cardiology  Once        Provider:  (Not yet assigned)    Completed LU ACOSTA     Inpatient consult to Cardiology  Once        Provider:  (Not yet assigned)    Completed MIGUEL DRAPER            Physical Exam  Vitals reviewed. /78 (BP Location: Right arm, Patient Position: Lying)   Pulse 74   Temp 97.1 °F (36.2 °C) (Oral)   Resp (!) 40   Ht 5' 6" (1.676 m)   Wt 89.4 kg (197 lb 1.5 oz)   SpO2 97%   BMI 31.81 kg/m²   Constitutional:       Appearance: He is not ill-appearing or diaphoretic.   Cardiovascular:      Rate and Rhythm: Normal rate and regular rhythm.      Pulses: " Normal pulses.      Heart sounds: Normal heart sounds. No murmur heard.     Comments: Chest and abd nontender to palpation  Pulmonary:      Effort: Pulmonary effort is normal.      Breath sounds: Normal breath sounds. No wheezing or rales.   Abdominal:      General: Abdomen is flat.      Palpations: Abdomen is soft.   Musculoskeletal:      Right lower leg: No edema.      Left lower leg: No edema.   Skin:     General: Skin is warm and dry.   Neurological:      General: No focal deficit present.      Mental Status: He is alert and oriented to person, place, and time.   Psychiatric:         Mood and Affect: Mood normal.      Significant Diagnostic Studies:   CT PE study negative  US gallbladder negative  LHC from OSH reviewed, nonobstructive CAD  PET stress negative, below:    The myocardial perfusion images are normal without evidence of ischemia or scar.    The whole heart absolute myocardial perfusion values averaged 0.63 cc/min/g at rest, which is normal; 1.69 cc/min/g at stress, which is mildly reduced; and CFR is 2.69 , which is low normal.    CT attenuation images demonstrate moderate diffuse coronary calcifications in the LAD and LCX territory.    The gated perfusion images showed an ejection fraction of 68% at rest and 79% during stress. A normal ejection fraction is greater than 47%.    The wall motion is normal at rest and during stress.    The LV cavity size is normal at rest and stress.    The ECG portion of the study is negative for ischemia.    There were no arrhythmias during stress.    The patient reported chest pain during the stress test.      Pending Diagnostic Studies:       None            Final Active Diagnoses:    Diagnosis Date Noted POA    PRINCIPAL PROBLEM:  Other chest pain [R07.89] 06/19/2023 Yes    Mediastinal lymphadenopathy [R59.0] 06/20/2023 Yes    Type 2 diabetes mellitus, with long-term current use of insulin [E11.9, Z79.4] 10/17/2013 Not Applicable     Chronic    Hypertension  associated with diabetes [E11.59, I15.2] 05/07/2013 Yes     Chronic    Hyperlipidemia [E78.5]  Yes     Chronic      Problems Resolved During this Admission:    Diagnosis Date Noted Date Resolved POA    Coronary artery disease [I25.10] 09/24/2013 06/20/2023 Yes     Cardiac/Vascular  * Other chest pain  Patient underwent coronary angiogram 06/14/2023 and found to have a RCA/PDA 50% defect with patent stents in the LAD and OM3. TTE at [facility] showed LVEF 65%. Spoke with the Interventional Cardiologist at [facility] , no iFR/FFR performed, communicated that this gentleman had returned for chest discomfort.  ECG without TWI or ST changes concerning for ischemia  Troponins flat and low (0.006) x 2  Patient requiring multiple doses of sublingual NTG in addition to Imdur this admission thus transferred to CCU for presumed unstable angina.    Images from OSH reviewed in [facility] - consistent with nonobstructive CAD  Investigated non-cardiac causes of chest pain, US abd without gallstones and PE study negative    Plan  - PET Stress repeated and unremarkable, no reversible ischemia   - Imdur 60mg daily - stopped due to headache  - Toprol 25mg daily  - Amlodipine  - SLG NTG prn  - Aspirin + statin + P2Y12      Discharged Condition: stable    Disposition: Home or Self Care    Follow Up:    Patient Instructions:      Ambulatory referral/consult to Cardiology   Standing Status: Future   Referral Priority: Routine Referral Type: Consultation   Referral Reason: Specialty Services Required   Requested Specialty: Cardiology   Number of Visits Requested: 1     Ambulatory referral/consult to Internal Medicine   Standing Status: Future   Referral Priority: Routine Referral Type: Consultation   Referral Reason: Specialty Services Required   Requested Specialty: Internal Medicine   Number of Visits Requested: 1     Ambulatory referral/consult to Pulmonology   Standing Status: Future   Referral Priority: Routine  Referral Type: Consultation   Referral Reason: Specialty Services Required   Requested Specialty: Pulmonary Disease   Number of Visits Requested: 1     Medications:  Reconciled Home Medications:      Medication List        START taking these medications      amLODIPine 10 MG tablet  Commonly known as: NORVASC  Take 1 tablet (10 mg total) by mouth once daily.     cyclobenzaprine 10 MG tablet  Commonly known as: FLEXERIL  Take 1 tablet (10 mg total) by mouth 3 (three) times daily as needed for Muscle spasms.            CHANGE how you take these medications      nitroGLYCERIN 0.4 MG SL tablet  Commonly known as: NITROSTAT  Place 1 tablet (0.4 mg total) under the tongue every 5 (five) minutes as needed for Chest pain. Up to 3 doses. If chest pain is not relieved or worsens 3 to 5 minutes after 1 dose, call 911 and seek immediate emergency medical attention.  What changed: additional instructions            CONTINUE taking these medications      aspirin 81 MG EC tablet  Commonly known as: ECOTRIN  Take 1 tablet (81 mg total) by mouth once daily.     atorvastatin 80 MG tablet  Commonly known as: LIPITOR  Take 1 tablet (80 mg total) by mouth every evening.     * blood sugar diagnostic Strp  Commonly known as: FREESTYLE LITE STRIPS  Inject 1 strip into the skin 3 (three) times daily.     * blood sugar diagnostic Strp  To check BG 4 times daily, to use with insurance preferred meter E11.9     blood-glucose meter kit  To check BG 4 times daily, to use with insurance preferred meter E11.9     clopidogreL 75 mg tablet  Commonly known as: PLAVIX  Take 1 tablet (75 mg total) by mouth once daily.     diclofenac sodium 1 % Gel  Commonly known as: VOLTAREN  Apply 2 g topically 3 (three) times daily. Apply to the area of foot or toe pain 2-3x per day or night as needed     fluticasone propionate 50 mcg/actuation nasal spray  Commonly known as: FLONASE  1 spray (50 mcg total) by Each Nostril route once daily.     insulin aspart U-100  "100 unit/mL (3 mL) Inpn pen  Commonly known as: NovoLOG FlexPen U-100 Insulin  6 units before meals plus sliding scale for TDD 25 units     * lancets 28 gauge Misc  Commonly known as: FREESTYLE LANCETS  USE THREE TIMES DAILY     * lancets Misc  To check BG 4 times daily, to use with insurance preferred meter E11.9     metFORMIN 1000 MG tablet  Commonly known as: GLUCOPHAGE  Take 1 tablet (1,000 mg total) by mouth daily with breakfast.     metoprolol succinate 25 MG 24 hr tablet  Commonly known as: TOPROL-XL  Take 1 tablet (25 mg total) by mouth once daily.     pantoprazole 40 MG tablet  Commonly known as: PROTONIX  TAKE 1 TABLET EVERY DAY     pen needle, diabetic 31 gauge x 5/16" Ndle  Commonly known as: BD ULTRA-FINE SHORT PEN NEEDLE  Takes four injections daily E11.9     TRESIBA FLEXTOUCH U-200 200 unit/mL (3 mL) insulin pen  Generic drug: insulin degludec  ADMINISTER 60 UNITS UNDER THE SKIN EVERY EVENING     VITAMIN B-12 ORAL     vitamin E 400 UNIT capsule           * This list has 4 medication(s) that are the same as other medications prescribed for you. Read the directions carefully, and ask your doctor or other care provider to review them with you.                STOP taking these medications      amLODIPine-benazepriL 5-40 mg per capsule  Commonly known as: LOTREL     glipiZIDE 10 MG Tr24  Commonly known as: GLUCOTROL              Time spent on the discharge of patient: 35 minutes    Jessica Marti MD  Cardiology  [facility] - Cardiac Intensive Care  "

## 2023-06-21 NOTE — HOSPITAL COURSE
Patient admitted to hospital medicine initially for chest pain. Used multiple doses of sublingual NTG without much relief of pain therefore gen cards and IC consulted, patient transferred to CCU service for presumed unstable angina. [facility] imagees from 6/14 [facility] were requested and transferred morning of 6/20, pending multi-disciplinary review. Ultimately it was decided that the patient's degree of stenosis does not match his chest pain, thus today the plan was to assess alternative causes of chest pain eg gallstones and PE. PET stress ordered for 6/21.

## 2023-06-21 NOTE — PLAN OF CARE
[facility] - Cardiac Intensive [facility]  Discharge Final Note    Primary Care Provider: Bree Larose MD    Expected Discharge Date: 6/21/2023    Final Discharge Note (most recent)       Final Note - 06/21/23 1213          Final Note    Assessment Type Final Discharge Note     Anticipated Discharge Disposition Home or Self Care     Hospital Resources/Appts/Education Provided Appointments scheduled and added to AVS                   Maite Jimenez LMSW  [facility]  [id number]      Future Appointments   Date Time Provider Department Center   6/26/2023  2:30 PM Rabia Aguilera PA-C [facility] IM [facility]   7/5/2023 10:00 AM Dion Edmondson MD [facility] CARDIO [facility]

## 2023-06-21 NOTE — NURSING
Patient is ready for discharge. Patient stable alert and oriented. IVs removed. No complaints of pain. Discussed discharge plan. Reviewed medications and side effects, appointments, and all answered questions with patient and family. Verbalized understanding. Medications delivered to patient and spouse at the bedside. AVS given to patient and spouse.

## 2023-06-21 NOTE — SUBJECTIVE & OBJECTIVE
Interval History: Patient still reporting intermitted chest pain described as pressure like. [facility] images to be reviewed today.    Investigating other non-cardiac causes of chest pain today.    Review of Systems   Constitutional: Negative.   HENT: Negative.     Cardiovascular:  Positive for chest pain. Negative for claudication, cyanosis, dyspnea on exertion, irregular heartbeat, leg swelling, near-syncope, orthopnea and palpitations.   Respiratory: Negative.  Negative for shortness of breath, sputum production and wheezing.    Endocrine: Negative.    Musculoskeletal: Negative.    Genitourinary: Negative.    Neurological: Negative.    All other systems reviewed and are negative.  Objective:     Vital Signs (Most Recent):  Temp: 98 °F (36.7 °C) (06/21/23 1101)  Pulse: 75 (06/21/23 1300)  Resp: (!) 21 (06/21/23 1300)  BP: 106/65 (06/21/23 1300)  SpO2: (!) 92 % (06/21/23 1300) Vital Signs (24h Range):  Temp:  [97.1 °F (36.2 °C)-98.3 °F (36.8 °C)] 98 °F (36.7 °C)  Pulse:  [63-78] 75  Resp:  [9-40] 21  SpO2:  [91 %-97 %] 92 %  BP: ()/(53-78) 106/65     Weight: 89.4 kg (197 lb)  Body mass index is 31.8 kg/m².     SpO2: (!) 92 %         Intake/Output Summary (Last 24 hours) at 6/21/2023 1439  Last data filed at 6/21/2023 0901  Gross per 24 hour   Intake 870 ml   Output 2000 ml   Net -1130 ml       Lines/Drains/Airways       None                      Physical Exam  Vitals reviewed.   Constitutional:       Appearance: He is not ill-appearing or diaphoretic.   Cardiovascular:      Rate and Rhythm: Normal rate and regular rhythm.      Pulses: Normal pulses.      Heart sounds: Normal heart sounds. No murmur heard.     Comments: Chest and abd nontender to palpation  Pulmonary:      Effort: Pulmonary effort is normal.      Breath sounds: Normal breath sounds. No wheezing or rales.   Abdominal:      General: Abdomen is flat.      Palpations: Abdomen is soft.   Musculoskeletal:      Right lower leg: No edema.      Left lower  leg: No edema.   Skin:     General: Skin is warm and dry.   Neurological:      General: No focal deficit present.      Mental Status: He is alert and oriented to person, place, and time.   Psychiatric:         Mood and Affect: Mood normal.          Significant Labs: All pertinent lab results from the last 24 hours have been reviewed.    Significant Imaging:  Reviewed

## 2023-06-21 NOTE — NURSING
End of Shift Note:      AAO x4.  Melatonin given x1.      On RA.      NSR. HR 60s-70s.  SBPs: 90s-140s.  Pt CP controlled throughout shift w/ Nitro SL x2 given at 0345 & 0428 this AM.       Tolerating per MD diet order well.    UOP per urinal= 750mL.  No BM.      MAEW. Able to ambulate.    Lisa-care given per patient x 3.      Report given to oncoming nurse.    Temp:  [97.9 °F (36.6 °C)-98.3 °F (36.8 °C)]   Pulse:  [63-76]   Resp:  [9-28]   BP: ()/(53-71)   SpO2:  [91 %-97 %]

## 2023-06-21 NOTE — PLAN OF CARE
APPOINTMENT:    Patient has been scheduled for a PCP Hospital Follow Up with Rabia Aguilera PA-C on Monday Jun 26, 2023 at 2:30 PM.    Please arrive approximately 15 minutes before your scheduled appointment time and ensure that you have a valid government issued ID and your insurance card.     [facility]   Please park in surface lot and check in at central registration desk.    [facility]  [address]   [facility] [id number] [phone]      And;  Patient has been scheduled for a Cardiology Hospital Follow up Visit with Dion Edmondson MD on Wednesday Jul 5, 2023 at 10:00 AM.    Please arrive approximately 15 minutes before your scheduled appointment time and ensure that you have a valid government issued ID and your insurance card.     Cardiology Services Clinics - 3rd floor  [facility] - Cardiology - 3rd Fl  [address]   [facility] [id number]  [phone]        Anjali Sarabia  Community Health Worker(CHW)  Case Management  Ext. [phone]

## 2023-06-22 ENCOUNTER — TELEPHONE (OUTPATIENT)
Dept: PULMONOLOGY | Facility: CLINIC | Age: 73
End: 2023-06-22
Payer: MEDICARE

## 2023-06-22 ENCOUNTER — TELEPHONE (OUTPATIENT)
Dept: INTERNAL MEDICINE | Facility: CLINIC | Age: 73
End: 2023-06-22
Payer: MEDICARE

## 2023-06-22 NOTE — TELEPHONE ENCOUNTER
----- Message from Denae Mckenzie sent at 6/22/2023  2:22 PM CDT -----  Contact: [phone]  Pt wife Destiny said she needs a call back about her  just got out the hosp and she has some  questions about different specialty appts he needs. Please call her back.

## 2023-06-22 NOTE — TELEPHONE ENCOUNTER
I called and spoke to Mrs Deleon about a visit in pulmonary. Dr Jessica Marti is referring her ..I told Mrs Deleon that her  can be seen 8-29-23 at 1 pm with Dr Blayne Velasco.I have him on the waiting list., appointment slip mailed. Fela Scott LPN

## 2023-06-22 NOTE — TELEPHONE ENCOUNTER
----- Message from Misty Mccrary MA sent at 6/22/2023  2:39 PM CDT -----  Type:  Sooner Apoointment Request    Caller is requesting a sooner appointment. yes   Name of Caller: pt   When is the first available appointment? N/a  Would the patient rather a call back or a response via [facility]? Call back  Best Call Back Number: [phone]  Additional Information:  please contact pt to be scheduled from active referral

## 2023-06-23 ENCOUNTER — PATIENT OUTREACH (OUTPATIENT)
Dept: ADMINISTRATIVE | Facility: CLINIC | Age: 73
End: 2023-06-23
Payer: MEDICARE

## 2023-06-23 RX ORDER — PANTOPRAZOLE SODIUM 40 MG/1
TABLET, DELAYED RELEASE ORAL
Qty: 90 TABLET | Refills: 1 | Status: SHIPPED | OUTPATIENT
Start: 2023-06-23 | End: 2024-03-01

## 2023-06-23 NOTE — PROGRESS NOTES
C3 nurse spoke with Paul Deleon  for a TCC post hospital discharge follow up call. The patient has a scheduled HOSFU appointment with KATHERINE Aguilera on 06/26/2023 @ [facility].

## 2023-06-26 ENCOUNTER — OFFICE VISIT (OUTPATIENT)
Dept: INTERNAL MEDICINE | Facility: CLINIC | Age: 73
End: 2023-06-26
Payer: MEDICARE

## 2023-06-26 VITALS
WEIGHT: 192.88 LBS | SYSTOLIC BLOOD PRESSURE: 112 MMHG | OXYGEN SATURATION: 96 % | HEART RATE: 77 BPM | DIASTOLIC BLOOD PRESSURE: 60 MMHG | BODY MASS INDEX: 31 KG/M2 | HEIGHT: 66 IN

## 2023-06-26 DIAGNOSIS — I15.2 HYPERTENSION ASSOCIATED WITH DIABETES: Primary | Chronic | ICD-10-CM

## 2023-06-26 DIAGNOSIS — E78.5 HYPERLIPIDEMIA, UNSPECIFIED HYPERLIPIDEMIA TYPE: Chronic | ICD-10-CM

## 2023-06-26 DIAGNOSIS — R59.0 MEDIASTINAL LYMPHADENOPATHY: ICD-10-CM

## 2023-06-26 DIAGNOSIS — Z79.4 TYPE 2 DIABETES MELLITUS WITH OTHER SPECIFIED COMPLICATION, WITH LONG-TERM CURRENT USE OF INSULIN: Chronic | ICD-10-CM

## 2023-06-26 DIAGNOSIS — R07.9 CHEST PAIN SYNDROME: ICD-10-CM

## 2023-06-26 DIAGNOSIS — E11.69 TYPE 2 DIABETES MELLITUS WITH OTHER SPECIFIED COMPLICATION, WITH LONG-TERM CURRENT USE OF INSULIN: Chronic | ICD-10-CM

## 2023-06-26 DIAGNOSIS — E11.59 HYPERTENSION ASSOCIATED WITH DIABETES: Primary | Chronic | ICD-10-CM

## 2023-06-26 PROCEDURE — 1111F DSCHRG MED/CURRENT MED MERGE: CPT | Mod: CPTII,S$GLB,, | Performed by: PHYSICIAN ASSISTANT

## 2023-06-26 PROCEDURE — 1126F PR PAIN SEVERITY QUANTIFIED, NO PAIN PRESENT: ICD-10-PCS | Mod: CPTII,S$GLB,, | Performed by: PHYSICIAN ASSISTANT

## 2023-06-26 PROCEDURE — 4010F ACE/ARB THERAPY RXD/TAKEN: CPT | Mod: CPTII,S$GLB,, | Performed by: PHYSICIAN ASSISTANT

## 2023-06-26 PROCEDURE — 99999 PR PBB SHADOW E&M-EST. PATIENT-LVL V: CPT | Mod: PBBFAC,,, | Performed by: PHYSICIAN ASSISTANT

## 2023-06-26 PROCEDURE — 1126F AMNT PAIN NOTED NONE PRSNT: CPT | Mod: CPTII,S$GLB,, | Performed by: PHYSICIAN ASSISTANT

## 2023-06-26 PROCEDURE — 99496 TRANSJ CARE MGMT HIGH F2F 7D: CPT | Mod: S$GLB,,, | Performed by: PHYSICIAN ASSISTANT

## 2023-06-26 PROCEDURE — 3078F DIAST BP <80 MM HG: CPT | Mod: CPTII,S$GLB,, | Performed by: PHYSICIAN ASSISTANT

## 2023-06-26 PROCEDURE — 3072F LOW RISK FOR RETINOPATHY: CPT | Mod: CPTII,S$GLB,, | Performed by: PHYSICIAN ASSISTANT

## 2023-06-26 PROCEDURE — 1101F PT FALLS ASSESS-DOCD LE1/YR: CPT | Mod: CPTII,S$GLB,, | Performed by: PHYSICIAN ASSISTANT

## 2023-06-26 PROCEDURE — 3288F PR FALLS RISK ASSESSMENT DOCUMENTED: ICD-10-PCS | Mod: CPTII,S$GLB,, | Performed by: PHYSICIAN ASSISTANT

## 2023-06-26 PROCEDURE — 1101F PR PT FALLS ASSESS DOC 0-1 FALLS W/OUT INJ PAST YR: ICD-10-PCS | Mod: CPTII,S$GLB,, | Performed by: PHYSICIAN ASSISTANT

## 2023-06-26 PROCEDURE — 3078F PR MOST RECENT DIASTOLIC BLOOD PRESSURE < 80 MM HG: ICD-10-PCS | Mod: CPTII,S$GLB,, | Performed by: PHYSICIAN ASSISTANT

## 2023-06-26 PROCEDURE — 1160F PR REVIEW ALL MEDS BY PRESCRIBER/CLIN PHARMACIST DOCUMENTED: ICD-10-PCS | Mod: CPTII,S$GLB,, | Performed by: PHYSICIAN ASSISTANT

## 2023-06-26 PROCEDURE — 3008F PR BODY MASS INDEX (BMI) DOCUMENTED: ICD-10-PCS | Mod: CPTII,S$GLB,, | Performed by: PHYSICIAN ASSISTANT

## 2023-06-26 PROCEDURE — 3288F FALL RISK ASSESSMENT DOCD: CPT | Mod: CPTII,S$GLB,, | Performed by: PHYSICIAN ASSISTANT

## 2023-06-26 PROCEDURE — 99496 TRANSITIONAL CARE MANAGE SERVICE 7 DAY DISCHARGE: ICD-10-PCS | Mod: S$GLB,,, | Performed by: PHYSICIAN ASSISTANT

## 2023-06-26 PROCEDURE — 4010F PR ACE/ARB THEARPY RXD/TAKEN: ICD-10-PCS | Mod: CPTII,S$GLB,, | Performed by: PHYSICIAN ASSISTANT

## 2023-06-26 PROCEDURE — 1160F RVW MEDS BY RX/DR IN RCRD: CPT | Mod: CPTII,S$GLB,, | Performed by: PHYSICIAN ASSISTANT

## 2023-06-26 PROCEDURE — 3072F PR LOW RISK FOR RETINOPATHY: ICD-10-PCS | Mod: CPTII,S$GLB,, | Performed by: PHYSICIAN ASSISTANT

## 2023-06-26 PROCEDURE — 3008F BODY MASS INDEX DOCD: CPT | Mod: CPTII,S$GLB,, | Performed by: PHYSICIAN ASSISTANT

## 2023-06-26 PROCEDURE — 3074F SYST BP LT 130 MM HG: CPT | Mod: CPTII,S$GLB,, | Performed by: PHYSICIAN ASSISTANT

## 2023-06-26 PROCEDURE — 1159F PR MEDICATION LIST DOCUMENTED IN MEDICAL RECORD: ICD-10-PCS | Mod: CPTII,S$GLB,, | Performed by: PHYSICIAN ASSISTANT

## 2023-06-26 PROCEDURE — 3074F PR MOST RECENT SYSTOLIC BLOOD PRESSURE < 130 MM HG: ICD-10-PCS | Mod: CPTII,S$GLB,, | Performed by: PHYSICIAN ASSISTANT

## 2023-06-26 PROCEDURE — 1159F MED LIST DOCD IN RCRD: CPT | Mod: CPTII,S$GLB,, | Performed by: PHYSICIAN ASSISTANT

## 2023-06-26 PROCEDURE — 99999 PR PBB SHADOW E&M-EST. PATIENT-LVL V: ICD-10-PCS | Mod: PBBFAC,,, | Performed by: PHYSICIAN ASSISTANT

## 2023-06-26 PROCEDURE — 1111F PR DISCHARGE MEDS RECONCILED W/ CURRENT OUTPATIENT MED LIST: ICD-10-PCS | Mod: CPTII,S$GLB,, | Performed by: PHYSICIAN ASSISTANT

## 2023-06-26 NOTE — PROGRESS NOTES
Transitional Care Note  Subjective:       Patient ID: Paul Deleon is a 72 y.o. male.  Chief Complaint: Hospital Follow Up    Family and/or Caretaker present at visit?  Yes.  Diagnostic tests reviewed/disposition: No diagnosic tests pending after this hospitalization.  Disease/illness education: Yes  Home health/community services discussion/referrals: Patient does not have home health established from hospital visit.  They do not need home health.  If needed, we will set up home health for the patient.   Establishment or re-establishment of referral orders for community resources: No other necessary community resources.   Discussion with other health care providers: No discussion with other health care providers necessary.   Paul Deleon is an established patient of Bree Larose MD here today for hospital f/u visit.    Here today with his wife, who assists with history at times.    He is somewhat unclear on all medications today, cannot recall names of many.  It looks like amlodipine-benazepril and glipizide were both d/c during hospital stay.      Admitted 6/19-6/21 due to chest pain.  It started while cutting grass.  He was monitored in the CCU and see by interventional cardiology.  Pain was fairly unresponsive to sublingual NTG.  He underwent PET stress test that was negative.  Also had CTA to rule out PE and gallbladder US, which was fine.  No clear etiology for pain so rec outpatient GI work up.    He is scheduled for GI appointment upcoming.  He does take protonix 40 mg daily.    CTA incidentally with prominent mediastinal lymph nodes.  He has upcoming pulm appointment and on wait list for sooner appointment.      Since discharge he has had several episodes of chest pain though none as severe as initial episode that sent him to the ED.  He believes he has flexeril at home but cannot recall if he has taken it.      DM -   Home blood sugar varies,     Dipp  Novolog  Tresiba  Metformin    Lab Results       Component                Value               Date                       HGBA1C                   10.2 (H)            06/13/2023                 HGBA1C                   10.1 (H)            09/27/2022                 HGBA1C                   10.2 (H)            11/04/2021              CAD -   STEMI s/p PCI 5/2013  Plavix  NTG prn  Toprol XL 25 mg    HTN -   Amlodipine 10 mg  Toprol XL 25 mg    Lipids -   Lipitor 80 mg        Review of Systems   Constitutional:  Negative for appetite change, chills, fatigue and fever.   HENT:  Negative for congestion and sore throat.    Eyes:  Negative for visual disturbance.   Respiratory:  Negative for cough, chest tightness and shortness of breath.    Cardiovascular:  Positive for chest pain. Negative for palpitations and leg swelling.   Gastrointestinal:  Negative for abdominal pain, blood in stool, constipation, diarrhea, nausea and vomiting.   Genitourinary:  Negative for dysuria, frequency, hematuria and urgency.   Musculoskeletal:  Negative for arthralgias and back pain.   Skin:  Negative for rash.   Neurological:  Negative for dizziness, syncope, weakness and headaches.   Psychiatric/Behavioral:  Negative for dysphoric mood and sleep disturbance. The patient is not nervous/anxious.      Objective:      Physical Exam  Vitals and nursing note reviewed.   Constitutional:       Appearance: He is well-developed.   HENT:      Head: Normocephalic.      Right Ear: External ear normal.      Left Ear: External ear normal.   Eyes:      Pupils: Pupils are equal, round, and reactive to light.   Cardiovascular:      Rate and Rhythm: Normal rate and regular rhythm.      Heart sounds: Normal heart sounds. No murmur heard.    No friction rub. No gallop.   Pulmonary:      Effort: Pulmonary effort is normal. No respiratory distress.      Breath sounds: Normal breath sounds.   Abdominal:      Palpations: Abdomen is soft.      Tenderness:  There is no abdominal tenderness.   Musculoskeletal:         General: No swelling.   Skin:     General: Skin is warm and dry.   Neurological:      General: No focal deficit present.      Mental Status: He is alert.   Psychiatric:         Mood and Affect: Mood normal.       Assessment:       1. Hypertension associated with diabetes    2. Hyperlipidemia, unspecified hyperlipidemia type    3. Chest pain syndrome    4. Type 2 diabetes mellitus with other specified complication, with long-term current use of insulin    5. Mediastinal lymphadenopathy        Plan:       Paul was seen today for hospital follow up.    Diagnoses and all orders for this visit:    Importance of bringing medications to follow up visits reviewed at length today as he cannot recall names of medications so cannot accurately perform med rec today    Hypertension associated with diabetes - stable and controlled, continue current regimen, bring all bottles to review at upcoming cardiology appointment  BP Readings from Last 5 Encounters:   06/26/23 112/60   06/21/23 106/65   01/10/23 111/71   10/25/22 129/80   10/25/22 110/68     Hyperlipidemia, unspecified hyperlipidemia type - stable and controlled, continue current regimen  Lab Results   Component Value Date    CHOL 80 (L) 07/02/2021    CHOL 80 (L) 07/02/2021    TRIG 86 07/02/2021    TRIG 86 07/02/2021    HDL 25 (L) 07/02/2021    HDL 25 (L) 07/02/2021    LDLCALC 37.8 (L) 07/02/2021    LDLCALC 37.8 (L) 07/02/2021     Chest pain syndrome - cardiology work up negative, has upcoming GI appointment and cardiology appointment    Type 2 diabetes mellitus with other specified complication, with long-term current use of insulin - home blood sugar has varied widely, recommend f/u with Dr. Miller to review at all, A1C shows poor control  Lab Results   Component Value Date    HGBA1C 10.2 (H) 06/13/2023    HGBA1C 10.1 (H) 09/27/2022    HGBA1C 10.2 (H) 11/04/2021     Mediastinal lymphadenopathy - he has upcoming  "pulm appointment to review    Benazepril d/c due to controlled BP - meds were titrated to try to improve chest pain - will monitor if this needs to be resumed  Glipizide d/c due to low-normal blood sugar readings  Monitor blood sugar as this may need to be resumed    Will call patient to get more information regarding medications and ensure he schedules f/u with PCP and Dr. Miller    Pt has been given instructions populated from patient information database and has verbalized understanding of the after visit summary and information contained wherein.    Follow up with a primary care provider. May go to ER for acute shortness of breath, lightheadedness, fever, or any other emergent complaints or changes in condition.    "This note will be shared with the patient"          "

## 2023-06-27 ENCOUNTER — TELEPHONE (OUTPATIENT)
Dept: INTERNAL MEDICINE | Facility: CLINIC | Age: 73
End: 2023-06-27
Payer: MEDICARE

## 2023-06-27 NOTE — TELEPHONE ENCOUNTER
Please call patient  Assist him in scheduling a follow up with Dr. Angela mcginnis the next few weeks and also Dr. Larose within 1 month to follow up on blood pressure and blood sugar  Needs to bring med bottles to appointment

## 2023-07-05 ENCOUNTER — OFFICE VISIT (OUTPATIENT)
Dept: CARDIOLOGY | Facility: CLINIC | Age: 73
End: 2023-07-05
Payer: MEDICARE

## 2023-07-05 VITALS
BODY MASS INDEX: 31.6 KG/M2 | DIASTOLIC BLOOD PRESSURE: 58 MMHG | SYSTOLIC BLOOD PRESSURE: 100 MMHG | WEIGHT: 196.63 LBS | HEART RATE: 75 BPM | HEIGHT: 66 IN | OXYGEN SATURATION: 97 %

## 2023-07-05 DIAGNOSIS — I15.2 HYPERTENSION ASSOCIATED WITH DIABETES: Chronic | ICD-10-CM

## 2023-07-05 DIAGNOSIS — R07.9 CHEST PAIN, UNSPECIFIED TYPE: Primary | ICD-10-CM

## 2023-07-05 DIAGNOSIS — R07.9 CHEST PAIN SYNDROME: ICD-10-CM

## 2023-07-05 DIAGNOSIS — E11.69 TYPE 2 DIABETES MELLITUS WITH OTHER SPECIFIED COMPLICATION, WITH LONG-TERM CURRENT USE OF INSULIN: Chronic | ICD-10-CM

## 2023-07-05 DIAGNOSIS — I25.10 CORONARY ARTERY DISEASE, UNSPECIFIED VESSEL OR LESION TYPE, UNSPECIFIED WHETHER ANGINA PRESENT, UNSPECIFIED WHETHER NATIVE OR TRANSPLANTED HEART: ICD-10-CM

## 2023-07-05 DIAGNOSIS — Z79.4 TYPE 2 DIABETES MELLITUS WITH OTHER SPECIFIED COMPLICATION, WITH LONG-TERM CURRENT USE OF INSULIN: Chronic | ICD-10-CM

## 2023-07-05 DIAGNOSIS — E66.9 OBESITY, UNSPECIFIED CLASSIFICATION, UNSPECIFIED OBESITY TYPE, UNSPECIFIED WHETHER SERIOUS COMORBIDITY PRESENT: ICD-10-CM

## 2023-07-05 DIAGNOSIS — E11.59 HYPERTENSION ASSOCIATED WITH DIABETES: Chronic | ICD-10-CM

## 2023-07-05 DIAGNOSIS — I10 HYPERTENSION, UNSPECIFIED TYPE: ICD-10-CM

## 2023-07-05 DIAGNOSIS — E78.5 HYPERLIPIDEMIA, UNSPECIFIED HYPERLIPIDEMIA TYPE: Chronic | ICD-10-CM

## 2023-07-05 PROCEDURE — 99999 PR PBB SHADOW E&M-EST. PATIENT-LVL V: ICD-10-PCS | Mod: PBBFAC,GC,, | Performed by: STUDENT IN AN ORGANIZED HEALTH CARE EDUCATION/TRAINING PROGRAM

## 2023-07-05 PROCEDURE — 3072F PR LOW RISK FOR RETINOPATHY: ICD-10-PCS | Mod: CPTII,GC,S$GLB, | Performed by: STUDENT IN AN ORGANIZED HEALTH CARE EDUCATION/TRAINING PROGRAM

## 2023-07-05 PROCEDURE — 1159F MED LIST DOCD IN RCRD: CPT | Mod: CPTII,GC,S$GLB, | Performed by: STUDENT IN AN ORGANIZED HEALTH CARE EDUCATION/TRAINING PROGRAM

## 2023-07-05 PROCEDURE — 1101F PT FALLS ASSESS-DOCD LE1/YR: CPT | Mod: CPTII,GC,S$GLB, | Performed by: STUDENT IN AN ORGANIZED HEALTH CARE EDUCATION/TRAINING PROGRAM

## 2023-07-05 PROCEDURE — 1126F PR PAIN SEVERITY QUANTIFIED, NO PAIN PRESENT: ICD-10-PCS | Mod: CPTII,GC,S$GLB, | Performed by: STUDENT IN AN ORGANIZED HEALTH CARE EDUCATION/TRAINING PROGRAM

## 2023-07-05 PROCEDURE — 99214 PR OFFICE/OUTPT VISIT, EST, LEVL IV, 30-39 MIN: ICD-10-PCS | Mod: GC,S$GLB,, | Performed by: STUDENT IN AN ORGANIZED HEALTH CARE EDUCATION/TRAINING PROGRAM

## 2023-07-05 PROCEDURE — 99999 PR PBB SHADOW E&M-EST. PATIENT-LVL V: CPT | Mod: PBBFAC,GC,, | Performed by: STUDENT IN AN ORGANIZED HEALTH CARE EDUCATION/TRAINING PROGRAM

## 2023-07-05 PROCEDURE — 1159F PR MEDICATION LIST DOCUMENTED IN MEDICAL RECORD: ICD-10-PCS | Mod: CPTII,GC,S$GLB, | Performed by: STUDENT IN AN ORGANIZED HEALTH CARE EDUCATION/TRAINING PROGRAM

## 2023-07-05 PROCEDURE — 4010F ACE/ARB THERAPY RXD/TAKEN: CPT | Mod: CPTII,GC,S$GLB, | Performed by: STUDENT IN AN ORGANIZED HEALTH CARE EDUCATION/TRAINING PROGRAM

## 2023-07-05 PROCEDURE — 3078F PR MOST RECENT DIASTOLIC BLOOD PRESSURE < 80 MM HG: ICD-10-PCS | Mod: CPTII,GC,S$GLB, | Performed by: STUDENT IN AN ORGANIZED HEALTH CARE EDUCATION/TRAINING PROGRAM

## 2023-07-05 PROCEDURE — 99214 OFFICE O/P EST MOD 30 MIN: CPT | Mod: GC,S$GLB,, | Performed by: STUDENT IN AN ORGANIZED HEALTH CARE EDUCATION/TRAINING PROGRAM

## 2023-07-05 PROCEDURE — 3288F FALL RISK ASSESSMENT DOCD: CPT | Mod: CPTII,GC,S$GLB, | Performed by: STUDENT IN AN ORGANIZED HEALTH CARE EDUCATION/TRAINING PROGRAM

## 2023-07-05 PROCEDURE — 4010F PR ACE/ARB THEARPY RXD/TAKEN: ICD-10-PCS | Mod: CPTII,GC,S$GLB, | Performed by: STUDENT IN AN ORGANIZED HEALTH CARE EDUCATION/TRAINING PROGRAM

## 2023-07-05 PROCEDURE — 1101F PR PT FALLS ASSESS DOC 0-1 FALLS W/OUT INJ PAST YR: ICD-10-PCS | Mod: CPTII,GC,S$GLB, | Performed by: STUDENT IN AN ORGANIZED HEALTH CARE EDUCATION/TRAINING PROGRAM

## 2023-07-05 PROCEDURE — 1126F AMNT PAIN NOTED NONE PRSNT: CPT | Mod: CPTII,GC,S$GLB, | Performed by: STUDENT IN AN ORGANIZED HEALTH CARE EDUCATION/TRAINING PROGRAM

## 2023-07-05 PROCEDURE — 3072F LOW RISK FOR RETINOPATHY: CPT | Mod: CPTII,GC,S$GLB, | Performed by: STUDENT IN AN ORGANIZED HEALTH CARE EDUCATION/TRAINING PROGRAM

## 2023-07-05 PROCEDURE — 3074F SYST BP LT 130 MM HG: CPT | Mod: CPTII,GC,S$GLB, | Performed by: STUDENT IN AN ORGANIZED HEALTH CARE EDUCATION/TRAINING PROGRAM

## 2023-07-05 PROCEDURE — 3008F BODY MASS INDEX DOCD: CPT | Mod: CPTII,GC,S$GLB, | Performed by: STUDENT IN AN ORGANIZED HEALTH CARE EDUCATION/TRAINING PROGRAM

## 2023-07-05 PROCEDURE — 3008F PR BODY MASS INDEX (BMI) DOCUMENTED: ICD-10-PCS | Mod: CPTII,GC,S$GLB, | Performed by: STUDENT IN AN ORGANIZED HEALTH CARE EDUCATION/TRAINING PROGRAM

## 2023-07-05 PROCEDURE — 1111F DSCHRG MED/CURRENT MED MERGE: CPT | Mod: CPTII,GC,S$GLB, | Performed by: STUDENT IN AN ORGANIZED HEALTH CARE EDUCATION/TRAINING PROGRAM

## 2023-07-05 PROCEDURE — 3078F DIAST BP <80 MM HG: CPT | Mod: CPTII,GC,S$GLB, | Performed by: STUDENT IN AN ORGANIZED HEALTH CARE EDUCATION/TRAINING PROGRAM

## 2023-07-05 PROCEDURE — 3288F PR FALLS RISK ASSESSMENT DOCUMENTED: ICD-10-PCS | Mod: CPTII,GC,S$GLB, | Performed by: STUDENT IN AN ORGANIZED HEALTH CARE EDUCATION/TRAINING PROGRAM

## 2023-07-05 PROCEDURE — 3074F PR MOST RECENT SYSTOLIC BLOOD PRESSURE < 130 MM HG: ICD-10-PCS | Mod: CPTII,GC,S$GLB, | Performed by: STUDENT IN AN ORGANIZED HEALTH CARE EDUCATION/TRAINING PROGRAM

## 2023-07-05 PROCEDURE — 1111F PR DISCHARGE MEDS RECONCILED W/ CURRENT OUTPATIENT MED LIST: ICD-10-PCS | Mod: CPTII,GC,S$GLB, | Performed by: STUDENT IN AN ORGANIZED HEALTH CARE EDUCATION/TRAINING PROGRAM

## 2023-07-05 RX ORDER — AMLODIPINE BESYLATE 5 MG/1
5 TABLET ORAL DAILY
Qty: 30 TABLET | Refills: 11 | Status: SHIPPED | OUTPATIENT
Start: 2023-07-05 | End: 2024-07-04

## 2023-07-05 NOTE — ASSESSMENT & PLAN NOTE
-Likely noncardiac given history and not relieved by nitroglycerin  -Patient has appointment scheduled with GI and is on Protonix  -See plan for CAD for anti-anginals

## 2023-07-05 NOTE — ASSESSMENT & PLAN NOTE
-Previously with MALIKA to LAD and LCx in October 2022 in [place]  -First MALIKA was to OM3 in 2013  -Most recent LHC from 6/14/23 showed patent stents and 50% RCA lesion  -PET stress without evidence of ischemia on previous CCU admission in June 2023  -Continue ASA/Plavix  -Antianginals: Continue metoprolol 25 mg qd, decrease amlodipine to 5 mg qd due to dizziness/lightheadedness intermittently and lower BPs

## 2023-07-05 NOTE — PROGRESS NOTES
"    PCP - Bree Larose MD  Referring Physician:     Subjective:   Patient ID:  Paul Deleon is a 72 y.o. male with past medical history of:   -CAD (STEMI s/p OM3 stent in 05/13, MALIKA to Lcx and LAD in October 2022  -Type II DM  -HTN  -HLD    Presents for follow up. Last seen by Dr. Rodriguez in Jan 2023.    Per recent PCP visit:  "Admitted 6/19-6/21 due to chest pain.  It started while cutting grass.  He was monitored in the CCU and see by interventional cardiology.  Pain was fairly unresponsive to sublingual NTG.  He underwent PET stress test that was negative.  Also had CTA to rule out PE and gallbladder US, which was fine.  No clear etiology for pain so rec outpatient GI work up."    Per discharge summary:  "Recently underwent stress testing with SPECT imaging at [facility] on 06/13/2023 and was found to have a partially reversible inferior wall defect. Underwent coronary angiogram 06/14/2023 and found to have a RCA/PDA 50% defect with patent stents in the LAD and OM3. TTE at [facility] showed LVEF 65%."    Today, patient reports that he has continued to have chest pain almost daily that is 2-3/10. Pain sometimes happens after meals. Other times, it happens while cutting his grass but is not relieved by nitroglycerin and is only sometimes relieved with rest. Denies shortness of breath, palpitations, PND, claudication. He is taking all medications as prescribed.     History:     Social History     Tobacco Use    Smoking status: Never    Smokeless tobacco: Never   Substance Use Topics    Alcohol use: Yes     Alcohol/week: 0.0 standard drinks     Comment: rare alcohol     Family History   Problem Relation Age of Onset    Heart disease Mother         cad    Heart disease Father         pacemaker    COPD Sister         awaiting lung transplant    Cancer Brother         lung, metastatic    Melanoma Neg Hx     Psoriasis Neg Hx     Lupus Neg Hx     Eczema Neg Hx     Acne Neg Hx "     Amblyopia Neg Hx     Blindness Neg Hx     Cataracts Neg Hx     Glaucoma Neg Hx     Macular degeneration Neg Hx     Strabismus Neg Hx     Retinal detachment Neg Hx        Meds:   Review of patient's allergies indicates:  No Known Allergies    Current Outpatient Medications:     amLODIPine (NORVASC) 10 MG tablet, Take 1 tablet (10 mg total) by mouth once daily., Disp: 30 tablet, Rfl: 11    aspirin (ECOTRIN) 81 MG EC tablet, Take 1 tablet (81 mg total) by mouth once daily., Disp: 30 tablet, Rfl: 11    atorvastatin (LIPITOR) 80 MG tablet, Take 1 tablet (80 mg total) by mouth every evening., Disp: 90 tablet, Rfl: 3    blood sugar diagnostic (FREESTYLE LITE STRIPS) Strp, Inject 1 strip into the skin 3 (three) times daily., Disp: 200 each, Rfl: 11    blood sugar diagnostic Strp, To check BG 4 times daily, to use with insurance preferred meter E11.9, Disp: 450 each, Rfl: 3    blood-glucose meter kit, To check BG 4 times daily, to use with insurance preferred meter E11.9, Disp: 1 each, Rfl: 0    clopidogreL (PLAVIX) 75 mg tablet, Take 1 tablet (75 mg total) by mouth once daily., Disp: 30 tablet, Rfl: 3    cyanocobalamin, vitamin B-12, (VITAMIN B-12 ORAL), , Disp: , Rfl:     diclofenac sodium (VOLTAREN) 1 % Gel, Apply 2 g topically 3 (three) times daily. Apply to the area of foot or toe pain 2-3x per day or night as needed, Disp: 100 g, Rfl: 3    fluticasone propionate (FLONASE) 50 mcg/actuation nasal spray, 1 spray (50 mcg total) by Each Nostril route once daily., Disp: 15.8 mL, Rfl: 6    insulin aspart U-100 (NOVOLOG FLEXPEN U-100 INSULIN) 100 unit/mL (3 mL) InPn pen, 6 units before meals plus sliding scale for TDD 25 units, Disp: 15 mL, Rfl: 6    lancets (FREESTYLE LANCETS) 28 gauge Misc, USE THREE TIMES DAILY, Disp: 200 each, Rfl: 11    lancets Misc, To check BG 4 times daily, to use with insurance preferred meter E11.9, Disp: 450 each, Rfl: 3    metoprolol succinate (TOPROL-XL) 25 MG 24 hr tablet, Take 1 tablet (25  "mg total) by mouth once daily., Disp: 90 tablet, Rfl: 3    nitroGLYCERIN (NITROSTAT) 0.4 MG SL tablet, Place 1 tablet (0.4 mg total) under the tongue every 5 (five) minutes as needed for Chest pain. Up to 3 doses. If chest pain is not relieved or worsens 3 to 5 minutes after 1 dose, call 911 and seek immediate emergency medical attention., Disp: 25 tablet, Rfl: 2    pantoprazole (PROTONIX) 40 MG tablet, TAKE 1 TABLET EVERY DAY, Disp: 90 tablet, Rfl: 1    TRESIBA FLEXTOUCH U-200 200 unit/mL (3 mL) insulin pen, ADMINISTER 60 UNITS UNDER THE SKIN EVERY EVENING, Disp: 3 pen, Rfl: 6    vitamin E 400 UNIT capsule, , Disp: , Rfl:     metFORMIN (GLUCOPHAGE) 1000 MG tablet, Take 1 tablet (1,000 mg total) by mouth daily with breakfast., Disp: 90 tablet, Rfl: 3    pen needle, diabetic (BD ULTRA-FINE SHORT PEN NEEDLE) 31 gauge x 5/16" Ndle, Takes four injections daily E11.9, Disp: 450 each, Rfl: 3  No current facility-administered medications for this visit.        Objective:   BP (!) 100/58   Pulse 75   Ht 5' 6" (1.676 m)   Wt 89.2 kg (196 lb 10.4 oz)   SpO2 97%   BMI 31.74 kg/m²     Physical Exam  Gen: No apparent distress, resting comfortably  HEENT: Pupils equal and reactive to light  Cardio: Regular rate, point of maximal impulse not displaced, no murmur noted, 2+ radial pulses bilaterally, 2+ DP pulses bilaterally  Resp: CTAB, no wheezing  Abd: Soft, non-tender, non-distended  Skin: Warm, dry, no peripheral edema noted  Neuro: Alert and oriented x3  Psych: Normal mood and affect      Labs:     Lab Results   Component Value Date     (L) 06/21/2023    K 4.6 06/21/2023     06/21/2023    CO2 18 (L) 06/21/2023    BUN 11 06/21/2023    CREATININE 0.8 06/21/2023    ANIONGAP 11 06/21/2023     Lab Results   Component Value Date    HGBA1C 10.2 (H) 06/13/2023    HGBA1C 10.1 (H) 09/27/2022    HGBA1C 8.1 07/08/2020     Lab Results   Component Value Date    BNP 12 06/19/2023    BNP 13 07/28/2018    BNP <10.0 08/27/2013 "       Lab Results   Component Value Date    WBC 6.27 06/21/2023    HGB 12.9 (L) 06/21/2023    HCT 38.1 (L) 06/21/2023     06/21/2023    GRAN 5.6 06/19/2023    GRAN 64.1 06/19/2023     Lab Results   Component Value Date    CHOL 80 (L) 07/02/2021    CHOL 80 (L) 07/02/2021    HDL 25 (L) 07/02/2021    HDL 25 (L) 07/02/2021    LDLCALC 37.8 (L) 07/02/2021    LDLCALC 37.8 (L) 07/02/2021    TRIG 86 07/02/2021    TRIG 86 07/02/2021       Lab Results   Component Value Date     (L) 06/21/2023    K 4.6 06/21/2023     06/21/2023    CO2 18 (L) 06/21/2023    BUN 11 06/21/2023    CREATININE 0.8 06/21/2023    ANIONGAP 11 06/21/2023     Lab Results   Component Value Date    HGBA1C 10.2 (H) 06/13/2023    HGBA1C 10.1 (H) 09/27/2022    HGBA1C 8.1 07/08/2020     Lab Results   Component Value Date    BNP 12 06/19/2023    BNP 13 07/28/2018    BNP <10.0 08/27/2013    Lab Results   Component Value Date    WBC 6.27 06/21/2023    HGB 12.9 (L) 06/21/2023    HCT 38.1 (L) 06/21/2023     06/21/2023    GRAN 5.6 06/19/2023    GRAN 64.1 06/19/2023     Lab Results   Component Value Date    CHOL 80 (L) 07/02/2021    CHOL 80 (L) 07/02/2021    HDL 25 (L) 07/02/2021    HDL 25 (L) 07/02/2021    LDLCALC 37.8 (L) 07/02/2021    LDLCALC 37.8 (L) 07/02/2021    TRIG 86 07/02/2021    TRIG 86 07/02/2021                Cardiovascular Imaging:     Echo 6/13/23 (outside hospital):   Mitral Valve    Mitral valve appears normal in structure and function.      Aortic Valve    Aortic valve appears normal in structure and function.      Tricuspid Valve    Tricuspid valve appears normal in structure and function.      Pulmonic Valve    Pulmonic valve appears normal in structure and function.      Left Atrium    Left atrium appears normal.      Left Ventricle    Left ventricle size appears normal.    Ejection fraction is visually estimated at 65%.      Right Atrium    Right atrium appears normal.      Right Ventricle    Right Ventricle appears  normal in size and function.      Pericardial Effusion    No evidence of pericardial effusion.      Pleural Effusion    No evidence of pleural effusion.          PET Stress 6/21/23:    The myocardial perfusion images are normal without evidence of ischemia or scar.    The whole heart absolute myocardial perfusion values averaged 0.63 cc/min/g at rest, which is normal; 1.69 cc/min/g at stress, which is mildly reduced; and CFR is 2.69 , which is low normal.    CT attenuation images demonstrate moderate diffuse coronary calcifications in the LAD and LCX territory.    The gated perfusion images showed an ejection fraction of 68% at rest and 79% during stress. A normal ejection fraction is greater than 47%.    The wall motion is normal at rest and during stress.    The LV cavity size is normal at rest and stress.    The ECG portion of the study is negative for ischemia.    There were no arrhythmias during stress.    The patient reported chest pain during the stress test.     LHC:    Assessment & Plan:     Chest pain  -Likely noncardiac given history and not relieved by nitroglycerin  -Patient has appointment scheduled with GI and is on Protonix  -See plan for CAD for anti-anginals    Coronary artery disease  -Previously with MALIKA to LAD and LCx in October 2022 in [place]  -First MALIKA was to OM3 in 2013  -Most recent LHC from 6/14/23 showed patent stents and 50% RCA lesion  -PET stress without evidence of ischemia on previous CCU admission in June 2023  -Continue ASA/Plavix  -Antianginals: Continue metoprolol 25 mg qd, decrease amlodipine to 5 mg qd due to dizziness/lightheadedness intermittently and lower BPs    Hyperlipidemia  LDL at goal, continue Atorvastatin 80 mg qHS    Case staffed with Dr. Zheng. RTC in 3 months.      Signed:  Jameson Edmondson MD  [facility] PGY-5    Staff attestation to follow.

## 2023-07-11 ENCOUNTER — OFFICE VISIT (OUTPATIENT)
Dept: GASTROENTEROLOGY | Facility: CLINIC | Age: 73
End: 2023-07-11
Payer: MEDICARE

## 2023-07-11 VITALS
HEIGHT: 66 IN | HEART RATE: 77 BPM | BODY MASS INDEX: 31.18 KG/M2 | SYSTOLIC BLOOD PRESSURE: 108 MMHG | DIASTOLIC BLOOD PRESSURE: 71 MMHG | WEIGHT: 194 LBS

## 2023-07-11 DIAGNOSIS — R07.9 CHEST PAIN SYNDROME: Primary | ICD-10-CM

## 2023-07-11 DIAGNOSIS — K21.9 GASTROESOPHAGEAL REFLUX DISEASE, UNSPECIFIED WHETHER ESOPHAGITIS PRESENT: ICD-10-CM

## 2023-07-11 PROCEDURE — 1125F PR PAIN SEVERITY QUANTIFIED, PAIN PRESENT: ICD-10-PCS | Mod: CPTII,S$GLB,, | Performed by: STUDENT IN AN ORGANIZED HEALTH CARE EDUCATION/TRAINING PROGRAM

## 2023-07-11 PROCEDURE — 1159F MED LIST DOCD IN RCRD: CPT | Mod: CPTII,S$GLB,, | Performed by: STUDENT IN AN ORGANIZED HEALTH CARE EDUCATION/TRAINING PROGRAM

## 2023-07-11 PROCEDURE — 3072F PR LOW RISK FOR RETINOPATHY: ICD-10-PCS | Mod: CPTII,S$GLB,, | Performed by: STUDENT IN AN ORGANIZED HEALTH CARE EDUCATION/TRAINING PROGRAM

## 2023-07-11 PROCEDURE — 1101F PR PT FALLS ASSESS DOC 0-1 FALLS W/OUT INJ PAST YR: ICD-10-PCS | Mod: CPTII,S$GLB,, | Performed by: STUDENT IN AN ORGANIZED HEALTH CARE EDUCATION/TRAINING PROGRAM

## 2023-07-11 PROCEDURE — 99204 OFFICE O/P NEW MOD 45 MIN: CPT | Mod: S$GLB,,, | Performed by: STUDENT IN AN ORGANIZED HEALTH CARE EDUCATION/TRAINING PROGRAM

## 2023-07-11 PROCEDURE — 3072F LOW RISK FOR RETINOPATHY: CPT | Mod: CPTII,S$GLB,, | Performed by: STUDENT IN AN ORGANIZED HEALTH CARE EDUCATION/TRAINING PROGRAM

## 2023-07-11 PROCEDURE — 3008F PR BODY MASS INDEX (BMI) DOCUMENTED: ICD-10-PCS | Mod: CPTII,S$GLB,, | Performed by: STUDENT IN AN ORGANIZED HEALTH CARE EDUCATION/TRAINING PROGRAM

## 2023-07-11 PROCEDURE — 3078F PR MOST RECENT DIASTOLIC BLOOD PRESSURE < 80 MM HG: ICD-10-PCS | Mod: CPTII,S$GLB,, | Performed by: STUDENT IN AN ORGANIZED HEALTH CARE EDUCATION/TRAINING PROGRAM

## 2023-07-11 PROCEDURE — 3078F DIAST BP <80 MM HG: CPT | Mod: CPTII,S$GLB,, | Performed by: STUDENT IN AN ORGANIZED HEALTH CARE EDUCATION/TRAINING PROGRAM

## 2023-07-11 PROCEDURE — 1111F PR DISCHARGE MEDS RECONCILED W/ CURRENT OUTPATIENT MED LIST: ICD-10-PCS | Mod: CPTII,S$GLB,, | Performed by: STUDENT IN AN ORGANIZED HEALTH CARE EDUCATION/TRAINING PROGRAM

## 2023-07-11 PROCEDURE — 3288F FALL RISK ASSESSMENT DOCD: CPT | Mod: CPTII,S$GLB,, | Performed by: STUDENT IN AN ORGANIZED HEALTH CARE EDUCATION/TRAINING PROGRAM

## 2023-07-11 PROCEDURE — 99999 PR PBB SHADOW E&M-EST. PATIENT-LVL V: CPT | Mod: PBBFAC,,, | Performed by: STUDENT IN AN ORGANIZED HEALTH CARE EDUCATION/TRAINING PROGRAM

## 2023-07-11 PROCEDURE — 1101F PT FALLS ASSESS-DOCD LE1/YR: CPT | Mod: CPTII,S$GLB,, | Performed by: STUDENT IN AN ORGANIZED HEALTH CARE EDUCATION/TRAINING PROGRAM

## 2023-07-11 PROCEDURE — 3008F BODY MASS INDEX DOCD: CPT | Mod: CPTII,S$GLB,, | Performed by: STUDENT IN AN ORGANIZED HEALTH CARE EDUCATION/TRAINING PROGRAM

## 2023-07-11 PROCEDURE — 1111F DSCHRG MED/CURRENT MED MERGE: CPT | Mod: CPTII,S$GLB,, | Performed by: STUDENT IN AN ORGANIZED HEALTH CARE EDUCATION/TRAINING PROGRAM

## 2023-07-11 PROCEDURE — 3074F SYST BP LT 130 MM HG: CPT | Mod: CPTII,S$GLB,, | Performed by: STUDENT IN AN ORGANIZED HEALTH CARE EDUCATION/TRAINING PROGRAM

## 2023-07-11 PROCEDURE — 1125F AMNT PAIN NOTED PAIN PRSNT: CPT | Mod: CPTII,S$GLB,, | Performed by: STUDENT IN AN ORGANIZED HEALTH CARE EDUCATION/TRAINING PROGRAM

## 2023-07-11 PROCEDURE — 1159F PR MEDICATION LIST DOCUMENTED IN MEDICAL RECORD: ICD-10-PCS | Mod: CPTII,S$GLB,, | Performed by: STUDENT IN AN ORGANIZED HEALTH CARE EDUCATION/TRAINING PROGRAM

## 2023-07-11 PROCEDURE — 99999 PR PBB SHADOW E&M-EST. PATIENT-LVL V: ICD-10-PCS | Mod: PBBFAC,,, | Performed by: STUDENT IN AN ORGANIZED HEALTH CARE EDUCATION/TRAINING PROGRAM

## 2023-07-11 PROCEDURE — 3074F PR MOST RECENT SYSTOLIC BLOOD PRESSURE < 130 MM HG: ICD-10-PCS | Mod: CPTII,S$GLB,, | Performed by: STUDENT IN AN ORGANIZED HEALTH CARE EDUCATION/TRAINING PROGRAM

## 2023-07-11 PROCEDURE — 4010F ACE/ARB THERAPY RXD/TAKEN: CPT | Mod: CPTII,S$GLB,, | Performed by: STUDENT IN AN ORGANIZED HEALTH CARE EDUCATION/TRAINING PROGRAM

## 2023-07-11 PROCEDURE — 4010F PR ACE/ARB THEARPY RXD/TAKEN: ICD-10-PCS | Mod: CPTII,S$GLB,, | Performed by: STUDENT IN AN ORGANIZED HEALTH CARE EDUCATION/TRAINING PROGRAM

## 2023-07-11 PROCEDURE — 3288F PR FALLS RISK ASSESSMENT DOCUMENTED: ICD-10-PCS | Mod: CPTII,S$GLB,, | Performed by: STUDENT IN AN ORGANIZED HEALTH CARE EDUCATION/TRAINING PROGRAM

## 2023-07-11 PROCEDURE — 99204 PR OFFICE/OUTPT VISIT, NEW, LEVL IV, 45-59 MIN: ICD-10-PCS | Mod: S$GLB,,, | Performed by: STUDENT IN AN ORGANIZED HEALTH CARE EDUCATION/TRAINING PROGRAM

## 2023-07-11 NOTE — PROGRESS NOTES
"    [facility] Consultation Note    Reason for Consult:  The primary encounter diagnosis was Chest pain syndrome. A diagnosis of Gastroesophageal reflux disease, unspecified whether esophagitis present was also pertinent to this visit.    PCP:   Bree Larose   [address] / [place] [address]    Referring MD:  Jessica Marti Md  [address] [place] [address]    HPI:  This is a 72 y.o. male here for evaluation of chest pain.    The patient has a history of CAD s/p recent angio and unremarkable stress test.  He was hospitalized at [facility] and recent angio was reviewed - notable for non-obstructive CAD.  CTA of chest was unrevealing of PE in June 2023.  Abdominal ultrasound at that time was also normal.     Per his report and recent hospital eval, ongoing pain is felt to not be cardiac in etiology.      The pain occurs once daily and he feels it occurs more commonly in the afternoon.  He does lay down and take a NTG which helps with his pain.  He denies any exertional component to the pain and can get it at rest.  He does have some mild SOB with the pain.  The pain lasts an hour or two before alleviating.  Does have some associated nausea.  Denies any symptoms of heartburn.      He notes that his pain feels similar to prior cardiac events.  Of note last stents placed in October 2022 at [place], [place].    He is on ASA & plavix.     Colonoscopy in 2008.      Objective Findings:  Vital Signs:  /71 (BP Location: Right arm, Patient Position: Sitting)   Pulse 77   Ht 5' 6" (1.676 m)   Wt 88 kg (194 lb 0.1 oz)   BMI 31.31 kg/m²   Body mass index is 31.31 kg/m².    Physical Exam:  General Appearance: Well appearing in no acute distress        Assessment:  1. Chest pain syndrome    2. Gastroesophageal reflux disease, unspecified whether esophagitis present      In brief, the patient is a 71yo man who is seen in the setting of chest pain and GERD.    The patient has a long standing " history of CAD, but recent eval deemed his pain to not be due to obstructive disease.  He does have GERD, but notes this is well controlled on PPI therapy.  I have a low suspicion for a primary GI etiology, but think it is reasonable to proceed with an EGD to assess for mucosal disease.  I will schedule an EGD and also update his screening colonoscopy.      He will need to hold his DAPT and this will be discussed with his cardiology provider at the time of scheduling.     EGD/Colonoscopy ordered for C 2 or 4 endo.    Additionally - I have considered esophageal spasms given resolution with NTG.  Pending EGD and symptoms course, will discuss e manometry to assess for a hypercontractile disorder.        Follow up in about 3 months (around 10/11/2023).      Order summary:         Thank you so much for allowing me to participate in the care of Paul Spencer MD

## 2023-07-12 ENCOUNTER — TELEPHONE (OUTPATIENT)
Dept: ENDOSCOPY | Facility: HOSPITAL | Age: 73
End: 2023-07-12
Payer: MEDICARE

## 2023-07-12 VITALS — WEIGHT: 194 LBS | BODY MASS INDEX: 31.18 KG/M2 | HEIGHT: 66 IN

## 2023-07-12 DIAGNOSIS — R07.9 CHEST PAIN, UNSPECIFIED TYPE: Primary | ICD-10-CM

## 2023-07-12 DIAGNOSIS — Z12.11 SPECIAL SCREENING FOR MALIGNANT NEOPLASMS, COLON: ICD-10-CM

## 2023-07-12 NOTE — TELEPHONE ENCOUNTER
Spoke to patient to schedule procedure(s) Colonoscopy/EGD       Physician to perform procedure(s) Dr. FAHEEM Spencer  Date of Procedure (s) 8/3/23  Arrival Time 8:30 AM  Time of Procedure(s) 9:30 AM   Location of Procedure(s) [facility] Floor  Type of Rx Prep sent to patient: PEG  Instructions provided to patient via [facility]    Patient was informed on the following information and verbalized understanding. Screening questionnaire reviewed with patient and complete. If procedure requires anesthesia, a responsible adult needs to be present to accompany the patient home, patient cannot drive after receiving anesthesia. Appointment details are tentative, especially check-in time. Patient will receive a prep-op call 4 days prior to confirm check-in time for procedure. If applicable the patient should contact their pharmacy to verify Rx for procedure prep is ready for pick-up. Patient was advised to call the scheduling department at [phone] if pharmacy states no Rx is available. Patient was advised to call the endoscopy scheduling department if any questions or concerns arise.      [facility] Scheduling Department

## 2023-07-12 NOTE — TELEPHONE ENCOUNTER
"----- Message from Nitza Iglesias sent at 2023  8:41 AM CDT -----  Regarding: FW: Endo Case Request    ----- Message -----  From: Sha Spencer MD  Sent: 2023  11:47 AM CDT  To: [facility]  Subject: Endo Case Request                                Procedure: EGD/Colonoscopy    Diagnosis: Screening colonoscopy and Chest Pain    Procedure Timin-4 weeks    #If within 4 weeks selected, please lane as high priority#    #If greater than 12 weeks, please select "5-12 weeks" and delay sending until 2 months prior to requested date#     Provider: Any GI provider    Location: [facility]    Additional Scheduling Information: Blood thinners and Diabetes    Prep Specifications:Standard prep    Have you attached a patient to this message: yes        "

## 2023-07-12 NOTE — TELEPHONE ENCOUNTER
----- Message from Sha Spencer MD sent at 7/12/2023  2:01 PM CDT -----  Yes, this is ok.  Please not on a Monday though.  ----- Message -----  From: Eduardo Hardy RN  Sent: 7/12/2023   1:49 PM CDT  To: Sha Spencer MD    Good afternoon,    Are you okay with adding patient on as a 3rd case for your service week 7/31/23-8/4/23?      Thanks,    Eduardo

## 2023-07-13 ENCOUNTER — TELEPHONE (OUTPATIENT)
Dept: ENDOSCOPY | Facility: HOSPITAL | Age: 73
End: 2023-07-13
Payer: MEDICARE

## 2023-07-13 NOTE — TELEPHONE ENCOUNTER
Dear Dr Zheng-    Patient has a scheduled procedure Colonoscopy/EGD on 8/3/23 and is currently taking a blood thinner prescribed by your office. In order to ensure patient safety, we would like to confirm that the patient can place their blood thinner medication on hold for the procedure. Can he/she discontinue Plavix (clopidogrel) for a minimum of 5 days prior to the procedure?     Thank you for your prompt reply.    [facility] Endoscopy Scheduling

## 2023-07-20 NOTE — TELEPHONE ENCOUNTER
Dr Spencer-  Patient scheduled with you for an EGD on 8/3/23.  See message below regarding the Plavix hold.  Please advise on how to proceed.  Thank you.    Yvrose

## 2023-07-24 ENCOUNTER — TELEPHONE (OUTPATIENT)
Dept: ENDOSCOPY | Facility: HOSPITAL | Age: 73
End: 2023-07-24
Payer: MEDICARE

## 2023-07-24 NOTE — TELEPHONE ENCOUNTER
----- Message from Eduardo Hardy RN sent at 2023  2:48 PM CDT -----  Regardin/3 BT  The patient is currently under an external cardiologist Dr. Jose Armando Cooney care and requires a blood thinner Plavix (clopidogrel) for their upcoming scheduled Colonoscopy/EGD on 8/3/23.           External provider information:    Physician name: Dr. Jose Armando Cooney  Facility/Location: [facility] [place], [place]  Phone number: [phone]  Fax # [phone]

## 2023-08-02 DIAGNOSIS — E78.5 HYPERLIPIDEMIA, UNSPECIFIED HYPERLIPIDEMIA TYPE: ICD-10-CM

## 2023-08-02 RX ORDER — BLOOD GLUCOSE CONTROL HIGH,LOW
EACH MISCELLANEOUS
Qty: 1 EACH | Refills: 0 | OUTPATIENT
Start: 2023-08-02

## 2023-08-02 RX ORDER — ISOPROPYL ALCOHOL 70 ML/100ML
SWAB TOPICAL
Refills: 0 | OUTPATIENT
Start: 2023-08-02

## 2023-08-02 RX ORDER — DEXTROSE 4 G
TABLET,CHEWABLE ORAL
Refills: 0 | OUTPATIENT
Start: 2023-08-02

## 2023-08-02 RX ORDER — METFORMIN HYDROCHLORIDE 1000 MG/1
TABLET ORAL
Qty: 90 TABLET | Refills: 3 | Status: SHIPPED | OUTPATIENT
Start: 2023-08-02

## 2023-08-02 NOTE — TELEPHONE ENCOUNTER
Refill Decision Note   Paul Adrienne  is requesting a refill authorization.  Brief Assessment and Rationale for Refill:  Quick Discontinue     Medication Therapy Plan: Pharmacy is requesting new scripts for the following medications without required information, (sig/ frequency/qty/etc)     Medication Reconciliation Completed: No   Comments:     No Care Gaps recommended.     Note composed:11:03 AM 08/02/2023

## 2023-08-03 ENCOUNTER — ANESTHESIA (OUTPATIENT)
Dept: ENDOSCOPY | Facility: HOSPITAL | Age: 73
End: 2023-08-03
Payer: MEDICARE

## 2023-08-03 ENCOUNTER — ANESTHESIA EVENT (OUTPATIENT)
Dept: ENDOSCOPY | Facility: HOSPITAL | Age: 73
End: 2023-08-03
Payer: MEDICARE

## 2023-08-03 ENCOUNTER — HOSPITAL ENCOUNTER (OUTPATIENT)
Facility: HOSPITAL | Age: 73
Discharge: HOME | End: 2023-08-03
Attending: STUDENT IN AN ORGANIZED HEALTH CARE EDUCATION/TRAINING PROGRAM | Admitting: STUDENT IN AN ORGANIZED HEALTH CARE EDUCATION/TRAINING PROGRAM
Payer: MEDICARE

## 2023-08-03 VITALS
RESPIRATION RATE: 18 BRPM | OXYGEN SATURATION: 100 % | WEIGHT: 194 LBS | HEIGHT: 66 IN | SYSTOLIC BLOOD PRESSURE: 144 MMHG | TEMPERATURE: 98 F | BODY MASS INDEX: 31.18 KG/M2 | HEART RATE: 64 BPM | DIASTOLIC BLOOD PRESSURE: 80 MMHG

## 2023-08-03 DIAGNOSIS — R07.9 CHEST PAIN, UNSPECIFIED TYPE: Primary | ICD-10-CM

## 2023-08-03 DIAGNOSIS — R07.9 CHEST PAIN: ICD-10-CM

## 2023-08-03 LAB — POCT GLUCOSE: 200 MG/DL (ref 70–110)

## 2023-08-03 PROCEDURE — 25000003 PHARM REV CODE 250: Performed by: NURSE ANESTHETIST, CERTIFIED REGISTERED

## 2023-08-03 PROCEDURE — 43235 PR EGD, FLEX, DIAGNOSTIC: ICD-10-PCS | Mod: 51,,, | Performed by: STUDENT IN AN ORGANIZED HEALTH CARE EDUCATION/TRAINING PROGRAM

## 2023-08-03 PROCEDURE — 88305 TISSUE EXAM BY PATHOLOGIST: CPT | Mod: 26,,, | Performed by: PATHOLOGY

## 2023-08-03 PROCEDURE — 27201012 HC FORCEPS, HOT/COLD, DISP: Performed by: STUDENT IN AN ORGANIZED HEALTH CARE EDUCATION/TRAINING PROGRAM

## 2023-08-03 PROCEDURE — 43235 EGD DIAGNOSTIC BRUSH WASH: CPT | Performed by: STUDENT IN AN ORGANIZED HEALTH CARE EDUCATION/TRAINING PROGRAM

## 2023-08-03 PROCEDURE — D9220A PRA ANESTHESIA: ICD-10-PCS | Mod: ANES,,, | Performed by: ANESTHESIOLOGY

## 2023-08-03 PROCEDURE — 37000009 HC ANESTHESIA EA ADD 15 MINS: Performed by: STUDENT IN AN ORGANIZED HEALTH CARE EDUCATION/TRAINING PROGRAM

## 2023-08-03 PROCEDURE — 88305 TISSUE EXAM BY PATHOLOGIST: ICD-10-PCS | Mod: 26,,, | Performed by: PATHOLOGY

## 2023-08-03 PROCEDURE — 63600175 PHARM REV CODE 636 W HCPCS: Performed by: NURSE ANESTHETIST, CERTIFIED REGISTERED

## 2023-08-03 PROCEDURE — 45380 COLONOSCOPY AND BIOPSY: CPT | Mod: PT | Performed by: STUDENT IN AN ORGANIZED HEALTH CARE EDUCATION/TRAINING PROGRAM

## 2023-08-03 PROCEDURE — D9220A PRA ANESTHESIA: Mod: CRNA,,, | Performed by: NURSE ANESTHETIST, CERTIFIED REGISTERED

## 2023-08-03 PROCEDURE — 45380 COLONOSCOPY AND BIOPSY: CPT | Mod: PT,GC,, | Performed by: STUDENT IN AN ORGANIZED HEALTH CARE EDUCATION/TRAINING PROGRAM

## 2023-08-03 PROCEDURE — 88305 TISSUE EXAM BY PATHOLOGIST: CPT | Performed by: PATHOLOGY

## 2023-08-03 PROCEDURE — 37000008 HC ANESTHESIA 1ST 15 MINUTES: Performed by: STUDENT IN AN ORGANIZED HEALTH CARE EDUCATION/TRAINING PROGRAM

## 2023-08-03 PROCEDURE — D9220A PRA ANESTHESIA: ICD-10-PCS | Mod: CRNA,,, | Performed by: NURSE ANESTHETIST, CERTIFIED REGISTERED

## 2023-08-03 PROCEDURE — 45380 PR COLONOSCOPY,BIOPSY: ICD-10-PCS | Mod: PT,GC,, | Performed by: STUDENT IN AN ORGANIZED HEALTH CARE EDUCATION/TRAINING PROGRAM

## 2023-08-03 PROCEDURE — 43235 EGD DIAGNOSTIC BRUSH WASH: CPT | Mod: 51,,, | Performed by: STUDENT IN AN ORGANIZED HEALTH CARE EDUCATION/TRAINING PROGRAM

## 2023-08-03 PROCEDURE — D9220A PRA ANESTHESIA: Mod: ANES,,, | Performed by: ANESTHESIOLOGY

## 2023-08-03 RX ORDER — LIDOCAINE HYDROCHLORIDE 20 MG/ML
INJECTION INTRAVENOUS
Status: DISCONTINUED | OUTPATIENT
Start: 2023-08-03 | End: 2023-08-03

## 2023-08-03 RX ORDER — PROPOFOL 10 MG/ML
VIAL (ML) INTRAVENOUS
Status: DISCONTINUED | OUTPATIENT
Start: 2023-08-03 | End: 2023-08-03

## 2023-08-03 RX ORDER — SODIUM CHLORIDE 0.9 % (FLUSH) 0.9 %
10 SYRINGE (ML) INJECTION
Status: DISCONTINUED | OUTPATIENT
Start: 2023-08-03 | End: 2023-08-03 | Stop reason: HOSPADM

## 2023-08-03 RX ORDER — SODIUM CHLORIDE 9 MG/ML
INJECTION, SOLUTION INTRAVENOUS CONTINUOUS
Status: DISCONTINUED | OUTPATIENT
Start: 2023-08-03 | End: 2023-08-03 | Stop reason: HOSPADM

## 2023-08-03 RX ADMIN — PROPOFOL 40 MG: 10 INJECTION, EMULSION INTRAVENOUS at 09:08

## 2023-08-03 RX ADMIN — PROPOFOL 100 MCG/KG/MIN: 10 INJECTION, EMULSION INTRAVENOUS at 09:08

## 2023-08-03 RX ADMIN — LIDOCAINE HYDROCHLORIDE 80 MG: 20 INJECTION INTRAVENOUS at 09:08

## 2023-08-03 RX ADMIN — SODIUM CHLORIDE: 9 INJECTION, SOLUTION INTRAVENOUS at 09:08

## 2023-08-03 RX ADMIN — SODIUM CHLORIDE: 9 INJECTION, SOLUTION INTRAVENOUS at 10:08

## 2023-08-03 NOTE — PROVATION PATIENT INSTRUCTIONS
Discharge Summary/Instructions after an Endoscopic Procedure  Patient Name: Paul Deleon  Patient MRN: [id number]  Patient YOB: 1950  Thursday, August 3, 2023  Sha Spencer MD  Dear patient,  As a result of recent federal legislation (The Federal Cures Act), you may   receive lab or pathology results from your procedure in your [facility]   account before your physician is able to contact you. Your physician or   their representative will relay the results to you with their   recommendations at their soonest availability.  Thank you,  RESTRICTIONS:  During your procedure today, you received medications for sedation.  These   medications may affect your judgment, balance and coordination.  Therefore,   for 24 hours, you have the following restrictions:   - DO NOT drive a car, operate machinery, make legal/financial decisions,   sign important papers or drink alcohol.    ACTIVITY:  Today: no heavy lifting, straining or running due to procedural   sedation/anesthesia.  The following day: return to full activity including work.  DIET:  Eat and drink normally unless instructed otherwise.     TREATMENT FOR COMMON SIDE EFFECTS:  - Mild abdominal pain, nausea, belching, bloating or excessive gas:  rest,   eat lightly and use a heating pad.  - Sore Throat: treat with throat lozenges and/or gargle with warm salt   water.  - Because air was used during the procedure, expelling large amounts of air   from your rectum or belching is normal.  - If a bowel prep was taken, you may not have a bowel movement for 1-3 days.    This is normal.  SYMPTOMS TO WATCH FOR AND REPORT TO YOUR PHYSICIAN:  1. Abdominal pain or bloating, other than gas cramps.  2. Chest pain.  3. Back pain.  4. Signs of infection such as: chills or fever occurring within 24 hours   after the procedure.  5. Rectal bleeding, which would show as bright red, maroon, or black stools.   (A tablespoon of blood from the rectum is not serious,  especially if   hemorrhoids are present.)  6. Vomiting.  7. Weakness or dizziness.  GO DIRECTLY TO THE NEAREST EMERGENCY ROOM IF YOU HAVE ANY OF THE FOLLOWING:      Difficulty breathing              Chills and/or fever over 101 F   Persistent vomiting and/or vomiting blood   Severe abdominal pain   Severe chest pain   Black, tarry stools   Bleeding- more than one tablespoon   Any other symptom or condition that you feel may need urgent attention  Your doctor recommends these additional instructions:  If any biopsies were taken, your doctors clinic will contact you in 1 to 2   weeks with any results.  - Written discharge instructions were provided to the patient.   - Discharge patient to home.   - The findings and recommendations were discussed with the patient.   - Continue present medications.  For questions, problems or results please call your physician - Sha Spencer MD at Work:  [phone].  [facility] [place], EMERGENCY ROOM PHONE NUMBER: [phone]  IF A COMPLICATION OR EMERGENCY SITUATION ARISES AND YOU ARE UNABLE TO REACH   YOUR PHYSICIAN - GO DIRECTLY TO THE EMERGENCY ROOM.  Sha Spencer MD  8/3/2023 10:22:20 AM  This report has been verified and signed electronically.  Dear patient,  As a result of recent federal legislation (The Federal Cures Act), you may   receive lab or pathology results from your procedure in your [facility]   account before your physician is able to contact you. Your physician or   their representative will relay the results to you with their   recommendations at their soonest availability.  Thank you,  PROVATION

## 2023-08-03 NOTE — ANESTHESIA POSTPROCEDURE EVALUATION
Anesthesia Post Evaluation    Patient: Paul Deleon    Procedure(s) Performed: Procedure(s) (LRB):  EGD (ESOPHAGOGASTRODUODENOSCOPY) (N/A)  COLONOSCOPY (N/A)    Final Anesthesia Type: general      Patient location during evaluation: PACU  Patient participation: Yes- Able to Participate  Level of consciousness: awake and alert and oriented  Post-procedure vital signs: reviewed and stable  Pain management: adequate  Airway patency: patent    PONV status at discharge: No PONV  Anesthetic complications: no      Cardiovascular status: blood pressure returned to baseline, hemodynamically stable and stable  Respiratory status: unassisted, room air and spontaneous ventilation  Hydration status: euvolemic  Follow-up not needed.          Vitals Value Taken Time   /80 08/03/23 1100   Temp 36.6 °C (97.9 °F) 08/03/23 1027   Pulse 64 08/03/23 1100   Resp 18 08/03/23 1100   SpO2 100 % 08/03/23 1100         No case tracking events are documented in the log.      Pain/Eula Score: Eula Score: 10 (8/3/2023 10:45 AM)

## 2023-08-03 NOTE — ANESTHESIA PREPROCEDURE EVALUATION
08/03/2023  Pre-operative evaluation for Procedure(s) (LRB):  EGD (ESOPHAGOGASTRODUODENOSCOPY) (N/A)  COLONOSCOPY (N/A)    Paul Deleon is a 72 y.o. male hx of cad with stents in 2022 and recent [facility] with 50%RCA and patent stents    Patient Active Problem List   Diagnosis    Hyperlipidemia    Obesity    Hypertension associated with diabetes    Noncompliance with medication regimen    Chest pain syndrome    Type 2 diabetes mellitus, with long-term current use of insulin    Hearing loss, sensorineural    Intermittent claudication    Other chest pain    Mediastinal lymphadenopathy    Chest pain    Coronary artery disease       Review of patient's allergies indicates:  No Known Allergies    No current facility-administered medications on file prior to encounter.     Current Outpatient Medications on File Prior to Encounter   Medication Sig Dispense Refill    amLODIPine (NORVASC) 5 MG tablet Take 1 tablet (5 mg total) by mouth once daily. 30 tablet 11    aspirin (ECOTRIN) 81 MG EC tablet Take 1 tablet (81 mg total) by mouth once daily. 30 tablet 11    atorvastatin (LIPITOR) 80 MG tablet Take 1 tablet (80 mg total) by mouth every evening. 90 tablet 3    clopidogreL (PLAVIX) 75 mg tablet Take 1 tablet (75 mg total) by mouth once daily. 30 tablet 3    insulin aspart U-100 (NOVOLOG FLEXPEN U-100 INSULIN) 100 unit/mL (3 mL) InPn pen 6 units before meals plus sliding scale for TDD 25 units 15 mL 6    metoprolol succinate (TOPROL-XL) 25 MG 24 hr tablet Take 1 tablet (25 mg total) by mouth once daily. 90 tablet 3    pantoprazole (PROTONIX) 40 MG tablet TAKE 1 TABLET EVERY DAY 90 tablet 1    TRESIBA FLEXTOUCH U-200 200 unit/mL (3 mL) insulin pen ADMINISTER 60 UNITS UNDER THE SKIN EVERY EVENING 3 pen 6    blood sugar diagnostic (FREESTYLE LITE STRIPS) Strp Inject 1 strip into the skin 3 (three)  "times daily. 200 each 11    blood sugar diagnostic Strp To check BG 4 times daily, to use with insurance preferred meter E11.9 450 each 3    blood-glucose meter kit To check BG 4 times daily, to use with insurance preferred meter E11.9 1 each 0    cyanocobalamin, vitamin B-12, (VITAMIN B-12 ORAL)       diclofenac sodium (VOLTAREN) 1 % Gel Apply 2 g topically 3 (three) times daily. Apply to the area of foot or toe pain 2-3x per day or night as needed 100 g 3    fluticasone propionate (FLONASE) 50 mcg/actuation nasal spray 1 spray (50 mcg total) by Each Nostril route once daily. 15.8 mL 6    lancets (FREESTYLE LANCETS) 28 gauge Misc USE THREE TIMES DAILY 200 each 11    lancets Misc To check BG 4 times daily, to use with insurance preferred meter E11.9 450 each 3    nitroGLYCERIN (NITROSTAT) 0.4 MG SL tablet Place 1 tablet (0.4 mg total) under the tongue every 5 (five) minutes as needed for Chest pain. Up to 3 doses. If chest pain is not relieved or worsens 3 to 5 minutes after 1 dose, call 911 and seek immediate emergency medical attention. 25 tablet 2    pen needle, diabetic (BD ULTRA-FINE SHORT PEN NEEDLE) 31 gauge x 5/16" Ndle Takes four injections daily E11.9 450 each 3    vitamin E 400 UNIT capsule          Past Surgical History:   Procedure Laterality Date    ANKLE SURGERY      BACK SURGERY      CORONARY ANGIOPLASTY WITH STENT PLACEMENT      ELBOW SURGERY      lft    THROAT SURGERY         Social History     Socioeconomic History    Marital status:     Number of children: 0   Occupational History    Occupation:      Employer: [employer]     Comment: RETIRED   Tobacco Use    Smoking status: Never    Smokeless tobacco: Never   Substance and Sexual Activity    Alcohol use: Yes     Alcohol/week: 0.0 standard drinks of alcohol     Comment: rare alcohol    Drug use: No    Sexual activity: Yes     Partners: Female     Birth control/protection: Other-see comments " "  Social History Narrative           Activity limited to yard work, walking.  Low impact gym work twice a week (Silver Sneakers).     Social Determinants of Health     Financial Resource Strain: Low Risk  (6/20/2023)    Overall Financial Resource Strain (CARDIA)     Difficulty of Paying Living Expenses: Not hard at all   Food Insecurity: No Food Insecurity (6/20/2023)    Hunger Vital Sign     Worried About Running Out of Food in the Last Year: Never true     Ran Out of Food in the Last Year: Never true   Transportation Needs: No Transportation Needs (6/20/2023)    PRAPARE - Transportation     Lack of Transportation (Medical): No     Lack of Transportation (Non-Medical): No   Physical Activity: Sufficiently Active (6/20/2023)    Exercise Vital Sign     Days of Exercise per Week: 3 days     Minutes of Exercise per Session: 100 min   Stress: Stress Concern Present (6/20/2023)    [employer] - Occupational Stress Questionnaire     Feeling of Stress : To some extent   Social Connections: Moderately Isolated (6/20/2023)    Social Connection and Isolation Panel [NHANES]     Frequency of Communication with Friends and Family: More than three times a week     Frequency of Social Gatherings with Friends and Family: Once a week     Attends Confucianism Services: Never     Active Member of Clubs or Organizations: No     Attends Club or Organization Meetings: Never     Marital Status:    Housing Stability: Low Risk  (6/20/2023)    Housing Stability Vital Sign     Unable to Pay for Housing in the Last Year: No     Number of Places Lived in the Last Year: 1     Unstable Housing in the Last Year: No         CBC: No results for input(s): "WBC", "RBC", "HGB", "HCT", "PLT", "MCV", "MCH", "MCHC" in the last 72 hours.    CMP: No results for input(s): "NA", "K", "CL", "CO2", "BUN", "CREATININE", "GLU", "MG", "PHOS", "CALCIUM", "ALBUMIN", "PROT", "ALKPHOS", "ALT", "AST", "BILITOT" in the " "last 72 hours.    INR  No results for input(s): "PT", "INR", "PROTIME", "APTT" in the last 72 hours.        Diagnostic Studies:      EKD Echo:  Results for orders placed or performed during the hospital encounter of 13   2D echo with color flow doppler   Result Value Ref Range    EF + QEF 65     Diastolic Dysfunction No            Pre-op Assessment    I have reviewed the Patient Summary Reports.     I have reviewed the Nursing Notes. I have reviewed the NPO Status.   I have reviewed the Medications.     Review of Systems  Anesthesia Hx:  No problems with previous Anesthesia  History of prior surgery of interest to airway management or planning: Denies Family Hx of Anesthesia complications.   Denies Personal Hx of Anesthesia complications. (.anespre)   Hematology/Oncology:         -- Denies Anemia:   Cardiovascular:   Exercise tolerance: poor Hypertension Past MI CAD  CABG/stent  ECG has been reviewed.    Pulmonary:   Denies COPD.  Denies Asthma.  Denies Sleep Apnea.    Renal/:   Denies Chronic Renal Disease.     Hepatic/GI:   GERD    Neurological:   Denies CVA. Denies Seizures.    Endocrine:   Diabetes        Physical Exam  General: Well nourished, Cooperative, Alert and Oriented    Airway:  Mallampati: III / II  Mouth Opening: Normal  TM Distance: Normal  Tongue: Normal  Neck ROM: Normal ROM    Dental:  Intact    Chest/Lungs:  Normal Respiratory Rate    Heart:  Rate: Normal  Rhythm: Regular Rhythm        Anesthesia Plan  Type of Anesthesia, risks & benefits discussed:    Anesthesia Type: Gen Natural Airway  Intra-op Monitoring Plan: Standard ASA Monitors  Post Op Pain Control Plan: multimodal analgesia  Induction:  IV  Informed Consent: Informed consent signed with the Patient and all parties understand the risks and agree with anesthesia plan.  All questions answered.   ASA Score: 3  Day of Surgery Review of History & Physical: H&P Update referred to the surgeon/provider.    Ready For Surgery From " Anesthesia Perspective.     .

## 2023-08-03 NOTE — H&P
Endoscopy History and Physical    PCP - Bree Larose MD    Procedure - EGD  ASA - per anesthesia  Mallampati - per anesthesia  Plan of anesthesia - MAC    HPI:  This is a 72 y.o. male here for evaluation of :  chest pain, colon cancer screening, average risk    ROS:  Constitutional: No fevers, chills  CV: No chest pain  Pulm: No cough  Ophtho: No vision changes  GI: see HPI  Derm: No rash    Medical History:  has a past medical history of Cataract, Coronary artery disease, Diabetes mellitus type II, GERD (gastroesophageal reflux disease), Heart attack, Hyperlipidemia, Hypertension, MI (mitral incompetence), MI (myocardial infarction), and Ulcer.    Surgical History:  has a past surgical history that includes Throat surgery; Ankle surgery; Elbow surgery; Back surgery; and Coronary angioplasty with stent.    Family History: family history includes COPD in his sister; Cancer in his brother; Heart disease in his father and mother.. Otherwise no colon cancer, inflammatory bowel disease, or GI malignancies.    Social History:  reports that he has never smoked. He has never used smokeless tobacco. He reports current alcohol use. He reports that he does not use drugs.    Review of patient's allergies indicates:  No Known Allergies    Medications:   Medications Prior to Admission   Medication Sig Dispense Refill Last Dose    amLODIPine (NORVASC) 5 MG tablet Take 1 tablet (5 mg total) by mouth once daily. 30 tablet 11 Past Week    aspirin (ECOTRIN) 81 MG EC tablet Take 1 tablet (81 mg total) by mouth once daily. 30 tablet 11 Past Week    atorvastatin (LIPITOR) 80 MG tablet Take 1 tablet (80 mg total) by mouth every evening. 90 tablet 3 Past Week    clopidogreL (PLAVIX) 75 mg tablet Take 1 tablet (75 mg total) by mouth once daily. 30 tablet 3 Past Week    insulin aspart U-100 (NOVOLOG FLEXPEN U-100 INSULIN) 100 unit/mL (3 mL) InPn pen 6 units before meals plus sliding scale for TDD 25 units 15 mL 6 Past Week    metFORMIN  "(GLUCOPHAGE) 1000 MG tablet TAKE 1 TABLET EVERY DAY WITH BREAKFAST 90 tablet 3 Past Week    metoprolol succinate (TOPROL-XL) 25 MG 24 hr tablet Take 1 tablet (25 mg total) by mouth once daily. 90 tablet 3 Past Week    pantoprazole (PROTONIX) 40 MG tablet TAKE 1 TABLET EVERY DAY 90 tablet 1 Past Week    TRESIBA FLEXTOUCH U-200 200 unit/mL (3 mL) insulin pen ADMINISTER 60 UNITS UNDER THE SKIN EVERY EVENING 3 pen 6 Past Week    blood sugar diagnostic (FREESTYLE LITE STRIPS) Strp Inject 1 strip into the skin 3 (three) times daily. 200 each 11     blood sugar diagnostic Strp To check BG 4 times daily, to use with insurance preferred meter E11.9 450 each 3     blood-glucose meter kit To check BG 4 times daily, to use with insurance preferred meter E11.9 1 each 0     cyanocobalamin, vitamin B-12, (VITAMIN B-12 ORAL)    More than a month    diclofenac sodium (VOLTAREN) 1 % Gel Apply 2 g topically 3 (three) times daily. Apply to the area of foot or toe pain 2-3x per day or night as needed 100 g 3     fluticasone propionate (FLONASE) 50 mcg/actuation nasal spray 1 spray (50 mcg total) by Each Nostril route once daily. 15.8 mL 6 More than a month    lancets (FREESTYLE LANCETS) 28 gauge Misc USE THREE TIMES DAILY 200 each 11     lancets Misc To check BG 4 times daily, to use with insurance preferred meter E11.9 450 each 3     nitroGLYCERIN (NITROSTAT) 0.4 MG SL tablet Place 1 tablet (0.4 mg total) under the tongue every 5 (five) minutes as needed for Chest pain. Up to 3 doses. If chest pain is not relieved or worsens 3 to 5 minutes after 1 dose, call 911 and seek immediate emergency medical attention. 25 tablet 2 More than a month    pen needle, diabetic (BD ULTRA-FINE SHORT PEN NEEDLE) 31 gauge x 5/16" Ndle Takes four injections daily E11.9 450 each 3     vitamin E 400 UNIT capsule    More than a month         Vital Signs:   Vitals:    08/03/23 0904   BP: 130/73   Pulse: 74   Resp: 16   Temp: 97.2 °F (36.2 °C)       General " Appearance: Well appearing in no acute distress  Eyes:    No scleral icterus  ENT: atraumatic  Abdomen: Soft, nondistended  Extremities: no tenderness  Skin: normal color    Labs:  Lab Results   Component Value Date    WBC 6.27 06/21/2023    HGB 12.9 (L) 06/21/2023    HCT 38.1 (L) 06/21/2023     06/21/2023    CHOL 80 (L) 07/02/2021    CHOL 80 (L) 07/02/2021    TRIG 86 07/02/2021    TRIG 86 07/02/2021    HDL 25 (L) 07/02/2021    HDL 25 (L) 07/02/2021    ALT 12 06/19/2023    AST 18 06/19/2023     (L) 06/21/2023    K 4.6 06/21/2023     06/21/2023    CREATININE 0.8 06/21/2023    BUN 11 06/21/2023    CO2 18 (L) 06/21/2023    TSH 3.255 06/12/2020    PSA 2.0 10/20/2020    INR 1.0 06/19/2023    GLUF 159 (H) 12/31/2013    HGBA1C 10.2 (H) 06/13/2023       I have explained the risks and benefits of endoscopy procedures to the patient/their POA including but not limited to bleeding, perforation, infection, and death.  The patient/POA was asked if they understand and allowed to ask any further questions to their satisfaction.    Proceed with EGD and colonoscopy    Georgi Allan MD

## 2023-08-03 NOTE — TRANSFER OF CARE
"Anesthesia Transfer of Care Note    Patient: Paul Deleon    Procedure(s) Performed: Procedure(s) (LRB):  EGD (ESOPHAGOGASTRODUODENOSCOPY) (N/A)  COLONOSCOPY (N/A)    Patient location: PACU    Anesthesia Type: general    Transport from OR: Transported from OR on 6-10 L/min O2 by face mask with adequate spontaneous ventilation    Post pain: adequate analgesia    Post assessment: no apparent anesthetic complications and tolerated procedure well    Post vital signs: stable    Level of consciousness: awake    Nausea/Vomiting: no nausea/vomiting    Complications: none    Transfer of care protocol was followed      Last vitals:   Visit Vitals  BP (!) 109/56   Pulse 67   Temp 36.6 °C (97.9 °F) (Temporal)   Resp 16   Ht 5' 6" (1.676 m)   Wt 88 kg (194 lb)   SpO2 100%   BMI 31.31 kg/m²     "

## 2023-08-03 NOTE — PROVATION PATIENT INSTRUCTIONS
Discharge Summary/Instructions after an Endoscopic Procedure  Patient Name: Paul Deleon  Patient MRN: [id number]  Patient YOB: 1950  Thursday, August 3, 2023  Sha Spencer MD  Dear patient,  As a result of recent federal legislation (The Federal Cures Act), you may   receive lab or pathology results from your procedure in your [facility]   account before your physician is able to contact you. Your physician or   their representative will relay the results to you with their   recommendations at their soonest availability.  Thank you,  RESTRICTIONS:  During your procedure today, you received medications for sedation.  These   medications may affect your judgment, balance and coordination.  Therefore,   for 24 hours, you have the following restrictions:   - DO NOT drive a car, operate machinery, make legal/financial decisions,   sign important papers or drink alcohol.    ACTIVITY:  Today: no heavy lifting, straining or running due to procedural   sedation/anesthesia.  The following day: return to full activity including work.  DIET:  Eat and drink normally unless instructed otherwise.     TREATMENT FOR COMMON SIDE EFFECTS:  - Mild abdominal pain, nausea, belching, bloating or excessive gas:  rest,   eat lightly and use a heating pad.  - Sore Throat: treat with throat lozenges and/or gargle with warm salt   water.  - Because air was used during the procedure, expelling large amounts of air   from your rectum or belching is normal.  - If a bowel prep was taken, you may not have a bowel movement for 1-3 days.    This is normal.  SYMPTOMS TO WATCH FOR AND REPORT TO YOUR PHYSICIAN:  1. Abdominal pain or bloating, other than gas cramps.  2. Chest pain.  3. Back pain.  4. Signs of infection such as: chills or fever occurring within 24 hours   after the procedure.  5. Rectal bleeding, which would show as bright red, maroon, or black stools.   (A tablespoon of blood from the rectum is not serious,  especially if   hemorrhoids are present.)  6. Vomiting.  7. Weakness or dizziness.  GO DIRECTLY TO THE NEAREST EMERGENCY ROOM IF YOU HAVE ANY OF THE FOLLOWING:      Difficulty breathing              Chills and/or fever over 101 F   Persistent vomiting and/or vomiting blood   Severe abdominal pain   Severe chest pain   Black, tarry stools   Bleeding- more than one tablespoon   Any other symptom or condition that you feel may need urgent attention  Your doctor recommends these additional instructions:  If any biopsies were taken, your doctors clinic will contact you in 1 to 2   weeks with any results.  - Discharge patient to home.   - Resume regular diet.   - Resume Plavix (clopidogrel) at prior dose today.   - Repeat colonoscopy in 5 years for surveillance.   - Patient has a contact number available for emergencies.  The signs and   symptoms of potential delayed complications were discussed with the   patient.  Return to normal activities tomorrow.  Written discharge   instructions were provided to the patient.  For questions, problems or results please call your physician - Sha Spencer MD at Work:  [phone].  [facility] [place], EMERGENCY ROOM PHONE NUMBER: [phone]  IF A COMPLICATION OR EMERGENCY SITUATION ARISES AND YOU ARE UNABLE TO REACH   YOUR PHYSICIAN - GO DIRECTLY TO THE EMERGENCY ROOM.  Sha Spencer MD  8/3/2023 10:35:57 AM  This report has been verified and signed electronically.  Dear patient,  As a result of recent federal legislation (The Federal Cures Act), you may   receive lab or pathology results from your procedure in your [facility]   account before your physician is able to contact you. Your physician or   their representative will relay the results to you with their   recommendations at their soonest availability.  Thank you,  PROVATION

## 2023-08-08 LAB
FINAL PATHOLOGIC DIAGNOSIS: NORMAL
GROSS: NORMAL
Lab: NORMAL
MICROSCOPIC EXAM: NORMAL

## 2023-08-29 ENCOUNTER — LAB VISIT (OUTPATIENT)
Dept: LAB | Facility: HOSPITAL | Age: 73
End: 2023-08-29
Attending: STUDENT IN AN ORGANIZED HEALTH CARE EDUCATION/TRAINING PROGRAM
Payer: MEDICARE

## 2023-08-29 ENCOUNTER — OFFICE VISIT (OUTPATIENT)
Dept: PULMONOLOGY | Facility: CLINIC | Age: 73
End: 2023-08-29
Payer: MEDICARE

## 2023-08-29 VITALS
HEART RATE: 76 BPM | SYSTOLIC BLOOD PRESSURE: 116 MMHG | DIASTOLIC BLOOD PRESSURE: 80 MMHG | OXYGEN SATURATION: 94 % | BODY MASS INDEX: 31.18 KG/M2 | WEIGHT: 194 LBS | HEIGHT: 66 IN

## 2023-08-29 DIAGNOSIS — R59.0 MEDIASTINAL LYMPHADENOPATHY: Primary | ICD-10-CM

## 2023-08-29 DIAGNOSIS — Z79.4 TYPE 2 DIABETES MELLITUS WITH OTHER SPECIFIED COMPLICATION, WITH LONG-TERM CURRENT USE OF INSULIN: Chronic | ICD-10-CM

## 2023-08-29 DIAGNOSIS — R59.0 MEDIASTINAL LYMPHADENOPATHY: ICD-10-CM

## 2023-08-29 DIAGNOSIS — E11.69 TYPE 2 DIABETES MELLITUS WITH OTHER SPECIFIED COMPLICATION, WITH LONG-TERM CURRENT USE OF INSULIN: Chronic | ICD-10-CM

## 2023-08-29 DIAGNOSIS — I70.0 AORTIC ATHEROSCLEROSIS: ICD-10-CM

## 2023-08-29 DIAGNOSIS — E66.09 CLASS 1 OBESITY DUE TO EXCESS CALORIES WITH SERIOUS COMORBIDITY AND BODY MASS INDEX (BMI) OF 31.0 TO 31.9 IN ADULT: ICD-10-CM

## 2023-08-29 LAB
CREAT SERPL-MCNC: 1 MG/DL (ref 0.5–1.4)
EST. GFR  (NO RACE VARIABLE): >60 ML/MIN/1.73 M^2

## 2023-08-29 PROCEDURE — 1159F PR MEDICATION LIST DOCUMENTED IN MEDICAL RECORD: ICD-10-PCS | Mod: CPTII,S$GLB,, | Performed by: STUDENT IN AN ORGANIZED HEALTH CARE EDUCATION/TRAINING PROGRAM

## 2023-08-29 PROCEDURE — 3079F PR MOST RECENT DIASTOLIC BLOOD PRESSURE 80-89 MM HG: ICD-10-PCS | Mod: CPTII,S$GLB,, | Performed by: STUDENT IN AN ORGANIZED HEALTH CARE EDUCATION/TRAINING PROGRAM

## 2023-08-29 PROCEDURE — 3008F BODY MASS INDEX DOCD: CPT | Mod: CPTII,S$GLB,, | Performed by: STUDENT IN AN ORGANIZED HEALTH CARE EDUCATION/TRAINING PROGRAM

## 2023-08-29 PROCEDURE — 82565 ASSAY OF CREATININE: CPT | Performed by: STUDENT IN AN ORGANIZED HEALTH CARE EDUCATION/TRAINING PROGRAM

## 2023-08-29 PROCEDURE — 3072F LOW RISK FOR RETINOPATHY: CPT | Mod: CPTII,S$GLB,, | Performed by: STUDENT IN AN ORGANIZED HEALTH CARE EDUCATION/TRAINING PROGRAM

## 2023-08-29 PROCEDURE — 3288F FALL RISK ASSESSMENT DOCD: CPT | Mod: CPTII,S$GLB,, | Performed by: STUDENT IN AN ORGANIZED HEALTH CARE EDUCATION/TRAINING PROGRAM

## 2023-08-29 PROCEDURE — 3072F PR LOW RISK FOR RETINOPATHY: ICD-10-PCS | Mod: CPTII,S$GLB,, | Performed by: STUDENT IN AN ORGANIZED HEALTH CARE EDUCATION/TRAINING PROGRAM

## 2023-08-29 PROCEDURE — 1101F PT FALLS ASSESS-DOCD LE1/YR: CPT | Mod: CPTII,S$GLB,, | Performed by: STUDENT IN AN ORGANIZED HEALTH CARE EDUCATION/TRAINING PROGRAM

## 2023-08-29 PROCEDURE — 4010F ACE/ARB THERAPY RXD/TAKEN: CPT | Mod: CPTII,S$GLB,, | Performed by: STUDENT IN AN ORGANIZED HEALTH CARE EDUCATION/TRAINING PROGRAM

## 2023-08-29 PROCEDURE — 3074F PR MOST RECENT SYSTOLIC BLOOD PRESSURE < 130 MM HG: ICD-10-PCS | Mod: CPTII,S$GLB,, | Performed by: STUDENT IN AN ORGANIZED HEALTH CARE EDUCATION/TRAINING PROGRAM

## 2023-08-29 PROCEDURE — 99204 OFFICE O/P NEW MOD 45 MIN: CPT | Mod: S$GLB,,, | Performed by: STUDENT IN AN ORGANIZED HEALTH CARE EDUCATION/TRAINING PROGRAM

## 2023-08-29 PROCEDURE — 99999 PR PBB SHADOW E&M-EST. PATIENT-LVL IV: ICD-10-PCS | Mod: PBBFAC,,, | Performed by: STUDENT IN AN ORGANIZED HEALTH CARE EDUCATION/TRAINING PROGRAM

## 2023-08-29 PROCEDURE — 36415 COLL VENOUS BLD VENIPUNCTURE: CPT | Performed by: STUDENT IN AN ORGANIZED HEALTH CARE EDUCATION/TRAINING PROGRAM

## 2023-08-29 PROCEDURE — 99204 PR OFFICE/OUTPT VISIT, NEW, LEVL IV, 45-59 MIN: ICD-10-PCS | Mod: S$GLB,,, | Performed by: STUDENT IN AN ORGANIZED HEALTH CARE EDUCATION/TRAINING PROGRAM

## 2023-08-29 PROCEDURE — 4010F PR ACE/ARB THEARPY RXD/TAKEN: ICD-10-PCS | Mod: CPTII,S$GLB,, | Performed by: STUDENT IN AN ORGANIZED HEALTH CARE EDUCATION/TRAINING PROGRAM

## 2023-08-29 PROCEDURE — 99999 PR PBB SHADOW E&M-EST. PATIENT-LVL IV: CPT | Mod: PBBFAC,,, | Performed by: STUDENT IN AN ORGANIZED HEALTH CARE EDUCATION/TRAINING PROGRAM

## 2023-08-29 PROCEDURE — 1101F PR PT FALLS ASSESS DOC 0-1 FALLS W/OUT INJ PAST YR: ICD-10-PCS | Mod: CPTII,S$GLB,, | Performed by: STUDENT IN AN ORGANIZED HEALTH CARE EDUCATION/TRAINING PROGRAM

## 2023-08-29 PROCEDURE — 1159F MED LIST DOCD IN RCRD: CPT | Mod: CPTII,S$GLB,, | Performed by: STUDENT IN AN ORGANIZED HEALTH CARE EDUCATION/TRAINING PROGRAM

## 2023-08-29 PROCEDURE — 3288F PR FALLS RISK ASSESSMENT DOCUMENTED: ICD-10-PCS | Mod: CPTII,S$GLB,, | Performed by: STUDENT IN AN ORGANIZED HEALTH CARE EDUCATION/TRAINING PROGRAM

## 2023-08-29 PROCEDURE — 3079F DIAST BP 80-89 MM HG: CPT | Mod: CPTII,S$GLB,, | Performed by: STUDENT IN AN ORGANIZED HEALTH CARE EDUCATION/TRAINING PROGRAM

## 2023-08-29 PROCEDURE — 3008F PR BODY MASS INDEX (BMI) DOCUMENTED: ICD-10-PCS | Mod: CPTII,S$GLB,, | Performed by: STUDENT IN AN ORGANIZED HEALTH CARE EDUCATION/TRAINING PROGRAM

## 2023-08-29 PROCEDURE — 3074F SYST BP LT 130 MM HG: CPT | Mod: CPTII,S$GLB,, | Performed by: STUDENT IN AN ORGANIZED HEALTH CARE EDUCATION/TRAINING PROGRAM

## 2023-08-29 NOTE — PROGRESS NOTES
"Subjective:     Reason for visit:  Pulmonary nodule    Patient ID:  Paul Deleon is a 72 y.o. obese male with HTN, hyperlipidemia, uncontrolled type 2 diabetes, CAD s/p PCI to OM 03/20/2013, PCI again in 2014    Interval History:  Chest pain admission in June of this year.  CT of the chest showing incidental mediastinal lymphadenopathy.  Chest pain improved.  Workup negative for ACS.  No unintentional weight loss, night sweats or respiratory complaints at this time      Additional Pulmonary History:  Childhood Illnesses:  none  Occupational/Environmental:  Worked in [employer] for about 10 years back in the 60s and 70s.  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  No pets  Tobacco/Smoking:  Never    Objective:     Vitals:    08/29/23 1311   BP: 116/80   BP Location: Left arm   Patient Position: Sitting   Pulse: 76   SpO2: (!) 94%   Weight: 88 kg (194 lb 0.1 oz)   Height: 5' 6" (1.676 m)         Physical Exam  Vitals and nursing note reviewed.   Constitutional:       General: He is not in acute distress.     Appearance: He is obese. He is not ill-appearing, toxic-appearing or diaphoretic.   HENT:      Head: Normocephalic and atraumatic.      Nose: No rhinorrhea.      Mouth/Throat:      Mouth: Mucous membranes are moist.   Eyes:      General: No scleral icterus.     Extraocular Movements: Extraocular movements intact.   Cardiovascular:      Rate and Rhythm: Normal rate and regular rhythm.   Pulmonary:      Effort: No tachypnea, accessory muscle usage, respiratory distress or retractions.   Abdominal:      General: There is no distension.   Skin:     General: Skin is warm and dry.      Coloration: Skin is not jaundiced.      Findings: No rash.   Neurological:      General: No focal deficit present.      Mental Status: Mental status is at baseline.          Personal Diagnostic Review and Interpretation  06/20/2023 CTA chest:  Aortic atherosclerosis; no PE.  Few mediastinal lymph nodes up to 1.0 cm; " lungs otherwise clear      Pertinent Studies Reviewed & Interpreted:     Pulmonary Function Tests:   None    6 Minute Walk Tests:   None    Echocardiograms:   06/13/2023:  EF 65% with LVH    Other pertinent laboratories:  06/14/2023 left heart catheterization:  Multivessel coronary artery disease; no intervention    06/19/2023 BNP 12 < 13 < 10 < 10      Assessment & Plan:       Problem List Items Addressed This Visit          Cardiac/Vascular    Aortic atherosclerosis    Overview     Noted on recent CT chest.  Currently on ASA and atorvastatin as well as Plavix.  Known CAD s/p PCI x2            Endocrine    Type 2 diabetes mellitus, with long-term current use of insulin (Chronic)    Overview     Places patient at greater risk for complications related to his other chronic medical conditions.         Class 1 obesity due to excess calories with serious comorbidity and body mass index (BMI) of 31.0 to 31.9 in adult    Overview     Associated with HTN and diabetes.  Imperative that patient lose weight.            Other    Mediastinal lymphadenopathy - Primary    Overview     Unclear etiology or significance.  Admitted for chest pain at that time.  CT chest without overt parenchymal disease.  Never smoker.  Some remote occupational exposure but mild.  Likely reactive.  Repeat CT at 3 month interval.  If enlarging or unchanged, will likely need PET-CT         Relevant Orders    CT Chest With Contrast    Creatinine, serum      RETURN TO CLINIC AFTER CT SCAN NEXT MONTH    Portions of the record may have been created with voice-recognition software. Occasional wrong-word or sound-a-like substitutions may have occurred due to the inherent limitations of voice-recognition software. Read the chart carefully and recognize, using context, where substitutions have occurred.

## 2023-09-25 DIAGNOSIS — Z12.11 SPECIAL SCREENING FOR MALIGNANT NEOPLASMS, COLON: ICD-10-CM

## 2023-09-25 RX ORDER — POLYETHYLENE GLYCOL-3350 AND ELECTROLYTES WITH FLAVOR PACK 240; 5.84; 2.98; 6.72; 22.72 G/278.26G; G/278.26G; G/278.26G; G/278.26G; G/278.26G
POWDER, FOR SOLUTION ORAL
Qty: 1 EACH | Refills: 0 | Status: SHIPPED | OUTPATIENT
Start: 2023-09-25

## 2023-09-27 ENCOUNTER — HOSPITAL ENCOUNTER (OUTPATIENT)
Dept: RADIOLOGY | Facility: HOSPITAL | Age: 73
Discharge: HOME | End: 2023-09-27
Attending: STUDENT IN AN ORGANIZED HEALTH CARE EDUCATION/TRAINING PROGRAM
Payer: MEDICARE

## 2023-09-27 DIAGNOSIS — R59.0 MEDIASTINAL LYMPHADENOPATHY: ICD-10-CM

## 2023-09-27 PROCEDURE — 71260 CT THORAX DX C+: CPT | Mod: 26,,, | Performed by: RADIOLOGY

## 2023-09-27 PROCEDURE — 25500020 PHARM REV CODE 255: Performed by: STUDENT IN AN ORGANIZED HEALTH CARE EDUCATION/TRAINING PROGRAM

## 2023-09-27 PROCEDURE — 71260 CT CHEST WITH CONTRAST: ICD-10-PCS | Mod: 26,,, | Performed by: RADIOLOGY

## 2023-09-27 PROCEDURE — 71260 CT THORAX DX C+: CPT | Mod: TC

## 2023-09-27 RX ADMIN — IOHEXOL 75 ML: 350 INJECTION, SOLUTION INTRAVENOUS at 07:09

## 2023-09-29 ENCOUNTER — PATIENT MESSAGE (OUTPATIENT)
Dept: PULMONOLOGY | Facility: CLINIC | Age: 73
End: 2023-09-29
Payer: MEDICARE

## 2023-09-29 ENCOUNTER — TELEPHONE (OUTPATIENT)
Dept: PULMONOLOGY | Facility: CLINIC | Age: 73
End: 2023-09-29
Payer: MEDICARE

## 2023-09-29 NOTE — TELEPHONE ENCOUNTER
----- Message from Radha Laguerre sent at 9/29/2023  9:32 AM CDT -----  Regarding: Appt Access  Contact: [phone]  SCHEDULING/REQUEST    Appt Type: EP    Date/Time Preference: n/a    Treating Provider: Caleb    Caller Name: DAYDAY GLEASON [[id number]]    Contact Preference:  [phone] or [phone]    Comments/Notes:  Pt had an appt on 09/28/2023 that he missed due to an emergency.  Pt would like to r/s.  Please call.

## 2023-09-29 NOTE — TELEPHONE ENCOUNTER
Mr EastAdrienne is asking if Dr Velasco can call him with his Ct results as yesterday's appointment was cancelled and its a long drive to [facility]. I told him I will let Dr Velasco know his request. Fela Scott LPN

## 2023-10-02 DIAGNOSIS — R59.0 MEDIASTINAL LYMPHADENOPATHY: Primary | ICD-10-CM

## 2023-11-02 DIAGNOSIS — E11.65 UNCONTROLLED TYPE 2 DIABETES MELLITUS WITH HYPERGLYCEMIA: ICD-10-CM

## 2023-11-02 NOTE — TELEPHONE ENCOUNTER
----- Message from Ayad Irene sent at 11/2/2023 10:44 AM CDT -----  Regarding: advise; supplies needed / rx  Contact: PT @ [phone]  Pt is calling asking to speak with someone in the office to advise that he is having a hard time getting his supplies. Pt is also asking about getting a DEXCOM; the one he can put on his arm; something more up to date.     Pt states that he needs test strips, alcohol pads, pen needle. Pt is asking for a return call back. Pt is also asking to be seen sooner than March 2024. Thanks.

## 2023-11-03 RX ORDER — LANCETS
EACH MISCELLANEOUS
Qty: 450 EACH | Refills: 3 | Status: SHIPPED | OUTPATIENT
Start: 2023-11-03

## 2023-11-03 RX ORDER — ISOPROPYL ALCOHOL 70 ML/100ML
1 SWAB TOPICAL
Qty: 400 EACH | Refills: 3 | Status: SHIPPED | OUTPATIENT
Start: 2023-11-03

## 2023-11-03 RX ORDER — PEN NEEDLE, DIABETIC 30 GX3/16"
NEEDLE, DISPOSABLE MISCELLANEOUS
Qty: 450 EACH | Refills: 3 | Status: SHIPPED | OUTPATIENT
Start: 2023-11-03 | End: 2024-03-13 | Stop reason: SDUPTHER

## 2023-12-20 ENCOUNTER — TELEPHONE (OUTPATIENT)
Dept: PULMONOLOGY | Facility: CLINIC | Age: 73
End: 2023-12-20
Payer: MEDICARE

## 2023-12-20 DIAGNOSIS — E11.9 TYPE 2 DIABETES MELLITUS WITHOUT COMPLICATION: ICD-10-CM

## 2023-12-20 NOTE — TELEPHONE ENCOUNTER
Pet scan scheduled for 12/21/23 and Dr Velasco will call with results. F/u scheduled on 3/11/24 at 11:30am with Dr Velasco per Dr Velasco on 12/20/23. Patient's wife verbalized understanding.

## 2023-12-20 NOTE — TELEPHONE ENCOUNTER
"----- Message from Fela Mary sent at 12/20/2023  1:37 PM CST -----  Consult/Advisory:      Name Of Caller: Self    Contact Preference:   [phone]         What is the nature of the call?: Pt stated that he was to get a call back regarding scan prior to appt for 12/21/23.     Pt also stated he would like to know before 5m today if possible       Additional Notes:      "Thank you for all that you do for our patients"          "

## 2023-12-21 ENCOUNTER — HOSPITAL ENCOUNTER (OUTPATIENT)
Dept: RADIOLOGY | Facility: HOSPITAL | Age: 73
Discharge: HOME | End: 2023-12-21
Attending: STUDENT IN AN ORGANIZED HEALTH CARE EDUCATION/TRAINING PROGRAM
Payer: MEDICARE

## 2023-12-21 DIAGNOSIS — R59.0 MEDIASTINAL LYMPHADENOPATHY: ICD-10-CM

## 2023-12-21 LAB — POCT GLUCOSE: 158 MG/DL (ref 70–110)

## 2023-12-21 PROCEDURE — 78815 PET IMAGE W/CT SKULL-THIGH: CPT | Mod: 26,PI,, | Performed by: STUDENT IN AN ORGANIZED HEALTH CARE EDUCATION/TRAINING PROGRAM

## 2023-12-21 PROCEDURE — 78815 NM PET CT ROUTINE: ICD-10-PCS | Mod: 26,PI,, | Performed by: STUDENT IN AN ORGANIZED HEALTH CARE EDUCATION/TRAINING PROGRAM

## 2023-12-21 PROCEDURE — A9552 F18 FDG: HCPCS

## 2024-03-01 RX ORDER — PANTOPRAZOLE SODIUM 40 MG/1
TABLET, DELAYED RELEASE ORAL
Qty: 90 TABLET | Refills: 3 | Status: SHIPPED | OUTPATIENT
Start: 2024-03-01

## 2024-03-13 ENCOUNTER — TELEPHONE (OUTPATIENT)
Dept: ENDOCRINOLOGY | Facility: CLINIC | Age: 74
End: 2024-03-13

## 2024-03-13 ENCOUNTER — OFFICE VISIT (OUTPATIENT)
Dept: ENDOCRINOLOGY | Facility: CLINIC | Age: 74
End: 2024-03-13
Payer: MEDICARE

## 2024-03-13 ENCOUNTER — TELEPHONE (OUTPATIENT)
Dept: DIABETES | Facility: CLINIC | Age: 74
End: 2024-03-13
Payer: MEDICARE

## 2024-03-13 ENCOUNTER — LAB VISIT (OUTPATIENT)
Dept: LAB | Facility: HOSPITAL | Age: 74
End: 2024-03-13
Attending: INTERNAL MEDICINE
Payer: MEDICARE

## 2024-03-13 VITALS
HEIGHT: 66 IN | WEIGHT: 191.56 LBS | SYSTOLIC BLOOD PRESSURE: 100 MMHG | DIASTOLIC BLOOD PRESSURE: 68 MMHG | BODY MASS INDEX: 30.79 KG/M2

## 2024-03-13 DIAGNOSIS — Z79.4 TYPE 2 DIABETES MELLITUS WITH OTHER SPECIFIED COMPLICATION, WITH LONG-TERM CURRENT USE OF INSULIN: Chronic | ICD-10-CM

## 2024-03-13 DIAGNOSIS — I25.10 CORONARY ARTERY DISEASE, UNSPECIFIED VESSEL OR LESION TYPE, UNSPECIFIED WHETHER ANGINA PRESENT, UNSPECIFIED WHETHER NATIVE OR TRANSPLANTED HEART: Primary | ICD-10-CM

## 2024-03-13 DIAGNOSIS — E11.69 TYPE 2 DIABETES MELLITUS WITH OTHER SPECIFIED COMPLICATION, WITH LONG-TERM CURRENT USE OF INSULIN: Chronic | ICD-10-CM

## 2024-03-13 DIAGNOSIS — I15.2 HYPERTENSION ASSOCIATED WITH DIABETES: Primary | Chronic | ICD-10-CM

## 2024-03-13 DIAGNOSIS — E11.59 HYPERTENSION ASSOCIATED WITH DIABETES: Primary | Chronic | ICD-10-CM

## 2024-03-13 DIAGNOSIS — E11.65 UNCONTROLLED TYPE 2 DIABETES MELLITUS WITH HYPERGLYCEMIA: ICD-10-CM

## 2024-03-13 LAB
ANION GAP SERPL CALC-SCNC: 8 MMOL/L (ref 8–16)
BUN SERPL-MCNC: 12 MG/DL (ref 8–23)
CALCIUM SERPL-MCNC: 9.4 MG/DL (ref 8.7–10.5)
CHLORIDE SERPL-SCNC: 105 MMOL/L (ref 95–110)
CO2 SERPL-SCNC: 26 MMOL/L (ref 23–29)
CREAT SERPL-MCNC: 0.8 MG/DL (ref 0.5–1.4)
EST. GFR  (NO RACE VARIABLE): >60 ML/MIN/1.73 M^2
ESTIMATED AVG GLUCOSE: 252 MG/DL (ref 68–131)
GLUCOSE SERPL-MCNC: 243 MG/DL (ref 70–110)
HBA1C MFR BLD: 10.4 % (ref 4–5.6)
POTASSIUM SERPL-SCNC: 4.5 MMOL/L (ref 3.5–5.1)
SODIUM SERPL-SCNC: 139 MMOL/L (ref 136–145)

## 2024-03-13 PROCEDURE — 83036 HEMOGLOBIN GLYCOSYLATED A1C: CPT | Performed by: INTERNAL MEDICINE

## 2024-03-13 PROCEDURE — 3008F BODY MASS INDEX DOCD: CPT | Mod: CPTII,S$GLB,, | Performed by: INTERNAL MEDICINE

## 2024-03-13 PROCEDURE — 99999 PR PBB SHADOW E&M-EST. PATIENT-LVL IV: CPT | Mod: PBBFAC,,, | Performed by: INTERNAL MEDICINE

## 2024-03-13 PROCEDURE — 3074F SYST BP LT 130 MM HG: CPT | Mod: CPTII,S$GLB,, | Performed by: INTERNAL MEDICINE

## 2024-03-13 PROCEDURE — 1101F PT FALLS ASSESS-DOCD LE1/YR: CPT | Mod: CPTII,S$GLB,, | Performed by: INTERNAL MEDICINE

## 2024-03-13 PROCEDURE — 3078F DIAST BP <80 MM HG: CPT | Mod: CPTII,S$GLB,, | Performed by: INTERNAL MEDICINE

## 2024-03-13 PROCEDURE — 3288F FALL RISK ASSESSMENT DOCD: CPT | Mod: CPTII,S$GLB,, | Performed by: INTERNAL MEDICINE

## 2024-03-13 PROCEDURE — 80048 BASIC METABOLIC PNL TOTAL CA: CPT | Performed by: INTERNAL MEDICINE

## 2024-03-13 PROCEDURE — 99214 OFFICE O/P EST MOD 30 MIN: CPT | Mod: S$GLB,,, | Performed by: INTERNAL MEDICINE

## 2024-03-13 PROCEDURE — 36415 COLL VENOUS BLD VENIPUNCTURE: CPT | Performed by: INTERNAL MEDICINE

## 2024-03-13 PROCEDURE — 1159F MED LIST DOCD IN RCRD: CPT | Mod: CPTII,S$GLB,, | Performed by: INTERNAL MEDICINE

## 2024-03-13 PROCEDURE — 1126F AMNT PAIN NOTED NONE PRSNT: CPT | Mod: CPTII,S$GLB,, | Performed by: INTERNAL MEDICINE

## 2024-03-13 RX ORDER — PEN NEEDLE, DIABETIC 30 GX3/16"
NEEDLE, DISPOSABLE MISCELLANEOUS
Qty: 450 EACH | Refills: 3 | Status: SHIPPED | OUTPATIENT
Start: 2024-03-13

## 2024-03-13 RX ORDER — INSULIN LISPRO 100 [IU]/ML
10 INJECTION, SOLUTION INTRAVENOUS; SUBCUTANEOUS
Qty: 15 ML | Refills: 11 | Status: SHIPPED | OUTPATIENT
Start: 2024-03-13 | End: 2025-03-13

## 2024-03-13 RX ORDER — INSULIN DEGLUDEC 200 U/ML
30 INJECTION, SOLUTION SUBCUTANEOUS NIGHTLY
Qty: 5 PEN | Refills: 6 | Status: SHIPPED | OUTPATIENT
Start: 2024-03-13

## 2024-03-13 RX ORDER — BLOOD-GLUCOSE SENSOR
1 EACH MISCELLANEOUS
Qty: 9 EACH | Refills: 3 | Status: SHIPPED | OUTPATIENT
Start: 2024-03-13 | End: 2025-03-13

## 2024-03-13 RX ORDER — BLOOD-GLUCOSE SENSOR
1 EACH MISCELLANEOUS
Qty: 3 EACH | Refills: 11 | Status: SHIPPED | OUTPATIENT
Start: 2024-03-13 | End: 2024-03-13

## 2024-03-13 RX ORDER — BLOOD-GLUCOSE SENSOR
1 EACH MISCELLANEOUS
Qty: 9 EACH | Refills: 3 | Status: SHIPPED | OUTPATIENT
Start: 2024-03-13 | End: 2024-03-13 | Stop reason: SDUPTHER

## 2024-03-13 NOTE — ASSESSMENT & PLAN NOTE
Diagnosed 13 years ago   Recent A1C:10% in 6/2023    Plan to continue MDI and metformin   Would benefit from SGLT 2 inhibitor or GLP 1 agonist   Plan to obtain labs and refer to DM education.  Sensor recommended and order placed.   DM education recommended and order placed     Hypoglycemia instructions provided

## 2024-03-13 NOTE — PATIENT INSTRUCTIONS
For treatment of low blood sugars:   If your blood sugar is less than 70 but higher than 60, and you are not having any symptoms, please make sure you check your blood sugar again in 10 - 15 minutes, avoid tasks such as driving. If the repeat value is still in the same range, please drink a 1/4 cup of orange juice or 1 - 2 glucose tablets.     If your blood sugar reading is under 60, whether you are symptomatic or not, please treat. You can do this with 3 oral glucose tablets, juice, milk, other snacks, or a meal. Make sure you check 15 minutes after you treat.

## 2024-03-13 NOTE — TELEPHONE ENCOUNTER
PLAN:  Tresiba 30 units   Humalog 10 units before meals plus sliding scale   Start jardiance 10 mg daily   Continue metformin   Please call dm education         Results for orders placed or performed in visit on 03/13/24   Hemoglobin A1C   Result Value Ref Range    Hemoglobin A1C 10.4 (H) 4.0 - 5.6 %    Estimated Avg Glucose 252 (H) 68 - 131 mg/dL   Basic Metabolic Panel   Result Value Ref Range    Sodium 139 136 - 145 mmol/L    Potassium 4.5 3.5 - 5.1 mmol/L    Chloride 105 95 - 110 mmol/L    CO2 26 23 - 29 mmol/L    Glucose 243 (H) 70 - 110 mg/dL    BUN 12 8 - 23 mg/dL    Creatinine 0.8 0.5 - 1.4 mg/dL    Calcium 9.4 8.7 - 10.5 mg/dL    Anion Gap 8 8 - 16 mmol/L    eGFR >60.0 >60 mL/min/1.73 m^2

## 2024-03-13 NOTE — PROGRESS NOTES
"Subjective:      Patient ID: Paul Deleon is a 73 y.o. male.    Chief Complaint:  Diabetes      History of Present Illness  Endocrinology Return Visit     73 y.o. male with history of CAD, s/p stent placement, presented to ER with unstable angina 6/2023 and is here for a f/u visit for T2DM diagnosed 13 years ago.    DM2  Current Diabetes Regimen:  Tresiba 40 units (up to 60 units)  Novolog 2 - 6 units before meals, has not used since 1/2024; uses at most 6 units daily. Typically injects after meals.   Metformin 1000 mg daily     TDD 46 units   Glucose Monitoring:  Checks blood sugars two - three times daily     Recent Hgb A1C:  Lab Results   Component Value Date    HGBA1C 10.2 (H) 06/13/2023       Hypoglycemic Episodes:  Deneis     Screening / DM Complications:    Nephropathy:   Lab Results   Component Value Date    MICALBCREAT 28.9 09/27/2022       Last Lipid Panel:  Lab Results   Component Value Date    LDLCALC 37.8 (L) 07/02/2021    LDLCALC 37.8 (L) 07/02/2021       B12:  No results found for: "IHXRNYNC21"    Neuropathy: +  Diabetic Retinopathy:  has appointment 3/23 no laser surgery or DR  CAD/MI: see HPI    Review of Systems  Flu four weeks ago  Objective:   Physical Exam  Vitals:    03/13/24 0823   BP: 100/68   Weight: 86.9 kg (191 lb 9.3 oz)   Height: 5' 6" (1.676 m)       BP Readings from Last 3 Encounters:   03/13/24 100/68   08/29/23 116/80   08/03/23 (!) 144/80     Wt Readings from Last 1 Encounters:   03/13/24 0823 86.9 kg (191 lb 9.3 oz)         Body mass index is 30.92 kg/m².    Lab Review:   Lab Results   Component Value Date    HGBA1C 10.2 (H) 06/13/2023     Lab Results   Component Value Date    CHOL 80 (L) 07/02/2021    CHOL 80 (L) 07/02/2021    HDL 25 (L) 07/02/2021    HDL 25 (L) 07/02/2021    LDLCALC 37.8 (L) 07/02/2021    LDLCALC 37.8 (L) 07/02/2021    TRIG 86 07/02/2021    TRIG 86 07/02/2021    CHOLHDL 31.3 07/02/2021    CHOLHDL 31.3 07/02/2021     Lab Results   Component Value Date    "  (L) 06/21/2023    K 4.6 06/21/2023     06/21/2023    CO2 18 (L) 06/21/2023     (H) 06/21/2023    BUN 11 06/21/2023    CREATININE 1.0 08/29/2023    CALCIUM 9.3 06/21/2023    PROT 7.0 06/19/2023    ALBUMIN 3.8 06/19/2023    BILITOT 1.1 (H) 06/19/2023    ALKPHOS 91 06/19/2023    AST 18 06/19/2023    ALT 12 06/19/2023    ANIONGAP 11 06/21/2023    ESTGFRAFRICA >60.0 07/02/2021    EGFRNONAA >60.0 07/02/2021    TSH 3.255 06/12/2020         Assessment and Plan     Type 2 diabetes mellitus, with long-term current use of insulin  Diagnosed 13 years ago   Recent A1C:10% in 6/2023    Plan to continue MDI and metformin   Would benefit from SGLT 2 inhibitor or GLP 1 agonist   Plan to obtain labs and refer to DM education.  Sensor recommended and order placed.   DM education recommended and order placed     Hypoglycemia instructions provided

## 2024-05-13 LAB
LEFT EYE DM RETINOPATHY: NEGATIVE
RIGHT EYE DM RETINOPATHY: NEGATIVE

## 2024-05-20 DIAGNOSIS — I10 HYPERTENSION, UNSPECIFIED TYPE: ICD-10-CM

## 2024-05-20 NOTE — TELEPHONE ENCOUNTER
No care due was identified.  [facility] Embedded Care Due Messages. Reference number: [id number].   5/20/2024 4:26:37 PM CDT

## 2024-05-21 RX ORDER — METOPROLOL SUCCINATE 25 MG/1
25 TABLET, EXTENDED RELEASE ORAL DAILY
Qty: 90 TABLET | Refills: 0 | Status: SHIPPED | OUTPATIENT
Start: 2024-05-21

## 2024-05-21 RX ORDER — AMLODIPINE BESYLATE 5 MG/1
5 TABLET ORAL DAILY
Qty: 90 TABLET | Refills: 0 | Status: SHIPPED | OUTPATIENT
Start: 2024-05-21

## 2024-05-21 NOTE — TELEPHONE ENCOUNTER
Refill Routing Note   Medication(s) are not appropriate for processing by [facility] for the following reason(s):        Patient not seen by provider within 15 months: Due for annual with PCP added to sig    [facility] action(s):  Defer      Medication Therapy Plan:         Appointments  past 12m or future 3m with PCP    Date Provider   Last Visit   10/25/2022 Bree Larose MD   Next Visit   Visit date not found Bree Larose MD   ED visits in past 90 days: 0        Note composed:11:54 AM 05/21/2024

## 2024-05-28 ENCOUNTER — PATIENT OUTREACH (OUTPATIENT)
Dept: ADMINISTRATIVE | Facility: HOSPITAL | Age: 74
End: 2024-05-28
Payer: MEDICARE

## 2024-05-28 NOTE — PROGRESS NOTES
Health Maintenance Due   Topic Date Due    TETANUS VACCINE  Never done    Shingles Vaccine (1 of 2) Never done    RSV Vaccine (Age 60+ and Pregnant patients) (1 - 1-dose 60+ series) Never done    Foot Exam  07/13/2022    Diabetes Urine Screening  09/27/2023    COVID-19 Vaccine (7 - 2023-24 season) 05/20/2024    Lipid Panel  06/13/2024    Hemoglobin A1c  06/13/2024       Chart reviewed and updated. Eye exam record uploaded.    Blanca Dejesus LPN   Clinical Care Coordinator  [facility]ness

## 2024-06-05 ENCOUNTER — HOSPITAL ENCOUNTER (EMERGENCY)
Facility: HOSPITAL | Age: 74
Discharge: HOME | End: 2024-06-05
Attending: EMERGENCY MEDICINE
Payer: MEDICARE

## 2024-06-05 VITALS
BODY MASS INDEX: 30.37 KG/M2 | TEMPERATURE: 98 F | OXYGEN SATURATION: 98 % | HEART RATE: 72 BPM | WEIGHT: 189 LBS | DIASTOLIC BLOOD PRESSURE: 90 MMHG | RESPIRATION RATE: 20 BRPM | HEIGHT: 66 IN | SYSTOLIC BLOOD PRESSURE: 140 MMHG

## 2024-06-05 DIAGNOSIS — R10.9 LEFT SIDED ABDOMINAL PAIN: Primary | ICD-10-CM

## 2024-06-05 LAB
ALBUMIN SERPL BCP-MCNC: 3.7 G/DL (ref 3.5–5.2)
ALP SERPL-CCNC: 132 U/L (ref 55–135)
ALT SERPL W/O P-5'-P-CCNC: 5 U/L (ref 10–44)
ANION GAP SERPL CALC-SCNC: 10 MMOL/L (ref 8–16)
AST SERPL-CCNC: 14 U/L (ref 10–40)
BASOPHILS # BLD AUTO: 0.07 K/UL (ref 0–0.2)
BASOPHILS NFR BLD: 0.8 % (ref 0–1.9)
BILIRUB SERPL-MCNC: 1.1 MG/DL (ref 0.1–1)
BILIRUB UR QL STRIP: NEGATIVE
BUN SERPL-MCNC: 14 MG/DL (ref 8–23)
CALCIUM SERPL-MCNC: 9.5 MG/DL (ref 8.7–10.5)
CHLORIDE SERPL-SCNC: 102 MMOL/L (ref 95–110)
CLARITY UR REFRACT.AUTO: CLEAR
CO2 SERPL-SCNC: 23 MMOL/L (ref 23–29)
COLOR UR AUTO: COLORLESS
CREAT SERPL-MCNC: 1 MG/DL (ref 0.5–1.4)
DIFFERENTIAL METHOD BLD: NORMAL
EOSINOPHIL # BLD AUTO: 0.1 K/UL (ref 0–0.5)
EOSINOPHIL NFR BLD: 1.6 % (ref 0–8)
ERYTHROCYTE [DISTWIDTH] IN BLOOD BY AUTOMATED COUNT: 13.1 % (ref 11.5–14.5)
EST. GFR  (NO RACE VARIABLE): >60 ML/MIN/1.73 M^2
GLUCOSE SERPL-MCNC: 181 MG/DL (ref 70–110)
GLUCOSE UR QL STRIP: NEGATIVE
HCT VFR BLD AUTO: 45.2 % (ref 40–54)
HCV AB SERPL QL IA: NORMAL
HGB BLD-MCNC: 15.1 G/DL (ref 14–18)
HGB UR QL STRIP: NEGATIVE
HIV 1+2 AB+HIV1 P24 AG SERPL QL IA: NORMAL
IMM GRANULOCYTES # BLD AUTO: 0.03 K/UL (ref 0–0.04)
IMM GRANULOCYTES NFR BLD AUTO: 0.3 % (ref 0–0.5)
KETONES UR QL STRIP: NEGATIVE
LEUKOCYTE ESTERASE UR QL STRIP: NEGATIVE
LIPASE SERPL-CCNC: 12 U/L (ref 4–60)
LYMPHOCYTES # BLD AUTO: 2.3 K/UL (ref 1–4.8)
LYMPHOCYTES NFR BLD: 26.9 % (ref 18–48)
MCH RBC QN AUTO: 28.8 PG (ref 27–31)
MCHC RBC AUTO-ENTMCNC: 33.4 G/DL (ref 32–36)
MCV RBC AUTO: 86 FL (ref 82–98)
MONOCYTES # BLD AUTO: 0.5 K/UL (ref 0.3–1)
MONOCYTES NFR BLD: 6 % (ref 4–15)
NEUTROPHILS # BLD AUTO: 5.6 K/UL (ref 1.8–7.7)
NEUTROPHILS NFR BLD: 64.4 % (ref 38–73)
NITRITE UR QL STRIP: NEGATIVE
NRBC BLD-RTO: 0 /100 WBC
OHS QRS DURATION: 88 MS
OHS QTC CALCULATION: 437 MS
PH UR STRIP: 5 [PH] (ref 5–8)
PLATELET # BLD AUTO: 226 K/UL (ref 150–450)
PMV BLD AUTO: 10.3 FL (ref 9.2–12.9)
POTASSIUM SERPL-SCNC: 4 MMOL/L (ref 3.5–5.1)
PROT SERPL-MCNC: 7 G/DL (ref 6–8.4)
PROT UR QL STRIP: NEGATIVE
RBC # BLD AUTO: 5.25 M/UL (ref 4.6–6.2)
SODIUM SERPL-SCNC: 135 MMOL/L (ref 136–145)
SP GR UR STRIP: 1 (ref 1–1.03)
URN SPEC COLLECT METH UR: ABNORMAL
WBC # BLD AUTO: 8.65 K/UL (ref 3.9–12.7)

## 2024-06-05 PROCEDURE — 25000003 PHARM REV CODE 250: Performed by: EMERGENCY MEDICINE

## 2024-06-05 PROCEDURE — 81003 URINALYSIS AUTO W/O SCOPE: CPT | Performed by: EMERGENCY MEDICINE

## 2024-06-05 PROCEDURE — 63600175 PHARM REV CODE 636 W HCPCS: Performed by: EMERGENCY MEDICINE

## 2024-06-05 PROCEDURE — 96361 HYDRATE IV INFUSION ADD-ON: CPT

## 2024-06-05 PROCEDURE — 96374 THER/PROPH/DIAG INJ IV PUSH: CPT | Mod: 59

## 2024-06-05 PROCEDURE — 80053 COMPREHEN METABOLIC PANEL: CPT | Performed by: EMERGENCY MEDICINE

## 2024-06-05 PROCEDURE — 85025 COMPLETE CBC W/AUTO DIFF WBC: CPT | Performed by: EMERGENCY MEDICINE

## 2024-06-05 PROCEDURE — 99284 EMERGENCY DEPT VISIT MOD MDM: CPT | Mod: 25

## 2024-06-05 PROCEDURE — 86803 HEPATITIS C AB TEST: CPT | Performed by: PHYSICIAN ASSISTANT

## 2024-06-05 PROCEDURE — 83690 ASSAY OF LIPASE: CPT | Performed by: EMERGENCY MEDICINE

## 2024-06-05 PROCEDURE — 25500020 PHARM REV CODE 255: Performed by: EMERGENCY MEDICINE

## 2024-06-05 PROCEDURE — 87389 HIV-1 AG W/HIV-1&-2 AB AG IA: CPT | Performed by: PHYSICIAN ASSISTANT

## 2024-06-05 RX ORDER — IBUPROFEN 800 MG/1
800 TABLET ORAL EVERY 6 HOURS PRN
Qty: 12 TABLET | Refills: 0 | Status: SHIPPED | OUTPATIENT
Start: 2024-06-05 | End: 2024-06-08

## 2024-06-05 RX ORDER — ACETAMINOPHEN 500 MG
1000 TABLET ORAL
Status: COMPLETED | OUTPATIENT
Start: 2024-06-05 | End: 2024-06-05

## 2024-06-05 RX ORDER — KETOROLAC TROMETHAMINE 30 MG/ML
10 INJECTION, SOLUTION INTRAMUSCULAR; INTRAVENOUS
Status: COMPLETED | OUTPATIENT
Start: 2024-06-05 | End: 2024-06-05

## 2024-06-05 RX ADMIN — ACETAMINOPHEN 1000 MG: 500 TABLET ORAL at 07:06

## 2024-06-05 RX ADMIN — KETOROLAC TROMETHAMINE 10 MG: 30 INJECTION, SOLUTION INTRAMUSCULAR at 05:06

## 2024-06-05 RX ADMIN — IOHEXOL 100 ML: 350 INJECTION, SOLUTION INTRAVENOUS at 07:06

## 2024-06-05 NOTE — ED TRIAGE NOTES
Patient comes into the emergency department by POV with complaints of abd pain. Patient states that he has been having abd pain for 3 days. Pt states it feels sharp and has felt nauseated. Patient denies blood in stool and vomiting and fever.

## 2024-06-05 NOTE — ED PROVIDER NOTES
Encounter Date: 2024       History     Chief Complaint   Patient presents with    Abdominal Pain     L side of abd hurting for 3 days, hx reflux, pain inc with moving and breathing      This patient is a 73-year-old male Past Medical History:  No date: Cataract  No date: Coronary artery disease  No date: Diabetes mellitus type II  No date: GERD (gastroesophageal reflux disease)  No date: Heart attack  No date: Hyperlipidemia  No date: Hypertension  No date: MI (mitral incompetence)  No date: MI (myocardial infarction)  No date: Ulcer  Presenting to the emergency department with complaints of left-sided abdominal pain that has been increasing over the past 3 days.  He reports mild accompanying constipation which is abnormal for him.  He denies associated nausea, chills, fever, urinary symptoms, back pain, previous history of abdominal surgeries.  No medications taken prior to arrival.  He has never experienced pain in his area like this before.      Review of patient's allergies indicates:  No Known Allergies  Past Medical History:   Diagnosis Date    Cataract     Coronary artery disease     Diabetes mellitus type II     GERD (gastroesophageal reflux disease)     Heart attack     Hyperlipidemia     Hypertension     MI (mitral incompetence)     MI (myocardial infarction)     Ulcer      Past Surgical History:   Procedure Laterality Date    ANKLE SURGERY      BACK SURGERY      COLONOSCOPY N/A 8/3/2023    Procedure: COLONOSCOPY;  Surgeon: Sha Spencer MD;  Location: [facility] ([address] [place] [address]);  Service: Endoscopy;  Laterality: N/A;    CORONARY ANGIOPLASTY WITH STENT PLACEMENT      ELBOW SURGERY      lft    ESOPHAGOGASTRODUODENOSCOPY N/A 8/3/2023    Procedure: EGD (ESOPHAGOGASTRODUODENOSCOPY);  Surgeon: Sha Spencer MD;  Location: [facility] ([address] [place] [address]);  Service: Endoscopy;  Laterality: N/A;  Procedure Timin-4 weeks  2nd floor - DE stents placed in 10/2022  Ok to schedule as 3rd case per Dr. Spencer-see  tele encounter 7/12 7/12 referred by Dr. Spencer/PEG/instr. mailed and to portal-[facility] to hold Plavix 5 days per Dr Zheng-GT    THROAT SURGERY       Family History   Problem Relation Name Age of Onset    Heart disease Mother          cad    Heart disease Father          pacemaker    COPD Sister Mayra         awaiting lung transplant    Cancer Brother Armin         lung, metastatic    Melanoma Neg Hx      Psoriasis Neg Hx      Lupus Neg Hx      Eczema Neg Hx      Acne Neg Hx      Amblyopia Neg Hx      Blindness Neg Hx      Cataracts Neg Hx      Glaucoma Neg Hx      Macular degeneration Neg Hx      Strabismus Neg Hx      Retinal detachment Neg Hx       Social History     Tobacco Use    Smoking status: Never    Smokeless tobacco: Never   Substance Use Topics    Alcohol use: Yes     Alcohol/week: 0.0 standard drinks of alcohol     Comment: rare alcohol    Drug use: No     Review of Systems   Gastrointestinal:  Positive for abdominal pain.       Physical Exam     Initial Vitals [06/05/24 1533]   BP Pulse Resp Temp SpO2   (!) 146/86 91 18 97.6 °F (36.4 °C) 95 %      MAP       --         Physical Exam    Nursing note and vitals reviewed.  Constitutional: Vital signs are normal. He appears well-nourished.   HENT:   Head: Normocephalic and atraumatic.   Eyes: Conjunctivae and EOM are normal.   Neck: Neck supple. No thyroid mass present.   Normal range of motion.  Cardiovascular:  Normal rate, regular rhythm and normal heart sounds.     Exam reveals no gallop and no friction rub.       No murmur heard.  Pulmonary/Chest: Breath sounds normal. He has no wheezes. He has no rhonchi. He has no rales.   Abdominal: Abdomen is soft. Bowel sounds are normal. There is abdominal tenderness.   Obese abdomen, left-sided abdominal pain to palpation, worse with movement/torso twisting, no abdominal distention or rigidity   Musculoskeletal:      Cervical back: Normal range of motion and neck supple.     Neurological: He is alert and  oriented to person, place, and time. He has normal strength. No cranial nerve deficit or sensory deficit.   Skin: Skin is warm and dry. No rash noted. No erythema.   Psychiatric: He has a normal mood and affect. His speech is normal. Cognition and memory are normal.         ED Course   Procedures  Labs Reviewed   COMPREHENSIVE METABOLIC PANEL - Abnormal; Notable for the following components:       Result Value    Sodium 135 (*)     Glucose 181 (*)     Total Bilirubin 1.1 (*)     ALT 5 (*)     All other components within normal limits   URINALYSIS, REFLEX TO URINE CULTURE - Abnormal; Notable for the following components:    Color, UA Colorless (*)     All other components within normal limits    Narrative:     Specimen Source->Urine   HIV 1 / 2 ANTIBODY    Narrative:     Release to patient->Immediate   HEPATITIS C ANTIBODY    Narrative:     Release to patient->Immediate   CBC W/ AUTO DIFFERENTIAL   LIPASE   ISTAT CHEM8          Imaging Results              CT Abdomen Pelvis With IV Contrast NO Oral Contrast (Final result)  Result time 06/05/24 19:22:55      Final result by Joe Farley MD (06/05/24 19:22:55)                   Impression:      1. Findings may reflect hepatic steatosis, correlation with LFTs recommended.  2. The urinary bladder is decompressed, may account for wall thickening.  Additionally, prostatomegaly may account for wall thickening in the setting of chronic obstruction.  Correlation however with urinalysis is recommended to exclude changes of cystitis.  3. Please see above for several additional findings.      Electronically signed by: Joe Farley MD  Date:    06/05/2024  Time:    19:22               Narrative:    EXAMINATION:  CT ABDOMEN PELVIS WITH IV CONTRAST    CLINICAL HISTORY:  LLQ abdominal pain;    TECHNIQUE:  Low dose axial images, sagittal and coronal reformations were obtained from the lung bases to the pubic symphysis following the IV administration of 100 mL of Omnipaque  350 .  Oral contrast was not given.    COMPARISON:  Ultrasound 06/20/2023, PET-CT 12/21/2023    FINDINGS:  Images of the lower thorax are unremarkable.    The liver is hypoattenuating, may reflect sequela of contrast phase however steatosis not excluded.  Correlation with LFTs recommended.  There are a few scattered punctate low attenuating lesions within the hepatic parenchyma, too small for characterization.  The spleen and adrenal glands are unremarkable.  The gallbladder is unremarkable.  There is fatty atrophy of the pancreas without pancreatic ductal dilation.  The stomach is decompressed without wall thickening.  The portal vein, splenic vein, SMV, celiac axis and SMA all are patent noting there is high-grade stenosis at the origin of the celiac artery without occlusion.  There are a few scattered nonenlarged abdominal lymph nodes.    The kidneys enhance symmetrically without hydronephrosis or nephrolithiasis.  A cyst arises from the interpolar region of the right kidney measuring 5 cm.  An additional low attenuating lesion arises from the interpolar region of the right kidney, too small for characterization.  There is mild left perinephric fat stranding.  The bilateral ureters are unremarkable without calculi seen.  The urinary bladder is relatively decompressed without wall thickening.  The prostate is enlarged.    There are a few scattered colonic diverticula without inflammation.  The terminal ileum is unremarkable.  The appendix is unremarkable.  The small bowel is grossly unremarkable.  There are a few scattered shotty periaortic, pericaval, and mesenteric lymph nodes.  No significant abdominal lymphadenopathy.    There are bilateral fat containing inguinal hernias.  There are degenerative changes of the bilateral sacroiliac joints, pubic symphysis, and spine.  No significant inguinal lymphadenopathy.                                       Medications   sodium chloride 0.9% bolus 1,000 mL 1,000 mL (0 mLs  Intravenous Stopped 6/5/24 1953)   ketorolac injection 9.999 mg (9.999 mg Intravenous Given 6/5/24 1744)   iohexoL (OMNIPAQUE 350) injection 100 mL (100 mLs Intravenous Given 6/5/24 1909)   acetaminophen tablet 1,000 mg (1,000 mg Oral Given 6/5/24 1954)     Medical Decision Making  Patient rapidly assess shortly after arrival.  Initial vital signs are stable.  Patient had significant improvement here with IV Toradol.  Screening labs are found to be largely unremarkable.  His CT scan of the abdomen and pelvis with contrast additionally is negative for overt acute contributing pathology.  On reassessment patient reports only moving the area recreates pain and he is educated that at this time I have high suspicion for musculoskeletal cause.  He is educated about supportive care and will be discharged with a course of anti-inflammatories.  I discussed with patient and his wife that evaluation in the ED does not suggest any emergent or life threatening condition medical condition requiring immediate intervention beyond what was provided in the ED, and I believe he is safe for discharge.  Regardless, an unremarkable evaluation in the ED does not preclude the development or presence of a serious of life threatening condition. As such, patient was instructed to return immediately for any worsening or change in current symptoms.  Patient agreeable with this plan and he was asked to follow up with the primary care doctor as soon as possible.      Amount and/or Complexity of Data Reviewed  Labs: ordered.  Radiology: ordered.    Risk  OTC drugs.  Prescription drug management.               ED Course as of 06/05/24 2052 Wed Jun 05, 2024   1542 EKG 12-lead  No STEMI. [LP]      ED Course User Index  [LP] Mello Mcmanus III, MD               Medical Decision Making:   Differential Diagnosis:   DDX include, but not limited to, musculoskeletal injury, AAA, colitis, diverticulitis, all discussed perforation, obstruction, acute  appendicitis, kidney stone disease, cystitis, ileus, SBO, malignancy, prodromal shingles pain, atypical ACS             Clinical Impression:  Final diagnoses:  [R10.9] Left sided abdominal pain (Primary)          ED Disposition Condition    Discharge Stable          ED Prescriptions       Medication Sig Dispense Start Date End Date Auth. Provider    ibuprofen (ADVIL,MOTRIN) 800 MG tablet Take 1 tablet (800 mg total) by mouth every 6 (six) hours as needed for Pain. 12 tablet 6/5/2024 6/8/2024 Ray Marshall MD          Follow-up Information       Follow up With Specialties Details Why Contact Info    Bree Larose MD Internal Medicine Schedule an appointment as soon as possible for a visit   [address]  [place] [address]  [phone]               Ray Marshall MD  06/05/24 2052

## 2024-06-19 DIAGNOSIS — E11.9 TYPE 2 DIABETES MELLITUS WITHOUT COMPLICATION: ICD-10-CM

## 2024-07-02 ENCOUNTER — PATIENT OUTREACH (OUTPATIENT)
Dept: ADMINISTRATIVE | Facility: HOSPITAL | Age: 74
End: 2024-07-02
Payer: MEDICARE

## 2024-07-02 NOTE — PROGRESS NOTES
Health Maintenance Due   Topic Date Due    TETANUS VACCINE  Never done    Aspirin/Antiplatelet Therapy  Never done    High Dose Statin  Never done    Shingles Vaccine (1 of 2) Never done    RSV Vaccine (Age 60+ and Pregnant patients) (1 - 1-dose 60+ series) Never done    Foot Exam  07/13/2022    Diabetes Urine Screening  09/27/2023    COVID-19 Vaccine (7 - 2023-24 season) 05/20/2024    Lipid Panel  06/13/2024    Hemoglobin A1c  06/13/2024      Chart reviewed. Triggered LINKS. Updated Care Everywhere.     Hernandez Leahy CMA  Population Health Care Coordinator  Primary Care Team

## 2024-07-10 ENCOUNTER — CLINICAL SUPPORT (OUTPATIENT)
Dept: OPHTHALMOLOGY | Facility: CLINIC | Age: 74
End: 2024-07-10
Payer: MEDICARE

## 2024-07-10 ENCOUNTER — LAB VISIT (OUTPATIENT)
Dept: LAB | Facility: HOSPITAL | Age: 74
End: 2024-07-10
Attending: INTERNAL MEDICINE
Payer: MEDICARE

## 2024-07-10 ENCOUNTER — OFFICE VISIT (OUTPATIENT)
Dept: PODIATRY | Facility: CLINIC | Age: 74
End: 2024-07-10
Payer: MEDICARE

## 2024-07-10 ENCOUNTER — OFFICE VISIT (OUTPATIENT)
Dept: INTERNAL MEDICINE | Facility: CLINIC | Age: 74
End: 2024-07-10
Payer: MEDICARE

## 2024-07-10 VITALS
HEART RATE: 82 BPM | OXYGEN SATURATION: 96 % | BODY MASS INDEX: 30.11 KG/M2 | SYSTOLIC BLOOD PRESSURE: 114 MMHG | WEIGHT: 187.38 LBS | HEIGHT: 66 IN | DIASTOLIC BLOOD PRESSURE: 66 MMHG

## 2024-07-10 VITALS
BODY MASS INDEX: 30.11 KG/M2 | RESPIRATION RATE: 18 BRPM | WEIGHT: 187.38 LBS | DIASTOLIC BLOOD PRESSURE: 76 MMHG | HEIGHT: 66 IN | SYSTOLIC BLOOD PRESSURE: 135 MMHG | HEART RATE: 69 BPM

## 2024-07-10 DIAGNOSIS — E78.5 HYPERLIPIDEMIA, UNSPECIFIED HYPERLIPIDEMIA TYPE: ICD-10-CM

## 2024-07-10 DIAGNOSIS — I10 HYPERTENSION, UNSPECIFIED TYPE: ICD-10-CM

## 2024-07-10 DIAGNOSIS — R20.2 PARESTHESIA OF BOTH LOWER EXTREMITIES: ICD-10-CM

## 2024-07-10 DIAGNOSIS — E53.8 VITAMIN B 12 DEFICIENCY: ICD-10-CM

## 2024-07-10 DIAGNOSIS — Z79.4 TYPE 2 DIABETES MELLITUS WITH HYPEROSMOLAR COMA, WITH LONG-TERM CURRENT USE OF INSULIN: Primary | ICD-10-CM

## 2024-07-10 DIAGNOSIS — E11.01 TYPE 2 DIABETES MELLITUS WITH HYPEROSMOLAR COMA, WITH LONG-TERM CURRENT USE OF INSULIN: Primary | ICD-10-CM

## 2024-07-10 DIAGNOSIS — E11.65 UNCONTROLLED TYPE 2 DIABETES MELLITUS WITH HYPERGLYCEMIA: ICD-10-CM

## 2024-07-10 DIAGNOSIS — Z00.00 ANNUAL PHYSICAL EXAM: Primary | ICD-10-CM

## 2024-07-10 DIAGNOSIS — R07.89 OTHER CHEST PAIN: ICD-10-CM

## 2024-07-10 LAB
CHOLEST SERPL-MCNC: 145 MG/DL (ref 120–199)
CHOLEST/HDLC SERPL: 5.4 {RATIO} (ref 2–5)
ESTIMATED AVG GLUCOSE: 246 MG/DL (ref 68–131)
HBA1C MFR BLD: 10.2 % (ref 4–5.6)
HDLC SERPL-MCNC: 27 MG/DL (ref 40–75)
HDLC SERPL: 18.6 % (ref 20–50)
LDLC SERPL CALC-MCNC: 78.2 MG/DL (ref 63–159)
NONHDLC SERPL-MCNC: 118 MG/DL
TRIGL SERPL-MCNC: 199 MG/DL (ref 30–150)
VIT B12 SERPL-MCNC: 293 PG/ML (ref 210–950)

## 2024-07-10 PROCEDURE — 1101F PT FALLS ASSESS-DOCD LE1/YR: CPT | Mod: CPTII,S$GLB,, | Performed by: INTERNAL MEDICINE

## 2024-07-10 PROCEDURE — 1125F AMNT PAIN NOTED PAIN PRSNT: CPT | Mod: CPTII,S$GLB,, | Performed by: INTERNAL MEDICINE

## 2024-07-10 PROCEDURE — 36415 COLL VENOUS BLD VENIPUNCTURE: CPT | Performed by: INTERNAL MEDICINE

## 2024-07-10 PROCEDURE — 99999 PR PBB SHADOW E&M-EST. PATIENT-LVL IV: CPT | Mod: PBBFAC,,, | Performed by: PODIATRIST

## 2024-07-10 PROCEDURE — 83036 HEMOGLOBIN GLYCOSYLATED A1C: CPT | Performed by: INTERNAL MEDICINE

## 2024-07-10 PROCEDURE — 3074F SYST BP LT 130 MM HG: CPT | Mod: CPTII,S$GLB,, | Performed by: INTERNAL MEDICINE

## 2024-07-10 PROCEDURE — 3075F SYST BP GE 130 - 139MM HG: CPT | Mod: CPTII,S$GLB,, | Performed by: PODIATRIST

## 2024-07-10 PROCEDURE — 3288F FALL RISK ASSESSMENT DOCD: CPT | Mod: CPTII,S$GLB,, | Performed by: PODIATRIST

## 2024-07-10 PROCEDURE — 1160F RVW MEDS BY RX/DR IN RCRD: CPT | Mod: CPTII,S$GLB,, | Performed by: INTERNAL MEDICINE

## 2024-07-10 PROCEDURE — 1125F AMNT PAIN NOTED PAIN PRSNT: CPT | Mod: CPTII,S$GLB,, | Performed by: PODIATRIST

## 2024-07-10 PROCEDURE — 90715 TDAP VACCINE 7 YRS/> IM: CPT | Mod: S$GLB,,, | Performed by: INTERNAL MEDICINE

## 2024-07-10 PROCEDURE — 3046F HEMOGLOBIN A1C LEVEL >9.0%: CPT | Mod: CPTII,S$GLB,, | Performed by: PODIATRIST

## 2024-07-10 PROCEDURE — 82607 VITAMIN B-12: CPT | Performed by: INTERNAL MEDICINE

## 2024-07-10 PROCEDURE — 3008F BODY MASS INDEX DOCD: CPT | Mod: CPTII,S$GLB,, | Performed by: PODIATRIST

## 2024-07-10 PROCEDURE — 1101F PT FALLS ASSESS-DOCD LE1/YR: CPT | Mod: CPTII,S$GLB,, | Performed by: PODIATRIST

## 2024-07-10 PROCEDURE — 3078F DIAST BP <80 MM HG: CPT | Mod: CPTII,S$GLB,, | Performed by: PODIATRIST

## 2024-07-10 PROCEDURE — 3008F BODY MASS INDEX DOCD: CPT | Mod: CPTII,S$GLB,, | Performed by: INTERNAL MEDICINE

## 2024-07-10 PROCEDURE — 3078F DIAST BP <80 MM HG: CPT | Mod: CPTII,S$GLB,, | Performed by: INTERNAL MEDICINE

## 2024-07-10 PROCEDURE — 1159F MED LIST DOCD IN RCRD: CPT | Mod: CPTII,S$GLB,, | Performed by: INTERNAL MEDICINE

## 2024-07-10 PROCEDURE — 3046F HEMOGLOBIN A1C LEVEL >9.0%: CPT | Mod: CPTII,S$GLB,, | Performed by: INTERNAL MEDICINE

## 2024-07-10 PROCEDURE — 99999 PR PBB SHADOW E&M-EST. PATIENT-LVL IV: CPT | Mod: PBBFAC,,, | Performed by: INTERNAL MEDICINE

## 2024-07-10 PROCEDURE — 90471 IMMUNIZATION ADMIN: CPT | Mod: S$GLB,,, | Performed by: INTERNAL MEDICINE

## 2024-07-10 PROCEDURE — 2023F DILAT RTA XM W/O RTNOPTHY: CPT | Mod: CPTII,S$GLB,, | Performed by: INTERNAL MEDICINE

## 2024-07-10 PROCEDURE — 80061 LIPID PANEL: CPT | Performed by: INTERNAL MEDICINE

## 2024-07-10 PROCEDURE — 99213 OFFICE O/P EST LOW 20 MIN: CPT | Mod: S$GLB,,, | Performed by: PODIATRIST

## 2024-07-10 PROCEDURE — 99397 PER PM REEVAL EST PAT 65+ YR: CPT | Mod: 25,S$GLB,, | Performed by: INTERNAL MEDICINE

## 2024-07-10 PROCEDURE — 3288F FALL RISK ASSESSMENT DOCD: CPT | Mod: CPTII,S$GLB,, | Performed by: INTERNAL MEDICINE

## 2024-07-10 RX ORDER — METOPROLOL SUCCINATE 25 MG/1
25 TABLET, EXTENDED RELEASE ORAL DAILY
Qty: 90 TABLET | Refills: 3 | Status: SHIPPED | OUTPATIENT
Start: 2024-07-10

## 2024-07-10 RX ORDER — KETOCONAZOLE 20 MG/G
CREAM TOPICAL DAILY
Qty: 60 G | Refills: 2 | Status: SHIPPED | OUTPATIENT
Start: 2024-07-10

## 2024-07-10 RX ORDER — ATORVASTATIN CALCIUM 80 MG/1
80 TABLET, FILM COATED ORAL NIGHTLY
Qty: 90 TABLET | Refills: 3 | Status: SHIPPED | OUTPATIENT
Start: 2024-07-10 | End: 2025-07-05

## 2024-07-10 RX ORDER — NITROGLYCERIN 0.4 MG/1
0.4 TABLET SUBLINGUAL EVERY 5 MIN PRN
Qty: 25 TABLET | Refills: 2 | Status: SHIPPED | OUTPATIENT
Start: 2024-07-10 | End: 2025-07-10

## 2024-07-10 RX ORDER — DICLOFENAC SODIUM 10 MG/G
2 GEL TOPICAL 3 TIMES DAILY
Qty: 100 G | Refills: 3 | Status: SHIPPED | OUTPATIENT
Start: 2024-07-10

## 2024-07-10 RX ORDER — ACETAMINOPHEN, DEXTROMETHORPHAN HBR, DOXYLAMINE SUCCINATE, PHENYLEPHRINE HCL 650; 20; 12.5; 1 MG/30ML; MG/30ML; MG/30ML; MG/30ML
1000 SOLUTION ORAL DAILY
Qty: 90 TABLET | Refills: 3 | Status: SHIPPED | OUTPATIENT
Start: 2024-07-10

## 2024-07-10 RX ORDER — AMLODIPINE BESYLATE 5 MG/1
5 TABLET ORAL DAILY
Qty: 90 TABLET | Refills: 3 | Status: SHIPPED | OUTPATIENT
Start: 2024-07-10

## 2024-07-10 NOTE — Clinical Note
uSdha - could you give him a call to see if he was able to figure out how to use Dexcom by reviewing website? Thanks

## 2024-07-10 NOTE — PROGRESS NOTES
VISUAL FIELD TEST 24-2 SS-OU-DONE/AB  OD-REL-FIX-COOP-GOOD/AB  OS-REL-GOOD-FIX-POOR-COOP-GOOD/AB    PT HAS NO KNOWN ALLERGIES TO LATEX OR ADHESIVES./AB    MRX: OD -2.50 +1.50 X 170            OS -2.00 +1.00 X 5

## 2024-07-10 NOTE — PROGRESS NOTES
Subjective     Patient ID: Paul Deleon is a 73 y.o. male.    Chief Complaint: Follow-up and Hip Pain    HPI  Happy in [facility].    C/o r hip pain down lateral leg.    CAd, multivessel, without CP.    Exercise limited to working out in yard.          No cp.    Uncontrolled DM.  A1c 10.4.      He was not able to keep appt with diabetes educator.         He is taking about 40 units of Tresiba Insulin, not 30.               Following humalog sliding scale.  Taking Jardiance qod , not daily due to excesive urination.         He is watching diet closely.    Saw Pulm for mediastinal lymphadenopathy 2023.  PET - Multiple stable [id number] enlarged mediastinal lymph nodes.     ED visit June 5 with L side pain.  CT - no explanation.  Fatty liver.-   EKG normal.  CMP and cbc ok.      Review of Systems   Constitutional:  Negative for activity change and unexpected weight change.   HENT:  Negative for postnasal drip, rhinorrhea and sinus pressure/congestion.    Respiratory:  Negative for chest tightness and shortness of breath.    Cardiovascular:  Negative for chest pain and leg swelling.   Gastrointestinal:  Negative for abdominal pain, nausea and vomiting.   Genitourinary:  Negative for difficulty urinating, dysuria, hematuria and urgency.   Integumentary:  Negative for rash.   Neurological:  Negative for headaches.   Psychiatric/Behavioral:  Negative for dysphoric mood and sleep disturbance. The patient is not nervous/anxious.           Objective     Physical Exam  Constitutional:       General: He is not in acute distress.     Appearance: He is well-developed. He is not ill-appearing, toxic-appearing or diaphoretic.   HENT:      Head: Normocephalic and atraumatic.   Eyes:      General: No scleral icterus.        Left eye: No discharge.      Conjunctiva/sclera: Conjunctivae normal.   Neck:      Thyroid: No thyromegaly.      Vascular: No JVD.   Cardiovascular:      Rate and Rhythm: Normal rate and regular  rhythm.      Heart sounds: Normal heart sounds. No murmur heard.  Pulmonary:      Effort: Pulmonary effort is normal. No respiratory distress.      Breath sounds: Normal breath sounds. No stridor. No wheezing, rhonchi or rales.   Abdominal:      General: There is no distension.      Palpations: Abdomen is soft. There is no mass.      Tenderness: There is no abdominal tenderness. There is no guarding.   Musculoskeletal:      Cervical back: Normal range of motion. No rigidity.      Right lower leg: No edema.      Left lower leg: No edema.   Lymphadenopathy:      Cervical: No cervical adenopathy.   Skin:     General: Skin is dry.      Findings: No rash.   Neurological:      Mental Status: He is alert and oriented to person, place, and time.   Psychiatric:         Behavior: Behavior normal.         Thought Content: Thought content normal.         Judgment: Judgment normal.       Lab Results   Component Value Date    WBC 8.65 06/05/2024    HGB 15.1 06/05/2024    HCT 45.2 06/05/2024     06/05/2024    CHOL 80 (L) 07/02/2021    CHOL 80 (L) 07/02/2021    TRIG 86 07/02/2021    TRIG 86 07/02/2021    HDL 25 (L) 07/02/2021    HDL 25 (L) 07/02/2021    ALT 5 (L) 06/05/2024    AST 14 06/05/2024     (L) 06/05/2024    K 4.0 06/05/2024     06/05/2024    CREATININE 1.0 06/05/2024    BUN 14 06/05/2024    CO2 23 06/05/2024    TSH 3.255 06/12/2020    PSA 2.0 10/20/2020    INR 1.0 06/19/2023    GLUF 159 (H) 12/31/2013    HGBA1C 10.4 (H) 03/13/2024        Assessment and Plan     1. Annual physical exam    2. Uncontrolled type 2 diabetes mellitus with hyperglycemia  -     Hemoglobin A1C; Future; Expected date: 07/10/2024    3. Hypertension, unspecified type  -     amLODIPine (NORVASC) 5 MG tablet; Take 1 tablet (5 mg total) by mouth once daily.  Dispense: 90 tablet; Refill: 3    4. Other chest pain  -     nitroGLYCERIN (NITROSTAT) 0.4 MG SL tablet; Place 1 tablet (0.4 mg total) under the tongue every 5 (five) minutes as  needed for Chest pain. Up to 3 doses. If chest pain is not relieved or worsens 3 to 5 minutes after 1 dose, call 911 and seek immediate emergency medical attention.  Dispense: 25 tablet; Refill: 2    5. Hyperlipidemia, unspecified hyperlipidemia type  -     Lipid Panel; Future; Expected date: 07/10/2024    6. Vitamin B 12 deficiency  -     VITAMIN B12; Future; Expected date: 07/10/2024  -     cyanocobalamin, vitamin B-12, (VITAMIN B-12) 1,000 mcg TbSR; Take 1,000 mcg by mouth once daily.  Dispense: 90 tablet; Refill: 3    Other orders  -     atorvastatin (LIPITOR) 80 MG tablet; Take 1 tablet (80 mg total) by mouth every evening.  Dispense: 90 tablet; Refill: 3  -     metoprolol succinate (TOPROL-XL) 25 MG 24 hr tablet; Take 1 tablet (25 mg total) by mouth once daily. Due for annual with PCP  Dispense: 90 tablet; Refill: 3  -     DIPH,PERTUSS(ACEL),TET VAC(PF)(ADULT)(ADACEL)(TDaP)          Rsv vaccine.  Will ask our health , Sudha Bojorquez, to f/u on Dexcom use.  F/u endocrine       Follow up in about 1 year (around 7/10/2025).

## 2024-07-15 ENCOUNTER — TELEPHONE (OUTPATIENT)
Dept: INTERNAL MEDICINE | Facility: CLINIC | Age: 74
End: 2024-07-15
Payer: MEDICARE

## 2024-07-15 NOTE — TELEPHONE ENCOUNTER
----- Message from Bree Larose MD sent at 7/12/2024  6:24 PM CDT -----  Pls call him - he doesn't read [place]  DM is terribly controlled. He needs to f/u Dr Miller and make appt with diabetic educator, as she requested.

## 2024-07-17 NOTE — PROGRESS NOTES
Subjective:     Patient ID: Paul Deleon is a 73 y.o. male.    Chief Complaint: Diabetic Foot Exam, Nail Care, and Foot Problem (Burning pain - numbness )    Paul is a 73 y.o. male who presents to the clinic upon referral from Dr. Elina lazaro. provider found  for evaluation and treatment of diabetic feet. Paul has a past medical history of Cataract, Coronary artery disease, Diabetes mellitus type II, GERD (gastroesophageal reflux disease), Heart attack, Hyperlipidemia, Hypertension, MI (mitral incompetence), MI (myocardial infarction), and Ulcer. Patient relates no major problem with feet. Only complaints today consist of yearly comprehensive diabetic  foot examination and foot numbness . Uses Voltaren which helps .    PCP: Bree Larose MD    Date Last Seen by PCP:   Chief Complaint   Patient presents with    Diabetic Foot Exam    Nail Care    Foot Problem     Burning pain - numbness          Hemoglobin A1C   Date Value Ref Range Status   07/10/2024 10.2 (H) 4.0 - 5.6 % Final     Comment:     ADA Screening Guidelines:  5.7-6.4%  Consistent with prediabetes  >or=6.5%  Consistent with diabetes    High levels of fetal hemoglobin interfere with the HbA1C  assay. Heterozygous hemoglobin variants (HbS, HgC, etc)do  not significantly interfere with this assay.   However, presence of multiple variants may affect accuracy.     03/13/2024 10.4 (H) 4.0 - 5.6 % Final     Comment:     ADA Screening Guidelines:  5.7-6.4%  Consistent with prediabetes  >or=6.5%  Consistent with diabetes    High levels of fetal hemoglobin interfere with the HbA1C  assay. Heterozygous hemoglobin variants (HbS, HgC, etc)do  not significantly interfere with this assay.   However, presence of multiple variants may affect accuracy.     06/13/2023 10.2 (H) 4.0 - 6.0 % Final   09/27/2022 10.1 (H) 4.0 - 5.6 % Final     Comment:     ADA Screening Guidelines:  5.7-6.4%  Consistent with prediabetes  >or=6.5%  Consistent with diabetes    High  levels of fetal hemoglobin interfere with the HbA1C  assay. Heterozygous hemoglobin variants (HbS, HgC, etc)do  not significantly interfere with this assay.   However, presence of multiple variants may affect accuracy.     07/08/2020 8.1 % Final         Review of Systems   Constitutional: Negative for chills, decreased appetite and fever.   Cardiovascular:  Negative for leg swelling.   Skin:  Positive for dry skin.   Musculoskeletal:  Negative for arthritis, joint pain, joint swelling and myalgias.   Gastrointestinal:  Negative for nausea and vomiting.   Neurological:  Positive for numbness, paresthesias and sensory change. Negative for loss of balance.          Patient Active Problem List   Diagnosis    Hyperlipidemia    Class 1 obesity due to excess calories with serious comorbidity and body mass index (BMI) of 31.0 to 31.9 in adult    Hypertension associated with diabetes    Noncompliance with medication regimen    Chest pain syndrome    Type 2 diabetes mellitus, with long-term current use of insulin    Hearing loss, sensorineural    Intermittent claudication    Other chest pain    Mediastinal lymphadenopathy    Chest pain    Coronary artery disease    Aortic atherosclerosis       Current Outpatient Medications on File Prior to Visit   Medication Sig Dispense Refill    alcohol swabs PadM Apply 1 each topically as needed. 400 each 3    blood sugar diagnostic (FREESTYLE LITE STRIPS) Strp Inject 1 strip into the skin 3 (three) times daily. 200 each 11    blood sugar diagnostic Strp To check BG 4 times daily, to use with insurance preferred meter E11.9 450 each 3    blood-glucose sensor (DEXCOM G7 SENSOR) Vivienne 1 Device by Misc.(Non-Drug; Combo Route) route every 10 days. 9 each 3    empagliflozin (JARDIANCE) 10 mg tablet Take 1 tablet (10 mg total) by mouth once daily. 30 tablet 6    fluticasone propionate (FLONASE) 50 mcg/actuation nasal spray 1 spray (50 mcg total) by Each Nostril route once daily. 15.8 mL 6     "insulin lispro 100 unit/mL injection Inject 10 Units into the skin 3 (three) times daily before meals. Plus sliding scale TDD 45 units 15 mL 11    lancets (FREESTYLE LANCETS) 28 gauge Misc USE THREE TIMES DAILY 200 each 11    lancets Misc To check BG 4 times daily, to use with insurance preferred meter E11.9 450 each 3    metFORMIN (GLUCOPHAGE) 1000 MG tablet TAKE 1 TABLET EVERY DAY WITH BREAKFAST 90 tablet 3    pantoprazole (PROTONIX) 40 MG tablet TAKE 1 TABLET EVERY DAY 90 tablet 3    pen needle, diabetic (BD ULTRA-FINE SHORT PEN NEEDLE) 31 gauge x 5/16" Ndle Takes four injections daily E11.9 450 each 3    polyethylene glycol (GAVILYTE-C) 240-22.72-6.72 -5.84 gram SolR TAKE 4000 ML BY MOUTH 1 TIME FOR 1 DOSE 1 each 0    TRESIBA FLEXTOUCH U-200 200 unit/mL (3 mL) insulin pen Inject 30 Units into the skin every evening. 5 pen 6    vitamin E 400 UNIT capsule       amLODIPine (NORVASC) 5 MG tablet Take 1 tablet (5 mg total) by mouth once daily. 90 tablet 3    aspirin (ECOTRIN) 81 MG EC tablet Take 1 tablet (81 mg total) by mouth once daily. 30 tablet 11    atorvastatin (LIPITOR) 80 MG tablet Take 1 tablet (80 mg total) by mouth every evening. 90 tablet 3    blood-glucose meter kit To check BG 4 times daily, to use with insurance preferred meter E11.9 1 each 0    clopidogreL (PLAVIX) 75 mg tablet Take 1 tablet (75 mg total) by mouth once daily. 30 tablet 3    cyanocobalamin, vitamin B-12, (VITAMIN B-12) 1,000 mcg TbSR Take 1,000 mcg by mouth once daily. 90 tablet 3    metoprolol succinate (TOPROL-XL) 25 MG 24 hr tablet Take 1 tablet (25 mg total) by mouth once daily. Due for annual with PCP 90 tablet 3    nitroGLYCERIN (NITROSTAT) 0.4 MG SL tablet Place 1 tablet (0.4 mg total) under the tongue every 5 (five) minutes as needed for Chest pain. Up to 3 doses. If chest pain is not relieved or worsens 3 to 5 minutes after 1 dose, call 911 and seek immediate emergency medical attention. 25 tablet 2     No current " facility-administered medications on file prior to visit.       Review of patient's allergies indicates:  No Known Allergies    Past Surgical History:   Procedure Laterality Date    ANKLE SURGERY      BACK SURGERY      COLONOSCOPY N/A 8/3/2023    Procedure: COLONOSCOPY;  Surgeon: Sha Spencer MD;  Location: [facility] ([address] [place] [address]);  Service: Endoscopy;  Laterality: N/A;    CORONARY ANGIOPLASTY WITH STENT PLACEMENT      ELBOW SURGERY      lft    ESOPHAGOGASTRODUODENOSCOPY N/A 8/3/2023    Procedure: EGD (ESOPHAGOGASTRODUODENOSCOPY);  Surgeon: Sha Spencer MD;  [facility] ([facility]);  Service: Endoscopy;  Laterality: N/A;  Procedure Timin-4 weeks  2nd floor - DE stents placed in 10/2022  Ok to schedule as 3rd case per Dr. Spencer-see tele encounter  referred by Dr. Spencer/PEG/instr. mailed and to portal-  ok to hold Plavix 5 days per Dr Zheng-GT    THROAT SURGERY         Family History   Problem Relation Name Age of Onset    Heart disease Mother          cad    Heart disease Father          pacemaker    COPD Sister Mayra         awaiting lung transplant    Cancer Brother Armin         lung, metastatic    Melanoma Neg Hx      Psoriasis Neg Hx      Lupus Neg Hx      Eczema Neg Hx      Acne Neg Hx      Amblyopia Neg Hx      Blindness Neg Hx      Cataracts Neg Hx      Glaucoma Neg Hx      Macular degeneration Neg Hx      Strabismus Neg Hx      Retinal detachment Neg Hx         Social History     Socioeconomic History    Marital status:     Number of children: 0   Occupational History    Occupation:      Employer: [employer]     Comment: RETIRED   Tobacco Use    Smoking status: Never    Smokeless tobacco: Never   Substance and Sexual Activity    Alcohol use: Yes     Alcohol/week: 0.0 standard drinks of alcohol     Comment: rare alcohol    Drug use: No    Sexual activity: Yes     Partners: Female     Birth control/protection: Other-see comments   Social  "History Narrative           Activity limited to yard work, walking.  Low impact gym work twice a week (Silver Sneakers).     Social Determinants of Health     Financial Resource Strain: Low Risk  (6/20/2023)    Overall Financial Resource Strain (CARDIA)     Difficulty of Paying Living Expenses: Not hard at all   Food Insecurity: No Food Insecurity (6/20/2023)    Hunger Vital Sign     Worried About Running Out of Food in the Last Year: Never true     Ran Out of Food in the Last Year: Never true   Transportation Needs: No Transportation Needs (6/20/2023)    PRAPARE - Transportation     Lack of Transportation (Medical): No     Lack of Transportation (Non-Medical): No   Physical Activity: Sufficiently Active (6/20/2023)    Exercise Vital Sign     Days of Exercise per Week: 3 days     Minutes of Exercise per Session: 100 min   Stress: Stress Concern Present (6/20/2023)    [employer] [place] [employer] - Occupational Stress Questionnaire     Feeling of Stress : To some extent   Housing Stability: Low Risk  (6/20/2023)    Housing Stability Vital Sign     Unable to Pay for Housing in the Last Year: No     Number of Places Lived in the Last Year: 1     Unstable Housing in the Last Year: No           Objective:     Vitals:    07/10/24 1025   BP: 135/76   Pulse: 69   Resp: 18   Weight: 85 kg (187 lb 6.3 oz)   Height: 5' 6" (1.676 m)   PainSc:   5   PainLoc: Foot        Physical Exam  Vitals and nursing note reviewed.   Constitutional:       General: He is not in acute distress.     Appearance: He is well-developed. He is not toxic-appearing or diaphoretic.      Comments: alert and oriented x 3.    Cardiovascular:      Pulses:           Dorsalis pedis pulses are 2+ on the right side and 2+ on the left side.        Posterior tibial pulses are 2+ on the right side and 2+ on the left side.      Comments:  Capillary refill time is within normal limits. Digital hair present.   Pulmonary:      Effort: No respiratory " distress.   Musculoskeletal:         General: No deformity.      Right ankle: No tenderness. No lateral malleolus, medial malleolus, AITF ligament, CF ligament or posterior TF ligament tenderness.      Right Achilles Tendon: No defects. Solis's test negative.      Left ankle: No tenderness. No lateral malleolus, medial malleolus, AITF ligament, CF ligament or posterior TF ligament tenderness.      Left Achilles Tendon: No defects. Solis's test negative.      Right foot: No tenderness or bony tenderness.      Left foot: No tenderness or bony tenderness.      Comments: Adequate joint range of motion without pain, limitation, nor crepitation Bilateral feet and ankle joints. Muscle strength is 5/5 in all groups bilaterally.           Feet:      Right foot:      Protective Sensation: 5 sites tested.  5 sites sensed.      Skin integrity: Skin integrity normal.      Left foot:      Protective Sensation: 5 sites tested.  5 sites sensed.      Skin integrity: Skin integrity normal.   Lymphadenopathy:      Comments: No lymphatic streaking     Skin:     General: Skin is warm and dry.      Coloration: Skin is not pale.      Findings: No rash.      Nails: There is no clubbing.      Comments: Skin is of normal turgor.   Normal temperature gradient.  Examination of the skin reveals no evidence of significant rashes, open lesions, suspicious appearing nevi or other concerning lesions.     Scaling dryness in a moccasin distribution is noted to the bilateral lower extremities with associated erythema.      Toenails 1-5 bilaterally are neatly trimmed; of normal color and thickness   Neurological:      Sensory: No sensory deficit.      Motor: No atrophy.      Comments: Light touch present     Psychiatric:         Attention and Perception: He is attentive.         Mood and Affect: Mood is not anxious. Affect is not inappropriate.         Speech: He is communicative. Speech is not slurred.         Behavior: Behavior is not  combative.           Assessment:      Encounter Diagnoses   Name Primary?    Type 2 diabetes mellitus with hyperosmolar coma, with long-term current use of insulin Yes    Paresthesia of both lower extremities      Plan:     Paul was seen today for diabetic foot exam, nail care and foot problem.    Diagnoses and all orders for this visit:    Type 2 diabetes mellitus with hyperosmolar coma, with long-term current use of insulin    Paresthesia of both lower extremities    Other orders  -     diclofenac sodium (VOLTAREN) 1 % Gel; Apply 2 g topically 3 (three) times daily. Apply to the area of foot or toe pain 2-3x per day or night as needed  -     ketoconazole (NIZORAL) 2 % cream; Apply topically once daily.      I counseled the patient on his conditions, their implications and medical management.    - Shoe inspection. Diabetic Foot Education. Patient reminded of the importance of good nutrition and blood sugar control to help prevent podiatric complications of diabetes. Patient instructed on proper foot hygeine. We discussed wearing proper shoe gear, daily foot inspections, never walking without protective shoe gear, caution putting sharp instruments to feet     - Discussed DM foot care:  Wear comfortable, proper fitting shoes. Wash feet daily. Dry well. After drying, apply moisturizer to feet (no lotion to webspaces). Inspect feet daily for skin breaks, blisters, swelling, or redness. Wear cotton socks (preferably white)  Change socks every day. Do NOT walk barefoot. Do NOT use heating pads or warm/hot water soaks     - Discussed importance of daily moisturizer to the feet such as Cerave,Sween, Or Cetaphil    - Patient is low risk for developing lower extremity issues secondary to diabetes.     - Reviewed current medication(s), and lab(s) specific to foot ailment(s) with patient in detail. All questions answered     - Independent review of patients previous clinical notes    - I recommend continued yearly diabetic  foot examinations by Myself or PCP    - Currently  patient has NO pedal manifestations of DM    - Patients with out pedal manifestations of DM, do not qualify for Diabetic Shoes/Inserts nor  nail/callus trimming     - Advised the patient that fungus likes warm, dark, and moist environments such as bathrooms, gyms, and pools. Advised to spray shower, shower mat , and any shoes w/ Lysol periodically    - Discussed the  importance of maintaining  low blood sugar levels, and the direct affect elevated blood sugars have on progression of neuropathic symptoms    - Voltaren refill for neuropathy pains     - RTC in 1 yr or  PRN

## 2024-07-19 ENCOUNTER — TELEPHONE (OUTPATIENT)
Dept: ENDOCRINOLOGY | Facility: CLINIC | Age: 74
End: 2024-07-19
Payer: MEDICARE

## 2024-07-19 NOTE — TELEPHONE ENCOUNTER
Spoke to pt.     Hi. Can you call MR. Deleon,  I saw him a few months ago and changed his regimen around (there eis a phone note) can you check and see what he is using for insulin (should be on two types and two pills, metformin and jardiance). He also need dm educaiton which I ordered .  thanks  SD          Previous Message    PT states he's on tresiba and humalog or novolog and he's also using metformin aand yuval    Pt dm education appointment is scheduled on 7/31

## 2024-07-19 NOTE — TELEPHONE ENCOUNTER
----- Message from Roberta Miller MD sent at 7/19/2024  5:47 AM CDT -----  Hi. Can you call MR. Deleon,   I saw him a few months ago and changed his regimen around (there eis a phone note) can you check and see what he is using for insulin (should be on two types and two pills, metformin and jardiance). He also need dm educaiton which I ordered .  thanks  SD  ----- Message -----  From: Bree Larose MD  Sent: 7/11/2024  12:32 PM CDT  To: MD Roberta Barraza - DM remains poorly controlled.  Any help you can give would be appreciated!  He couldn't figure out how to use Dexcom and never met up with diabetes educator.  NE

## 2024-07-31 ENCOUNTER — OFFICE VISIT (OUTPATIENT)
Dept: OPHTHALMOLOGY | Facility: CLINIC | Age: 74
End: 2024-07-31
Payer: MEDICARE

## 2024-07-31 ENCOUNTER — PATIENT MESSAGE (OUTPATIENT)
Dept: RESEARCH | Facility: HOSPITAL | Age: 74
End: 2024-07-31
Payer: MEDICARE

## 2024-07-31 DIAGNOSIS — H25.13 AGE-RELATED NUCLEAR CATARACT OF BOTH EYES: ICD-10-CM

## 2024-07-31 DIAGNOSIS — H40.012 OAG (OPEN ANGLE GLAUCOMA) SUSPECT, LOW RISK, LEFT: ICD-10-CM

## 2024-07-31 DIAGNOSIS — H40.011 OAG (OPEN ANGLE GLAUCOMA) SUSPECT, LOW RISK, RIGHT: Primary | ICD-10-CM

## 2024-07-31 PROCEDURE — 76514 ECHO EXAM OF EYE THICKNESS: CPT | Mod: S$GLB,,, | Performed by: STUDENT IN AN ORGANIZED HEALTH CARE EDUCATION/TRAINING PROGRAM

## 2024-07-31 PROCEDURE — 99999 PR PBB SHADOW E&M-EST. PATIENT-LVL III: CPT | Mod: PBBFAC,,, | Performed by: STUDENT IN AN ORGANIZED HEALTH CARE EDUCATION/TRAINING PROGRAM

## 2024-07-31 PROCEDURE — 1126F AMNT PAIN NOTED NONE PRSNT: CPT | Mod: CPTII,S$GLB,, | Performed by: STUDENT IN AN ORGANIZED HEALTH CARE EDUCATION/TRAINING PROGRAM

## 2024-07-31 PROCEDURE — 99203 OFFICE O/P NEW LOW 30 MIN: CPT | Mod: S$GLB,,, | Performed by: STUDENT IN AN ORGANIZED HEALTH CARE EDUCATION/TRAINING PROGRAM

## 2024-07-31 PROCEDURE — 1159F MED LIST DOCD IN RCRD: CPT | Mod: CPTII,S$GLB,, | Performed by: STUDENT IN AN ORGANIZED HEALTH CARE EDUCATION/TRAINING PROGRAM

## 2024-07-31 PROCEDURE — 92020 GONIOSCOPY: CPT | Mod: S$GLB,,, | Performed by: STUDENT IN AN ORGANIZED HEALTH CARE EDUCATION/TRAINING PROGRAM

## 2024-07-31 PROCEDURE — 3046F HEMOGLOBIN A1C LEVEL >9.0%: CPT | Mod: CPTII,S$GLB,, | Performed by: STUDENT IN AN ORGANIZED HEALTH CARE EDUCATION/TRAINING PROGRAM

## 2024-07-31 PROCEDURE — 3288F FALL RISK ASSESSMENT DOCD: CPT | Mod: CPTII,S$GLB,, | Performed by: STUDENT IN AN ORGANIZED HEALTH CARE EDUCATION/TRAINING PROGRAM

## 2024-07-31 PROCEDURE — G2211 COMPLEX E/M VISIT ADD ON: HCPCS | Mod: S$GLB,,, | Performed by: STUDENT IN AN ORGANIZED HEALTH CARE EDUCATION/TRAINING PROGRAM

## 2024-07-31 PROCEDURE — 1101F PT FALLS ASSESS-DOCD LE1/YR: CPT | Mod: CPTII,S$GLB,, | Performed by: STUDENT IN AN ORGANIZED HEALTH CARE EDUCATION/TRAINING PROGRAM

## 2024-08-21 ENCOUNTER — TELEPHONE (OUTPATIENT)
Dept: INTERNAL MEDICINE | Facility: CLINIC | Age: 74
End: 2024-08-21
Payer: MEDICARE

## 2024-08-21 NOTE — TELEPHONE ENCOUNTER
Called patient left msg, request call back at direct number. [phone].  Sudha Bojorquez RN HC

## 2024-08-21 NOTE — TELEPHONE ENCOUNTER
----- Message from Bree Larose MD sent at 7/10/2024  8:17 PM CDT -----  Sudha - could you give him a call to see if he was able to figure out how to use Dexcom by reviewing website?  Thanks

## 2024-12-11 ENCOUNTER — OFFICE VISIT (OUTPATIENT)
Dept: OTOLARYNGOLOGY | Facility: CLINIC | Age: 74
End: 2024-12-11
Payer: MEDICARE

## 2024-12-11 ENCOUNTER — CLINICAL SUPPORT (OUTPATIENT)
Dept: AUDIOLOGY | Facility: CLINIC | Age: 74
End: 2024-12-11
Payer: MEDICARE

## 2024-12-11 DIAGNOSIS — H61.23 BILATERAL IMPACTED CERUMEN: Primary | ICD-10-CM

## 2024-12-11 DIAGNOSIS — H90.3 SENSORINEURAL HEARING LOSS (SNHL), BILATERAL: ICD-10-CM

## 2024-12-11 DIAGNOSIS — R42 DIZZINESS: ICD-10-CM

## 2024-12-11 DIAGNOSIS — H90.3 SENSORINEURAL HEARING LOSS OF BOTH EARS: Primary | ICD-10-CM

## 2024-12-11 PROCEDURE — 92557 COMPREHENSIVE HEARING TEST: CPT | Mod: S$GLB,,, | Performed by: AUDIOLOGIST

## 2024-12-11 PROCEDURE — 99999 PR PBB SHADOW E&M-EST. PATIENT-LVL III: CPT | Mod: PBBFAC,,,

## 2024-12-11 PROCEDURE — 92567 TYMPANOMETRY: CPT | Mod: S$GLB,,, | Performed by: AUDIOLOGIST

## 2024-12-11 NOTE — PROGRESS NOTES
"Subjective:   Paul Deleon is a 74 y.o. male with a PMHx of CAD, T2DM, GERD, MI, HLD, and HTN who was self-referred for cerumen impaction. Notes it has been several years since his last ear cleaning. He does have a history of SNHL. Last audio in 2013. He does not use hearing aids. Does notice a gradual decline in hearing bilaterally. He does not use hearing aids due to expense. Denies otalgia, otorrhea or tinnitus. Of note, has noticed "dizziness" with standing and sitting up quickly the last 2 weeks. He describes both room spinning and lightheadedness that is brief in nature. No associated falls. There is not a prior history of ear surgery. There is not a prior history of ear infections. There is not a history of ear trauma. He admits to a history of significant noise exposure- works at [employer], occasionally wears hearing protection.     Past Medical History  He has a past medical history of Cataract, Coronary artery disease, Diabetes mellitus type II, GERD (gastroesophageal reflux disease), Heart attack, Hyperlipidemia, Hypertension, MI (mitral incompetence), MI (myocardial infarction), and Ulcer.    Past Surgical History  He has a past surgical history that includes Throat surgery; Ankle surgery; Elbow surgery; Back surgery; Coronary angioplasty with stent; Esophagogastroduodenoscopy (N/A, 8/3/2023); and Colonoscopy (N/A, 8/3/2023).    Family History  His family history includes COPD in his sister; Cancer in his brother; Heart disease in his father and mother.    Social History  He reports that he has never smoked. He has never used smokeless tobacco. He reports current alcohol use. He reports that he does not use drugs.    Allergies  He has No Known Allergies.    Medications  He has a current medication list which includes the following prescription(s): alcohol swabs, amlodipine, atorvastatin, blood sugar diagnostic, blood sugar diagnostic, dexcom g7 sensor, cyanocobalamin (vitamin b-12), " diclofenac sodium, empagliflozin, fluticasone propionate, insulin lispro, ketoconazole, lancets, lancets, metformin, metoprolol succinate, nitroglycerin, pantoprazole, pen needle, diabetic, gavilyte-c, tresiba flextouch u-200, vitamin e, aspirin, blood-glucose meter, and clopidogrel.    Review of Systems     Constitutional: Negative for fever.      HENT: Positive for hearing loss.  Negative for ear discharge, ear infection, ear pain and ringing in the ears.      Neurological: Positive for dizziness and light-headedness.         Objective:       Head:  Normocephalic.     Ears:    Right Ear: No drainage, swelling or tenderness. Tympanic membrane is not scarred, not perforated and not erythematous. No middle ear effusion.   Left Ear: No drainage, swelling or tenderness. Tympanic membrane is not scarred, not perforated and not erythematous.  No middle ear effusion.   Ceruminous debris obstructing bilateral TM, removed via microscopy.     Pulmonary/Chest:   Effort normal.     [place]-Hallpike Left: Negative for torsional and up-beating nystagmus  [place]Hallpike Right: Negative for torsional and up-beating nystagmus  Subjective dizziness when going from laying to sitting during allison hallpike.    Tandem Gait: Abnormal     [place]: Negative      Procedure  Cerumen removal performed.  See procedure note.  Procedure Note:  The patient was brought to the minor procedure room and placed under the operating microscope of the right and left ear canal which was cleaned of ceruminous debris. Using a combination of suction, curettes and cup forceps the patient's cerumen was removed. The patient tolerated the procedure well. There were no complications.       Audiology     I independently reviewed the tracings of the complete audiometric evaluation performed today. I reviewed the audiogram with the patient as well. Pertinent findings include: mild to severe rising to moderately-severe SNHL AD and normal hearing sloping to a mild to severe  rising to moderately-severe SNHL AS.  A SRT was obtained at 25 dBHL AD and at 20 dBHL AS.  Speech discrimination was 84% AU.    Imaging:   None      Assessment:     1. Bilateral impacted cerumen    2. Sensorineural hearing loss (SNHL), bilateral    3. Dizziness      Plan:   Paul was seen today for cerumen impaction.  Diagnoses and all orders for this visit:    Bilateral impacted cerumen  Remove via microscopy. RTC in 1 year.    Sensorineural hearing loss (SNHL), bilateral  Audiometric testing interpretation consistent with sensorineural hearing loss. Discussed the etiology of SNHL. Medically cleared for hearing amplification, and will follow-up with Audiology if interested. Hearing conservation in noisy environments. RTC in 1-2 years for routine audiogram or sooner if needed.    Dizziness  -     Ambulatory referral/consult to Physical/Occupational Therapy; Future  Discussed various factors related to dizziness and imbalance including: age-related changes, cardiovascular, orthopedic/muscular, vestibular or neurologic. [place]-Hallpike was negative bilaterally and otoscopic exam was benign today. Dizziness is likely multifactorial and I recommended Vestibular physical therapy for balance/coordination training. Patient encouraged to be careful when moving from sitting to standing and to stay well hydrated/ and monitor for symptoms of light-headedness. Recommend follow up with PCP.

## 2024-12-11 NOTE — PROGRESS NOTES
Mr. Paul Deleon was seen in the clinic today for an audiological evaluation.  Mr. Deleon reported bilateral hearing loss and does have documented bilateral sensorineural hearing loss.    Audiological testing revealed normal hearing sloping to a mild to severe rising to moderately-severe sensorineural hearing loss for the right ear and normal hearing sloping to a mild to severe rising to moderately-severe sensorineural hearing loss for the left ear.  A speech reception threshold was obtained at 25 dBHL for the right ear and at 20 dBHL for the left ear.  Speech discrimination was 84% for the right ear and 84% for the left ear.      Tympanometry testing revealed a Type A tympanogram for the right ear and a Type A tympanogram for the left ear.      Recommendations:  1. Otologic evaluation  2. Annual audiological evaluation  3. Hearing protection when in noise   4. Hearing aid consultation

## 2024-12-30 DIAGNOSIS — I25.10 CORONARY ARTERY DISEASE, UNSPECIFIED VESSEL OR LESION TYPE, UNSPECIFIED WHETHER ANGINA PRESENT, UNSPECIFIED WHETHER NATIVE OR TRANSPLANTED HEART: ICD-10-CM

## 2024-12-30 DIAGNOSIS — E11.65 UNCONTROLLED TYPE 2 DIABETES MELLITUS WITH HYPERGLYCEMIA: ICD-10-CM

## 2024-12-31 RX ORDER — INSULIN DEGLUDEC 200 U/ML
60 INJECTION, SOLUTION SUBCUTANEOUS NIGHTLY
Qty: 9 ML | Refills: 1 | Status: SHIPPED | OUTPATIENT
Start: 2024-12-31

## 2025-01-28 ENCOUNTER — OFFICE VISIT (OUTPATIENT)
Dept: INTERNAL MEDICINE | Facility: CLINIC | Age: 75
End: 2025-01-28
Payer: MEDICARE

## 2025-01-28 ENCOUNTER — IMMUNIZATION (OUTPATIENT)
Dept: INTERNAL MEDICINE | Facility: CLINIC | Age: 75
End: 2025-01-28
Payer: MEDICARE

## 2025-01-28 ENCOUNTER — HOSPITAL ENCOUNTER (OUTPATIENT)
Dept: RADIOLOGY | Facility: HOSPITAL | Age: 75
Discharge: HOME | End: 2025-01-28
Attending: INTERNAL MEDICINE
Payer: MEDICARE

## 2025-01-28 VITALS
BODY MASS INDEX: 30.9 KG/M2 | HEIGHT: 66 IN | DIASTOLIC BLOOD PRESSURE: 76 MMHG | HEART RATE: 76 BPM | WEIGHT: 192.25 LBS | SYSTOLIC BLOOD PRESSURE: 126 MMHG | OXYGEN SATURATION: 98 %

## 2025-01-28 DIAGNOSIS — Z23 NEED FOR VACCINATION: Primary | ICD-10-CM

## 2025-01-28 DIAGNOSIS — M17.11 PRIMARY OSTEOARTHRITIS OF RIGHT KNEE: Primary | ICD-10-CM

## 2025-01-28 DIAGNOSIS — E11.65 UNCONTROLLED TYPE 2 DIABETES MELLITUS WITH HYPERGLYCEMIA: ICD-10-CM

## 2025-01-28 DIAGNOSIS — E11.59 HYPERTENSION ASSOCIATED WITH DIABETES: ICD-10-CM

## 2025-01-28 DIAGNOSIS — R42 VERTIGO: ICD-10-CM

## 2025-01-28 DIAGNOSIS — I15.2 HYPERTENSION ASSOCIATED WITH DIABETES: ICD-10-CM

## 2025-01-28 DIAGNOSIS — M17.11 PRIMARY OSTEOARTHRITIS OF RIGHT KNEE: ICD-10-CM

## 2025-01-28 PROCEDURE — 90480 ADMN SARSCOV2 VAC 1/ONLY CMP: CPT | Mod: S$GLB,,, | Performed by: INTERNAL MEDICINE

## 2025-01-28 PROCEDURE — 1101F PT FALLS ASSESS-DOCD LE1/YR: CPT | Mod: CPTII,S$GLB,, | Performed by: INTERNAL MEDICINE

## 2025-01-28 PROCEDURE — 3288F FALL RISK ASSESSMENT DOCD: CPT | Mod: CPTII,S$GLB,, | Performed by: INTERNAL MEDICINE

## 2025-01-28 PROCEDURE — 99999 PR PBB SHADOW E&M-EST. PATIENT-LVL V: CPT | Mod: PBBFAC,,, | Performed by: INTERNAL MEDICINE

## 2025-01-28 PROCEDURE — 3074F SYST BP LT 130 MM HG: CPT | Mod: CPTII,S$GLB,, | Performed by: INTERNAL MEDICINE

## 2025-01-28 PROCEDURE — 91320 SARSCV2 VAC 30MCG TRS-SUC IM: CPT | Mod: S$GLB,,, | Performed by: INTERNAL MEDICINE

## 2025-01-28 PROCEDURE — 3008F BODY MASS INDEX DOCD: CPT | Mod: CPTII,S$GLB,, | Performed by: INTERNAL MEDICINE

## 2025-01-28 PROCEDURE — 73560 X-RAY EXAM OF KNEE 1 OR 2: CPT | Mod: TC,LT

## 2025-01-28 PROCEDURE — G2211 COMPLEX E/M VISIT ADD ON: HCPCS | Mod: S$GLB,,, | Performed by: INTERNAL MEDICINE

## 2025-01-28 PROCEDURE — 73560 X-RAY EXAM OF KNEE 1 OR 2: CPT | Mod: 26,59,LT, | Performed by: RADIOLOGY

## 2025-01-28 PROCEDURE — 99214 OFFICE O/P EST MOD 30 MIN: CPT | Mod: S$GLB,,, | Performed by: INTERNAL MEDICINE

## 2025-01-28 PROCEDURE — 1159F MED LIST DOCD IN RCRD: CPT | Mod: CPTII,S$GLB,, | Performed by: INTERNAL MEDICINE

## 2025-01-28 PROCEDURE — 3078F DIAST BP <80 MM HG: CPT | Mod: CPTII,S$GLB,, | Performed by: INTERNAL MEDICINE

## 2025-01-28 PROCEDURE — 73562 X-RAY EXAM OF KNEE 3: CPT | Mod: 26,RT,, | Performed by: RADIOLOGY

## 2025-01-28 PROCEDURE — 1126F AMNT PAIN NOTED NONE PRSNT: CPT | Mod: CPTII,S$GLB,, | Performed by: INTERNAL MEDICINE

## 2025-01-28 RX ORDER — DICLOFENAC SODIUM 10 MG/G
2 GEL TOPICAL 3 TIMES DAILY
Qty: 100 G | Refills: 3 | Status: SHIPPED | OUTPATIENT
Start: 2025-01-28

## 2025-01-28 RX ORDER — CLOPIDOGREL BISULFATE 75 MG/1
75 TABLET ORAL DAILY
Qty: 90 TABLET | Refills: 3 | Status: SHIPPED | OUTPATIENT
Start: 2025-01-28 | End: 2026-01-28

## 2025-01-28 NOTE — PROGRESS NOTES
Subjective     Patient ID: Paul Deleon is a 74 y.o. male.    Chief Complaint: Follow-up    HPI  C/o  severe R knee pain.         Occas gives way.      C/o feeling wobbly when standing.     Momentary     Vertigo.  No nausea.      Can't relate to head turning.      Recent ENT eval - hearing loss.. wears aids.    Last A1c 10.2    Stopped jardiance due to excessive urination.    NO sugars to review.  I referred back to Endocrine, but appt never made.    He has yet to see diabetes educator      Review of Systems   Constitutional:  Negative for activity change and unexpected weight change.   Respiratory:  Negative for chest tightness and shortness of breath.    Cardiovascular:  Negative for chest pain and leg swelling.   Gastrointestinal:  Negative for abdominal pain.   Musculoskeletal:  Positive for arthralgias.   Neurological:  Negative for headaches.   Psychiatric/Behavioral:  Negative for dysphoric mood.           Objective     Physical Exam  Constitutional:       General: He is not in acute distress.     Appearance: He is well-developed. He is not ill-appearing, toxic-appearing or diaphoretic.   Eyes:      General: No scleral icterus.  Neck:      Thyroid: No thyromegaly.      Vascular: No JVD.   Cardiovascular:      Rate and Rhythm: Normal rate and regular rhythm.      Heart sounds: Normal heart sounds. No murmur heard.  Pulmonary:      Effort: Pulmonary effort is normal. No respiratory distress.      Breath sounds: Normal breath sounds. No stridor. No wheezing, rhonchi or rales.   Abdominal:      General: There is no distension.      Palpations: Abdomen is soft. There is no mass.      Tenderness: There is no abdominal tenderness. There is no guarding.      Hernia: No hernia is present.   Musculoskeletal:      Cervical back: No rigidity or tenderness.      Right knee: Normal.      Right lower leg: No edema.      Left lower leg: No edema.   Lymphadenopathy:      Cervical: No cervical adenopathy.    Neurological:      Mental Status: He is alert and oriented to person, place, and time.      Cranial Nerves: No cranial nerve deficit.      Motor: No weakness.      Coordination: Coordination normal.      Gait: Gait normal.      Comments: + modified allison-hallpike on R   Psychiatric:         Mood and Affect: Mood normal.         Behavior: Behavior normal.         Thought Content: Thought content normal.            Assessment and Plan     1. Primary osteoarthritis of right knee  -     Ambulatory referral/consult to Orthopedics; Future; Expected date: 02/04/2025  -     X-ray Knee Ortho Right; Future; Expected date: 01/28/2025    2. Hypertension associated with diabetes    3. Uncontrolled type 2 diabetes mellitus with hyperglycemia  -     Ambulatory referral/consult to Diabetes Education; Future; Expected date: 02/04/2025  -     Microalbumin/Creatinine Ratio, Urine  -     Basic Metabolic Panel; Future; Expected date: 01/28/2025    4. BMI 31.0-31.9,adult  -     CBC Without Differential; Future; Expected date: 01/28/2025    5. Vertigo  -     Ambulatory Referral/Consult to Physical/Occupational Therapy; Future; Expected date: 02/04/2025    Other orders  -     clopidogreL (PLAVIX) 75 mg tablet; Take 1 tablet (75 mg total) by mouth once daily.  Dispense: 90 tablet; Refill: 3  -     diclofenac sodium (VOLTAREN) 1 % Gel; Apply 2 g topically 3 (three) times daily. Apply to the area of foot or toe pain 2-3x per day or night as needed  Dispense: 100 g; Refill: 3             Covid vax    Follow up in about 6 months (around 7/28/2025).

## 2025-02-02 ENCOUNTER — TELEPHONE (OUTPATIENT)
Dept: INTERNAL MEDICINE | Facility: CLINIC | Age: 75
End: 2025-02-02
Payer: MEDICARE

## 2025-02-02 NOTE — TELEPHONE ENCOUNTER
I requested that A1c be added, but it wasn't.  As that message ever received.  Pls apologize to him and schedule at near by clinic asap.

## 2025-02-03 NOTE — TELEPHONE ENCOUNTER
----- Message from Nell sent at 2/3/2025  1:39 PM CST -----  Contact: [phone]    Requesting an RX refill or new RX.     Is this a refill or new RX:Refill      RX name and strength (copy/paste from chart):clopidogreL (PLAVIX) 75 mg tablet 90 tablet     Is this a 30 day or 90 day RX:     Pharmacy name and phone # (copy/paste from chart)    [id number]  [place], [place] - [address]  [address] [place] [address]  Phone: [phone] Fax: [phone]

## 2025-02-05 ENCOUNTER — TELEPHONE (OUTPATIENT)
Dept: INTERNAL MEDICINE | Facility: CLINIC | Age: 75
End: 2025-02-05
Payer: MEDICARE

## 2025-02-06 ENCOUNTER — CLINICAL SUPPORT (OUTPATIENT)
Dept: REHABILITATION | Facility: HOSPITAL | Age: 75
End: 2025-02-06
Payer: MEDICARE

## 2025-02-06 DIAGNOSIS — R68.89 DECREASED FUNCTIONAL ACTIVITY TOLERANCE: Primary | ICD-10-CM

## 2025-02-06 DIAGNOSIS — R42 VERTIGO: ICD-10-CM

## 2025-02-06 PROCEDURE — 97161 PT EVAL LOW COMPLEX 20 MIN: CPT | Mod: PN

## 2025-02-06 NOTE — TELEPHONE ENCOUNTER
----- Message from Bree Larose MD sent at 2/2/2025  6:01 PM CST -----  I requested appt with Irielle in Feb, same day as ortho, along with diabetic education, but neither were scheduled.  Pls schedule

## 2025-02-06 NOTE — PROGRESS NOTES
"Outpatient Rehab  Physical Therapy Evaluation    Patient Name: Paul Deleon  MRN: [id number]  YOB: 1950  Today's Date: 2/6/2025    Therapy Diagnosis:   Encounter Diagnoses   Name Primary?    Vertigo     Decreased functional activity tolerance Yes     Physician: Bree Larose MD    Physician Orders: Eval and Treat  Medical Diagnosis: Vertigo [R42]   Visit # / Visits Authorized:  1 / 1 Eval   Date of Evaluation:  2/6/2025   Insurance Authorization Period: 1/28/25 to 1/28/26  Plan of Care Certification:  2/6/2025 to 3/6/25      Time In: 1100   Time Out: 1145  Total Time: 45   Total Billable Time: 30 minutes     Subjective   History of Present Illness  Paul is a 74 y.o. male who reports to physical therapy with a chief concern of Imbalance. According to the patient's chart, Paul has a past medical history of Cataract, Coronary artery disease, Diabetes mellitus type II, GERD (gastroesophageal reflux disease), Heart attack, Hyperlipidemia, Hypertension, MI (mitral incompetence), MI (myocardial infarction), and Ulcer. Paul has a past surgical history that includes Throat surgery; Ankle surgery; Elbow surgery; Back surgery; Coronary angioplasty with stent; Esophagogastroduodenoscopy (N/A, 8/3/2023); and Colonoscopy (N/A, 8/3/2023).                History of Present Condition/Illness: He has been having some dizziness for the last month or so. Reports having an "ear test" about 1-2 months ago and had some dizziness while having the procedure performed. He normally gets dizzy with positional changes but it is not all the time. No recent falls. Does not use a cane or walker. Has some arthritis in the right knee but normally does okay getting around. He likes to keep up with yard work. No headaches or migraines. No numbness or tingling down the arms or legs. Reports having Type 2 Diabetes HTN - both managed via medication. Lives with spouse at home in a [facility].     Pain  No Pain Reported: Yes     "   Location: N/A  Clinical Progression (since onset): Stable  No Pain. No dizziness at the moment.      Employment  Employment Status: Retired          Past Medical History/Physical Systems Review:   Paul Deleon  has a past medical history of Cataract, Coronary artery disease, Diabetes mellitus type II, GERD (gastroesophageal reflux disease), Heart attack, Hyperlipidemia, Hypertension, MI (mitral incompetence), MI (myocardial infarction), and Ulcer.    Paul Deleon  has a past surgical history that includes Throat surgery; Ankle surgery; Elbow surgery; Back surgery; Coronary angioplasty with stent; Esophagogastroduodenoscopy (N/A, 8/3/2023); and Colonoscopy (N/A, 8/3/2023).    Paul has a current medication list which includes the following prescription(s): alcohol swabs, amlodipine, aspirin, atorvastatin, blood sugar diagnostic, blood sugar diagnostic, blood-glucose meter, dexcom g7 sensor, clopidogrel, cyanocobalamin (vitamin b-12), diclofenac sodium, fluticasone propionate, insulin lispro, ketoconazole, lancets, lancets, metformin, metoprolol succinate, nitroglycerin, pantoprazole, pen needle, diabetic, gavilyte-c, tresiba flextouch u-200, and vitamin e.    Review of patient's allergies indicates:  No Known Allergies     Objective   Ocular Structure and Alignment  Right Ocular Alignment: Intact  Left Ocular Alignment: Intact  Pupillary Symmetry: Symmetric  Head Position: Neutral           Posture  Patient presents with a Forward head position.     Shoulders are Rounded.             Subcranial Range of Motion   Active Restricted? Passive Restricted? Pain   Flexion         Protraction         Retraction           Cervical Range of Motion   Active (deg) Passive (deg) Pain   Flexion  (WNL)       Extension  (WNL)       Right Lateral Flexion         Right Rotation  (25% limited)       Left Lateral Flexion         Left Rotation  (25% limited)              Shoulder Range of Motion  Right Shoulder    Active (deg) Passive (deg) Pain   Flexion 180       Extension         Scaption         ABduction 180       ADduction         Horizontal ABduction         Horizontal ADduction         External Rotation (Shoulder ABducted 0 degrees)         External Rotation (Shoulder ABducted 45 degrees)         External Rotation (Shoulder ABducted 90 degrees)         Internal Rotation (Shoulder ABducted 0 degrees)         Internal Rotation (Shoulder ABducted 45 degrees)         Internal Rotation (Shoulder ABducted 90 degrees)           Left Shoulder   Active (deg) Passive (deg) Pain   Flexion 180       Extension         Scaption         ABduction 180       ADduction         Horizontal ABduction         Horizontal ADduction         External Rotation (Shoulder ABducted 0 degrees)         External Rotation (Shoulder ABducted 45 degrees)         External Rotation (Shoulder ABducted 90 degrees)         Internal Rotation (Shoulder ABducted 0 degrees)         Internal Rotation (Shoulder ABducted 45 degrees)         Internal Rotation (Shoulder ABducted 90 degrees)                       Shoulder Strength - Planes of Motion   Right Strength Right Pain Left Strength Left  Pain   Flexion 4+   4+     Extension 4+   4+     ABduction 4+   4+     ADduction           Horizontal ABduction           Horizontal ADduction           Internal Rotation 0° 4+   4+     Internal Rotation 90°           External Rotation 0° 4+   4+     External Rotation 90°                            Bauer Balance  Bauer Balance Scale  1. Sitting to Standing: Able to stand without using hands and stabilize independently  2. Standing Unsupported: Able to stand safely for 2 minutes  3. Sitting with Back Unsupported but Feet Supported on Floor or on a Stool: Able to sit safely and securely for 2 minutes  4. Standing to Sitting: Sits safely with minimal use of hands  5.  Transfers: Able to transfer safely with minor use of hands  6. Standing Unsupported with Eyes Closed: Able to  stand 10 seconds safely  7. Standing Unsupported with Feet Together: Able to place feet together independently and stand 1 minute safely  8. Reach Forward with Outstretched Arm While Standing: Can reach forward confidently 25 cm (10 inches)  9.  Object from Floor from a Standing Position: Able to  slipper safely and easily  10. Turning to Look Behind Over Left and Right Shoulders While Standing: Looks behind from both sides and weight shifts well  11. Turn 360 Degrees: Able to turn 360 degrees safely but slowly  12. Place Alternate Foot on Step or Stool While Standing Unsupported: Able to stand independently and complete 8 steps in greater than 20 seconds  13. Standing Unsupported One Foot in Front: Able to place foot ahead independently and hold 30 seconds  14. Standing on One Leg: Tries to lift leg unable to hold 3 seconds but remains standing independently  Bauer Balance Score: 49    Interpretation:  41-56 = Low Fall Risk  21-40 = Medium Fall Risk  0-20 = High Fall Risk    Ambulation Assistance Required  Surface With  Assistive Device Without Assistive Device Details   Level   Independent      Uneven         Curb           Gait Analysis  Gait Analysis Details  Narrow Stance Balance Eyes Open = 10s or greater. Narrow Stance Balance Eyes Closed = 10s or greater - mild instability observed but no true loss of balance. No increase in dizziness with eyes open. Mild increase in dizziness with eyes closed.    Intake Outcome Measure for FOTO Survey    Therapist reviewed FOTO scores for Paul BE Adrienne on 2/6/2025.   FOTO report - see Media section or FOTO account episode details.     Intake Score: 62%    Treatment:  Balance/Neuromuscular Re-Education  Balance/Neuromuscular Re-Education Activity 1: Vestibular Occular Reflex 3x10 reps - up/down, side/side  Balance/Neuromuscular Re-Education Activity 2: Horizontal Vestibular Ocular Reflex 3x10 reps  Balance/Neuromuscular Re-Education Activity 3: Vestibular  Substitution x10 reps (10s each)    Patient's spiritual, cultural, and educational needs considered and patient agreeable to plan of care and goals.     Assessment & Plan   Assessment  Paul presents with a condition of Low complexity.   Presentation of Symptoms: Stable       Functional Limitations: Activity tolerance, Ambulating on uneven surfaces, Transfers, Maintaining balance    Patient Goal for Therapy (PT): Decrease overall dizziness.  Prognosis: Fair  Assessment Details: Patient lives in [place], [place]. Able to attend therapy 1x/week. Mild Dizziness noted with transfers - specifically sit to supine and supine to sit. Symptoms subsided after about 10s. No headaches or numbness/tingling. Therapy will aim to address balance deficits and correct vestibular imbalances. Financial concerns may also limit patient's ability to participate in skilled outpatient PT services.    Plan  From a physical therapy perspective, the patient would benefit from: Skilled Rehab Services    Planned therapy interventions include: Therapeutic exercise, Therapeutic activities, Neuromuscular re-education, Manual therapy, Canalith repositioning, ADLs/IADLs, Community/work reintegration, and Gait training.    Planned modalities to include: Cryotherapy (cold pack), Electrical stimulation - passive/unattended, Mechanical traction, Ultrasound, and Thermotherapy (hot pack).        Visit Frequency: 1 times Per Week for 4 Weeks.       This plan was discussed with Patient and Family.   Discussion participants: Agreed Upon Plan of Care         Goals:   Active       LTG       1       Start:  02/06/25    Expected End:  03/06/25       Patient to improve score on the FOTO predicted score or better, to improve QOL.         2       Start:  02/06/25    Expected End:  03/06/25       Patient to report decreased to no dizziness with transfers.            STG       1       Start:  02/06/25    Expected End:  02/20/25       Patient to be educated on HEP.          2       Start:  02/06/25    Expected End:  02/20/25       Patient to improve score on the FOTO, to improve QOL.           Ibeth Cullen, PT, DPT, Cert. DN

## 2025-02-10 ENCOUNTER — TELEPHONE (OUTPATIENT)
Dept: INTERNAL MEDICINE | Facility: CLINIC | Age: 75
End: 2025-02-10
Payer: MEDICARE

## 2025-02-10 NOTE — TELEPHONE ENCOUNTER
Called patient to schedule labs   No Answer/LVM   [FreeTextEntry1] : The patient is a 54 year F referred by HERNÁN Montes for consultation regarding Right breast ADH s/p R excisional bx on 10/21/2022\par \par 10/21/2022 R excisional bx\par - R focal minimal ADH, minute IDP, secretory change, benign epithelial w/ calcs, bx changes.\par \par Exam today shows a well-healing incision, no evidence of infection or seroma or hematoma.\par \par \par Plan:\par - Refer to medical oncology\par - Next SM/US 8/2023\par - RTO after imaging \par

## 2025-02-10 NOTE — TELEPHONE ENCOUNTER
----- Message from Bree Larose MD sent at 2/9/2025  4:12 PM CST -----     Pls schedule A1c at same time of one of his appts soon- it wasn't linked to last lab appt  thanks

## 2025-02-21 ENCOUNTER — OFFICE VISIT (OUTPATIENT)
Dept: ORTHOPEDICS | Facility: CLINIC | Age: 75
End: 2025-02-21
Payer: MEDICARE

## 2025-02-21 VITALS — BODY MASS INDEX: 31.39 KG/M2 | WEIGHT: 195.31 LBS | HEIGHT: 66 IN

## 2025-02-21 DIAGNOSIS — M17.11 PRIMARY OSTEOARTHRITIS OF RIGHT KNEE: Primary | ICD-10-CM

## 2025-02-21 NOTE — PROGRESS NOTES
SUBJECTIVE:     Chief Complaint & History of Present Illness:  History of Present Illness    CHIEF COMPLAINT:  - Right knee pain    HPI:  Mr. Deleon presents with right knee pain ongoing for 7-8 months. Pain is localized in the joint, primarily on the inside of the knee, and described as very aggravating. He reports clicking in the knee and instability while walking. These episodes of instability are accompanied by pain. His knee is stiff in the morning and hurts when he straightens it after bending, particularly when lying down. The knee swelled up once but is fine now. He has been taking Tylenol for pain relief. This is his first time seeking medical attention for knee pain. He remains active, working in the yard nearly every day.    He denies any specific accidents or injuries that may have caused the pain. He has no previous surgeries on either knee, though he mentions past surgeries on his elbow and shoulder. He is currently on anticoagulants (Plavix and aspirin) due to having four stents, which limits his pain management options. He also has a history of diabetes, with a recent A1c of 10.2.    Mr. Deleon denies any significant issues with the left knee and any history of prior knee treatments or evaluations.    PREVIOUS TREATMENTS:  - Mr. Deleon has been taking Tylenol for knee pain, with some benefit    MEDICATIONS:  - Tylenol: For knee pain  - Plavix: Daily  - Aspirin: Daily    SURGICAL HISTORY:  - Elbow surgery  - Shoulder surgery  - Four stents placed    WORK STATUS:  - Works in the yard almost every day      ROS:  Musculoskeletal: +joint pain, +joint stiffness          Past Medical History:   Diagnosis Date    Cataract     Coronary artery disease     Diabetes mellitus type II     GERD (gastroesophageal reflux disease)     Heart attack     Hyperlipidemia     Hypertension     MI (mitral incompetence)     MI (myocardial infarction)     Ulcer        Past Surgical History:   Procedure  "Laterality Date    ANKLE SURGERY      BACK SURGERY      COLONOSCOPY N/A 8/3/2023    Procedure: COLONOSCOPY;  Surgeon: Sha Spnecer MD;  Location: [facility] (2ND FLR);  Service: Endoscopy;  Laterality: N/A;    CORONARY ANGIOPLASTY WITH STENT PLACEMENT      ELBOW SURGERY      lft    ESOPHAGOGASTRODUODENOSCOPY N/A 8/3/2023    Procedure: EGD (ESOPHAGOGASTRODUODENOSCOPY);  Surgeon: Sha Spencer MD;  Location: [facility] (2ND FLR);  Service: Endoscopy;  Laterality: N/A;  Procedure Timin-4 weeks  2nd floor - DE stents placed in 10/2022  Ok to schedule as 3rd case per Dr. Spencer-see tele encounter  referred by Dr. Spencer/PEG/instr. mailed and to portal-  ok to hold Plavix 5 days per Dr Zheng-GT    THROAT SURGERY         Family History   Problem Relation Name Age of Onset    Heart disease Mother          cad    Heart disease Father          pacemaker    COPD Sister Mayra         awaiting lung transplant    Cancer Brother Armin         lung, metastatic    Melanoma Neg Hx      Psoriasis Neg Hx      Lupus Neg Hx      Eczema Neg Hx      Acne Neg Hx      Amblyopia Neg Hx      Blindness Neg Hx      Cataracts Neg Hx      Glaucoma Neg Hx      Macular degeneration Neg Hx      Strabismus Neg Hx      Retinal detachment Neg Hx         Review of patient's allergies indicates:  No Known Allergies      Current Medications[1]      OBJECTIVE:     PHYSICAL EXAM:  Ht 5' 6" (1.676 m)   Wt 88.6 kg (195 lb 5.2 oz)   BMI 31.53 kg/m²   General: Pleasant, cooperative, NAD.  HEENT: NCAT, sclera nonicteric.  Lungs: Respirations are equal and unlabored.   Abdomen: Soft and non-tender.  CV: 2+ bilateral upper and lower extremity pulses.  Neuro: Sensation intact to light touch.  Skin: Intact throughout LE with no rashes, erythema, or lesions.  Extremities: No LE edema, NVI lower extremities. antalgic gait.    right Knee Exam:  Knee Range of Motion: 0-120   Effusion: none  Condition of skin: intact  Location of " tenderness: Medial joint line   Strength: 5/5 quadriceps strength, 5/5 gastroc-soleus strength, 5/5 hamstring strength, and 5/5 tibialis anterior strength  Stability: stable to testing  Knee Alignment: normal  Dderick: negative    RADIOGRAPHS:  X-rays of the right knee taken previously were personally reviewed. Imaging reveals no acute fractures or dislocations. Mild medial compartment osteoarthritic changes.      ASSESSMENT:       ICD-10-CM ICD-9-CM   1. Primary osteoarthritis of right knee  M17.11 715.16       PLAN:     We discussed with the patient at length all the different treatment options available including acetaminophen, rest, ice, knee strengthening exercise, Viscosupplimentation injections, arthroscopic debridement, osteotomy, and finally knee arthroplasty.     Assessment & Plan    - Discussed potential future right knee gel injection (viscous supplementation) for knee arthritis if pain worsens.  - Refrain from CSI given uncontrolled diabetes.   - Continue with daily activities and yard work to keep the joint moving.  - Apply ice and elevate the knee if swelling occurs.  - Take Tylenol as needed for pain.   - Follow up as needed for new or worsening symptoms.          This note was generated with the assistance of ambient listening technology. Verbal consent was obtained by the patient and accompanying visitor(s) for the recording of patient appointment to facilitate this note. I attest to having reviewed and edited the generated note for accuracy, though some syntax or spelling errors may persist. Please contact the author of this note for any clarification.         Mitchell Deleon PA-C         [1]   Current Outpatient Medications:     alcohol swabs PadM, Apply 1 each topically as needed., Disp: 400 each, Rfl: 3    amLODIPine (NORVASC) 5 MG tablet, Take 1 tablet (5 mg total) by mouth once daily., Disp: 90 tablet, Rfl: 3    atorvastatin (LIPITOR) 80 MG tablet, Take 1 tablet (80 mg total) by mouth  every evening., Disp: 90 tablet, Rfl: 3    blood sugar diagnostic (FREESTYLE LITE STRIPS) Strp, Inject 1 strip into the skin 3 (three) times daily., Disp: 200 each, Rfl: 11    blood sugar diagnostic Strp, To check BG 4 times daily, to use with insurance preferred meter E11.9, Disp: 450 each, Rfl: 3    blood-glucose sensor (DEXCOM G7 SENSOR) Vivienne, 1 Device by Misc.(Non-Drug; Combo Route) route every 10 days., Disp: 9 each, Rfl: 3    clopidogreL (PLAVIX) 75 mg tablet, Take 1 tablet (75 mg total) by mouth once daily., Disp: 90 tablet, Rfl: 3    cyanocobalamin, vitamin B-12, (VITAMIN B-12) 1,000 mcg TbSR, Take 1,000 mcg by mouth once daily., Disp: 90 tablet, Rfl: 3    diclofenac sodium (VOLTAREN) 1 % Gel, Apply 2 g topically 3 (three) times daily. Apply to the area of foot or toe pain 2-3x per day or night as needed, Disp: 100 g, Rfl: 3    fluticasone propionate (FLONASE) 50 mcg/actuation nasal spray, 1 spray (50 mcg total) by Each Nostril route once daily., Disp: 15.8 mL, Rfl: 6    insulin lispro 100 unit/mL injection, Inject 10 Units into the skin 3 (three) times daily before meals. Plus sliding scale TDD 45 units, Disp: 15 mL, Rfl: 11    ketoconazole (NIZORAL) 2 % cream, Apply topically once daily., Disp: 60 g, Rfl: 2    lancets (FREESTYLE LANCETS) 28 gauge Misc, USE THREE TIMES DAILY, Disp: 200 each, Rfl: 11    lancets Misc, To check BG 4 times daily, to use with insurance preferred meter E11.9, Disp: 450 each, Rfl: 3    metFORMIN (GLUCOPHAGE) 1000 MG tablet, TAKE 1 TABLET EVERY DAY WITH BREAKFAST, Disp: 90 tablet, Rfl: 3    metoprolol succinate (TOPROL-XL) 25 MG 24 hr tablet, Take 1 tablet (25 mg total) by mouth once daily. Due for annual with PCP, Disp: 90 tablet, Rfl: 3    nitroGLYCERIN (NITROSTAT) 0.4 MG SL tablet, Place 1 tablet (0.4 mg total) under the tongue every 5 (five) minutes as needed for Chest pain. Up to 3 doses. If chest pain is not relieved or worsens 3 to 5 minutes after 1 dose, call 911 and seek  "immediate emergency medical attention., Disp: 25 tablet, Rfl: 2    pantoprazole (PROTONIX) 40 MG tablet, TAKE 1 TABLET EVERY DAY, Disp: 90 tablet, Rfl: 3    pen needle, diabetic (BD ULTRA-FINE SHORT PEN NEEDLE) 31 gauge x 5/16" Ndle, Takes four injections daily E11.9, Disp: 450 each, Rfl: 3    polyethylene glycol (GAVILYTE-C) 240-22.72-6.72 -5.84 gram SolR, TAKE 4000 ML BY MOUTH 1 TIME FOR 1 DOSE, Disp: 1 each, Rfl: 0    TRESIBA FLEXTOUCH U-200 200 unit/mL (3 mL) insulin pen, INJECT 60 UNITS UNDER THE SKIN EVERY EVENING, Disp: 9 mL, Rfl: 1    vitamin E 400 UNIT capsule, , Disp: , Rfl:     aspirin (ECOTRIN) 81 MG EC tablet, Take 1 tablet (81 mg total) by mouth once daily., Disp: 30 tablet, Rfl: 11    blood-glucose meter kit, To check BG 4 times daily, to use with insurance preferred meter E11.9, Disp: 1 each, Rfl: 0    "

## 2025-03-27 ENCOUNTER — TELEPHONE (OUTPATIENT)
Dept: OPTOMETRY | Facility: CLINIC | Age: 75
End: 2025-03-27
Payer: MEDICARE

## 2025-04-03 ENCOUNTER — TELEPHONE (OUTPATIENT)
Dept: OPTOMETRY | Facility: CLINIC | Age: 75
End: 2025-04-03
Payer: MEDICARE

## 2025-04-03 DIAGNOSIS — E11.69 TYPE 2 DIABETES MELLITUS WITH OTHER SPECIFIED COMPLICATION, WITH LONG-TERM CURRENT USE OF INSULIN: Primary | Chronic | ICD-10-CM

## 2025-04-03 DIAGNOSIS — Z79.4 TYPE 2 DIABETES MELLITUS WITH OTHER SPECIFIED COMPLICATION, WITH LONG-TERM CURRENT USE OF INSULIN: Primary | Chronic | ICD-10-CM

## 2025-04-03 RX ORDER — PANTOPRAZOLE SODIUM 40 MG/1
40 TABLET, DELAYED RELEASE ORAL
Qty: 90 TABLET | Refills: 0 | Status: SHIPPED | OUTPATIENT
Start: 2025-04-03

## 2025-05-13 ENCOUNTER — OFFICE VISIT (OUTPATIENT)
Dept: OPTOMETRY | Facility: CLINIC | Age: 75
End: 2025-05-13
Payer: MEDICARE

## 2025-05-13 DIAGNOSIS — E11.65 UNCONTROLLED TYPE 2 DIABETES MELLITUS WITH HYPERGLYCEMIA: ICD-10-CM

## 2025-05-13 DIAGNOSIS — E11.9 TYPE 2 DIABETES MELLITUS WITHOUT RETINOPATHY: Primary | ICD-10-CM

## 2025-05-13 DIAGNOSIS — H52.4 MYOPIA WITH ASTIGMATISM AND PRESBYOPIA, BILATERAL: ICD-10-CM

## 2025-05-13 DIAGNOSIS — I25.10 CORONARY ARTERY DISEASE, UNSPECIFIED VESSEL OR LESION TYPE, UNSPECIFIED WHETHER ANGINA PRESENT, UNSPECIFIED WHETHER NATIVE OR TRANSPLANTED HEART: ICD-10-CM

## 2025-05-13 DIAGNOSIS — H04.123 DRY EYE SYNDROME OF BOTH EYES: ICD-10-CM

## 2025-05-13 DIAGNOSIS — H52.13 MYOPIA WITH ASTIGMATISM AND PRESBYOPIA, BILATERAL: ICD-10-CM

## 2025-05-13 DIAGNOSIS — H40.013 OAG (OPEN ANGLE GLAUCOMA) SUSPECT, LOW RISK, BILATERAL: ICD-10-CM

## 2025-05-13 DIAGNOSIS — H25.13 SENILE NUCLEAR SCLEROSIS, BILATERAL: ICD-10-CM

## 2025-05-13 DIAGNOSIS — H52.203 MYOPIA WITH ASTIGMATISM AND PRESBYOPIA, BILATERAL: ICD-10-CM

## 2025-05-13 DIAGNOSIS — E11.36 TYPE 2 DIABETES MELLITUS WITH CATARACT: ICD-10-CM

## 2025-05-13 PROCEDURE — 3066F NEPHROPATHY DOC TX: CPT | Mod: CPTII,S$GLB,, | Performed by: OPTOMETRIST

## 2025-05-13 PROCEDURE — 1160F RVW MEDS BY RX/DR IN RCRD: CPT | Mod: CPTII,S$GLB,, | Performed by: OPTOMETRIST

## 2025-05-13 PROCEDURE — 92133 CPTRZD OPH DX IMG PST SGM ON: CPT | Mod: S$GLB,,, | Performed by: OPTOMETRIST

## 2025-05-13 PROCEDURE — 92014 COMPRE OPH EXAM EST PT 1/>: CPT | Mod: S$GLB,,, | Performed by: OPTOMETRIST

## 2025-05-13 PROCEDURE — 3060F POS MICROALBUMINURIA REV: CPT | Mod: CPTII,S$GLB,, | Performed by: OPTOMETRIST

## 2025-05-13 PROCEDURE — 99999 PR PBB SHADOW E&M-EST. PATIENT-LVL I: CPT | Mod: PBBFAC,,, | Performed by: OPTOMETRIST

## 2025-05-13 PROCEDURE — 1159F MED LIST DOCD IN RCRD: CPT | Mod: CPTII,S$GLB,, | Performed by: OPTOMETRIST

## 2025-05-13 PROCEDURE — 92015 DETERMINE REFRACTIVE STATE: CPT | Mod: S$GLB,,, | Performed by: OPTOMETRIST

## 2025-05-13 RX ORDER — INSULIN DEGLUDEC 200 U/ML
60 INJECTION, SOLUTION SUBCUTANEOUS NIGHTLY
Qty: 9 ML | Refills: 0 | Status: SHIPPED | OUTPATIENT
Start: 2025-05-13

## 2025-05-14 NOTE — PROGRESS NOTES
"HPI    ISIDORO: 07/24 with Dr. Dewey  Chief complaint (CC): Patient is here for annual eye exam today.  Patient   has noticed more trouble seeing the TV. Patient also sometimes has   burning, watering, irritation and sometimes "double vision".  Glasses? +  Contacts? -  H/o eye surgery, injections or laser: -  H/o eye injury: -  Known eye conditions? See above  Family h/o eye conditions? -  Eye gtts? -      (-) Flashes (-)  Floaters (-) Mucous   (+)  Tearing (-) Itching (+) Burning   (-) Headaches (-) Eye Pain/discomfort (+) Irritation   (-)  Redness (-) Double vision (-) Blurry vision    Diabetic? +  A1c? Hemoglobin A1C       Date                     Value               Ref Range             Status                07/10/2024               10.2 (H)            4.0 - 5.6 %           Final                 03/13/2024               10.4 (H)            4.0 - 5.6 %           Final                  06/13/2023               10.2 (H)            4.0 - 6.0 %           Final                 09/27/2022               10.1 (H)            4.0 - 5.6 %           Final                  07/08/2020               8.1                 %                     Final            ----------        Last edited by Shaniqua Cortez on 5/13/2025  1:25 PM.            Assessment /Plan     For exam results, see Encounter Report.    Type 2 diabetes mellitus without retinopathy  BS control. No signs of diabetic retinopathy. Monitor with annual exam.     Senile nuclear sclerosis, bilateral  Type 2 diabetes mellitus with cataract  Nuclear sclerotic cataract - not visually significant. Observe.    Dry eye syndrome of both eyes  Recommend iVizia, Systane Ultra or Refresh Optive TID-QID OU to aid with symptoms of dry eyes.    OAG (open angle glaucoma) suspect, low risk, bilateral  -     Posterior Segment OCT Optic Nerve- Both eyes  IOP 16 OD, OS. C/d 0.6 OD, 0.55 OS. Pachy 524 OD, 532 OS. OCT today OD thin G, borderline N and T, OS borderline G. No e/o glaucoma. " RTC 1 year.     Myopia with astigmatism and presbyopia, bilateral  SRx released to patient. Patient educated on lens options. Normal ocular health. RTC 1 year for routine exam.

## 2025-05-21 ENCOUNTER — TELEPHONE (OUTPATIENT)
Dept: INTERNAL MEDICINE | Facility: CLINIC | Age: 75
End: 2025-05-21
Payer: MEDICARE

## 2025-05-21 NOTE — TELEPHONE ENCOUNTER
----- Message from Tray sent at 5/20/2025 11:49 AM CDT -----  Contact: wife @  [phone]  Name of Who is Calling: wife  What is the request in detail: calling to discuss scheduling a urology appt   Can the clinic reply by [facility]: no  What [facility] to [facility] Back [facility]:  [phone]

## 2025-05-23 DIAGNOSIS — E11.65 UNCONTROLLED TYPE 2 DIABETES MELLITUS WITH HYPERGLYCEMIA: ICD-10-CM

## 2025-05-23 DIAGNOSIS — I25.10 CORONARY ARTERY DISEASE, UNSPECIFIED VESSEL OR LESION TYPE, UNSPECIFIED WHETHER ANGINA PRESENT, UNSPECIFIED WHETHER NATIVE OR TRANSPLANTED HEART: ICD-10-CM

## 2025-05-23 RX ORDER — PEN NEEDLE, DIABETIC 30 GX3/16"
NEEDLE, DISPOSABLE MISCELLANEOUS
Qty: 450 EACH | Refills: 3 | Status: SHIPPED | OUTPATIENT
Start: 2025-05-23

## 2025-05-23 RX ORDER — ISOPROPYL ALCOHOL 70 ML/100ML
1 SWAB TOPICAL
Qty: 400 EACH | Refills: 3 | Status: SHIPPED | OUTPATIENT
Start: 2025-05-23

## 2025-05-23 NOTE — TELEPHONE ENCOUNTER
Refill Routing Note   Medication(s) are not appropriate for processing by [facility] for the following reason(s):        No active prescription written by provider    [facility] action(s):  Defer     Requires labs : Yes             Appointments  past 12m or future 3m with PCP    Date Provider   Last Visit   1/28/2025 Bree Larose MD   Next Visit   Visit date not found Bree Larose MD   ED visits in past 90 days: 0        Note composed:1:56 PM 05/23/2025

## 2025-05-23 NOTE — TELEPHONE ENCOUNTER
Care Due:                  Date            Visit Type   Department     Provider  --------------------------------------------------------------------------------                                SAME DAY -                              ESTABLISHED   [facility] INTERNAL  Last Visit: 01-      PATIENT      MEDICINE       Bree Larose  Next Visit: None Scheduled  None         None Found                                                            Last  Test          Frequency    Reason                     Performed    Due Date  --------------------------------------------------------------------------------    Lipid Panel.  12 months..  atorvastatin.............  07-   07-    Health [facility] Embedded Care Due Messages. Reference number: [id number].   5/23/2025 11:14:04 AM CDT

## 2025-05-23 NOTE — TELEPHONE ENCOUNTER
----- Message from Georgi sent at 5/23/2025 10:53 AM CDT -----  Regarding: Refill request  Contact: Pt [phone]  Requesting an RX refill or new RX.Is this a refill or new RX: RefillRX name and strength (copy/paste from chart):  Diabetic Supplies - Pin Needles and Alcohol PadsIs this a 30 day or 90 day RX: Pharmacy name and phone # (copy/paste from chart):  [facility] - [place], [place] - [address] [address]Jsjbc: [phone] Fax: [phone]

## 2025-05-27 NOTE — PROGRESS NOTES
Subjective:      Patient ID: Paul Deleon is a 74 y.o. male.    Chief Complaint:  Follow-up and Diabetes    History of Present Illness  Paul Deleon is a 74 y.o. male with history significant for HLD, HTN, T2DM, CAD s/p stent placement and is here for management of T2DM.  Previously seen by Dr. Miller.  Last seen 2024.  This is their first visit with me.      With regards to type 2 diabetes:    Diagnosed:   FH: denies  Known complications:  DKA denies  RN +  Eye Exam: : 2025;  no laser surgery or DR  PN +, varies  Foot exam: : 07/10/2024  Nephropathy denies  CAD +  Denies history of pancreatitis & personal/family history of medullary thyroid cancer.     Current regimen:  Tresiba 40 units nightly  Novolog MDC (180/25)  Metformin 1000 mg daily    Compliant  Rotating injection sites. Denies contusions or lipohypertrophy.  Bolus timing: right before meals    Other medications tried:  Jardiance- polyuria    Glucose Monitor: SMBG, attempted Dexcom but was costly and didn't receive training. Not interested in resuming  3 times a day testing  Log reviewed: oral recall  Fastin-180s  Pre-prandial: 160s    Hypoglycemia:  Denies  Knows how to correct with 15 grams of carbs- juice, coke, or a peppermint.     Symptoms:  Denies polyuria   Denies polydipsia  Denies unintentional/unexplained weight changes  Denies vision changes  Denies fatigue  Denies snoring    Diet/Exercise:  Eats 3 meals a day. Well-balanced  Snacks : chips, fruits (grapes, oranges, mandarin)  Drinks : water, sugar free tea  Exercise : yardwork  Recent illness, injury, steroids: denies    Diabetes education: 2019    Diabetes Management Status    Hemoglobin A1C   Date Value Ref Range Status   07/10/2024 10.2 (H) 4.0 - 5.6 % Final     Comment:     ADA Screening Guidelines:  5.7-6.4%  Consistent with prediabetes  >or=6.5%  Consistent with diabetes    High levels of fetal hemoglobin interfere with the HbA1C  assay. Heterozygous  hemoglobin variants (HbS, HgC, etc)do  not significantly interfere with this assay.   However, presence of multiple variants may affect accuracy.     03/13/2024 10.4 (H) 4.0 - 5.6 % Final     Comment:     ADA Screening Guidelines:  5.7-6.4%  Consistent with prediabetes  >or=6.5%  Consistent with diabetes    High levels of fetal hemoglobin interfere with the HbA1C  assay. Heterozygous hemoglobin variants (HbS, HgC, etc)do  not significantly interfere with this assay.   However, presence of multiple variants may affect accuracy.     06/13/2023 10.2 (H) 4.0 - 6.0 % Final   09/27/2022 10.1 (H) 4.0 - 5.6 % Final     Comment:     ADA Screening Guidelines:  5.7-6.4%  Consistent with prediabetes  >or=6.5%  Consistent with diabetes    High levels of fetal hemoglobin interfere with the HbA1C  assay. Heterozygous hemoglobin variants (HbS, HgC, etc)do  not significantly interfere with this assay.   However, presence of multiple variants may affect accuracy.     07/08/2020 8.1 % Final       Statin: Taking Atorvastatin 80 mg nightly  ACE/ARB: Not taking  Screening or Prevention Patient's value Goal Complete/Controlled?   HgA1C Testing and Control   Lab Results   Component Value Date    HGBA1C 10.2 (H) 07/10/2024      Annually/Less than 8% No   Lipid profile : 07/10/2024 Annually Yes   LDL control Lab Results   Component Value Date    LDLCALC 78.2 07/10/2024    Annually/Less than 100 mg/dl  Yes   Nephropathy screening Lab Results   Component Value Date    LABMICR 26.0 01/28/2025     Lab Results   Component Value Date    PROTEINUA Negative 06/05/2024    Annually Yes   Blood pressure BP Readings from Last 1 Encounters:   05/30/25 110/72    Less than 140/90 Yes   Dilated retinal exam : 05/13/2025 Annually Yes   Foot exam   : 07/10/2024 Annually Yes     FIB-4 Calculation: 2.02 at 6/5/2024  5:32 PM  Calculated from:  SGOT/AST: 14 U/L at 6/5/2024  5:32 PM  SGPT/ALT: 5 U/L at 6/5/2024  5:32 PM  Platelets: 226 K/uL at 6/5/2024  5:32  "PM  Age: 73 years     FIB-4 below 1.30 is considered as low-risk for advanced fibrosis  FIB-4 over 2.67 is considered as high-risk for advanced fibrosis  FIB-4 values between 1.30 and 2.67 are considered as intermediate-risk of advanced fibrosis for ages 36-64.     For ages > 64 the cut-off for low-risk goes to < 2.  This is a screening tool and clinical judgement should be used in the interpretation of these results.    Kidney Failure Risk Equation (Tangri)    Kidney Failure Risk at 2 years: Unavailable    Kidney Failure Risk at 5 years: Unavailable    Lab Results   Component Value Date    MICALBCREAT 37.7 (H) 01/28/2025    CREATININE 1.0 01/28/2025         Review of Systems  As above  Objective:   Physical Exam  Constitutional:       Appearance: Normal appearance. He is obese.   Cardiovascular:      Rate and Rhythm: Normal rate.      Comments: No edema  Pulmonary:      Effort: Pulmonary effort is normal.   Neurological:      Mental Status: He is alert.   Psychiatric:         Mood and Affect: Mood normal.         Behavior: Behavior normal.         Visit Vitals  /72 (BP Location: Right arm, Patient Position: Sitting)   Pulse 69   Ht 5' 6" (1.676 m)   Wt 85.6 kg (188 lb 13.2 oz)   SpO2 97%   BMI 30.48 kg/m²       Body mass index is 30.48 kg/m².    Lab Review:   Lab Results   Component Value Date    HGBA1C 10.2 (H) 07/10/2024    HGBA1C 10.4 (H) 03/13/2024    HGBA1C 10.2 (H) 06/13/2023       Lab Results   Component Value Date    CHOL 145 07/10/2024    HDL 27 (L) 07/10/2024    LDLCALC 78.2 07/10/2024    TRIG 199 (H) 07/10/2024    CHOLHDL 18.6 (L) 07/10/2024     Lab Results   Component Value Date     01/28/2025    K 4.7 01/28/2025     01/28/2025    CO2 27 01/28/2025     (H) 01/28/2025    BUN 17 01/28/2025    CREATININE 1.0 01/28/2025    CALCIUM 9.4 01/28/2025    PROT 7.0 06/05/2024    ALBUMIN 3.7 06/05/2024    BILITOT 1.1 (H) 06/05/2024    ALKPHOS 132 06/05/2024    AST 14 06/05/2024    ALT 5 (L) " "06/05/2024    ANIONGAP 10 01/28/2025    ESTGFRAFRICA >60.0 07/02/2021    EGFRNONAA >60.0 07/02/2021    TSH 3.255 06/12/2020     No results found for: "[id number]"  Assessment and Plan     1. Hypertension associated with diabetes        2. Uncontrolled type 2 diabetes mellitus with hyperglycemia  Comprehensive Metabolic Panel    Hemoglobin A1C    Ambulatory referral/consult to Pharmacy Assistance    semaglutide (OZEMPIC) 0.25 mg or 0.5 mg (2 mg/3 mL) pen injector    insulin aspart, niacinamide, (FIASP FLEXTOUCH U-100 INSULIN) 100 unit/mL (3 mL) InPn    alcohol swabs PadM    insulin degludec (TRESIBA FLEXTOUCH U-200) 200 unit/mL (3 mL) insulin pen    pen needle, diabetic (BD ULTRA-FINE SHORT PEN NEEDLE) 31 gauge x 5/16" Ndle    blood-glucose meter kit    lancets Misc    blood sugar diagnostic Strp    TSH      3. Coronary artery disease, unspecified vessel or lesion type, unspecified whether angina present, unspecified whether native or transplanted heart  insulin degludec (TRESIBA FLEXTOUCH U-200) 200 unit/mL (3 mL) insulin pen    pen needle, diabetic (BD ULTRA-FINE SHORT PEN NEEDLE) 31 gauge x 5/16" Ndle      4. Hyperlipidemia, unspecified hyperlipidemia type  Lipid Panel    atorvastatin (LIPITOR) 80 MG tablet    metFORMIN (GLUCOPHAGE) 1000 MG tablet      5. Type 2 diabetes mellitus with other specified complication, with long-term current use of insulin  pantoprazole (PROTONIX) 40 MG tablet      6. Class 1 obesity due to excess calories with serious comorbidity and body mass index (BMI) of 30.0 to 30.9 in adult            Type 2 diabetes mellitus, with long-term current use of insulin   - Labs : check CMP, A1C, and TSH   - A1c goal <7.5%    - Last A1C 10.2%   - Reviewed logs/CGM: no logs to review. Per oral recall, having variations in fasting glucose. Pre-prandial glucose at goal without requiring frequent sliding scale   - Not interested in CGM   - Reviewed diabetic diet   - Discussed starting GLP1 agonist for " improved diabetes control and some weight loss. Patient would benefit from cardiac benefits due to history of coronary stent.   - + microalbuminuria but intolerant to SGLT2 inhibitors. May improve with GLP1 agonist   - Referral to [facility] for Ozempic   - Insurance no longer covering Novolog or Humalog. Will order Fiasp    Medication:    - Continue Tresiba 40 units nightly   - Continue Novolog [facility] (180/25)   - Continue Metformin 1000 mg daily   - Start Ozempic 0.25 mg weekly     - When patient starts Ozempic 0.25 mg weekly, decrease Tresiba to 35 units nightly. If patient tolerates Ozempic, will titrate up to 0.5 mg weekly and continue decreasing Tresiba.     - Reviewed goals of therapy are to get the best control we can without hypoglycemia.   - Reviewed patient's current insulin regimen. Clarified proper insulin dose and timing in relation to meals, etc. Insulin injection sites and proper rotation instructed.     - Advised frequent self blood glucose monitoring.  Patient encouraged to document glucose results and bring them to every clinic visit.   - Hypoglycemia precautions discussed. Instructed on precautions before driving.     - Call for Bg repeatedly < 70 or > 180.    - Close adherence to lifestyle changes recommended.    - Periodic follow ups for eye evaluations, foot care and dental care suggested.      Hyperlipidemia   - LDL goal <55 given history of coronary stent   - Check lipid panel   - On statin per ADA recommendations      Hypertension associated with diabetes   - Controlled.   - Blood pressure goals discussed with patient.      Class 1 obesity due to excess calories with serious comorbidity and body mass index (BMI) of 30.0 to 30.9 in adult   - Encouraged dietary and lifestyle modifications.   - Emphasized weight loss goals.   - Start GLP1 agonist      Follow up in about 3 months (around 8/30/2025).    Visit today included increased complexity associated with the  care of the problems addressed and managing the longitudinal care of the patient due to the serious and/or complex managed problems.    Naomi Sharif PA-C

## 2025-05-30 ENCOUNTER — TELEPHONE (OUTPATIENT)
Dept: PHARMACY | Facility: CLINIC | Age: 75
End: 2025-05-30
Payer: MEDICARE

## 2025-05-30 ENCOUNTER — OFFICE VISIT (OUTPATIENT)
Dept: ENDOCRINOLOGY | Facility: CLINIC | Age: 75
End: 2025-05-30
Payer: MEDICARE

## 2025-05-30 ENCOUNTER — RESULTS FOLLOW-UP (OUTPATIENT)
Dept: ENDOCRINOLOGY | Facility: CLINIC | Age: 75
End: 2025-05-30

## 2025-05-30 ENCOUNTER — RESULTS FOLLOW-UP (OUTPATIENT)
Dept: PHARMACY | Facility: CLINIC | Age: 75
End: 2025-05-30

## 2025-05-30 ENCOUNTER — LAB VISIT (OUTPATIENT)
Dept: LAB | Facility: HOSPITAL | Age: 75
End: 2025-05-30
Payer: MEDICARE

## 2025-05-30 VITALS
WEIGHT: 188.81 LBS | DIASTOLIC BLOOD PRESSURE: 72 MMHG | BODY MASS INDEX: 30.34 KG/M2 | OXYGEN SATURATION: 97 % | HEIGHT: 66 IN | SYSTOLIC BLOOD PRESSURE: 110 MMHG | HEART RATE: 69 BPM

## 2025-05-30 DIAGNOSIS — E11.59 HYPERTENSION ASSOCIATED WITH DIABETES: Primary | Chronic | ICD-10-CM

## 2025-05-30 DIAGNOSIS — I15.2 HYPERTENSION ASSOCIATED WITH DIABETES: Primary | Chronic | ICD-10-CM

## 2025-05-30 DIAGNOSIS — E11.65 UNCONTROLLED TYPE 2 DIABETES MELLITUS WITH HYPERGLYCEMIA: ICD-10-CM

## 2025-05-30 DIAGNOSIS — I25.10 CORONARY ARTERY DISEASE, UNSPECIFIED VESSEL OR LESION TYPE, UNSPECIFIED WHETHER ANGINA PRESENT, UNSPECIFIED WHETHER NATIVE OR TRANSPLANTED HEART: ICD-10-CM

## 2025-05-30 DIAGNOSIS — E66.09 CLASS 1 OBESITY DUE TO EXCESS CALORIES WITH SERIOUS COMORBIDITY AND BODY MASS INDEX (BMI) OF 30.0 TO 30.9 IN ADULT: ICD-10-CM

## 2025-05-30 DIAGNOSIS — E66.811 CLASS 1 OBESITY DUE TO EXCESS CALORIES WITH SERIOUS COMORBIDITY AND BODY MASS INDEX (BMI) OF 30.0 TO 30.9 IN ADULT: ICD-10-CM

## 2025-05-30 DIAGNOSIS — E78.5 HYPERLIPIDEMIA, UNSPECIFIED HYPERLIPIDEMIA TYPE: ICD-10-CM

## 2025-05-30 DIAGNOSIS — Z79.4 TYPE 2 DIABETES MELLITUS WITH OTHER SPECIFIED COMPLICATION, WITH LONG-TERM CURRENT USE OF INSULIN: Chronic | ICD-10-CM

## 2025-05-30 DIAGNOSIS — E11.69 TYPE 2 DIABETES MELLITUS WITH OTHER SPECIFIED COMPLICATION, WITH LONG-TERM CURRENT USE OF INSULIN: Chronic | ICD-10-CM

## 2025-05-30 LAB
ALBUMIN SERPL BCP-MCNC: 4 G/DL (ref 3.5–5.2)
ALP SERPL-CCNC: 126 UNIT/L (ref 40–150)
ALT SERPL W/O P-5'-P-CCNC: 11 UNIT/L (ref 10–44)
ANION GAP (OHS): 8 MMOL/L (ref 8–16)
AST SERPL-CCNC: 14 UNIT/L (ref 11–45)
BILIRUB SERPL-MCNC: 0.9 MG/DL (ref 0.1–1)
BUN SERPL-MCNC: 18 MG/DL (ref 8–23)
CALCIUM SERPL-MCNC: 8.8 MG/DL (ref 8.7–10.5)
CHLORIDE SERPL-SCNC: 105 MMOL/L (ref 95–110)
CHOLEST SERPL-MCNC: 166 MG/DL (ref 120–199)
CHOLEST/HDLC SERPL: 5.7 {RATIO} (ref 2–5)
CO2 SERPL-SCNC: 26 MMOL/L (ref 23–29)
CREAT SERPL-MCNC: 0.8 MG/DL (ref 0.5–1.4)
EAG (OHS): 246 MG/DL (ref 68–131)
GFR SERPLBLD CREATININE-BSD FMLA CKD-EPI: >60 ML/MIN/1.73/M2
GLUCOSE SERPL-MCNC: 207 MG/DL (ref 70–110)
HBA1C MFR BLD: 10.2 % (ref 4–5.6)
HDLC SERPL-MCNC: 29 MG/DL (ref 40–75)
HDLC SERPL: 17.5 % (ref 20–50)
LDLC SERPL CALC-MCNC: 102.2 MG/DL (ref 63–159)
NONHDLC SERPL-MCNC: 137 MG/DL
POTASSIUM SERPL-SCNC: 4.9 MMOL/L (ref 3.5–5.1)
PROT SERPL-MCNC: 7.1 GM/DL (ref 6–8.4)
SODIUM SERPL-SCNC: 139 MMOL/L (ref 136–145)
TRIGL SERPL-MCNC: 174 MG/DL (ref 30–150)
TSH SERPL-ACNC: 2.93 UIU/ML (ref 0.4–4)

## 2025-05-30 PROCEDURE — 84443 ASSAY THYROID STIM HORMONE: CPT

## 2025-05-30 PROCEDURE — 36415 COLL VENOUS BLD VENIPUNCTURE: CPT

## 2025-05-30 PROCEDURE — 83036 HEMOGLOBIN GLYCOSYLATED A1C: CPT

## 2025-05-30 PROCEDURE — 82465 ASSAY BLD/SERUM CHOLESTEROL: CPT

## 2025-05-30 PROCEDURE — 99999 PR PBB SHADOW E&M-EST. PATIENT-LVL IV: CPT | Mod: PBBFAC,,,

## 2025-05-30 PROCEDURE — 82435 ASSAY OF BLOOD CHLORIDE: CPT

## 2025-05-30 RX ORDER — INSULIN DEGLUDEC 200 U/ML
40 INJECTION, SOLUTION SUBCUTANEOUS NIGHTLY
Qty: 18 ML | Refills: 3 | Status: SHIPPED | OUTPATIENT
Start: 2025-05-30 | End: 2026-05-30

## 2025-05-30 RX ORDER — METFORMIN HYDROCHLORIDE 1000 MG/1
1000 TABLET ORAL 2 TIMES DAILY WITH MEALS
Qty: 180 TABLET | Refills: 3 | Status: SHIPPED | OUTPATIENT
Start: 2025-05-30 | End: 2026-05-30

## 2025-05-30 RX ORDER — LANCETS
EACH MISCELLANEOUS
Qty: 200 EACH | Refills: 11 | Status: SHIPPED | OUTPATIENT
Start: 2025-05-30

## 2025-05-30 RX ORDER — INSULIN ASPART 100 [IU]/ML
INJECTION, SOLUTION INTRAVENOUS; SUBCUTANEOUS
COMMUNITY
Start: 2024-11-29 | End: 2025-05-30 | Stop reason: ALTCHOICE

## 2025-05-30 RX ORDER — ISOPROPYL ALCOHOL 70 ML/100ML
1 SWAB TOPICAL
Qty: 400 EACH | Refills: 3 | Status: SHIPPED | OUTPATIENT
Start: 2025-05-30

## 2025-05-30 RX ORDER — PANTOPRAZOLE SODIUM 40 MG/1
40 TABLET, DELAYED RELEASE ORAL DAILY
Qty: 90 TABLET | Refills: 0 | Status: SHIPPED | OUTPATIENT
Start: 2025-05-30

## 2025-05-30 RX ORDER — PEN NEEDLE, DIABETIC 30 GX3/16"
NEEDLE, DISPOSABLE MISCELLANEOUS
Qty: 450 EACH | Refills: 3 | Status: SHIPPED | OUTPATIENT
Start: 2025-05-30

## 2025-05-30 RX ORDER — ATORVASTATIN CALCIUM 80 MG/1
80 TABLET, FILM COATED ORAL NIGHTLY
Qty: 90 TABLET | Refills: 3 | Status: SHIPPED | OUTPATIENT
Start: 2025-05-30 | End: 2026-05-25

## 2025-05-30 RX ORDER — INSULIN ASPART INJECTION 100 [IU]/ML
INJECTION, SOLUTION SUBCUTANEOUS
Qty: 30 ML | Refills: 3 | Status: SHIPPED | OUTPATIENT
Start: 2025-05-30

## 2025-05-30 RX ORDER — SEMAGLUTIDE 0.68 MG/ML
0.5 INJECTION, SOLUTION SUBCUTANEOUS
Qty: 3 ML | Refills: 11 | Status: SHIPPED | OUTPATIENT
Start: 2025-05-30 | End: 2026-05-30

## 2025-05-30 RX ORDER — INSULIN PUMP SYRINGE, 3 ML
EACH MISCELLANEOUS
Qty: 1 EACH | Refills: 0 | Status: SHIPPED | OUTPATIENT
Start: 2025-05-30

## 2025-05-30 NOTE — ASSESSMENT & PLAN NOTE
- LDL goal <55 given history of coronary stent   - Check lipid panel   - On statin per ADA recommendations

## 2025-05-30 NOTE — PATIENT INSTRUCTIONS
"Diabetes  For Novolog, take 10-15 minutes before meals as needed only if blood sugar is above 180.  When you finish Novolog and start Fiasp, Fiasp can be taken right before you eat as needed if blood sugar is above 180.    Medications:  Continue Tresiba 40 units nightly  Continue Novolog sliding scale   Continue Metformin 1000 mg daily    When you start Ozempic 0.25 mg once a week, decrease Tresiba down to 35 units nightly.  I will call you in 3-4 weeks to follow-up to see how you on Ozempic before we make any further changes.  Please expect a call from [facility]: [phone]    Cholesterol  LDL goal is less than 55  Continue Atorvastatin 80 mg daily    High cholesterol foods to avoid/limit:     C: Cheese, milk, dairy  A: Animal Fats: hot dogs and hamburgers  G: "Getting it away from home": going out to eat a lot  E: Extra Fat: cookies, candy, and sweets  F: Family History    Remember high cholesterol can clog your arteries and increase risk for heart attack or stroke.     Blood Pressure  Blood pressure goal is less than 130/80    "

## 2025-05-30 NOTE — LETTER
May 30, 2025    Paul Deleon  [address] [place] [address]             Dear Mr. Deleon,    My name is Jameson Lisa, and I am reaching out on behalf of [facility] regarding your request for medication assistance. Our goal is to help qualified [facility] patients obtain financial assistance for prescribed medications.    Please note that enrollment into available support may require the following documents:    Proof of household income(such as social security award letter, pension statement or 3 consecutive pay stubs), Copy of all insurance cards (front and back), and Completed Medication [facility] authorization forms    If you still need assistance with your medications, please reach out to the phone number listed below. If we do not hear back from you, a second contact attempt will be made via mail or your My [facility] portal in 5 to 10 business days.    Thank you for giving us the opportunity to assist you with your healthcare needs. We look forward to working with you.      Sincerely  Jameson Lisa @[phone]  Pharmacy Patient Assistance Team  [address] [place] [address]  [id number] [place] [address]  Fax: [phone]  Email: [email or web address]

## 2025-05-30 NOTE — TELEPHONE ENCOUNTER
----- Message from Domingo Harrison sent at 5/30/2025 10:11 AM CDT -----  Regarding: Order for DAYDAY DELEON,     Mr. Deleon's case has been assigned to Jameson Lisa @[phone].       What happens next? Assigned advocate will review your patients chart and research available options.  Patient may be asked to provide specific documentation to help determine eligibility. Failure to provide the requested documentation will delay assistance.    Please note all requests are subject to program availability and patient eligibility verification.   Please note each program has it's own unique eligibility criteria (e.g., income limits, insurance status medical condition, residency).Therefore eligibility is determined by the specific program   being applied to not by the Pharmacy Patient Assistance Team.  Please note epic chart must reflect a current order for the requested medication written by an [facility] provider to begin PAP process.   Provider may review progress notes by typing pharmacy patient assistance in Epic search box.       Thank you,   [facility]  [address]  [place] [address]  Fax: [phone]  Email: [email or web address]      ----- Message -----  From: Naomi Sharif PA-C  Sent: 5/30/2025   9:21 AM CDT  To: Pharmacy Patient Assistance Team  Subject: Order for DAYDAY DELEON

## 2025-05-30 NOTE — ASSESSMENT & PLAN NOTE
- Labs : check CMP, A1C, and TSH   - A1c goal <7.5%    - Last A1C 10.2%   - Reviewed logs/CGM: no logs to review. Per oral recall, having variations in fasting glucose. Pre-prandial glucose at goal without requiring frequent sliding scale   - Not interested in CGM   - Reviewed diabetic diet   - Discussed starting GLP1 agonist for improved diabetes control and some weight loss. Patient would benefit from cardiac benefits due to history of coronary stent.   - + microalbuminuria but intolerant to SGLT2 inhibitors. May improve with GLP1 agonist   - Referral to [facility] for Ozempic   - Insurance no longer covering Novolog or Humalog. Will order Fiasp    Medication:    - Continue Tresiba 40 units nightly   - Continue Novolog MDC (180/25)   - Continue Metformin 1000 mg daily   - Start Ozempic 0.25 mg weekly     - When patient starts Ozempic 0.25 mg weekly, decrease Tresiba to 35 units nightly. If patient tolerates Ozempic, will titrate up to 0.5 mg weekly and continue decreasing Tresiba.     - Reviewed goals of therapy are to get the best control we can without hypoglycemia.   - Reviewed patient's current insulin regimen. Clarified proper insulin dose and timing in relation to meals, etc. Insulin injection sites and proper rotation instructed.     - Advised frequent self blood glucose monitoring.  Patient encouraged to document glucose results and bring them to every clinic visit.   - Hypoglycemia precautions discussed. Instructed on precautions before driving.     - Call for Bg repeatedly < 70 or > 180.    - Close adherence to lifestyle changes recommended.    - Periodic follow ups for eye evaluations, foot care and dental care suggested.

## 2025-05-30 NOTE — PROGRESS NOTES
Lidia     Mr. Deleon's case has been assigned to Jameson Lisa @[phone].       What happens next? Assigned advocate will review your patients chart and research available options.  Patient may be asked to provide specific documentation to help determine eligibility. Failure to provide the requested documentation will delay assistance.    Please note all requests are subject to program availability and patient eligibility verification.   Please note each program has it's own unique eligibility criteria (e.g., income limits, insurance status medical condition, residency).Therefore eligibility is determined by the specific program being applied to not by the Pharmacy Patient Assistance Team.  Please note epic chart must reflect a current order for the requested medication written by an [facility] provider to begin PAP process.   Provider may review progress notes by typing pharmacy patient assistance in Epic search box.       Thank you,   [facility]  [address]  [place] [address]  Fax: [phone]  Email: [email or web address]

## 2025-05-30 NOTE — TELEPHONE ENCOUNTER
First contact attempt has been made via phone, letter, and portal message  . Welcome letter and [facility] Enrollment Packet has been sent via letter and portal message . Follow-up will be made in approximately 5 to 10 business days.      Jameson Lisa  Pharmacy Patient Assistance Team

## 2025-06-03 ENCOUNTER — TELEPHONE (OUTPATIENT)
Dept: UROLOGY | Facility: CLINIC | Age: 75
End: 2025-06-03
Payer: MEDICARE

## 2025-06-04 ENCOUNTER — OFFICE VISIT (OUTPATIENT)
Dept: UROLOGY | Facility: CLINIC | Age: 75
End: 2025-06-04
Payer: MEDICARE

## 2025-06-04 ENCOUNTER — TELEPHONE (OUTPATIENT)
Dept: ENDOCRINOLOGY | Facility: CLINIC | Age: 75
End: 2025-06-04
Payer: MEDICARE

## 2025-06-04 VITALS
DIASTOLIC BLOOD PRESSURE: 66 MMHG | BODY MASS INDEX: 30.37 KG/M2 | HEIGHT: 66 IN | SYSTOLIC BLOOD PRESSURE: 146 MMHG | HEART RATE: 66 BPM | WEIGHT: 189 LBS

## 2025-06-04 DIAGNOSIS — R35.0 BENIGN PROSTATIC HYPERPLASIA WITH URINARY FREQUENCY: Primary | ICD-10-CM

## 2025-06-04 DIAGNOSIS — R35.0 URINARY FREQUENCY: ICD-10-CM

## 2025-06-04 DIAGNOSIS — N40.1 BENIGN PROSTATIC HYPERPLASIA WITH URINARY FREQUENCY: Primary | ICD-10-CM

## 2025-06-04 LAB — POC RESIDUAL URINE VOLUME: 15 ML (ref 0–100)

## 2025-06-04 PROCEDURE — 99999 PR PBB SHADOW E&M-EST. PATIENT-LVL IV: CPT | Mod: PBBFAC,,,

## 2025-06-04 RX ORDER — TAMSULOSIN HYDROCHLORIDE 0.4 MG/1
0.4 CAPSULE ORAL NIGHTLY
Qty: 30 CAPSULE | Refills: 11 | Status: SHIPPED | OUTPATIENT
Start: 2025-06-04 | End: 2026-06-04

## 2025-08-18 DIAGNOSIS — I10 HYPERTENSION, UNSPECIFIED TYPE: ICD-10-CM

## 2025-08-18 RX ORDER — AMLODIPINE BESYLATE 5 MG/1
5 TABLET ORAL
Qty: 90 TABLET | Refills: 1 | Status: SHIPPED | OUTPATIENT
Start: 2025-08-18

## 2025-08-18 RX ORDER — METOPROLOL SUCCINATE 25 MG/1
TABLET, EXTENDED RELEASE ORAL
Qty: 90 TABLET | Refills: 1 | Status: SHIPPED | OUTPATIENT
Start: 2025-08-18

## 2025-08-28 ENCOUNTER — OFFICE VISIT (OUTPATIENT)
Dept: UROLOGY | Facility: CLINIC | Age: 75
End: 2025-08-28
Payer: MEDICARE

## 2025-08-28 VITALS
SYSTOLIC BLOOD PRESSURE: 131 MMHG | BODY MASS INDEX: 30.22 KG/M2 | DIASTOLIC BLOOD PRESSURE: 81 MMHG | HEIGHT: 66 IN | HEART RATE: 72 BPM | WEIGHT: 188.06 LBS

## 2025-08-28 DIAGNOSIS — N50.812 PAIN IN LEFT TESTICLE: Primary | ICD-10-CM

## 2025-08-28 DIAGNOSIS — R35.0 BENIGN PROSTATIC HYPERPLASIA WITH URINARY FREQUENCY: ICD-10-CM

## 2025-08-28 DIAGNOSIS — N40.1 BENIGN PROSTATIC HYPERPLASIA WITH URINARY FREQUENCY: ICD-10-CM

## 2025-08-28 LAB
BILIRUBIN, UA POC OHS: NEGATIVE
BLOOD, UA POC OHS: NEGATIVE
CLARITY, UA POC OHS: CLEAR
COLOR, UA POC OHS: YELLOW
GLUCOSE, UA POC OHS: 500
KETONES, UA POC OHS: NEGATIVE
LEUKOCYTES, UA POC OHS: NEGATIVE
NITRITE, UA POC OHS: NEGATIVE
PH, UA POC OHS: 5
PROTEIN, UA POC OHS: NEGATIVE
SPECIFIC GRAVITY, UA POC OHS: 1.02
UROBILINOGEN, UA POC OHS: 0.2

## 2025-08-28 PROCEDURE — 3060F POS MICROALBUMINURIA REV: CPT | Mod: CPTII,S$GLB,,

## 2025-08-28 PROCEDURE — 3079F DIAST BP 80-89 MM HG: CPT | Mod: CPTII,S$GLB,,

## 2025-08-28 PROCEDURE — 99214 OFFICE O/P EST MOD 30 MIN: CPT | Mod: S$GLB,,,

## 2025-08-28 PROCEDURE — 3046F HEMOGLOBIN A1C LEVEL >9.0%: CPT | Mod: CPTII,S$GLB,,

## 2025-08-28 PROCEDURE — 99999 PR PBB SHADOW E&M-EST. PATIENT-LVL IV: CPT | Mod: PBBFAC,,,

## 2025-08-28 PROCEDURE — 1160F RVW MEDS BY RX/DR IN RCRD: CPT | Mod: CPTII,S$GLB,,

## 2025-08-28 PROCEDURE — 1159F MED LIST DOCD IN RCRD: CPT | Mod: CPTII,S$GLB,,

## 2025-08-28 PROCEDURE — 3288F FALL RISK ASSESSMENT DOCD: CPT | Mod: CPTII,S$GLB,,

## 2025-08-28 PROCEDURE — 3008F BODY MASS INDEX DOCD: CPT | Mod: CPTII,S$GLB,,

## 2025-08-28 PROCEDURE — 1126F AMNT PAIN NOTED NONE PRSNT: CPT | Mod: CPTII,S$GLB,,

## 2025-08-28 PROCEDURE — 3075F SYST BP GE 130 - 139MM HG: CPT | Mod: CPTII,S$GLB,,

## 2025-08-28 PROCEDURE — 1101F PT FALLS ASSESS-DOCD LE1/YR: CPT | Mod: CPTII,S$GLB,,

## 2025-08-28 PROCEDURE — 81003 URINALYSIS AUTO W/O SCOPE: CPT | Mod: QW,S$GLB,,

## 2025-08-28 PROCEDURE — 3066F NEPHROPATHY DOC TX: CPT | Mod: CPTII,S$GLB,,

## 2025-08-28 RX ORDER — DOXYCYCLINE 100 MG/1
100 CAPSULE ORAL 2 TIMES DAILY
COMMUNITY
Start: 2025-08-18

## 2025-08-28 RX ORDER — OXAPROZIN 600 MG/1
600 TABLET, FILM COATED ORAL EVERY 12 HOURS
Qty: 28 TABLET | Refills: 0 | Status: SHIPPED | OUTPATIENT
Start: 2025-08-28 | End: 2025-09-11

## 2025-09-03 ENCOUNTER — OFFICE VISIT (OUTPATIENT)
Dept: ENDOCRINOLOGY | Facility: CLINIC | Age: 75
End: 2025-09-03
Payer: MEDICARE

## 2025-09-03 ENCOUNTER — TELEPHONE (OUTPATIENT)
Dept: PHARMACY | Facility: CLINIC | Age: 75
End: 2025-09-03
Payer: MEDICARE

## 2025-09-03 ENCOUNTER — OFFICE VISIT (OUTPATIENT)
Dept: INTERNAL MEDICINE | Facility: CLINIC | Age: 75
End: 2025-09-03
Payer: MEDICARE

## 2025-09-03 VITALS
OXYGEN SATURATION: 99 % | BODY MASS INDEX: 30.36 KG/M2 | HEART RATE: 63 BPM | HEIGHT: 66 IN | WEIGHT: 188.94 LBS | SYSTOLIC BLOOD PRESSURE: 124 MMHG | DIASTOLIC BLOOD PRESSURE: 72 MMHG

## 2025-09-03 VITALS
HEIGHT: 66 IN | OXYGEN SATURATION: 98 % | WEIGHT: 189.25 LBS | HEART RATE: 88 BPM | BODY MASS INDEX: 30.41 KG/M2 | DIASTOLIC BLOOD PRESSURE: 68 MMHG | SYSTOLIC BLOOD PRESSURE: 116 MMHG

## 2025-09-03 DIAGNOSIS — E11.59 HYPERTENSION ASSOCIATED WITH DIABETES: Chronic | ICD-10-CM

## 2025-09-03 DIAGNOSIS — E11.69 TYPE 2 DIABETES MELLITUS WITH OTHER SPECIFIED COMPLICATION, WITH LONG-TERM CURRENT USE OF INSULIN: Chronic | ICD-10-CM

## 2025-09-03 DIAGNOSIS — E66.811 CLASS 1 OBESITY DUE TO EXCESS CALORIES WITH SERIOUS COMORBIDITY AND BODY MASS INDEX (BMI) OF 30.0 TO 30.9 IN ADULT: ICD-10-CM

## 2025-09-03 DIAGNOSIS — E53.8 VITAMIN B 12 DEFICIENCY: ICD-10-CM

## 2025-09-03 DIAGNOSIS — E11.59 HYPERTENSION ASSOCIATED WITH DIABETES: ICD-10-CM

## 2025-09-03 DIAGNOSIS — I15.2 HYPERTENSION ASSOCIATED WITH DIABETES: ICD-10-CM

## 2025-09-03 DIAGNOSIS — E66.09 CLASS 1 OBESITY DUE TO EXCESS CALORIES WITH SERIOUS COMORBIDITY AND BODY MASS INDEX (BMI) OF 30.0 TO 30.9 IN ADULT: ICD-10-CM

## 2025-09-03 DIAGNOSIS — E11.65 UNCONTROLLED TYPE 2 DIABETES MELLITUS WITH HYPERGLYCEMIA: Primary | ICD-10-CM

## 2025-09-03 DIAGNOSIS — E11.51 DIABETES MELLITUS WITH PERIPHERAL VASCULAR DISEASE: ICD-10-CM

## 2025-09-03 DIAGNOSIS — Z79.4 TYPE 2 DIABETES MELLITUS WITH DIABETIC NEUROPATHY, WITH LONG-TERM CURRENT USE OF INSULIN: ICD-10-CM

## 2025-09-03 DIAGNOSIS — E11.40 TYPE 2 DIABETES MELLITUS WITH DIABETIC NEUROPATHY, WITH LONG-TERM CURRENT USE OF INSULIN: ICD-10-CM

## 2025-09-03 DIAGNOSIS — E78.5 HYPERLIPIDEMIA, UNSPECIFIED HYPERLIPIDEMIA TYPE: Chronic | ICD-10-CM

## 2025-09-03 DIAGNOSIS — I15.2 HYPERTENSION ASSOCIATED WITH DIABETES: Chronic | ICD-10-CM

## 2025-09-03 DIAGNOSIS — I25.10 CORONARY ARTERY DISEASE, UNSPECIFIED VESSEL OR LESION TYPE, UNSPECIFIED WHETHER ANGINA PRESENT, UNSPECIFIED WHETHER NATIVE OR TRANSPLANTED HEART: ICD-10-CM

## 2025-09-03 DIAGNOSIS — Z79.4 TYPE 2 DIABETES MELLITUS WITH OTHER SPECIFIED COMPLICATION, WITH LONG-TERM CURRENT USE OF INSULIN: Chronic | ICD-10-CM

## 2025-09-03 DIAGNOSIS — Z00.00 ANNUAL PHYSICAL EXAM: Primary | ICD-10-CM

## 2025-09-03 DIAGNOSIS — E11.65 UNCONTROLLED TYPE 2 DIABETES MELLITUS WITH HYPERGLYCEMIA: ICD-10-CM

## 2025-09-03 DIAGNOSIS — E78.5 HYPERLIPIDEMIA, UNSPECIFIED HYPERLIPIDEMIA TYPE: ICD-10-CM

## 2025-09-03 PROCEDURE — 3008F BODY MASS INDEX DOCD: CPT | Mod: CPTII,S$GLB,, | Performed by: INTERNAL MEDICINE

## 2025-09-03 PROCEDURE — 99397 PER PM REEVAL EST PAT 65+ YR: CPT | Mod: S$GLB,,, | Performed by: INTERNAL MEDICINE

## 2025-09-03 PROCEDURE — 3066F NEPHROPATHY DOC TX: CPT | Mod: CPTII,S$GLB,, | Performed by: INTERNAL MEDICINE

## 2025-09-03 PROCEDURE — 3074F SYST BP LT 130 MM HG: CPT | Mod: CPTII,S$GLB,,

## 2025-09-03 PROCEDURE — 3046F HEMOGLOBIN A1C LEVEL >9.0%: CPT | Mod: CPTII,S$GLB,,

## 2025-09-03 PROCEDURE — 99999 PR PBB SHADOW E&M-EST. PATIENT-LVL III: CPT | Mod: PBBFAC,,, | Performed by: INTERNAL MEDICINE

## 2025-09-03 PROCEDURE — 1159F MED LIST DOCD IN RCRD: CPT | Mod: CPTII,S$GLB,,

## 2025-09-03 PROCEDURE — 3288F FALL RISK ASSESSMENT DOCD: CPT | Mod: CPTII,S$GLB,, | Performed by: INTERNAL MEDICINE

## 2025-09-03 PROCEDURE — 1101F PT FALLS ASSESS-DOCD LE1/YR: CPT | Mod: CPTII,S$GLB,, | Performed by: INTERNAL MEDICINE

## 2025-09-03 PROCEDURE — 3078F DIAST BP <80 MM HG: CPT | Mod: CPTII,S$GLB,,

## 2025-09-03 PROCEDURE — 3060F POS MICROALBUMINURIA REV: CPT | Mod: CPTII,S$GLB,,

## 2025-09-03 PROCEDURE — 3046F HEMOGLOBIN A1C LEVEL >9.0%: CPT | Mod: CPTII,S$GLB,, | Performed by: INTERNAL MEDICINE

## 2025-09-03 PROCEDURE — 3078F DIAST BP <80 MM HG: CPT | Mod: CPTII,S$GLB,, | Performed by: INTERNAL MEDICINE

## 2025-09-03 PROCEDURE — G2211 COMPLEX E/M VISIT ADD ON: HCPCS | Mod: S$GLB,,,

## 2025-09-03 PROCEDURE — 3060F POS MICROALBUMINURIA REV: CPT | Mod: CPTII,S$GLB,, | Performed by: INTERNAL MEDICINE

## 2025-09-03 PROCEDURE — 3008F BODY MASS INDEX DOCD: CPT | Mod: CPTII,S$GLB,,

## 2025-09-03 PROCEDURE — 1126F AMNT PAIN NOTED NONE PRSNT: CPT | Mod: CPTII,S$GLB,, | Performed by: INTERNAL MEDICINE

## 2025-09-03 PROCEDURE — 99214 OFFICE O/P EST MOD 30 MIN: CPT | Mod: S$GLB,,,

## 2025-09-03 PROCEDURE — 1126F AMNT PAIN NOTED NONE PRSNT: CPT | Mod: CPTII,S$GLB,,

## 2025-09-03 PROCEDURE — 1101F PT FALLS ASSESS-DOCD LE1/YR: CPT | Mod: CPTII,S$GLB,,

## 2025-09-03 PROCEDURE — 3288F FALL RISK ASSESSMENT DOCD: CPT | Mod: CPTII,S$GLB,,

## 2025-09-03 PROCEDURE — 3074F SYST BP LT 130 MM HG: CPT | Mod: CPTII,S$GLB,, | Performed by: INTERNAL MEDICINE

## 2025-09-03 PROCEDURE — 3066F NEPHROPATHY DOC TX: CPT | Mod: CPTII,S$GLB,,

## 2025-09-03 PROCEDURE — 99999 PR PBB SHADOW E&M-EST. PATIENT-LVL V: CPT | Mod: PBBFAC,,,

## 2025-09-03 RX ORDER — SEMAGLUTIDE 0.68 MG/ML
0.5 INJECTION, SOLUTION SUBCUTANEOUS
Qty: 3 ML | Refills: 11 | Status: SHIPPED | OUTPATIENT
Start: 2025-09-03 | End: 2026-09-03

## 2025-09-03 RX ORDER — INSULIN DEGLUDEC 200 U/ML
35 INJECTION, SOLUTION SUBCUTANEOUS NIGHTLY
Qty: 5.25 ML | Refills: 11 | Status: SHIPPED | OUTPATIENT
Start: 2025-09-03 | End: 2026-09-03